# Patient Record
Sex: FEMALE | Race: WHITE | Employment: OTHER | URBAN - METROPOLITAN AREA
[De-identification: names, ages, dates, MRNs, and addresses within clinical notes are randomized per-mention and may not be internally consistent; named-entity substitution may affect disease eponyms.]

---

## 2017-02-14 ENCOUNTER — OFFICE VISIT (OUTPATIENT)
Dept: FAMILY MEDICINE CLINIC | Age: 72
End: 2017-02-14

## 2017-02-14 ENCOUNTER — HOSPITAL ENCOUNTER (OUTPATIENT)
Dept: LAB | Age: 72
Discharge: HOME | End: 2017-02-14
Payer: MEDICARE

## 2017-02-14 VITALS
HEIGHT: 68 IN | HEART RATE: 77 BPM | DIASTOLIC BLOOD PRESSURE: 72 MMHG | RESPIRATION RATE: 18 BRPM | TEMPERATURE: 97.5 F | BODY MASS INDEX: 21.61 KG/M2 | SYSTOLIC BLOOD PRESSURE: 155 MMHG | OXYGEN SATURATION: 100 % | WEIGHT: 142.6 LBS

## 2017-02-14 DIAGNOSIS — F41.9 ANXIETY: ICD-10-CM

## 2017-02-14 DIAGNOSIS — E78.5 HYPERLIPIDEMIA LDL GOAL <70: ICD-10-CM

## 2017-02-14 DIAGNOSIS — Z12.31 ENCOUNTER FOR SCREENING MAMMOGRAM FOR BREAST CANCER: ICD-10-CM

## 2017-02-14 DIAGNOSIS — B00.9 HERPES SIMPLEX TYPE 2 INFECTION: ICD-10-CM

## 2017-02-14 DIAGNOSIS — M81.0 OSTEOPOROSIS: ICD-10-CM

## 2017-02-14 DIAGNOSIS — I10 ESSENTIAL HYPERTENSION: ICD-10-CM

## 2017-02-14 DIAGNOSIS — I10 ESSENTIAL HYPERTENSION: Primary | ICD-10-CM

## 2017-02-14 LAB
ALBUMIN SERPL BCP-MCNC: 4.6 G/DL (ref 3.4–5)
ALBUMIN/GLOB SERPL: 1.6 {RATIO} (ref 0.8–1.7)
ALP SERPL-CCNC: 67 U/L (ref 45–117)
ALT SERPL-CCNC: 23 U/L (ref 13–56)
ANION GAP BLD CALC-SCNC: 10 MMOL/L (ref 3–18)
AST SERPL W P-5'-P-CCNC: 10 U/L (ref 15–37)
BILIRUB SERPL-MCNC: 1 MG/DL (ref 0.2–1)
BUN SERPL-MCNC: 17 MG/DL (ref 7–18)
BUN/CREAT SERPL: 21 (ref 12–20)
CALCIUM SERPL-MCNC: 10 MG/DL (ref 8.5–10.1)
CHLORIDE SERPL-SCNC: 95 MMOL/L (ref 100–108)
CO2 SERPL-SCNC: 25 MMOL/L (ref 21–32)
CREAT SERPL-MCNC: 0.82 MG/DL (ref 0.6–1.3)
GLOBULIN SER CALC-MCNC: 2.8 G/DL (ref 2–4)
GLUCOSE SERPL-MCNC: 88 MG/DL (ref 74–99)
POTASSIUM SERPL-SCNC: 4.5 MMOL/L (ref 3.5–5.5)
PROT SERPL-MCNC: 7.4 G/DL (ref 6.4–8.2)
SODIUM SERPL-SCNC: 130 MMOL/L (ref 136–145)

## 2017-02-14 PROCEDURE — 80053 COMPREHEN METABOLIC PANEL: CPT | Performed by: NURSE PRACTITIONER

## 2017-02-14 RX ORDER — LISINOPRIL 30 MG/1
30 TABLET ORAL DAILY
Qty: 30 TAB | Refills: 3 | Status: SHIPPED | OUTPATIENT
Start: 2017-02-14 | End: 2017-06-06 | Stop reason: SDUPTHER

## 2017-02-14 RX ORDER — BUSPIRONE HYDROCHLORIDE 15 MG/1
7.5 TABLET ORAL 2 TIMES DAILY
Qty: 30 TAB | Refills: 2 | Status: SHIPPED | OUTPATIENT
Start: 2017-02-14 | End: 2017-06-06 | Stop reason: SDUPTHER

## 2017-02-14 RX ORDER — VALACYCLOVIR HYDROCHLORIDE 1 G/1
2000 TABLET, FILM COATED ORAL 2 TIMES DAILY
Qty: 30 TAB | Refills: 1 | Status: SHIPPED | OUTPATIENT
Start: 2017-02-14 | End: 2017-06-06 | Stop reason: SDUPTHER

## 2017-02-14 RX ORDER — AMLODIPINE BESYLATE 10 MG/1
5 TABLET ORAL DAILY
Qty: 30 TAB | Refills: 2 | Status: SHIPPED | OUTPATIENT
Start: 2017-02-14 | End: 2017-06-06 | Stop reason: SDUPTHER

## 2017-02-14 NOTE — PROGRESS NOTES
Progress Note  Today's Date:  2017   Patient:  Kacy Salazar  Patient :  1945    Subjective:   Kacy Salazar is a 66 y.o. female who presents for follow up for Hypertension, Hyperlipidemia, and Anxiety. Is compliant with medication. Has Sinus congestion at this time but has not used her nasal spry because it burns her nostrils. Also want to have her preventive screenings mammogram and Bone Density done. She also wants to schedule her yearly Reclast for Osteoporosis; last infusion was in 2016. Current Outpatient Meds and Allergies     Current Outpatient Prescriptions on File Prior to Visit   Medication Sig Dispense Refill    pravastatin (PRAVACHOL) 20 mg tablet Take 1 Tab by mouth nightly. 30 Tab 3    tiZANidine (ZANAFLEX) 2 mg tablet Take  by mouth.  busPIRone (BUSPAR) 15 mg tablet Take 0.5 Tabs by mouth two (2) times a day. Indications: GENERALIZED ANXIETY DISORDER 30 Tab 2    lisinopril (PRINIVIL, ZESTRIL) 20 mg tablet Take 1 Tab by mouth daily. Indications: Hypertension 30 Tab 3    amLODIPine (NORVASC) 10 mg tablet Take 5 mg by mouth daily.  diclofenac (VOLTAREN) 1 % gel Apply  to affected area four (4) times daily.  DOCOSAHEXANOIC ACID/EPA (FISH OIL PO) Take 1,200 mg by mouth three (3) times daily.  glucosamine sulfate 1,000 mg cap Take  by mouth.  acetaminophen (TYLENOL) 650 mg CR tablet Take 650 mg by mouth every six (6) hours as needed for Pain.  VITAMIN B COMPLEX PO Take 1 mL by mouth daily.  CHOLECALCIFEROL, VITAMIN D3, (VITAMIN D3 PO) Take 400 Int'l Units by mouth daily.  CALCIUM-MAGNESIUM-ZINC PO Take  by mouth three (3) times daily.  famotidine (PEPCID) 20 mg tablet Take 20 mg by mouth two (2) times a day.  docusate sodium 100 mg tab Take  by mouth.  melatonin 1 mg tablet Take  by mouth.  ketoconazole (NIZORAL) 2 % topical cream Apply  to affected area daily.       valACYclovir (VALTREX) 1 gram tablet Take 2 Tabs by mouth two (2) times a day. Indications: SUPPRESSION OF RECURRENT HERPES SIMPLEX INFECTION 30 Tab 1     No current facility-administered medications on file prior to visit. These medications have been reviewed and reconciled with the patient during today's visit. Allergies   Allergen Reactions    Compazine [Prochlorperazine] Other (comments)     Childhood reaction    Erythromycin Nausea and Vomiting    Indocin [Indomethacin] Other (comments)     Made her feel weird to the point it scared her    Keflex [Cephalexin] Nausea and Vomiting       ROS:     CONST:   Denies fatigue, weight change, appetite change   NEURO:   Denies headaches, vision changes, dizziness, loss of consciousness  CV:      Denies chest pain, palpitations, orthopnea, PND  PULM:  Denies SOB, wheezing, cough, hemoptysis  GI:             Denies nausea, vomiting, abdominal pain, greasy stools, blood in stool,     diarrhea, constipation  :       Denies dysuria, hematuria, change in urine  MS:      Denies muscle/joint pain, joint swelling  SKIN:        Denies rashes, skin changes  ALLERGY: Denies seasonal allergies, itchy eyes      Objective:     VS:    Visit Vitals    /72 (BP 1 Location: Left arm, BP Patient Position: Sitting)    Pulse 77    Temp 97.5 °F (36.4 °C) (Oral)    Resp 18    Ht 5' 7.52\" (1.715 m)    Wt 142 lb 9.6 oz (64.7 kg)    SpO2 100%    BMI 21.99 kg/m2       General:   well-nourished, well-groomed, pleasant, alert, in no acute distress.      Head:  Normocephalic, atraumatic, MMM,   Ears:  External ears WNL, TMs WNL,   Eyes:  EOMI, PERRL, glasses  Nose:  External nares WNL, nasal congestion,    Neck:  Neck supple with normal ROM for age, no thyromegaly,  Cardiovasc:   Regular rate and rhythm, no murmurs, no rubs, no gallops,  Pulmonary:   Clear breath sounds bilaterally, good air movement, no wheezing, no rales, no rhonchi, normal respiratory effort  Abdomen:   Abdomen soft, nontender, nondistended, NABS,   Extremities:   No edema, no tenderness with palpation of calves, warm and well-perfused  Neuro:   Alert, conversant, appropriate, following commands, no focal deficits. Assessment:       1. Essential hypertension    2. Hyperlipidemia LDL goal <70    3. Anxiety    4. Herpes simplex type 2 infection    5. Osteoporosis    6. Encounter for screening mammogram for breast cancer        Plan:       Orders Placed This Encounter    DEXA BONE DENSITY STUDY AXIAL     Standing Status:   Future     Standing Expiration Date:   3/14/2018     Order Specific Question:   Reason for Exam     Answer:   Hx of Osteoporosis     Comments:   [address] [facility] MAMMO BI SCREENING INCL CAD     Standing Status:   Future     Standing Expiration Date:   3/17/2018     Order Specific Question:   Reason for Exam     Answer:   Routine     Comments:   [facility]    METABOLIC PANEL, COMPREHENSIVE     Standing Status:   Future     Standing Expiration Date:   8/16/2017    busPIRone (BUSPAR) 15 mg tablet     Sig: Take 0.5 Tabs by mouth two (2) times a day. Indications: GENERALIZED ANXIETY DISORDER     Dispense:  30 Tab     Refill:  2    amLODIPine (NORVASC) 10 mg tablet     Sig: Take 0.5 Tabs by mouth daily. Dispense:  30 Tab     Refill:  2    lisinopril (PRINIVIL, ZESTRIL) 30 mg tablet     Sig: Take 1 Tab by mouth daily. Dispense:  30 Tab     Refill:  3    valACYclovir (VALTREX) 1 gram tablet     Sig: Take 2 Tabs by mouth two (2) times a day. Indications: SUPPRESSION OF RECURRENT HERPES SIMPLEX INFECTION     Dispense:  30 Tab     Refill:  1       Advised to start taking Zyrtec daily. I have discussed the diagnosis with the patient and the intended plan as seen in the above orders. The patient has received an after-visit summary along with patient information handout. I have discussed medication side effects and warnings with the patient as well. Pt verbalized understanding.     Follow-up Disposition:  Return in about 6 months (around 8/14/2017), or if symptoms worsen or fail to improve.   AMBER Mccann  2/14/2017, 11:54 AM

## 2017-02-14 NOTE — PROGRESS NOTES
Tierra Samuels is a 66 y.o. female here for a follow up on her HTN. She would like mammo and dexa bone scan. Learning assessment previously completed. 1. Have you been to the ER, urgent care clinic or hospitalized since your last visit? no     2. Have you seen or consulted any other health care providers outside of the [address] [place] [address] since your last visit (Include any pap smears or colon screening)? Dr. Hermes Campbell, Dr. Levy Pay    Do you have an Advanced Directive? no    Would you like information on Advanced Directives?  Has paper work

## 2017-02-14 NOTE — MR AVS SNAPSHOT
Visit Information Date & Time Provider Department Dept. Phone Encounter #  
 2/14/2017 11:30 AM Mell Crouch, [facility] [phone] [id number] Upcoming Health Maintenance Date Due ZOSTER VACCINE AGE 60> 12/17/2004 GLAUCOMA SCREENING Q2Y 4/13/2017 Pneumococcal 65+ Low/Medium Risk (2 of 2 - PPSV23) 6/14/2017 MEDICARE YEARLY EXAM 11/16/2017 BREAST CANCER SCRN MAMMOGRAM 12/7/2017 COLONOSCOPY 11/4/2018 DTaP/Tdap/Td series (2 - Td) 9/11/2022 Allergies as of 2/14/2017  Review Complete On: 2/14/2017 By: AMBER Dupont Severity Noted Reaction Type Reactions Compazine [Prochlorperazine]  07/21/2016    Other (comments) Childhood reaction Erythromycin  07/21/2016   Intolerance Nausea and Vomiting Indocin [Indomethacin]  07/21/2016   Intolerance Other (comments) Made her feel weird to the point it scared her Keflex [Cephalexin]  07/21/2016   Intolerance Nausea and Vomiting Current Immunizations  Reviewed on 12/2/2016 Name Date Pneumococcal Conjugate (PCV-13) 6/14/2016 Not reviewed this visit You Were Diagnosed With   
  
 Codes Comments Essential hypertension    -  Primary ICD-10-CM: I10 
ICD-9-CM: 401.9 Hyperlipidemia LDL goal <70     ICD-10-CM: E78.5 ICD-9-CM: 272.4 Anxiety     ICD-10-CM: F41.9 ICD-9-CM: 300.00 Herpes simplex type 2 infection     ICD-10-CM: B00.9 ICD-9-CM: 054.9 Osteoporosis     ICD-10-CM: M81.0 ICD-9-CM: 733.00 Encounter for screening mammogram for breast cancer     ICD-10-CM: Z12.31 
ICD-9-CM: V76.12 Vitals BP Pulse Temp Resp Height(growth percentile) Weight(growth percentile) 155/72 (BP 1 Location: Left arm, BP Patient Position: Sitting) 77 97.5 °F (36.4 °C) (Oral) 18 5' 7.52\" (1.715 m) 142 lb 9.6 oz (64.7 kg) SpO2 BMI OB Status Smoking Status 100% 21.99 kg/m2 Postmenopausal Current Every Day Smoker Vitals History BMI and BSA Data Body Mass Index Body Surface Area  
 21.99 kg/m 2 1.76 m 2 Preferred Pharmacy Pharmacy Name Phone [facility]  [address] [phone] Your Updated Medication List  
  
   
This list is accurate as of: 2/14/17 12:45 PM.  Always use your most recent med list.  
  
  
  
  
 acetaminophen 650 mg CR tablet Commonly known as:  TYLENOL Take 650 mg by mouth every six (6) hours as needed for Pain. amLODIPine 10 mg tablet Commonly known as:  Rudene Post Take 0.5 Tabs by mouth daily. busPIRone 15 mg tablet Commonly known as:  BUSPAR Take 0.5 Tabs by mouth two (2) times a day. Indications: GENERALIZED ANXIETY DISORDER  
  
 CALCIUM-MAGNESIUM-ZINC PO Take  by mouth three (3) times daily. diclofenac 1 % Gel Commonly known as:  VOLTAREN Apply  to affected area four (4) times daily. docusate sodium 100 mg Tab Take  by mouth. famotidine 20 mg tablet Commonly known as:  PEPCID Take 20 mg by mouth two (2) times a day. FISH OIL PO Take 1,200 mg by mouth three (3) times daily. glucosamine sulfate 1,000 mg Cap Take  by mouth.  
  
 ketoconazole 2 % topical cream  
Commonly known as:  NIZORAL Apply  to affected area daily. lisinopril 30 mg tablet Commonly known as:  Delsie Commander Take 1 Tab by mouth daily. melatonin 1 mg tablet Take  by mouth.  
  
 pravastatin 20 mg tablet Commonly known as:  PRAVACHOL Take 1 Tab by mouth nightly. tiZANidine 2 mg tablet Commonly known as:  Jentila [place] Take  by mouth. valACYclovir 1 gram tablet Commonly known as:  VALTREX Take 2 Tabs by mouth two (2) times a day. Indications: SUPPRESSION OF RECURRENT HERPES SIMPLEX INFECTION  
  
 VITAMIN B COMPLEX PO Take 1 mL by mouth daily. VITAMIN D3 PO Take 400 Int'l Units by mouth daily. Prescriptions Sent to Pharmacy Refills  
 busPIRone (BUSPAR) 15 mg tablet 2 Sig: Take 0.5 Tabs by mouth two (2) times a day. Indications: GENERALIZED ANXIETY DISORDER Class: Normal  
 Pharmacy: [facility] [place] [address], [address] [place] [address]. [address] [place] [address] Ph #: [phone] Route: Oral  
 amLODIPine (NORVASC) 10 mg tablet 2 Sig: Take 0.5 Tabs by mouth daily. Class: Normal  
 Pharmacy: [facility] [place] [address], [address] [place] [address]. [address] [place] [address] Ph #: [phone] Route: Oral  
 lisinopril (PRINIVIL, ZESTRIL) 30 mg tablet 3 Sig: Take 1 Tab by mouth daily. Class: Normal  
 Pharmacy: [facility] [place] [address], [address] [place] [address] [address] [place] [address] Ph #: [phone] Route: Oral  
 valACYclovir (VALTREX) 1 gram tablet 1 Sig: Take 2 Tabs by mouth two (2) times a day. Indications: SUPPRESSION OF RECURRENT HERPES SIMPLEX INFECTION Class: Normal  
 Pharmacy: [facility] [place] [address], [address] [place] [address]. [address] [place] [address] Ph #: [phone] Route: Oral  
  
To-Do List   
 02/14/2017 Imaging:  DEXA BONE DENSITY STUDY AXIAL   
  
 02/14/2017 Imaging:  TONIE MAMMO BI SCREENING INCL CAD   
  
 05/15/2017 Lab:  METABOLIC PANEL, COMPREHENSIVE Patient Instructions High Cholesterol: Care Instructions Your Care Instructions Cholesterol is a type of fat in your blood. It is needed for many body functions, such as making new cells. Cholesterol is made by your body. It also comes from food you eat. High cholesterol means that you have too much of the fat in your blood. This raises your risk of a heart attack and stroke. LDL and HDL are part of your total cholesterol. LDL is the \"bad\" cholesterol.  High LDL can raise your risk for heart disease, heart attack, and stroke. HDL is the \"good\" cholesterol. It helps clear bad cholesterol from the body. High HDL is linked with a lower risk of heart disease, heart attack, and stroke. Your cholesterol levels help your doctor find out your risk for having a heart attack or stroke. You and your doctor can talk about whether you need to lower your risk and what treatment is best for you. A heart-healthy lifestyle along with medicines can help lower your cholesterol and your risk. The way you choose to lower your risk will depend on how high your risk is for heart attack and stroke. It will also depend on how you feel about taking medicines. Follow-up care is a key part of your treatment and safety. Be sure to make and go to all appointments, and call your doctor if you are having problems. It's also a good idea to know your test results and keep a list of the medicines you take. How can you care for yourself at home? · Eat a variety of foods every day. Good choices include fruits, vegetables, whole grains (like oatmeal), dried beans and peas, nuts and seeds, soy products (like tofu), and fat-free or low-fat dairy products. · Replace butter, margarine, and hydrogenated or partially hydrogenated oils with olive and canola oils. (Canola oil margarine without trans fat is fine.) · Replace red meat with fish, poultry, and soy protein (like tofu). · Limit processed and packaged foods like chips, crackers, and cookies. · Bake, broil, or steam foods. Don't ya them. · Be physically active. Get at least 30 minutes of exercise on most days of the week. Walking is a good choice. You also may want to do other activities, such as running, swimming, cycling, or playing tennis or team sports. · Stay at a healthy weight or lose weight by making the changes in eating and physical activity listed above. Losing just a small amount of weight, even 5 to 10 pounds, can reduce your risk for having a heart attack or stroke. · Do not smoke. When should you call for help? Watch closely for changes in your health, and be sure to contact your doctor if: 
· You need help making lifestyle changes. · You have questions about your medicine. Where can you learn more? Go to http://alfa-gino.info/. Enter R351 in the search box to learn more about \"High Cholesterol: Care Instructions. \" Current as of: January 27, 2016 Content Version: 11.1 © 2006-2016 [employer]. Care instructions adapted under license by [employer] (which disclaims liability or warranty for this information). If you have questions about a medical condition or this instruction, always ask your healthcare professional. [place] [address] any warranty or liability for your use of this information. DASH Diet: Care Instructions Your Care Instructions The DASH diet is an eating plan that can help lower your blood pressure. DASH stands for Dietary Approaches to Stop Hypertension. Hypertension is high blood pressure. The DASH diet focuses on eating foods that are high in calcium, potassium, and magnesium. These nutrients can lower blood pressure. The foods that are highest in these nutrients are fruits, vegetables, low-fat dairy products, nuts, seeds, and legumes. But taking calcium, potassium, and magnesium supplements instead of eating foods that are high in those nutrients does not have the same effect. The DASH diet also includes whole grains, fish, and poultry. The DASH diet is one of several lifestyle changes your doctor may recommend to lower your high blood pressure. Your doctor may also want you to decrease the amount of sodium in your diet. Lowering sodium while following the DASH diet can lower blood pressure even further than just the DASH diet alone. Follow-up care is a key part of your treatment and safety.  Be sure to make and go to all appointments, and call your doctor if you are having problems. It's also a good idea to know your test results and keep a list of the medicines you take. How can you care for yourself at home? Following the DASH diet · Eat 4 to 5 servings of fruit each day. A serving is 1 medium-sized piece of fruit, ½ cup chopped or canned fruit, 1/4 cup dried fruit, or 4 ounces (½ cup) of fruit juice. Choose fruit more often than fruit juice. · Eat 4 to 5 servings of vegetables each day. A serving is 1 cup of lettuce or raw leafy vegetables, ½ cup of chopped or cooked vegetables, or 4 ounces (½ cup) of vegetable juice. Choose vegetables more often than vegetable juice. · Get 2 to 3 servings of low-fat and fat-free dairy each day. A serving is 8 ounces of milk, 1 cup of yogurt, or 1 ½ ounces of cheese. · Eat 6 to 8 servings of grains each day. A serving is 1 slice of bread, 1 ounce of dry cereal, or ½ cup of cooked rice, pasta, or cooked cereal. Try to choose whole-grain products as much as possible. · Limit lean meat, poultry, and fish to 2 servings each day. A serving is 3 ounces, about the size of a deck of cards. · Eat 4 to 5 servings of nuts, seeds, and legumes (cooked dried beans, lentils, and split peas) each week. A serving is 1/3 cup of nuts, 2 tablespoons of seeds, or ½ cup of cooked beans or peas. · Limit fats and oils to 2 to 3 servings each day. A serving is 1 teaspoon of vegetable oil or 2 tablespoons of salad dressing. · Limit sweets and added sugars to 5 servings or less a week. A serving is 1 tablespoon jelly or jam, ½ cup sorbet, or 1 cup of lemonade. · Eat less than 2,300 milligrams (mg) of sodium a day. If you limit your sodium to 1,500 mg a day, you can lower your blood pressure even more. Tips for success · Start small. Do not try to make dramatic changes to your diet all at once. You might feel that you are missing out on your favorite foods and then be more likely to not follow the plan.  Make small changes, and stick with them. Once those changes become habit, add a few more changes. · Try some of the following: ¨ Make it a goal to eat a fruit or vegetable at every meal and at snacks. This will make it easy to get the recommended amount of fruits and vegetables each day. ¨ Try yogurt topped with fruit and nuts for a snack or healthy dessert. ¨ Add lettuce, tomato, cucumber, and onion to sandwiches. ¨ Combine a ready-made pizza crust with low-fat mozzarella cheese and lots of vegetable toppings. Try using tomatoes, squash, spinach, broccoli, carrots, cauliflower, and onions. ¨ Have a variety of cut-up vegetables with a low-fat dip as an appetizer instead of chips and dip. ¨ Sprinkle sunflower seeds or chopped almonds over salads. Or try adding chopped walnuts or almonds to cooked vegetables. ¨ Try some vegetarian meals using beans and peas. Add garbanzo or kidney beans to salads. Make burritos and tacos with mashed avila beans or black beans. Where can you learn more? Go to http://alfa-gino.info/. Enter M273 in the search box to learn more about \"DASH Diet: Care Instructions. \" Current as of: March 23, 2016 Content Version: 11.1 © 5699-8580 [employer]. Care instructions adapted under license by [employer] (which disclaims liability or warranty for this information). If you have questions about a medical condition or this instruction, always ask your healthcare professional. [place] [address] any warranty or liability for your use of this information. Low Sodium Diet (2,000 Milligram): Care Instructions Your Care Instructions Too much sodium causes your body to hold on to extra water. This can raise your blood pressure and force your heart and kidneys to work harder. In very serious cases, this could cause you to be put in the hospital. It might even be life-threatening.  By limiting sodium, you will feel better and lower your risk of serious problems. The most common source of sodium is salt. People get most of the salt in their diet from canned, prepared, and packaged foods. Fast food and restaurant meals also are very high in sodium. Your doctor will probably limit your sodium to less than 2,000 milligrams (mg) a day. This limit counts all the sodium in prepared and packaged foods and any salt you add to your food. Follow-up care is a key part of your treatment and safety. Be sure to make and go to all appointments, and call your doctor if you are having problems. It's also a good idea to know your test results and keep a list of the medicines you take. How can you care for yourself at home? Read food labels · Read labels on cans and food packages. The labels tell you how much sodium is in each serving. Make sure that you look at the serving size. If you eat more than the serving size, you have eaten more sodium. · Food labels also tell you the Percent Daily Value for sodium. Choose products with low Percent Daily Values for sodium. · Be aware that sodium can come in forms other than salt, including monosodium glutamate (MSG), sodium citrate, and sodium bicarbonate (baking soda). MSG is often added to Asian food. When you eat out, you can sometimes ask for food without MSG or added salt. Buy low-sodium foods · Buy foods that are labeled \"unsalted\" (no salt added), \"sodium-free\" (less than 5 mg of sodium per serving), or \"low-sodium\" (less than 140 mg of sodium per serving). Foods labeled \"reduced-sodium\" and \"light sodium\" may still have too much sodium. Be sure to read the label to see how much sodium you are getting. · Buy fresh vegetables, or frozen vegetables without added sauces. Buy low-sodium versions of canned vegetables, soups, and other canned goods. Prepare low-sodium meals · Cut back on the amount of salt you use in cooking.  This will help you adjust to the taste. Do not add salt after cooking. One teaspoon of salt has about 2,300 mg of sodium. · Take the salt shaker off the table. · Flavor your food with garlic, lemon juice, onion, vinegar, herbs, and spices. Do not use soy sauce, lite soy sauce, steak sauce, onion salt, garlic salt, celery salt, mustard, or ketchup on your food. · Use low-sodium salad dressings, sauces, and ketchup. Or make your own salad dressings and sauces without adding salt. · Use less salt (or none) when recipes call for it. You can often use half the salt a recipe calls for without losing flavor. Other foods such as rice, pasta, and grains do not need added salt. · Rinse canned vegetables, and cook them in fresh water. This removes somebut not allof the salt. · Avoid water that is naturally high in sodium or that has been treated with water softeners, which add sodium. Call your local water company to find out the sodium content of your water supply. If you buy bottled water, read the label and choose a sodium-free brand. Avoid high-sodium foods · Avoid eating: ¨ Smoked, cured, salted, and canned meat, fish, and poultry. ¨ Ham, gomez, hot dogs, and luncheon meats. ¨ Regular, hard, and processed cheese and regular peanut butter. ¨ Crackers with salted tops, and other salted snack foods such as pretzels, chips, and salted popcorn. ¨ Frozen prepared meals, unless labeled low-sodium. ¨ Canned and dried soups, broths, and bouillon, unless labeled sodium-free or low-sodium. ¨ Canned vegetables, unless labeled sodium-free or low-sodium. ¨ [place]ia fries, pizza, tacos, and other fast foods. ¨ Pickles, olives, ketchup, and other condiments, especially soy sauce, unless labeled sodium-free or low-sodium. Where can you learn more? Go to http://alfa-gino.info/. Enter G331 in the search box to learn more about \"Low Sodium Diet (2,000 Milligram): Care Instructions. \" Current as of: July 26, 2016 Content Version: 11.1 © 9095-8766 [employer]. Care instructions adapted under license by Robert Applebaum MD (which disclaims liability or warranty for this information). If you have questions about a medical condition or this instruction, always ask your healthcare professional. [address] any warranty or liability for your use of this information. Introducing [facility]! [facility] introduces Krugle patient portal. Now you can access parts of your medical record, email your doctor's office, and request medication refills online. 1. In your internet browser, go to [email or web address] 2. Click on the First Time User? Click Here link in the Sign In box. You will see the New Member Sign Up page. 3. Enter your Krugle Access Code exactly as it appears below. You will not need to use this code after youve completed the sign-up process. If you do not sign up before the expiration date, you must request a new code. · Krugle Access Code: [email or web address] Expires: 5/15/2017 12:44 PM 
 
4. Enter the last four digits of your Social Security Number (xxxx) and Date of Birth (mm/dd/yyyy) as indicated and click Submit. You will be taken to the next sign-up page. 5. Create a GlucoSentientt ID. This will be your Krugle login ID and cannot be changed, so think of one that is secure and easy to remember. 6. Create a Krugle password. You can change your password at any time. 7. Enter your Password Reset Question and Answer. This can be used at a later time if you forget your password. 8. Enter your e-mail address. You will receive e-mail notification when new information is available in [address] 9. Click Sign Up. You can now view and download portions of your medical record. 10. Click the Download Summary menu link to download a portable copy of your medical information. If you have questions, please visit the Frequently Asked Questions section of the [employer] website. Remember, RightSignature is NOT to be used for urgent needs. For medical emergencies, dial 911. Now available from your iPhone and Android! Please provide this summary of care documentation to your next provider. Your primary care clinician is listed as Ronen Tolbert. If you have any questions after today's visit, please call [phone].

## 2017-02-14 NOTE — PATIENT INSTRUCTIONS
High Cholesterol: Care Instructions  Your Care Instructions  Cholesterol is a type of fat in your blood. It is needed for many body functions, such as making new cells. Cholesterol is made by your body. It also comes from food you eat. High cholesterol means that you have too much of the fat in your blood. This raises your risk of a heart attack and stroke. LDL and HDL are part of your total cholesterol. LDL is the \"bad\" cholesterol. High LDL can raise your risk for heart disease, heart attack, and stroke. HDL is the \"good\" cholesterol. It helps clear bad cholesterol from the body. High HDL is linked with a lower risk of heart disease, heart attack, and stroke. Your cholesterol levels help your doctor find out your risk for having a heart attack or stroke. You and your doctor can talk about whether you need to lower your risk and what treatment is best for you. A heart-healthy lifestyle along with medicines can help lower your cholesterol and your risk. The way you choose to lower your risk will depend on how high your risk is for heart attack and stroke. It will also depend on how you feel about taking medicines. Follow-up care is a key part of your treatment and safety. Be sure to make and go to all appointments, and call your doctor if you are having problems. It's also a good idea to know your test results and keep a list of the medicines you take. How can you care for yourself at home? · Eat a variety of foods every day. Good choices include fruits, vegetables, whole grains (like oatmeal), dried beans and peas, nuts and seeds, soy products (like tofu), and fat-free or low-fat dairy products. · Replace butter, margarine, and hydrogenated or partially hydrogenated oils with olive and canola oils. (Canola oil margarine without trans fat is fine.)  · Replace red meat with fish, poultry, and soy protein (like tofu). · Limit processed and packaged foods like chips, crackers, and cookies.   · Bake, broil, or steam foods. Don't ya them. · Be physically active. Get at least 30 minutes of exercise on most days of the week. Walking is a good choice. You also may want to do other activities, such as running, swimming, cycling, or playing tennis or team sports. · Stay at a healthy weight or lose weight by making the changes in eating and physical activity listed above. Losing just a small amount of weight, even 5 to 10 pounds, can reduce your risk for having a heart attack or stroke. · Do not smoke. When should you call for help? Watch closely for changes in your health, and be sure to contact your doctor if:  · You need help making lifestyle changes. · You have questions about your medicine. Where can you learn more? Go to http://alfa-gino.info/. Enter N616 in the search box to learn more about \"High Cholesterol: Care Instructions. \"  Current as of: January 27, 2016  Content Version: 11.1  © 6091-6677 [employer]. Care instructions adapted under license by [employer] (which disclaims liability or warranty for this information). If you have questions about a medical condition or this instruction, always ask your healthcare professional. [address] any warranty or liability for your use of this information. DASH Diet: Care Instructions  Your Care Instructions  The DASH diet is an eating plan that can help lower your blood pressure. DASH stands for Dietary Approaches to Stop Hypertension. Hypertension is high blood pressure. The DASH diet focuses on eating foods that are high in calcium, potassium, and magnesium. These nutrients can lower blood pressure. The foods that are highest in these nutrients are fruits, vegetables, low-fat dairy products, nuts, seeds, and legumes. But taking calcium, potassium, and magnesium supplements instead of eating foods that are high in those nutrients does not have the same effect.  The DASH diet also includes whole grains, fish, and poultry. The DASH diet is one of several lifestyle changes your doctor may recommend to lower your high blood pressure. Your doctor may also want you to decrease the amount of sodium in your diet. Lowering sodium while following the DASH diet can lower blood pressure even further than just the DASH diet alone. Follow-up care is a key part of your treatment and safety. Be sure to make and go to all appointments, and call your doctor if you are having problems. It's also a good idea to know your test results and keep a list of the medicines you take. How can you care for yourself at home? Following the DASH diet  · Eat 4 to 5 servings of fruit each day. A serving is 1 medium-sized piece of fruit, ½ cup chopped or canned fruit, 1/4 cup dried fruit, or 4 ounces (½ cup) of fruit juice. Choose fruit more often than fruit juice. · Eat 4 to 5 servings of vegetables each day. A serving is 1 cup of lettuce or raw leafy vegetables, ½ cup of chopped or cooked vegetables, or 4 ounces (½ cup) of vegetable juice. Choose vegetables more often than vegetable juice. · Get 2 to 3 servings of low-fat and fat-free dairy each day. A serving is 8 ounces of milk, 1 cup of yogurt, or 1 ½ ounces of cheese. · Eat 6 to 8 servings of grains each day. A serving is 1 slice of bread, 1 ounce of dry cereal, or ½ cup of cooked rice, pasta, or cooked cereal. Try to choose whole-grain products as much as possible. · Limit lean meat, poultry, and fish to 2 servings each day. A serving is 3 ounces, about the size of a deck of cards. · Eat 4 to 5 servings of nuts, seeds, and legumes (cooked dried beans, lentils, and split peas) each week. A serving is 1/3 cup of nuts, 2 tablespoons of seeds, or ½ cup of cooked beans or peas. · Limit fats and oils to 2 to 3 servings each day. A serving is 1 teaspoon of vegetable oil or 2 tablespoons of salad dressing. · Limit sweets and added sugars to 5 servings or less a week. A serving is 1 tablespoon jelly or jam, ½ cup sorbet, or 1 cup of lemonade. · Eat less than 2,300 milligrams (mg) of sodium a day. If you limit your sodium to 1,500 mg a day, you can lower your blood pressure even more. Tips for success  · Start small. Do not try to make dramatic changes to your diet all at once. You might feel that you are missing out on your favorite foods and then be more likely to not follow the plan. Make small changes, and stick with them. Once those changes become habit, add a few more changes. · Try some of the following:  ¨ Make it a goal to eat a fruit or vegetable at every meal and at snacks. This will make it easy to get the recommended amount of fruits and vegetables each day. ¨ Try yogurt topped with fruit and nuts for a snack or healthy dessert. ¨ Add lettuce, tomato, cucumber, and onion to sandwiches. ¨ Combine a ready-made pizza crust with low-fat mozzarella cheese and lots of vegetable toppings. Try using tomatoes, squash, spinach, broccoli, carrots, cauliflower, and onions. ¨ Have a variety of cut-up vegetables with a low-fat dip as an appetizer instead of chips and dip. ¨ Sprinkle sunflower seeds or chopped almonds over salads. Or try adding chopped walnuts or almonds to cooked vegetables. ¨ Try some vegetarian meals using beans and peas. Add garbanzo or kidney beans to salads. Make burritos and tacos with mashed avila beans or black beans. Where can you learn more? Go to http://alfa-gino.info/. Enter Q628 in the search box to learn more about \"DASH Diet: Care Instructions. \"  Current as of: March 23, 2016  Content Version: 11.1  © 8378-1301 [employer]. Care instructions adapted under license by [employer] (which disclaims liability or warranty for this information).  If you have questions about a medical condition or this instruction, always ask your healthcare professional. Homer [employer] disclaims any warranty or liability for your use of this information. Low Sodium Diet (2,000 Milligram): Care Instructions  Your Care Instructions  Too much sodium causes your body to hold on to extra water. This can raise your blood pressure and force your heart and kidneys to work harder. In very serious cases, this could cause you to be put in the hospital. It might even be life-threatening. By limiting sodium, you will feel better and lower your risk of serious problems. The most common source of sodium is salt. People get most of the salt in their diet from canned, prepared, and packaged foods. Fast food and restaurant meals also are very high in sodium. Your doctor will probably limit your sodium to less than 2,000 milligrams (mg) a day. This limit counts all the sodium in prepared and packaged foods and any salt you add to your food. Follow-up care is a key part of your treatment and safety. Be sure to make and go to all appointments, and call your doctor if you are having problems. It's also a good idea to know your test results and keep a list of the medicines you take. How can you care for yourself at home? Read food labels  · Read labels on cans and food packages. The labels tell you how much sodium is in each serving. Make sure that you look at the serving size. If you eat more than the serving size, you have eaten more sodium. · Food labels also tell you the Percent Daily Value for sodium. Choose products with low Percent Daily Values for sodium. · Be aware that sodium can come in forms other than salt, including monosodium glutamate (MSG), sodium citrate, and sodium bicarbonate (baking soda). MSG is often added to Asian food. When you eat out, you can sometimes ask for food without MSG or added salt. Buy low-sodium foods  · Buy foods that are labeled \"unsalted\" (no salt added), \"sodium-free\" (less than 5 mg of sodium per serving), or \"low-sodium\" (less than 140 mg of sodium per serving).  Foods labeled \"reduced-sodium\" and \"light sodium\" may still have too much sodium. Be sure to read the label to see how much sodium you are getting. · Buy fresh vegetables, or frozen vegetables without added sauces. Buy low-sodium versions of canned vegetables, soups, and other canned goods. Prepare low-sodium meals  · Cut back on the amount of salt you use in cooking. This will help you adjust to the taste. Do not add salt after cooking. One teaspoon of salt has about 2,300 mg of sodium. · Take the salt shaker off the table. · Flavor your food with garlic, lemon juice, onion, vinegar, herbs, and spices. Do not use soy sauce, lite soy sauce, steak sauce, onion salt, garlic salt, celery salt, mustard, or ketchup on your food. · Use low-sodium salad dressings, sauces, and ketchup. Or make your own salad dressings and sauces without adding salt. · Use less salt (or none) when recipes call for it. You can often use half the salt a recipe calls for without losing flavor. Other foods such as rice, pasta, and grains do not need added salt. · Rinse canned vegetables, and cook them in fresh water. This removes some--but not all--of the salt. · Avoid water that is naturally high in sodium or that has been treated with water softeners, which add sodium. Call your local water company to find out the sodium content of your water supply. If you buy bottled water, read the label and choose a sodium-free brand. Avoid high-sodium foods  · Avoid eating:  ¨ Smoked, cured, salted, and canned meat, fish, and poultry. ¨ Ham, gomez, hot dogs, and luncheon meats. ¨ Regular, hard, and processed cheese and regular peanut butter. ¨ Crackers with salted tops, and other salted snack foods such as pretzels, chips, and salted popcorn. ¨ Frozen prepared meals, unless labeled low-sodium. ¨ Canned and dried soups, broths, and bouillon, unless labeled sodium-free or low-sodium. ¨ Canned vegetables, unless labeled sodium-free or low-sodium.   [facility] fries, pizza, tacos, and other fast foods. ¨ Pickles, olives, ketchup, and other condiments, especially soy sauce, unless labeled sodium-free or low-sodium. Where can you learn more? Go to http://alfa-gino.info/. Enter N433 in the search box to learn more about \"Low Sodium Diet (2,000 Milligram): Care Instructions. \"  Current as of: July 26, 2016  Content Version: 11.1  © 8726-0394 [employer], [employer]. Care instructions adapted under license by [employer] (which disclaims liability or warranty for this information). If you have questions about a medical condition or this instruction, always ask your healthcare professional. [address] any warranty or liability for your use of this information.

## 2017-02-18 NOTE — PROGRESS NOTES
Patient called regarding lab result. Advised to add salt in her diet. She stated that she had this problem in the past and was eating a hand full of sea salt nuts daily and it brought her level back up. Advised to to that and if she starts to feel sluggish to call 911. Level will be rechecked next week. She verbalized understanding.

## 2017-02-24 ENCOUNTER — CLINICAL SUPPORT (OUTPATIENT)
Dept: FAMILY MEDICINE CLINIC | Age: 72
End: 2017-02-24

## 2017-02-24 ENCOUNTER — HOSPITAL ENCOUNTER (OUTPATIENT)
Dept: LAB | Age: 72
Discharge: HOME | End: 2017-02-24
Payer: MEDICARE

## 2017-02-24 DIAGNOSIS — R89.9 ABNORMAL LABORATORY TEST: ICD-10-CM

## 2017-02-24 DIAGNOSIS — R89.9 ABNORMAL LABORATORY TEST: Primary | ICD-10-CM

## 2017-02-24 LAB
ANION GAP BLD CALC-SCNC: 7 MMOL/L (ref 3–18)
BUN SERPL-MCNC: 15 MG/DL (ref 7–18)
BUN/CREAT SERPL: 19 (ref 12–20)
CALCIUM SERPL-MCNC: 9 MG/DL (ref 8.5–10.1)
CHLORIDE SERPL-SCNC: 96 MMOL/L (ref 100–108)
CO2 SERPL-SCNC: 28 MMOL/L (ref 21–32)
CREAT SERPL-MCNC: 0.8 MG/DL (ref 0.6–1.3)
GLUCOSE SERPL-MCNC: 79 MG/DL (ref 74–99)
POTASSIUM SERPL-SCNC: 4.3 MMOL/L (ref 3.5–5.5)
SODIUM SERPL-SCNC: 131 MMOL/L (ref 136–145)

## 2017-02-24 PROCEDURE — 80048 BASIC METABOLIC PNL TOTAL CA: CPT | Performed by: NURSE PRACTITIONER

## 2017-02-28 ENCOUNTER — TELEPHONE (OUTPATIENT)
Dept: FAMILY MEDICINE CLINIC | Age: 72
End: 2017-02-28

## 2017-02-28 NOTE — PROGRESS NOTES
Also, her sodium level has slightly come up. She should increase salt intake. We will recheck lab in two weeks. Thank you.

## 2017-02-28 NOTE — TELEPHONE ENCOUNTER
Patient was returning a call from [address]. Please contact the patient at your convenience.

## 2017-03-14 ENCOUNTER — HOSPITAL ENCOUNTER (OUTPATIENT)
Dept: LAB | Age: 72
Discharge: HOME | End: 2017-03-14
Payer: MEDICARE

## 2017-03-14 ENCOUNTER — CLINICAL SUPPORT (OUTPATIENT)
Dept: FAMILY MEDICINE CLINIC | Age: 72
End: 2017-03-14

## 2017-03-14 DIAGNOSIS — R89.9 ABNORMAL LABORATORY TEST: ICD-10-CM

## 2017-03-14 DIAGNOSIS — I10 ESSENTIAL HYPERTENSION: Primary | ICD-10-CM

## 2017-03-14 LAB
ANION GAP BLD CALC-SCNC: 9 MMOL/L (ref 3–18)
BUN SERPL-MCNC: 16 MG/DL (ref 7–18)
BUN/CREAT SERPL: 16 (ref 12–20)
CALCIUM SERPL-MCNC: 9.2 MG/DL (ref 8.5–10.1)
CHLORIDE SERPL-SCNC: 98 MMOL/L (ref 100–108)
CO2 SERPL-SCNC: 26 MMOL/L (ref 21–32)
CREAT SERPL-MCNC: 1.01 MG/DL (ref 0.6–1.3)
GLUCOSE SERPL-MCNC: 76 MG/DL (ref 74–99)
POTASSIUM SERPL-SCNC: 4.7 MMOL/L (ref 3.5–5.5)
SODIUM SERPL-SCNC: 133 MMOL/L (ref 136–145)

## 2017-03-14 PROCEDURE — 80048 BASIC METABOLIC PNL TOTAL CA: CPT | Performed by: NURSE PRACTITIONER

## 2017-03-17 NOTE — PROGRESS NOTES
Please Fiona Joaquin let patient know that her Sodium is coming up. She should continue to increase salt in her diet and we will recheck in 2 weeks. If she has any symptoms, she should let the office know of go to the ED. Thank you.

## 2017-03-29 ENCOUNTER — HOSPITAL ENCOUNTER (OUTPATIENT)
Dept: LAB | Age: 72
Discharge: HOME | End: 2017-03-29
Payer: MEDICARE

## 2017-03-29 ENCOUNTER — CLINICAL SUPPORT (OUTPATIENT)
Dept: FAMILY MEDICINE CLINIC | Age: 72
End: 2017-03-29

## 2017-03-29 DIAGNOSIS — E87.1 HYPONATREMIA: Primary | ICD-10-CM

## 2017-03-29 DIAGNOSIS — E87.1 HYPONATREMIA: ICD-10-CM

## 2017-03-29 LAB
ANION GAP BLD CALC-SCNC: 8 MMOL/L (ref 3–18)
BUN SERPL-MCNC: 18 MG/DL (ref 7–18)
BUN/CREAT SERPL: 18 (ref 12–20)
CALCIUM SERPL-MCNC: 9.1 MG/DL (ref 8.5–10.1)
CHLORIDE SERPL-SCNC: 99 MMOL/L (ref 100–108)
CO2 SERPL-SCNC: 25 MMOL/L (ref 21–32)
CREAT SERPL-MCNC: 0.99 MG/DL (ref 0.6–1.3)
GLUCOSE SERPL-MCNC: 70 MG/DL (ref 74–99)
POTASSIUM SERPL-SCNC: 4.8 MMOL/L (ref 3.5–5.5)
SODIUM SERPL-SCNC: 132 MMOL/L (ref 136–145)

## 2017-03-29 PROCEDURE — 80048 BASIC METABOLIC PNL TOTAL CA: CPT | Performed by: FAMILY MEDICINE

## 2017-04-06 RX ORDER — PRAVASTATIN SODIUM 20 MG/1
20 TABLET ORAL
Qty: 30 TAB | Refills: 3 | Status: SHIPPED | OUTPATIENT
Start: 2017-04-06 | End: 2017-06-06 | Stop reason: SDUPTHER

## 2017-08-14 ENCOUNTER — OFFICE VISIT (OUTPATIENT)
Dept: FAMILY MEDICINE CLINIC | Age: 72
End: 2017-08-14

## 2017-08-14 VITALS
DIASTOLIC BLOOD PRESSURE: 74 MMHG | TEMPERATURE: 98.4 F | SYSTOLIC BLOOD PRESSURE: 167 MMHG | RESPIRATION RATE: 18 BRPM | HEART RATE: 66 BPM | HEIGHT: 68 IN | OXYGEN SATURATION: 98 % | WEIGHT: 145 LBS | BODY MASS INDEX: 21.98 KG/M2

## 2017-08-14 DIAGNOSIS — F41.9 ANXIETY: ICD-10-CM

## 2017-08-14 DIAGNOSIS — I10 ESSENTIAL HYPERTENSION: Primary | ICD-10-CM

## 2017-08-14 DIAGNOSIS — Z12.31 ENCOUNTER FOR SCREENING MAMMOGRAM FOR BREAST CANCER: ICD-10-CM

## 2017-08-14 DIAGNOSIS — B00.9 HERPES SIMPLEX TYPE 2 INFECTION: ICD-10-CM

## 2017-08-14 DIAGNOSIS — Z13.5 SCREENING FOR GLAUCOMA: ICD-10-CM

## 2017-08-14 DIAGNOSIS — E78.5 HYPERLIPIDEMIA LDL GOAL <70: ICD-10-CM

## 2017-08-14 DIAGNOSIS — F17.200 NEEDS SMOKING CESSATION EDUCATION: ICD-10-CM

## 2017-08-14 DIAGNOSIS — J30.9 ALLERGIC RHINITIS, UNSPECIFIED ALLERGIC RHINITIS TRIGGER, UNSPECIFIED RHINITIS SEASONALITY: ICD-10-CM

## 2017-08-14 DIAGNOSIS — E87.1 HYPONATREMIA: ICD-10-CM

## 2017-08-14 DIAGNOSIS — M47.22 OSTEOARTHRITIS OF SPINE WITH RADICULOPATHY, CERVICAL REGION: ICD-10-CM

## 2017-08-14 RX ORDER — LEVOCETIRIZINE DIHYDROCHLORIDE 5 MG/1
5 TABLET, FILM COATED ORAL DAILY
Qty: 30 TAB | Refills: 2 | Status: SHIPPED | OUTPATIENT
Start: 2017-08-14 | End: 2017-11-14 | Stop reason: SINTOL

## 2017-08-14 NOTE — PROGRESS NOTES
Payton Duncan is a 66 y.o. female here today for a follow up on HTN. Would like to discuss changing her allergy medication. Learning assessment previously completed. 1. Have you been to the ER, urgent care clinic or hospitalized since your last visit? no     2. Have you seen or consulted any other health care providers outside of the [facility] since your last visit (Include any pap smears or colon screening)? Dr. Igor Hung     Do you have an Advanced Directive? no    Would you like information on Advanced Directives?  Has paper work

## 2017-08-14 NOTE — PROGRESS NOTES
Today's Date:  2017   Patient:  Kathryn Manjarrez  Patient :  1945    Subjective:   Kathryn Manjarrez is a 66 y.o. female who presents for follow up for Chronic Diseases. Is compliant with medication; Also present with c/o sinus pressure and running nose. This is a chronic problem. Was taking Zyrtec daily which helped controlled symptoms but did not take for almost three weeks and for the past week, she has been suffering of exacerbation of symptoms. States that she has also started taking Zyrtec for the same time and it is not helping. States that she has a dog that has been sleeping on her bed for the past nine years and she can not have it sleep on the floor because it is her baby. She also uses Flonase Senimist daily. Denies fevers, chills, nausea, vomiting, diarrhea, Chest Pain, dizziness, SOB, headache, lightheadedness or vision change. Current Outpatient Meds and Allergies     Current Outpatient Prescriptions on File Prior to Visit   Medication Sig Dispense Refill    lisinopril (PRINIVIL, ZESTRIL) 30 mg tablet Take 1 Tab by mouth daily. 30 Tab 2    amLODIPine (NORVASC) 10 mg tablet Take 0.5 Tabs by mouth daily. 30 Tab 2    busPIRone (BUSPAR) 15 mg tablet Take 0.5 Tabs by mouth two (2) times a day. Indications: GENERALIZED ANXIETY DISORDER 30 Tab 2    pravastatin (PRAVACHOL) 20 mg tablet Take 1 Tab by mouth nightly. 30 Tab 2    valACYclovir (VALTREX) 1 gram tablet Take 2 Tabs by mouth two (2) times a day. Indications: SUPPRESSION OF RECURRENT HERPES SIMPLEX INFECTION 30 Tab 1    tiZANidine (ZANAFLEX) 2 mg tablet Take  by mouth.  diclofenac (VOLTAREN) 1 % gel Apply  to affected area four (4) times daily.  DOCOSAHEXANOIC ACID/EPA (FISH OIL PO) Take 1,200 mg by mouth three (3) times daily.  glucosamine sulfate 1,000 mg cap Take  by mouth.  acetaminophen (TYLENOL) 650 mg CR tablet Take 650 mg by mouth every six (6) hours as needed for Pain.       VITAMIN B COMPLEX PO Take 1 mL by mouth daily.  CHOLECALCIFEROL, VITAMIN D3, (VITAMIN D3 PO) Take 400 Int'l Units by mouth daily.  CALCIUM-MAGNESIUM-ZINC PO Take  by mouth three (3) times daily.  famotidine (PEPCID) 20 mg tablet Take 20 mg by mouth two (2) times a day.  docusate sodium 100 mg tab Take  by mouth.  melatonin 1 mg tablet Take 3 mg by mouth.  ketoconazole (NIZORAL) 2 % topical cream Apply  to affected area daily. No current facility-administered medications on file prior to visit. These medications have been reviewed and reconciled with the patient during today's visit. Allergies   Allergen Reactions    Compazine [Prochlorperazine] Other (comments)     Childhood reaction    Erythromycin Nausea and Vomiting    Indocin [Indomethacin] Other (comments)     Made her feel weird to the point it scared her    Keflex [Cephalexin] Nausea and Vomiting         ROS:     CONST:   Denies fatigue, weight change, appetite change   NEURO:   Denies headaches, vision changes, dizziness, loss of consciousness  ENT:  Admits to sinus pressure and allergies  CV:      Denies chest pain, palpitations, orthopnea, PND  PULM:  Admit to cough smoker; Denies SOB, wheezing, hemoptysis  GI:             Denies nausea, vomiting, abdominal pain, greasy stools, blood in stool,     diarrhea, constipation  :       Denies dysuria, hematuria, change in urine  MS:      Denies muscle/joint pain, joint swelling  SKIN:        Denies rashes, skin changes  HEME: Denies easy bleeding/bruising    Objective:     VS:    Visit Vitals    /74 (BP 1 Location: Left arm, BP Patient Position: Sitting)    Pulse 66    Temp 98.4 °F (36.9 °C) (Oral)    Resp 18    Ht 5' 7.52\" (1.715 m)    Wt 145 lb (65.8 kg)    SpO2 98%    BMI 22.36 kg/m2       General:   Well-nourished, well-groomed, pleasant, alert, in no acute distress.      Head:  Normocephalic, atraumatic, MMM,   Ears:  External ears WNL, TMs WNL,   Eyes:  EOMI, PERRL, Nose:  External nares WNL; pressure with palpation of maxillary sinuses without pain  Neck:  Neck supple with normal ROM for age, no thyromegaly,  Cardiovasc:   Regular rate and rhythm, no murmurs, no rubs, no gallops,   Pulmonary:   Clear breath sounds bilaterally, good air movement, no wheezing, no rales, no rhonchi, normal respiratory effort  Abdomen:   Abdomen soft, nontender, nondistended, NABS,  Extremities:   No edema, no tenderness with palpation of calves, warm and well-perfused  Neuro:   Alert, conversant, appropriate, following commands, no focal deficits. Assessment:       1. Essential hypertension    2. Anxiety    3. Osteoarthritis of spine with radiculopathy, cervical region    4. Hyperlipidemia LDL goal <70    5. Herpes simplex type 2 infection    6. Allergic rhinitis, unspecified allergic rhinitis trigger, unspecified rhinitis seasonality    7. Encounter for screening mammogram for breast cancer    8. Needs smoking cessation education    9. Screening for glaucoma    10.  Hyponatremia        Plan:       Orders Placed This Encounter    CBC WITH AUTOMATED DIFF     Standing Status:   Future     Standing Expiration Date:   2/14/2018    MICROALBUMIN, UR, RAND W/ MICROALBUMIN/CREA RATIO     Standing Status:   Future     Standing Expiration Date:   4/39/3531    METABOLIC PANEL, COMPREHENSIVE     Standing Status:   Future     Standing Expiration Date:   2/13/2018    LIPID PANEL     Standing Status:   Future     Standing Expiration Date:   2/13/2018    URINALYSIS W/ RFLX MICROSCOPIC     Standing Status:   Future     Standing Expiration Date:   2/13/2018    TSH 3RD GENERATION     Standing Status:   Future     Standing Expiration Date:   2/13/2018    REFERRAL TO OPHTHALMOLOGY     Referral Priority:   Routine     Referral Type:   Consultation     Referral Reason:   Specialty Services Required     Referred to Provider:   Tim Shahid OD    fluticasone (FLONASE SENSIMIST) 27.5 mcg/actuation nasal spray     Si Sprays by Nasal route daily.  levocetirizine (XYZAL) 5 mg tablet     Sig: Take 1 Tab by mouth daily. Dispense:  30 Tab     Refill:  2     Continue taking medication as prescribed. Return to ENT if symptoms persist.   I have discussed the diagnosis with the patient and the intended plan as seen in the above orders. The patient has received an after-visit summary along with patient information handout. I have discussed medication side effects and warnings with the patient as well. Pt verbalized understanding. Follow-up Disposition:  Return in about 3 months (around 2017).   AMBER Wayne  2017, 3:52 PM

## 2017-08-14 NOTE — PATIENT INSTRUCTIONS
Allergies: Care Instructions  Your Care Instructions  Allergies occur when your body's defense system (immune system) overreacts to certain substances. The immune system treats a harmless substance as if it were a harmful germ or virus. Many things can cause this overreaction, including pollens, medicine, food, dust, animal dander, and mold. Allergies can be mild or severe. Mild allergies can be managed with home treatment. But medicine may be needed to prevent problems. Managing your allergies is an important part of staying healthy. Your doctor may suggest that you have allergy testing to help find out what is causing your allergies. When you know what things trigger your symptoms, you can avoid them. This can prevent allergy symptoms and other health problems. For severe allergies that cause reactions that affect your whole body (anaphylactic reactions), your doctor may prescribe a shot of epinephrine to carry with you in case you have a severe reaction. Learn how to give yourself the shot and keep it with you at all times. Make sure it is not . Follow-up care is a key part of your treatment and safety. Be sure to make and go to all appointments, and call your doctor if you are having problems. It's also a good idea to know your test results and keep a list of the medicines you take. How can you care for yourself at home? · If you have been told by your doctor that dust or dust mites are causing your allergy, decrease the dust around your bed:  [facility] sheets, pillowcases, and other bedding in hot water every week. ¨ Use dust-proof covers for pillows, duvets, and mattresses. Avoid plastic covers because they tear easily and do not \"breathe. \" Wash as instructed on the label. ¨ Do not use any blankets and pillows that you do not need. ¨ Use blankets that you can wash in your washing machine. ¨ Consider removing drapes and carpets, which attract and hold dust, from your bedroom.   · If you are allergic to house dust and mites, do not use home humidifiers. Your doctor can suggest ways you can control dust and mites. · Look for signs of cockroaches. Cockroaches cause allergic reactions. Use cockroach baits to get rid of them. Then, clean your home well. Cockroaches like areas where grocery bags, newspapers, empty bottles, or cardboard boxes are stored. Do not keep these inside your home, and keep trash and food containers sealed. Seal off any spots where cockroaches might enter your home. · If you are allergic to mold, get rid of furniture, rugs, and drapes that smell musty. Check for mold in the bathroom. · If you are allergic to outdoor pollen or mold spores, use air-conditioning. Change or clean all filters every month. Keep windows closed. · If you are allergic to pollen, stay inside when pollen counts are high. Use a vacuum  with a HEPA filter or a double-thickness filter at least two times each week. · Stay inside when air pollution is bad. Avoid paint fumes, perfumes, and other strong odors. · Avoid conditions that make your allergies worse. Stay away from smoke. Do not smoke or let anyone else smoke in your house. Do not use fireplaces or wood-burning stoves. · If you are allergic to your pets, change the air filter in your furnace every month. Use high-efficiency filters. · If you are allergic to pet dander, keep pets outside or out of your bedroom. Old carpet and cloth furniture can hold a lot of animal dander. You may need to replace them. When should you call for help? Give an epinephrine shot if:  · You think you are having a severe allergic reaction. · You have symptoms in more than one body area, such as mild nausea and an itchy mouth. After giving an epinephrine shot call 911, even if you feel better. Call 911 if:  · You have symptoms of a severe allergic reaction. These may include:  ¨ Sudden raised, red areas (hives) all over your body.   ¨ Swelling of the throat, mouth, lips, or tongue. ¨ Trouble breathing. ¨ Passing out (losing consciousness). Or you may feel very lightheaded or suddenly feel weak, confused, or restless. · You have been given an epinephrine shot, even if you feel better. Call your doctor now or seek immediate medical care if:  · You have symptoms of an allergic reaction, such as:  ¨ A rash or hives (raised, red areas on the skin). ¨ Itching. ¨ Swelling. ¨ Belly pain, nausea, or vomiting. Watch closely for changes in your health, and be sure to contact your doctor if:  · You do not get better as expected. Where can you learn more? Go to http://alfa-gino.info/. Enter E550 in the search box to learn more about \"Allergies: Care Instructions. \"  Current as of: April 3, 2017  Content Version: 11.3  © 4683-5063 [employer]. Care instructions adapted under license by [employer] (which disclaims liability or warranty for this information). If you have questions about a medical condition or this instruction, always ask your healthcare professional. [place] [address] any warranty or liability for your use of this information. Rhinitis: Care Instructions  Your Care Instructions  Rhinitis is swelling and irritation in the nose. Allergies and infections are often the cause. Your nose may run or feel stuffy. Other symptoms are itchy and sore eyes, ears, throat, and mouth. If allergies are the cause, your doctor may do tests to find out what you are allergic to. You may be able to stop symptoms if you avoid the things that cause them. Your doctor may suggest or prescribe medicine to ease your symptoms. Follow-up care is a key part of your treatment and safety. Be sure to make and go to all appointments, and call your doctor if you are having problems. It's also a good idea to know your test results and keep a list of the medicines you take. How can you care for yourself at home?   · If your rhinitis is caused by allergies, try to find out what sets off (triggers) your symptoms. Take steps to avoid these triggers. ¨ Avoid yard work. It can stir up both pollen and mold. ¨ Do not smoke or allow others to smoke around you. If you need help quitting, talk to your doctor about stop-smoking programs and medicines. These can increase your chances of quitting for good. ¨ Do not use aerosol sprays, cleaning products, or perfumes. ¨ If pollen is one of your triggers, close your house and car windows during blooming season. ¨ Clean your house often to control dust.  ¨ Keep pets outside. · If your doctor recommends over-the-counter medicines to relieve symptoms, take your medicines exactly as prescribed. Call your doctor if you think you are having a problem with your medicine. · Use saline (saltwater) nasal washes to help keep your nasal passages open and wash out mucus and bacteria. You can buy saline nose drops at a grocery store or drugstore. Or you can make your own at home by adding 1 teaspoon of salt and 1 teaspoon of baking soda to 2 cups of distilled water. If you make your own, fill a bulb syringe with the solution, insert the tip into your nostril, and squeeze gently. Ashok Grammes your nose. When should you call for help? Call your doctor now or seek immediate medical care if:  · You are having trouble breathing. Watch closely for changes in your health, and be sure to contact your doctor if:  · Mucus from your nose gets thicker (like pus) or has new blood in it. · You have new or worse symptoms. · You do not get better as expected. Where can you learn more? Go to http://alfa-gino.info/. Enter M030 in the search box to learn more about \"Rhinitis: Care Instructions. \"  Current as of: July 29, 2016  Content Version: 11.3  © 1423-4316 [employer]. Care instructions adapted under license by [employer] (which disclaims liability or warranty for this information).  If you have questions about a medical condition or this instruction, always ask your healthcare professional. [address] any warranty or liability for your use of this information. High Cholesterol: Care Instructions  Your Care Instructions  Cholesterol is a type of fat in your blood. It is needed for many body functions, such as making new cells. Cholesterol is made by your body. It also comes from food you eat. High cholesterol means that you have too much of the fat in your blood. This raises your risk of a heart attack and stroke. LDL and HDL are part of your total cholesterol. LDL is the \"bad\" cholesterol. High LDL can raise your risk for heart disease, heart attack, and stroke. HDL is the \"good\" cholesterol. It helps clear bad cholesterol from the body. High HDL is linked with a lower risk of heart disease, heart attack, and stroke. Your cholesterol levels help your doctor find out your risk for having a heart attack or stroke. You and your doctor can talk about whether you need to lower your risk and what treatment is best for you. A heart-healthy lifestyle along with medicines can help lower your cholesterol and your risk. The way you choose to lower your risk will depend on how high your risk is for heart attack and stroke. It will also depend on how you feel about taking medicines. Follow-up care is a key part of your treatment and safety. Be sure to make and go to all appointments, and call your doctor if you are having problems. It's also a good idea to know your test results and keep a list of the medicines you take. How can you care for yourself at home? · Eat a variety of foods every day. Good choices include fruits, vegetables, whole grains (like oatmeal), dried beans and peas, nuts and seeds, soy products (like tofu), and fat-free or low-fat dairy products. · Replace butter, margarine, and hydrogenated or partially hydrogenated oils with olive and canola oils. (Canola oil margarine without trans fat is fine.)  · Replace red meat with fish, poultry, and soy protein (like tofu). · Limit processed and packaged foods like chips, crackers, and cookies. · Bake, broil, or steam foods. Don't ya them. · Be physically active. Get at least 30 minutes of exercise on most days of the week. Walking is a good choice. You also may want to do other activities, such as running, swimming, cycling, or playing tennis or team sports. · Stay at a healthy weight or lose weight by making the changes in eating and physical activity listed above. Losing just a small amount of weight, even 5 to 10 pounds, can reduce your risk for having a heart attack or stroke. · Do not smoke. When should you call for help? Watch closely for changes in your health, and be sure to contact your doctor if:  · You need help making lifestyle changes. · You have questions about your medicine. Where can you learn more? Go to http://alfa-gino.info/. Enter D736 in the search box to learn more about \"High Cholesterol: Care Instructions. \"  Current as of: April 3, 2017  Content Version: 11.3  © 9621-1664 [employer]. Care instructions adapted under license by [employer] (which disclaims liability or warranty for this information). If you have questions about a medical condition or this instruction, always ask your healthcare professional. [place] [address] any warranty or liability for your use of this information. Learning About Benefits From Quitting Smoking  How does quitting smoking make you healthier? If you're thinking about quitting smoking, you may have a few reasons to be smoke-free. Your health may be one of them. · When you quit smoking, you lower your risks for cancer, lung disease, heart attack, stroke, blood vessel disease, and blindness from macular degeneration.   · When you're smoke-free, you get sick less often, and you heal faster. You are less likely to get colds, flu, bronchitis, and pneumonia. · As a nonsmoker, you may find that your mood is better and you are less stressed. When and how will you feel healthier? Quitting has real health benefits that start from day 1 of being smoke-free. And the longer you stay smoke-free, the healthier you get and the better you feel. The first hours  · After just 20 minutes, your blood pressure and heart rate go down. That means there's less stress on your heart and blood vessels. · Within 12 hours, the level of carbon monoxide in your blood drops back to normal. That makes room for more oxygen. With more oxygen in your body, you may notice that you have more energy than when you smoked. After 2 weeks  · Your lungs start to work better. · Your risk of heart attack starts to drop. After 1 month  · When your lungs are clear, you cough less and breathe deeper, so it's easier to be active. · Your sense of taste and smell return. That means you can enjoy food more than you have since you started smoking. Over the years  · After 1 year, your risk of heart disease is half what it would be if you kept smoking. · After 5 years, your risk of stroke starts to shrink. Within a few years after that, it's about the same as if you'd never smoked. · After 10 years, your risk of dying from lung cancer is cut by about half. And your risk for many other types of cancer is lower too. How would quitting help others in your life? When you quit smoking, you improve the health of everyone who now breathes in your smoke. · Their heart, lung, and cancer risks drop, much like yours. · They are sick less. For babies and small children, living smoke-free means they're less likely to have ear infections, pneumonia, and bronchitis. · If you're a woman who is or will be pregnant someday, quitting smoking means a healthier . · Children who are close to you are less likely to become adult smokers.   Where can you learn more? Go to http://alfa-gino.info/. Enter [id number] [id number] [id number] in the search box to learn more about \"Learning About Benefits From Quitting Smoking. \"  Current as of: March 20, 2017  Content Version: 11.3  © 7099-1568 [employer]. Care instructions adapted under license by [employer] (which disclaims liability or warranty for this information). If you have questions about a medical condition or this instruction, always ask your healthcare professional. [place] [address] any warranty or liability for your use of this information.

## 2017-08-15 ENCOUNTER — LAB ONLY (OUTPATIENT)
Dept: FAMILY MEDICINE CLINIC | Age: 72
End: 2017-08-15

## 2017-08-15 ENCOUNTER — HOSPITAL ENCOUNTER (OUTPATIENT)
Dept: LAB | Age: 72
Discharge: HOME | End: 2017-08-15
Payer: MEDICARE

## 2017-08-15 DIAGNOSIS — E78.5 HYPERLIPIDEMIA LDL GOAL <70: ICD-10-CM

## 2017-08-15 DIAGNOSIS — I10 ESSENTIAL HYPERTENSION: ICD-10-CM

## 2017-08-15 DIAGNOSIS — I10 ESSENTIAL HYPERTENSION: Primary | ICD-10-CM

## 2017-08-15 LAB
ALBUMIN SERPL BCP-MCNC: 3.9 G/DL (ref 3.4–5)
ALBUMIN/GLOB SERPL: 1.4 {RATIO} (ref 0.8–1.7)
ALP SERPL-CCNC: 58 U/L (ref 45–117)
ALT SERPL-CCNC: 18 U/L (ref 13–56)
ANION GAP BLD CALC-SCNC: 8 MMOL/L (ref 3–18)
APPEARANCE UR: CLEAR
AST SERPL W P-5'-P-CCNC: 9 U/L (ref 15–37)
BACTERIA URNS QL MICRO: ABNORMAL /HPF
BASOPHILS # BLD: 0 K/UL (ref 0–0.06)
BASOPHILS NFR BLD: 0 % (ref 0–2)
BILIRUB SERPL-MCNC: 1 MG/DL (ref 0.2–1)
BILIRUB UR QL: NEGATIVE
BUN SERPL-MCNC: 22 MG/DL (ref 7–18)
BUN/CREAT SERPL: 21 (ref 12–20)
CALCIUM SERPL-MCNC: 8.6 MG/DL (ref 8.5–10.1)
CHLORIDE SERPL-SCNC: 104 MMOL/L (ref 100–108)
CHOLEST SERPL-MCNC: 146 MG/DL
CO2 SERPL-SCNC: 25 MMOL/L (ref 21–32)
COLOR UR: YELLOW
CREAT SERPL-MCNC: 1.07 MG/DL (ref 0.6–1.3)
DIFFERENTIAL METHOD BLD: ABNORMAL
EOSINOPHIL # BLD: 0.2 K/UL (ref 0–0.4)
EOSINOPHIL NFR BLD: 2 % (ref 0–5)
EPITH CASTS URNS QL MICRO: ABNORMAL /LPF (ref 0–5)
ERYTHROCYTE [DISTWIDTH] IN BLOOD BY AUTOMATED COUNT: 13.4 % (ref 11.6–14.5)
GLOBULIN SER CALC-MCNC: 2.8 G/DL (ref 2–4)
GLUCOSE SERPL-MCNC: 80 MG/DL (ref 74–99)
GLUCOSE UR STRIP.AUTO-MCNC: NEGATIVE MG/DL
HCT VFR BLD AUTO: 41.2 % (ref 35–45)
HDLC SERPL-MCNC: 72 MG/DL (ref 40–60)
HDLC SERPL: 2 {RATIO} (ref 0–5)
HGB BLD-MCNC: 13.4 G/DL (ref 12–16)
HGB UR QL STRIP: NEGATIVE
KETONES UR QL STRIP.AUTO: ABNORMAL MG/DL
LDLC SERPL CALC-MCNC: 51.6 MG/DL (ref 0–100)
LEUKOCYTE ESTERASE UR QL STRIP.AUTO: ABNORMAL
LIPID PROFILE,FLP: ABNORMAL
LYMPHOCYTES # BLD: 3.8 K/UL (ref 0.9–3.6)
LYMPHOCYTES NFR BLD: 37 % (ref 21–52)
MCH RBC QN AUTO: 31.5 PG (ref 24–34)
MCHC RBC AUTO-ENTMCNC: 32.5 G/DL (ref 31–37)
MCV RBC AUTO: 96.7 FL (ref 74–97)
MONOCYTES # BLD: 0.6 K/UL (ref 0.05–1.2)
MONOCYTES NFR BLD: 6 % (ref 3–10)
NEUTS SEG # BLD: 5.6 K/UL (ref 1.8–8)
NEUTS SEG NFR BLD: 55 % (ref 40–73)
NITRITE UR QL STRIP.AUTO: NEGATIVE
PH UR STRIP: 5.5 [PH] (ref 5–8)
PLATELET # BLD AUTO: 349 K/UL (ref 135–420)
PMV BLD AUTO: 9.7 FL (ref 9.2–11.8)
POTASSIUM SERPL-SCNC: 5.2 MMOL/L (ref 3.5–5.5)
PROT SERPL-MCNC: 6.7 G/DL (ref 6.4–8.2)
PROT UR STRIP-MCNC: ABNORMAL MG/DL
RBC # BLD AUTO: 4.26 M/UL (ref 4.2–5.3)
RBC #/AREA URNS HPF: ABNORMAL /HPF (ref 0–5)
SODIUM SERPL-SCNC: 137 MMOL/L (ref 136–145)
SP GR UR REFRACTOMETRY: 1.02 (ref 1–1.03)
TRIGL SERPL-MCNC: 112 MG/DL (ref ?–150)
TSH SERPL DL<=0.05 MIU/L-ACNC: 2.01 UIU/ML (ref 0.36–3.74)
UROBILINOGEN UR QL STRIP.AUTO: 0.2 EU/DL (ref 0.2–1)
VLDLC SERPL CALC-MCNC: 22.4 MG/DL
WBC # BLD AUTO: 10.2 K/UL (ref 4.6–13.2)
WBC URNS QL MICRO: ABNORMAL /HPF (ref 0–4)

## 2017-08-15 PROCEDURE — 85025 COMPLETE CBC W/AUTO DIFF WBC: CPT | Performed by: NURSE PRACTITIONER

## 2017-08-15 PROCEDURE — 80061 LIPID PANEL: CPT | Performed by: NURSE PRACTITIONER

## 2017-08-15 PROCEDURE — 84443 ASSAY THYROID STIM HORMONE: CPT | Performed by: NURSE PRACTITIONER

## 2017-08-15 PROCEDURE — 80053 COMPREHEN METABOLIC PANEL: CPT | Performed by: NURSE PRACTITIONER

## 2017-08-15 PROCEDURE — 82043 UR ALBUMIN QUANTITATIVE: CPT | Performed by: NURSE PRACTITIONER

## 2017-08-15 PROCEDURE — 81001 URINALYSIS AUTO W/SCOPE: CPT | Performed by: NURSE PRACTITIONER

## 2017-08-15 NOTE — PROGRESS NOTES
Yuri Mendoza is a 66 y.o. female here this morning for labs only. She tolerated well and left showing no s/s of distress.

## 2017-08-16 LAB
CREAT UR-MCNC: 278.01 MG/DL (ref 30–125)
MICROALBUMIN UR-MCNC: 8 MG/DL (ref 0–3)
MICROALBUMIN/CREAT UR-RTO: 29 MG/G (ref 0–30)

## 2017-08-20 NOTE — PROGRESS NOTES
Please let Mrs Will Asher know her blood count, liver and thyroid test were all normal. Her kidney function is stable. Her urine studies were normal. Her cholesterol values look good.

## 2017-08-22 ENCOUNTER — TELEPHONE (OUTPATIENT)
Dept: FAMILY MEDICINE CLINIC | Facility: CLINIC | Age: 72
End: 2017-08-22

## 2017-08-22 NOTE — TELEPHONE ENCOUNTER
Please let Mrs Raynard Jeans know her blood count, liver and thyroid test were all normal. Her kidney function is stable. Her urine studies were normal. Her cholesterol values look good. Called pt and left message. Call back number left and I myself or one of the other nurses will attempt to contact again.     The call was to inform pt results

## 2017-08-24 NOTE — TELEPHONE ENCOUNTER
Spoke with pt in regards to results. Pt acknowledges understanding and voices no concerns at this time.

## 2017-09-05 DIAGNOSIS — E78.5 HYPERLIPIDEMIA LDL GOAL <70: ICD-10-CM

## 2017-09-05 DIAGNOSIS — B00.9 HERPES SIMPLEX TYPE 2 INFECTION: ICD-10-CM

## 2017-09-05 DIAGNOSIS — I10 ESSENTIAL HYPERTENSION: ICD-10-CM

## 2017-09-05 DIAGNOSIS — Z12.31 ENCOUNTER FOR SCREENING MAMMOGRAM FOR BREAST CANCER: ICD-10-CM

## 2017-09-05 DIAGNOSIS — F41.9 ANXIETY: ICD-10-CM

## 2017-09-07 RX ORDER — BUSPIRONE HYDROCHLORIDE 15 MG/1
7.5 TABLET ORAL 2 TIMES DAILY
Qty: 30 TAB | Refills: 2 | Status: SHIPPED | OUTPATIENT
Start: 2017-09-07 | End: 2017-11-14 | Stop reason: SDUPTHER

## 2017-09-07 RX ORDER — PRAVASTATIN SODIUM 20 MG/1
20 TABLET ORAL
Qty: 30 TAB | Refills: 2 | Status: SHIPPED | OUTPATIENT
Start: 2017-09-07 | End: 2017-11-14 | Stop reason: SDUPTHER

## 2017-09-07 RX ORDER — LISINOPRIL 30 MG/1
30 TABLET ORAL DAILY
Qty: 30 TAB | Refills: 2 | Status: SHIPPED | OUTPATIENT
Start: 2017-09-07 | End: 2017-11-14 | Stop reason: SDUPTHER

## 2017-11-14 ENCOUNTER — HOSPITAL ENCOUNTER (OUTPATIENT)
Dept: LAB | Age: 72
Discharge: HOME | End: 2017-11-14
Payer: MEDICARE

## 2017-11-14 ENCOUNTER — OFFICE VISIT (OUTPATIENT)
Dept: FAMILY MEDICINE CLINIC | Age: 72
End: 2017-11-14

## 2017-11-14 VITALS
BODY MASS INDEX: 22.4 KG/M2 | TEMPERATURE: 98.2 F | WEIGHT: 147.8 LBS | OXYGEN SATURATION: 99 % | RESPIRATION RATE: 18 BRPM | DIASTOLIC BLOOD PRESSURE: 77 MMHG | SYSTOLIC BLOOD PRESSURE: 152 MMHG | HEIGHT: 68 IN | HEART RATE: 60 BPM

## 2017-11-14 DIAGNOSIS — I10 ESSENTIAL HYPERTENSION: Primary | ICD-10-CM

## 2017-11-14 DIAGNOSIS — M89.9 DISORDER OF BONE: ICD-10-CM

## 2017-11-14 DIAGNOSIS — E78.5 HYPERLIPIDEMIA LDL GOAL <70: ICD-10-CM

## 2017-11-14 DIAGNOSIS — F41.9 ANXIETY: ICD-10-CM

## 2017-11-14 DIAGNOSIS — R68.89 OTHER GENERAL SYMPTOMS AND SIGNS: ICD-10-CM

## 2017-11-14 DIAGNOSIS — M47.22 OSTEOARTHRITIS OF SPINE WITH RADICULOPATHY, CERVICAL REGION: ICD-10-CM

## 2017-11-14 DIAGNOSIS — Z12.2 ENCOUNTER FOR SCREENING FOR LUNG CANCER: ICD-10-CM

## 2017-11-14 DIAGNOSIS — F17.210 CIGARETTE NICOTINE DEPENDENCE, UNCOMPLICATED: ICD-10-CM

## 2017-11-14 DIAGNOSIS — R53.83 FATIGUE, UNSPECIFIED TYPE: ICD-10-CM

## 2017-11-14 DIAGNOSIS — R93.89 ABNORMAL CHEST X-RAY: ICD-10-CM

## 2017-11-14 DIAGNOSIS — G47.00 INSOMNIA, UNSPECIFIED TYPE: ICD-10-CM

## 2017-11-14 LAB
FOLATE SERPL-MCNC: 7.2 NG/ML (ref 3.1–17.5)
VIT B12 SERPL-MCNC: 1656 PG/ML (ref 211–911)

## 2017-11-14 PROCEDURE — 82306 VITAMIN D 25 HYDROXY: CPT | Performed by: NURSE PRACTITIONER

## 2017-11-14 PROCEDURE — 82607 VITAMIN B-12: CPT | Performed by: NURSE PRACTITIONER

## 2017-11-14 RX ORDER — BUSPIRONE HYDROCHLORIDE 15 MG/1
7.5 TABLET ORAL 2 TIMES DAILY
Qty: 30 TAB | Refills: 3 | Status: SHIPPED | OUTPATIENT
Start: 2017-11-14 | End: 2018-04-26 | Stop reason: SDUPTHER

## 2017-11-14 RX ORDER — TRAZODONE HYDROCHLORIDE 50 MG/1
50 TABLET ORAL
Qty: 30 TAB | Refills: 0 | Status: SHIPPED | OUTPATIENT
Start: 2017-11-14 | End: 2017-12-12 | Stop reason: SDUPTHER

## 2017-11-14 RX ORDER — PAPAYA
TABLET ORAL
COMMUNITY
End: 2022-02-14

## 2017-11-14 RX ORDER — CHLORPHENIRAMINE MALEATE 4 MG
2 TABLET ORAL DAILY
COMMUNITY

## 2017-11-14 RX ORDER — LISINOPRIL 30 MG/1
30 TABLET ORAL DAILY
Qty: 30 TAB | Refills: 3 | Status: SHIPPED | OUTPATIENT
Start: 2017-11-14 | End: 2018-03-15 | Stop reason: SDUPTHER

## 2017-11-14 RX ORDER — PRAVASTATIN SODIUM 20 MG/1
20 TABLET ORAL
Qty: 30 TAB | Refills: 3 | Status: SHIPPED | OUTPATIENT
Start: 2017-11-14 | End: 2018-04-26 | Stop reason: SDUPTHER

## 2017-11-14 RX ORDER — AMLODIPINE BESYLATE 10 MG/1
5 TABLET ORAL DAILY
Qty: 30 TAB | Refills: 3 | Status: SHIPPED | OUTPATIENT
Start: 2017-11-14 | End: 2018-07-26 | Stop reason: SDUPTHER

## 2017-11-14 NOTE — PROGRESS NOTES
Bethany Montero is a 66 y.o. female here this morning for a follow up on her HTN. She is also c/o extreme fatigue that she first noticed Saturday morning. She was seen at [facility] last month and was dx with pneumonia. They said she needs another x-ray to compare it to. Learning assessment previously completed. 1. Have you been to the ER, urgent care clinic or hospitalized since your last visit? [facility] October 11 and 13, 2017 and dx pneumonia    2. Have you seen or consulted any other health care providers outside of the [address] [place] [address] since your last visit (Include any pap smears or colon screening)? no    Do you have an Advanced Directive? no    Would you like information on Advanced Directives?  Has the paper work

## 2017-11-14 NOTE — PROGRESS NOTES
Today's Date:  2017   Patient:  Paula Clemente  Patient :  1945    Subjective:   Paula Clemente is a 66 y.o. female who presents for follow up for Chronic Diseases and also follow up for Pneumonia. Was seen at [facility]  for Congestion, Fevers and Diarrhea and chest Xray showed LLL Infiltrate and she was diagnosed with Pneumonia. The xray also revealed a 2 sub cm nodules over the right lower lobe. She was advised to have a follow up Cxray. States that she is not feeling well, she feels tired to the point that she cries even though she is not doing anything. Does not believe that she is depressed. Has no other complaints at this time. Current Outpatient Meds and Allergies     Current Outpatient Prescriptions on File Prior to Visit   Medication Sig Dispense Refill    lisinopril (PRINIVIL, ZESTRIL) 30 mg tablet Take 1 Tab by mouth daily. 30 Tab 2    busPIRone (BUSPAR) 15 mg tablet Take 0.5 Tabs by mouth two (2) times a day. Indications: GENERALIZED ANXIETY DISORDER 30 Tab 2    pravastatin (PRAVACHOL) 20 mg tablet Take 1 Tab by mouth nightly. 30 Tab 2    amLODIPine (NORVASC) 10 mg tablet Take 0.5 Tabs by mouth daily. 30 Tab 2    valACYclovir (VALTREX) 1 gram tablet Take 2 Tabs by mouth two (2) times a day. Indications: SUPPRESSION OF RECURRENT HERPES SIMPLEX INFECTION 30 Tab 1    tiZANidine (ZANAFLEX) 2 mg tablet Take  by mouth.  DOCOSAHEXANOIC ACID/EPA (FISH OIL PO) Take 1,200 mg by mouth three (3) times daily.  glucosamine sulfate 1,000 mg cap Take  by mouth.  acetaminophen (TYLENOL) 650 mg CR tablet Take 650 mg by mouth every six (6) hours as needed for Pain.  CHOLECALCIFEROL, VITAMIN D3, (VITAMIN D3 PO) Take 400 Int'l Units by mouth daily.  CALCIUM-MAGNESIUM-ZINC PO Take  by mouth three (3) times daily.  famotidine (PEPCID) 20 mg tablet Take 20 mg by mouth two (2) times a day.  melatonin 1 mg tablet Take 3 mg by mouth.       fluticasone (FLONASE SENSIMIST) 27.5 mcg/actuation nasal spray 2 Sprays by Nasal route daily.  levocetirizine (XYZAL) 5 mg tablet Take 1 Tab by mouth daily. 30 Tab 2    diclofenac (VOLTAREN) 1 % gel Apply  to affected area four (4) times daily.  VITAMIN B COMPLEX PO Take 1 mL by mouth daily.  docusate sodium 100 mg tab Take  by mouth.  ketoconazole (NIZORAL) 2 % topical cream Apply  to affected area daily. No current facility-administered medications on file prior to visit. These medications have been reviewed and reconciled with the patient during today's visit. Allergies   Allergen Reactions    Compazine [Prochlorperazine] Other (comments)     Childhood reaction    Erythromycin Nausea and Vomiting    Indocin [Indomethacin] Other (comments)     Made her feel weird to the point it scared her    Keflex [Cephalexin] Nausea and Vomiting         ROS:     CONST:   Denies fatigue, weight change, appetite change   NEURO:   Denies headaches, vision changes, dizziness, loss of consciousness  CV:      Denies chest pain, palpitations, orthopnea, PND  PULM:  Denies SOB, wheezing, cough, hemoptysis  GI:             Denies nausea, vomiting, abdominal pain, greasy stools, blood in stool,     diarrhea, constipation  :       Denies dysuria, hematuria, change in urine  MS:      Denies muscle/joint pain, joint swelling  SKIN:        Denies rashes, skin changes  ALLERGY: Chronic sinuses problem; seasonal allergies,    Objective:     VS:    Visit Vitals    /77 (BP 1 Location: Left arm, BP Patient Position: Sitting)    Pulse 60    Temp 98.2 °F (36.8 °C) (Oral)    Resp 18    Ht 5' 7.52\" (1.715 m)    Wt 147 lb 12.8 oz (67 kg)    SpO2 99%    BMI 22.79 kg/m2       General:   Well-nourished, well-groomed, pleasant, alert, in no acute distress.      Head:  Normocephalic, atraumatic, MMM,   Neck:  Neck supple with normal ROM for age, no thyromegaly,  Cardiovasc:   Regular rate and rhythm, no murmurs, no rubs, no gallops,   Pulmonary:   Clear breath sounds bilaterally, good air movement, no wheezing, no rales, no rhonchi, normal respiratory effort  Abdomen:   Abdomen soft, nontender, nondistended, NABS,  Extremities:   No edema, no tenderness with palpation of calves, warm and well-perfused  Neuro:   Alert, conversant, appropriate, following commands, no focal deficits. Assessment:       1. Essential hypertension    2. Hyperlipidemia LDL goal <70    3. Osteoarthritis of spine with radiculopathy, cervical region    4. Fatigue, unspecified type    5. Other general symptoms and signs     6. Disorder of bone     7. Anxiety    8. Abnormal chest x-ray    9. Insomnia, unspecified type    10. Encounter for screening for lung cancer    11. Cigarette nicotine dependence, uncomplicated         Plan:       Orders Placed This Encounter    XR CHEST PA LAT     Standing Status:   Future     Number of Occurrences:   1     Standing Expiration Date:   12/14/2018     Order Specific Question:   Reason for Exam     Answer: Follow up xray; diagnosed with pneumonia last month    CT CHEST WO CONT     Standing Status:   Future     Standing Expiration Date:   12/14/2018     Order Specific Question:   Is Patient Allergic to Contrast Dye? Answer:   No    VITAMIN B12 & FOLATE     Standing Status:   Future     Standing Expiration Date:   5/16/2018    VITAMIN D, 25 HYDROXY     Standing Status:   Future     Standing Expiration Date:   3/14/2018    omega-3 fatty acids-fish oil (FISH OIL) 360-1,200 mg cap     Sig: Take  by mouth.  carica papaya (PAPAYA ENZYME) tab     Sig: Take  by mouth.  lisinopril (PRINIVIL, ZESTRIL) 30 mg tablet     Sig: Take 1 Tab by mouth daily. Indications: hypertension     Dispense:  30 Tab     Refill:  3    busPIRone (BUSPAR) 15 mg tablet     Sig: Take 0.5 Tabs by mouth two (2) times a day.  Indications: Generalized Anxiety Disorder     Dispense:  30 Tab     Refill:  3    pravastatin (PRAVACHOL) 20 mg tablet     Sig: Take 1 Tab by mouth nightly. Dispense:  30 Tab     Refill:  3    amLODIPine (NORVASC) 10 mg tablet     Sig: Take 0.5 Tabs by mouth daily. Dispense:  30 Tab     Refill:  3    traZODone (DESYREL) 50 mg tablet     Sig: Take 1 Tab by mouth nightly. Dispense:  30 Tab     Refill:  0     CT of the chest changed from Low dose to without contrast as xray at [facility] showed the presence of nodules in the right lower lobe. Medication refilled. Started on Trazodone for Insomnia as Melatonin no longer works. Advised to start with half the dose (half pill) to see how the medication will work if symptom is not relieved  To add a melatonin if still not controlled to increase to take a whole pill. Advised to stop smoking; has cut down considerably but should stop smoking. I have discussed the diagnosis with the patient and the intended plan as seen in the above orders. The patient has received an after-visit summary along with patient information handout. I have discussed medication side effects and warnings with the patient as well. Pt verbalized understanding. Follow-up Disposition:  Return in about 4 weeks (around 12/12/2017), or if symptoms worsen or fail to improve.   AMBER Mack  11/14/2017, 10:40 AM

## 2017-11-14 NOTE — PATIENT INSTRUCTIONS
Fatigue: Care Instructions  Your Care Instructions    Fatigue is a feeling of tiredness, exhaustion, or lack of energy. You may feel fatigue because of too much or not enough activity. It can also come from stress, lack of sleep, boredom, and poor diet. Many medical problems, such as viral infections, can cause fatigue. Emotional problems, especially depression, are often the cause of fatigue. Fatigue is most often a symptom of another problem. Treatment for fatigue depends on the cause. For example, if you have fatigue because you have a certain health problem, treating this problem also treats your fatigue. If depression or anxiety is the cause, treatment may help. Follow-up care is a key part of your treatment and safety. Be sure to make and go to all appointments, and call your doctor if you are having problems. It's also a good idea to know your test results and keep a list of the medicines you take. How can you care for yourself at home? · Get regular exercise. But don't overdo it. Go back and forth between rest and exercise. · Get plenty of rest.  · Eat a healthy diet. Do not skip meals, especially breakfast.  · Reduce your use of caffeine, tobacco, and alcohol. Caffeine is most often found in coffee, tea, cola drinks, and chocolate. · Limit medicines that can cause fatigue. This includes tranquilizers and cold and allergy medicines. When should you call for help? Watch closely for changes in your health, and be sure to contact your doctor if:  ? · You have new symptoms such as fever or a rash. ? · Your fatigue gets worse. ? · You have been feeling down, depressed, or hopeless. Or you may have lost interest in things that you usually enjoy. ? · You are not getting better as expected. Where can you learn more? Go to http://alfa-gino.info/. Enter O460 in the search box to learn more about \"Fatigue: Care Instructions. \"  Current as of: March 20, 2017  Content Version: 11.4  © 0781-6060 Healthwise, [employer]. Care instructions adapted under license by [employer] (which disclaims liability or warranty for this information). If you have questions about a medical condition or this instruction, always ask your healthcare professional. [address] any warranty or liability for your use of this information. Learning About Sleeping Well  What does sleeping well mean? Sleeping well means getting enough sleep. How much sleep is enough varies among people. The number of hours you sleep is not as important as how you feel when you wake up. If you do not feel refreshed, you probably need more sleep. Another sign of not getting enough sleep is feeling tired during the day. The average total nightly sleep time is 7½ to 8 hours. Healthy adults may need a little more or a little less than this. Why is getting enough sleep important? Getting enough quality sleep is a basic part of good health. When your sleep suffers, your mood and your thoughts can suffer too. You may find yourself feeling more grumpy or stressed. Not getting enough sleep also can lead to serious problems, including injury, accidents, anxiety, and depression. What might cause poor sleeping? Many things can cause sleep problems, including:  · Stress. Stress can be caused by fear about a single event, such as giving a speech. Or you may have ongoing stress, such as worry about work or school. · Depression, anxiety, and other mental or emotional conditions. · Changes in your sleep habits or surroundings. This includes changes that happen where you sleep, such as noise, light, or sleeping in a different bed. It also includes changes in your sleep pattern, such as having jet lag or working a late shift. · Health problems, such as pain, breathing problems, and restless legs syndrome. · Lack of regular exercise. How can you help yourself?   Here are some tips that may help you sleep more soundly and wake up feeling more refreshed. Your sleeping area  · Use your bedroom only for sleeping and sex. A bit of light reading may help you fall asleep. But if it doesn't, do your reading elsewhere in the house. Don't watch TV in bed. · Be sure your bed is big enough to stretch out comfortably, especially if you have a sleep partner. · Keep your bedroom quiet, dark, and cool. Use curtains, blinds, or a sleep mask to block out light. To block out noise, use earplugs, soothing music, or a \"white noise\" machine. Your evening and bedtime routine  · Create a relaxing bedtime routine. You might want to take a warm shower or bath, listen to soothing music, or drink a cup of noncaffeinated tea. · Go to bed at the same time every night. And get up at the same time every morning, even if you feel tired. What to avoid  · Limit caffeine (coffee, tea, caffeinated sodas) during the day, and don't have any for at least 4 to 6 hours before bedtime. · Don't drink alcohol before bedtime. Alcohol can cause you to wake up more often during the night. · Don't smoke or use tobacco, especially in the evening. Nicotine can keep you awake. · Don't take naps during the day, especially close to bedtime. · Don't lie in bed awake for too long. If you can't fall asleep, or if you wake up in the middle of the night and can't get back to sleep within 15 minutes or so, get out of bed and go to another room until you feel sleepy. · Don't take medicine right before bed that may keep you awake or make you feel hyper or energized. Your doctor can tell you if your medicine may do this and if you can take it earlier in the day. If you can't sleep  · Imagine yourself in a peaceful, pleasant scene. Focus on the details and feelings of being in a place that is relaxing. · Get up and do a quiet or boring activity until you feel sleepy.   · Don't drink any liquids after 6 p.m. if you wake up often because you have to go to the bathroom. Where can you learn more? Go to http://alfa-gino.info/. Enter J374 in the search box to learn more about \"Learning About Sleeping Well. \"  Current as of: May 12, 2017  Content Version: 11.4  © 7355-6733 "[employer] Care instructions adapted under license by [employer] (which disclaims liability or warranty for this information). If you have questions about a medical condition or this instruction, always ask your healthcare professional. [address] any warranty or liability for your use of this information. Learning About Benefits From Quitting Smoking  How does quitting smoking make you healthier? If you're thinking about quitting smoking, you may have a few reasons to be smoke-free. Your health may be one of them. · When you quit smoking, you lower your risks for cancer, lung disease, heart attack, stroke, blood vessel disease, and blindness from macular degeneration. · When you're smoke-free, you get sick less often, and you heal faster. You are less likely to get colds, flu, bronchitis, and pneumonia. · As a nonsmoker, you may find that your mood is better and you are less stressed. When and how will you feel healthier? Quitting has real health benefits that start from day 1 of being smoke-free. And the longer you stay smoke-free, the healthier you get and the better you feel. The first hours  · After just 20 minutes, your blood pressure and heart rate go down. That means there's less stress on your heart and blood vessels. · Within 12 hours, the level of carbon monoxide in your blood drops back to normal. That makes room for more oxygen. With more oxygen in your body, you may notice that you have more energy than when you smoked. After 2 weeks  · Your lungs start to work better. · Your risk of heart attack starts to drop.   After 1 month  · When your lungs are clear, you cough less and breathe deeper, so it's easier to be active. · Your sense of taste and smell return. That means you can enjoy food more than you have since you started smoking. Over the years  · After 1 year, your risk of heart disease is half what it would be if you kept smoking. · After 5 years, your risk of stroke starts to shrink. Within a few years after that, it's about the same as if you'd never smoked. · After 10 years, your risk of dying from lung cancer is cut by about half. And your risk for many other types of cancer is lower too. How would quitting help others in your life? When you quit smoking, you improve the health of everyone who now breathes in your smoke. · Their heart, lung, and cancer risks drop, much like yours. · They are sick less. For babies and small children, living smoke-free means they're less likely to have ear infections, pneumonia, and bronchitis. · If you're a woman who is or will be pregnant someday, quitting smoking means a healthier . · Children who are close to you are less likely to become adult smokers. Where can you learn more? Go to http://alfa-gino.info/. Enter [id number] [id number] in the search box to learn more about \"Learning About Benefits From Quitting Smoking. \"  Current as of: 2017  Content Version: 11.4  © 0462-2121 Healthwise, [employer]. Care instructions adapted under license by [employer] (which disclaims liability or warranty for this information). If you have questions about a medical condition or this instruction, always ask your healthcare professional. [place] [address] any warranty or liability for your use of this information.

## 2017-11-15 LAB — 25(OH)D3 SERPL-MCNC: 32.2 NG/ML (ref 30–100)

## 2017-11-17 NOTE — PROGRESS NOTES
Please let patient know that her vit D is normal but her B12 is very high; she should stop taking B12 supplement for now. Level will be redrawn 3 to 6 months from now. Thank you.

## 2017-12-12 ENCOUNTER — OFFICE VISIT (OUTPATIENT)
Dept: FAMILY MEDICINE CLINIC | Age: 72
End: 2017-12-12

## 2017-12-12 VITALS
OXYGEN SATURATION: 100 % | HEART RATE: 81 BPM | RESPIRATION RATE: 18 BRPM | BODY MASS INDEX: 22.43 KG/M2 | HEIGHT: 68 IN | WEIGHT: 148 LBS | SYSTOLIC BLOOD PRESSURE: 157 MMHG | TEMPERATURE: 98 F | DIASTOLIC BLOOD PRESSURE: 73 MMHG

## 2017-12-12 VITALS
TEMPERATURE: 98 F | SYSTOLIC BLOOD PRESSURE: 157 MMHG | HEART RATE: 81 BPM | BODY MASS INDEX: 22.43 KG/M2 | RESPIRATION RATE: 18 BRPM | HEIGHT: 68 IN | WEIGHT: 148 LBS | DIASTOLIC BLOOD PRESSURE: 73 MMHG | OXYGEN SATURATION: 100 %

## 2017-12-12 DIAGNOSIS — B00.9 HERPES SIMPLEX TYPE 2 INFECTION: ICD-10-CM

## 2017-12-12 DIAGNOSIS — G47.00 INSOMNIA, UNSPECIFIED TYPE: Primary | ICD-10-CM

## 2017-12-12 RX ORDER — VALACYCLOVIR HYDROCHLORIDE 1 G/1
2000 TABLET, FILM COATED ORAL 2 TIMES DAILY
Qty: 30 TAB | Refills: 1 | Status: SHIPPED | OUTPATIENT
Start: 2017-12-12 | End: 2019-01-23 | Stop reason: SDUPTHER

## 2017-12-12 RX ORDER — TRAZODONE HYDROCHLORIDE 50 MG/1
50 TABLET ORAL
Qty: 30 TAB | Refills: 2 | Status: SHIPPED | OUTPATIENT
Start: 2017-12-12 | End: 2018-03-15 | Stop reason: SDUPTHER

## 2017-12-12 NOTE — PROGRESS NOTES
A user error has taken place: charting done on wrong patient and has been corrected Patient was scheduled with wrong provider.

## 2017-12-12 NOTE — MR AVS SNAPSHOT
Visit Information Date & Time Provider Department Dept. Phone Encounter #  
 12/12/2017 10:15 AM Mell Cuenca, [facility] [phone] [id number] Follow-up Instructions Return in about 3 months (around 3/12/2018). Upcoming Health Maintenance Date Due ZOSTER VACCINE AGE 60> 10/17/2004 Influenza Age 5 to Adult 8/1/2017 MEDICARE YEARLY EXAM 11/16/2017 COLONOSCOPY 11/4/2018 BREAST CANCER SCRN MAMMOGRAM 2/16/2019 GLAUCOMA SCREENING Q2Y 9/13/2019 DTaP/Tdap/Td series (2 - Td) 9/11/2022 Allergies as of 12/12/2017  Review Complete On: 12/12/2017 By: AMBER Mantilla Severity Noted Reaction Type Reactions Compazine [Prochlorperazine]  07/21/2016    Other (comments) Childhood reaction Erythromycin  07/21/2016   Intolerance Nausea and Vomiting Indocin [Indomethacin]  07/21/2016   Intolerance Other (comments) Made her feel weird to the point it scared her Keflex [Cephalexin]  07/21/2016   Intolerance Nausea and Vomiting Current Immunizations  Reviewed on 12/2/2016 Name Date Pneumococcal Conjugate (PCV-13) 6/14/2016 Pneumococcal Polysaccharide (PPSV-23) 6/15/2017 Not reviewed this visit You Were Diagnosed With   
  
 Codes Comments Insomnia, unspecified type    -  Primary ICD-10-CM: G47.00 ICD-9-CM: 780.52 Herpes simplex type 2 infection     ICD-10-CM: B00.9 ICD-9-CM: 054.9 Vitals BP Pulse Temp Resp Height(growth percentile) Weight(growth percentile) 157/73 (BP 1 Location: Left arm, BP Patient Position: Sitting) 81 98 °F (36.7 °C) (Oral) 18 5' 7.52\" (1.715 m) 148 lb (67.1 kg) SpO2 BMI OB Status Smoking Status 100% 22.82 kg/m2 Postmenopausal Current Some Day Smoker BMI and BSA Data Body Mass Index Body Surface Area  
 22.82 kg/m 2 1.79 m 2 Preferred Pharmacy Pharmacy Name Phone [address] [place] [address] [address] [phone] Your Updated Medication List  
  
   
This list is accurate as of: 12/12/17 10:57 AM.  Always use your most recent med list.  
  
  
  
  
 acetaminophen 650 mg Tber Commonly known as:  TYLENOL Take 650 mg by mouth every six (6) hours as needed for Pain. amLODIPine 10 mg tablet Commonly known as:  Gema Kevin Take 0.5 Tabs by mouth daily. busPIRone 15 mg tablet Commonly known as:  BUSPAR Take 0.5 Tabs by mouth two (2) times a day. Indications: Generalized Anxiety Disorder CALCIUM-MAGNESIUM-ZINC PO Take  by mouth three (3) times daily. diclofenac 1 % Gel Commonly known as:  VOLTAREN Apply  to affected area four (4) times daily. docusate sodium 100 mg Tab Take  by mouth. famotidine 20 mg tablet Commonly known as:  PEPCID Take 20 mg by mouth two (2) times a day. FISH -1,200 mg Cap Generic drug:  omega-3 fatty acids-fish oil Take  by mouth. FISH OIL PO Take 1,200 mg by mouth three (3) times daily. FLONASE SENSIMIST 27.5 mcg/actuation nasal spray Generic drug:  fluticasone 2 Sprays by Nasal route daily. glucosamine sulfate 1,000 mg Cap Take  by mouth.  
  
 ketoconazole 2 % topical cream  
Commonly known as:  NIZORAL Apply  to affected area daily. lisinopril 30 mg tablet Commonly known as:  Lisandra Ruffing Take 1 Tab by mouth daily. Indications: hypertension  
  
 melatonin 1 mg tablet Take 3 mg by mouth. PAPAYA ENZYME Tab Generic drug:  carica papaya Take  by mouth.  
  
 pravastatin 20 mg tablet Commonly known as:  PRAVACHOL Take 1 Tab by mouth nightly. tiZANidine 2 mg tablet Commonly known as:  Jacque Roger Take  by mouth. traZODone 50 mg tablet Commonly known as:  Carry Mullen Take 1 Tab by mouth nightly. valACYclovir 1 gram tablet Commonly known as:  VALTREX Take 2 Tabs by mouth two (2) times a day. Indications: SUPPRESSION OF RECURRENT HERPES SIMPLEX INFECTION  
  
 VITAMIN B COMPLEX PO Take 1 mL by mouth daily. VITAMIN D3 PO Take 400 Int'l Units by mouth daily. Prescriptions Sent to Pharmacy Refills  
 valACYclovir (VALTREX) 1 gram tablet 1 Sig: Take 2 Tabs by mouth two (2) times a day. Indications: SUPPRESSION OF RECURRENT HERPES SIMPLEX INFECTION Class: Normal  
 Pharmacy: [facility] [address] [place] [address], [address] [place] [address] #: [phone] Route: Oral  
 traZODone (DESYREL) 50 mg tablet 2 Sig: Take 1 Tab by mouth nightly. Class: Normal  
 Pharmacy: [facility] [address] [place] [address], [address] [place] [address] #: [phone] Route: Oral  
  
Follow-up Instructions Return in about 3 months (around 3/12/2018). Patient Instructions Insomnia: Care Instructions Your Care Instructions Insomnia is the inability to sleep well. It is a common problem for most people at some time. Insomnia may make it hard for you to get to sleep, stay asleep, or sleep as long as you need to. This can make you tired and grouchy during the day. It can also make you forgetful, less effective at work, and unhappy. Insomnia can be caused by conditions such as depression or anxiety. Pain can also affect your ability to sleep. When these problems are solved, the insomnia usually clears up. But sometimes bad sleep habits can cause insomnia. If insomnia is affecting your work or your enjoyment of life, you can take steps to improve your sleep. Follow-up care is a key part of your treatment and safety. Be sure to make and go to all appointments, and call your doctor if you are having problems. It's also a good idea to know your test results and keep a list of the medicines you take. How can you care for yourself at home? What to avoid · Do not have drinks with caffeine, such as coffee or black tea, for 8 hours before bed. · Do not smoke or use other types of tobacco near bedtime. Nicotine is a stimulant and can keep you awake. · Avoid drinking alcohol late in the evening, because it can cause you to wake in the middle of the night. · Do not eat a big meal close to bedtime. If you are hungry, eat a light snack. · Do not drink a lot of water close to bedtime, because the need to urinate may wake you up during the night. · Do not read or watch TV in bed. Use the bed only for sleeping and sexual activity. What to try · Go to bed at the same time every night, and wake up at the same time every morning. Do not take naps during the day. · Keep your bedroom quiet, dark, and cool. · Sleep on a comfortable pillow and mattress. · If watching the clock makes you anxious, turn it facing away from you so you cannot see the time. · If you worry when you lie down, start a worry book. Well before bedtime, write down your worries, and then set the book and your concerns aside. · Try meditation or other relaxation techniques before you go to bed. · If you cannot fall asleep, get up and go to another room until you feel sleepy. Do something relaxing. Repeat your bedtime routine before you go to bed again. · Make your house quiet and calm about an hour before bedtime. Turn down the lights, turn off the TV, log off the computer, and turn down the volume on music. This can help you relax after a busy day. When should you call for help? Watch closely for changes in your health, and be sure to contact your doctor if: 
? · Your efforts to improve your sleep do not work. ? · Your insomnia gets worse. ? · You have been feeling down, depressed, or hopeless or have lost interest in things that you usually enjoy. Where can you learn more? Go to http://francheska.info/. Enter P513 in the search box to learn more about \"Insomnia: Care Instructions. \" Current as of: July 26, 2016 Content Version: 11.4 © 4675-5198 One[employer] Virtual. Care instructions adapted under license by [employer] Cloud (which disclaims liability or warranty for this information). If you have questions about a medical condition or this instruction, always ask your healthcare professional. [address] any warranty or liability for your use of this information. Learning About Sleeping Well What does sleeping well mean? Sleeping well means getting enough sleep. How much sleep is enough varies among people. The number of hours you sleep is not as important as how you feel when you wake up. If you do not feel refreshed, you probably need more sleep. Another sign of not getting enough sleep is feeling tired during the day. The average total nightly sleep time is 7½ to 8 hours. Healthy adults may need a little more or a little less than this. Why is getting enough sleep important? Getting enough quality sleep is a basic part of good health. When your sleep suffers, your mood and your thoughts can suffer too. You may find yourself feeling more grumpy or stressed. Not getting enough sleep also can lead to serious problems, including injury, accidents, anxiety, and depression. What might cause poor sleeping? Many things can cause sleep problems, including: · Stress. Stress can be caused by fear about a single event, such as giving a speech. Or you may have ongoing stress, such as worry about work or school. · Depression, anxiety, and other mental or emotional conditions. · Changes in your sleep habits or surroundings. This includes changes that happen where you sleep, such as noise, light, or sleeping in a different bed. It also includes changes in your sleep pattern, such as having jet lag or working a late shift. · Health problems, such as pain, breathing problems, and restless legs syndrome. · Lack of regular exercise. How can you help yourself? Here are some tips that may help you sleep more soundly and wake up feeling more refreshed. Your sleeping area · Use your bedroom only for sleeping and sex. A bit of light reading may help you fall asleep. But if it doesn't, do your reading elsewhere in the house. Don't watch TV in bed. · Be sure your bed is big enough to stretch out comfortably, especially if you have a sleep partner. · Keep your bedroom quiet, dark, and cool. Use curtains, blinds, or a sleep mask to block out light. To block out noise, use earplugs, soothing music, or a \"white noise\" machine. Your evening and bedtime routine · Create a relaxing bedtime routine. You might want to take a warm shower or bath, listen to soothing music, or drink a cup of noncaffeinated tea. · Go to bed at the same time every night. And get up at the same time every morning, even if you feel tired. What to avoid · Limit caffeine (coffee, tea, caffeinated sodas) during the day, and don't have any for at least 4 to 6 hours before bedtime. · Don't drink alcohol before bedtime. Alcohol can cause you to wake up more often during the night. · Don't smoke or use tobacco, especially in the evening. Nicotine can keep you awake. · Don't take naps during the day, especially close to bedtime. · Don't lie in bed awake for too long. If you can't fall asleep, or if you wake up in the middle of the night and can't get back to sleep within 15 minutes or so, get out of bed and go to another room until you feel sleepy. · Don't take medicine right before bed that may keep you awake or make you feel hyper or energized. Your doctor can tell you if your medicine may do this and if you can take it earlier in the day. If you can't sleep · Imagine yourself in a peaceful, pleasant scene.  Focus on the details and feelings of being in a place that is relaxing. · Get up and do a quiet or boring activity until you feel sleepy. · Don't drink any liquids after 6 p.m. if you wake up often because you have to go to the bathroom. Where can you learn more? Go to http://alfa-gino.info/. Enter J302 in the search box to learn more about \"Learning About Sleeping Well. \" Current as of: May 12, 2017 Content Version: 11.4 © 9273-5581 Desk. Care instructions adapted under license by exoro system (which disclaims liability or warranty for this information). If you have questions about a medical condition or this instruction, always ask your healthcare professional. [address] any warranty or liability for your use of this information. Diabetic Renal Diet: Care Instructions Your Care Instructions You may already be spreading carbohydrate throughout your daily meals. When you also have kidney disease, you need to avoid foods that make your kidneys worse. Keep your blood sugar and blood pressure as near normal as you can to reduce your chance of kidney failure. Your doctor and dietitian will help you make an eating plan. It will be based on your body weight, size, and medical condition. You may need to limit salt, fluids, and protein. You also may need to limit minerals such as potassium and phosphorus. It takes planning, but there are plenty of tasty, healthy foods you can eat. Always talk with your doctor or dietitian before you make changes in your diet. Follow-up care is a key part of your treatment and safety. Be sure to make and go to all appointments, and call your doctor if you are having problems. It's also a good idea to know your test results and keep a list of the medicines you take. How can you care for yourself at home? · Work with your doctor or dietitian to create a food plan that guides your daily food choices. · Eat regular meals. Do not skip meals or go for many hours without eating. To help control your blood sugar, try to eat several small meals during the day, rather than three large ones. · You can use margarine, mayonnaise, and oil to add calories to your diet for energy. The healthiest oils are olive, canola, and safflower oils. · Talk to your dietitian about eating sweets, including honey and sugar. · Be safe with medicines. Take your medicines exactly as prescribed. Call your doctor if you think you are having a problem with your medicine. You will get more details on the specific medicines your doctor prescribes. · Do not take any other medicine without talking to your doctor first. This includes over-the-counter medicines, vitamins, and herbal products. · Limit alcohol to no more than 1 drink per day. Count it as part of your fluid allowance. To get the right amount of protein · Ask your doctor or dietitian how much protein you can have each day. You need some protein to stay healthy. · Include all sources of protein in your daily protein count. Besides meat, poultry, and fish, protein is found in milk and milk products, beans, peas, lentils, nuts, seeds, tofu, and eggs. Check for protein on the nutrition facts label found on packages of food such as bread and cereal. 
To limit salt · Do not add salt to your food. Avoid foods that list salt, sodium, or MSG as an ingredient. And look for \"reduced salt\" or \"low sodium\" on labels. · Do not use a salt substitute or lite salt unless your doctor says it is okay. (These products are high in potassium.) · Avoid or use very small amounts of condiments and marinades. These include soy sauce, fish sauce, and barbecue sauce. They are high in sodium. · Avoid salted pretzels, chips, and other salted snacks. · Check food labels to become more aware of the sodium content of foods.  Foods that are high in sodium include soups; many canned foods; cured, smoked, or dried meats; and many packaged foods. To control carbohydrate · Spread carbohydrate throughout the day. This helps to prevent high blood sugar after meals. Ask your dietitian how much carbohydrate you can have. Carbohydrate foods include: ¨ Whole-grain and refined breads and cereals, and some vegetables such as peas and beans. ¨ Fruits, milk, and milk products (except cheese). ¨ Candy, table sugar, and regular carbonated drinks. To limit fluids · Know what your fluid allowance is. Fill a pitcher with that amount of water every day. If you drink another fluid (such as coffee) that day, pour an equal amount out of the pitcher. · Foods that are liquid at room temperature count as fluids. These include ice cream and gelatin desserts such as Jell-O. To limit potassium · Fruits that are low in potassium include blueberries and raspberries. · Vegetables that are low in potassium include cucumber and radishes. To limit phosphorus · Follow your doctor's or dietitian's plan for your limit on milk and milk products in your diet. · Avoid nuts, peanut butter, seeds, lentils, beans, organ meats, and sardines. · Avoid cola drinks. · Avoid bran breads or bran cereals. They are high in phosphorus. Where can you learn more? Go to http://alfa-gino.info/. Enter U288 in the search box to learn more about \"Diabetic Renal Diet: Care Instructions. \" Current as of: March 13, 2017 Content Version: 11.4 © 8806-7945 Antidot. Care instructions adapted under license by Logic[employer]ker (which disclaims liability or warranty for this information). If you have questions about a medical condition or this instruction, always ask your healthcare professional. [facility] any warranty or liability for your use of this information. Introducing [facility]!    
 Denise García introduces MyCrowd patient portal. Now you can access parts of your medical record, email your doctor's office, and request medication refills online. 1. In your internet browser, go to [email or web address] 2. Click on the First Time User? Click Here link in the Sign In box. You will see the New Member Sign Up page. 3. Enter your Edhub Access Code exactly as it appears below. You will not need to use this code after youve completed the sign-up process. If you do not sign up before the expiration date, you must request a new code. · Edhub Access Code: [email or web address] Expires: 2/13/2018 11:25 AM 
 
4. Enter the last four digits of your Social Security Number (xxxx) and Date of Birth (mm/dd/yyyy) as indicated and click Submit. You will be taken to the next sign-up page. 5. Create a Edhub ID. This will be your Edhub login ID and cannot be changed, so think of one that is secure and easy to remember. 6. Create a Edhub password. You can change your password at any time. 7. Enter your Password Reset Question and Answer. This can be used at a later time if you forget your password. 8. Enter your e-mail address. You will receive e-mail notification when new information is available in [address] 9. Click Sign Up. You can now view and download portions of your medical record. 10. Click the Download Summary menu link to download a portable copy of your medical information. If you have questions, please visit the Frequently Asked Questions section of the Edhub website. Remember, Edhub is NOT to be used for urgent needs. For medical emergencies, dial 911. Now available from your iPhone and Android! Please provide this summary of care documentation to your next provider. Your primary care clinician is listed as Marcella Leigh. If you have any questions after today's visit, please call [phone].

## 2017-12-12 NOTE — PATIENT INSTRUCTIONS
Insomnia: Care Instructions  Your Care Instructions    Insomnia is the inability to sleep well. It is a common problem for most people at some time. Insomnia may make it hard for you to get to sleep, stay asleep, or sleep as long as you need to. This can make you tired and grouchy during the day. It can also make you forgetful, less effective at work, and unhappy. Insomnia can be caused by conditions such as depression or anxiety. Pain can also affect your ability to sleep. When these problems are solved, the insomnia usually clears up. But sometimes bad sleep habits can cause insomnia. If insomnia is affecting your work or your enjoyment of life, you can take steps to improve your sleep. Follow-up care is a key part of your treatment and safety. Be sure to make and go to all appointments, and call your doctor if you are having problems. It's also a good idea to know your test results and keep a list of the medicines you take. How can you care for yourself at home? What to avoid  · Do not have drinks with caffeine, such as coffee or black tea, for 8 hours before bed. · Do not smoke or use other types of tobacco near bedtime. Nicotine is a stimulant and can keep you awake. · Avoid drinking alcohol late in the evening, because it can cause you to wake in the middle of the night. · Do not eat a big meal close to bedtime. If you are hungry, eat a light snack. · Do not drink a lot of water close to bedtime, because the need to urinate may wake you up during the night. · Do not read or watch TV in bed. Use the bed only for sleeping and sexual activity. What to try  · Go to bed at the same time every night, and wake up at the same time every morning. Do not take naps during the day. · Keep your bedroom quiet, dark, and cool. · Sleep on a comfortable pillow and mattress. · If watching the clock makes you anxious, turn it facing away from you so you cannot see the time.   · If you worry when you lie down, start a worry book. Well before bedtime, write down your worries, and then set the book and your concerns aside. · Try meditation or other relaxation techniques before you go to bed. · If you cannot fall asleep, get up and go to another room until you feel sleepy. Do something relaxing. Repeat your bedtime routine before you go to bed again. · Make your house quiet and calm about an hour before bedtime. Turn down the lights, turn off the TV, log off the computer, and turn down the volume on music. This can help you relax after a busy day. When should you call for help? Watch closely for changes in your health, and be sure to contact your doctor if:  ? · Your efforts to improve your sleep do not work. ? · Your insomnia gets worse. ? · You have been feeling down, depressed, or hopeless or have lost interest in things that you usually enjoy. Where can you learn more? Go to http://alfaJetSuitegino.info/. Enter P513 in the search box to learn more about \"Insomnia: Care Instructions. \"  Current as of: July 26, 2016  Content Version: 11.4  © 9512-1604 [employer]. Care instructions adapted under license by [employer] (which disclaims liability or warranty for this information). If you have questions about a medical condition or this instruction, always ask your healthcare professional. [address] any warranty or liability for your use of this information. Learning About Sleeping Well  What does sleeping well mean? Sleeping well means getting enough sleep. How much sleep is enough varies among people. The number of hours you sleep is not as important as how you feel when you wake up. If you do not feel refreshed, you probably need more sleep. Another sign of not getting enough sleep is feeling tired during the day. The average total nightly sleep time is 7½ to 8 hours. Healthy adults may need a little more or a little less than this.   Why is getting enough sleep important? Getting enough quality sleep is a basic part of good health. When your sleep suffers, your mood and your thoughts can suffer too. You may find yourself feeling more grumpy or stressed. Not getting enough sleep also can lead to serious problems, including injury, accidents, anxiety, and depression. What might cause poor sleeping? Many things can cause sleep problems, including:  · Stress. Stress can be caused by fear about a single event, such as giving a speech. Or you may have ongoing stress, such as worry about work or school. · Depression, anxiety, and other mental or emotional conditions. · Changes in your sleep habits or surroundings. This includes changes that happen where you sleep, such as noise, light, or sleeping in a different bed. It also includes changes in your sleep pattern, such as having jet lag or working a late shift. · Health problems, such as pain, breathing problems, and restless legs syndrome. · Lack of regular exercise. How can you help yourself? Here are some tips that may help you sleep more soundly and wake up feeling more refreshed. Your sleeping area  · Use your bedroom only for sleeping and sex. A bit of light reading may help you fall asleep. But if it doesn't, do your reading elsewhere in the house. Don't watch TV in bed. · Be sure your bed is big enough to stretch out comfortably, especially if you have a sleep partner. · Keep your bedroom quiet, dark, and cool. Use curtains, blinds, or a sleep mask to block out light. To block out noise, use earplugs, soothing music, or a \"white noise\" machine. Your evening and bedtime routine  · Create a relaxing bedtime routine. You might want to take a warm shower or bath, listen to soothing music, or drink a cup of noncaffeinated tea. · Go to bed at the same time every night. And get up at the same time every morning, even if you feel tired.   What to avoid  · Limit caffeine (coffee, tea, caffeinated sodas) during the day, and don't have any for at least 4 to 6 hours before bedtime. · Don't drink alcohol before bedtime. Alcohol can cause you to wake up more often during the night. · Don't smoke or use tobacco, especially in the evening. Nicotine can keep you awake. · Don't take naps during the day, especially close to bedtime. · Don't lie in bed awake for too long. If you can't fall asleep, or if you wake up in the middle of the night and can't get back to sleep within 15 minutes or so, get out of bed and go to another room until you feel sleepy. · Don't take medicine right before bed that may keep you awake or make you feel hyper or energized. Your doctor can tell you if your medicine may do this and if you can take it earlier in the day. If you can't sleep  · Imagine yourself in a peaceful, pleasant scene. Focus on the details and feelings of being in a place that is relaxing. · Get up and do a quiet or boring activity until you feel sleepy. · Don't drink any liquids after 6 p.m. if you wake up often because you have to go to the bathroom. Where can you learn more? Go to http://alfa-gino.info/. Enter G760 in the search box to learn more about \"Learning About Sleeping Well. \"  Current as of: May 12, 2017  Content Version: 11.4  © 6002-2373 [employer]. Care instructions adapted under license by [employer] (which disclaims liability or warranty for this information). If you have questions about a medical condition or this instruction, always ask your healthcare professional. [place] [address] any warranty or liability for your use of this information. Diabetic Renal Diet: Care Instructions  Your Care Instructions    You may already be spreading carbohydrate throughout your daily meals. When you also have kidney disease, you need to avoid foods that make your kidneys worse.  Keep your blood sugar and blood pressure as near normal as you can to reduce your chance of kidney failure. Your doctor and dietitian will help you make an eating plan. It will be based on your body weight, size, and medical condition. You may need to limit salt, fluids, and protein. You also may need to limit minerals such as potassium and phosphorus. It takes planning, but there are plenty of tasty, healthy foods you can eat. Always talk with your doctor or dietitian before you make changes in your diet. Follow-up care is a key part of your treatment and safety. Be sure to make and go to all appointments, and call your doctor if you are having problems. It's also a good idea to know your test results and keep a list of the medicines you take. How can you care for yourself at home? · Work with your doctor or dietitian to create a food plan that guides your daily food choices. · Eat regular meals. Do not skip meals or go for many hours without eating. To help control your blood sugar, try to eat several small meals during the day, rather than three large ones. · You can use margarine, mayonnaise, and oil to add calories to your diet for energy. The healthiest oils are olive, canola, and safflower oils. · Talk to your dietitian about eating sweets, including honey and sugar. · Be safe with medicines. Take your medicines exactly as prescribed. Call your doctor if you think you are having a problem with your medicine. You will get more details on the specific medicines your doctor prescribes. · Do not take any other medicine without talking to your doctor first. This includes over-the-counter medicines, vitamins, and herbal products. · Limit alcohol to no more than 1 drink per day. Count it as part of your fluid allowance. To get the right amount of protein  · Ask your doctor or dietitian how much protein you can have each day. You need some protein to stay healthy. · Include all sources of protein in your daily protein count.  Besides meat, poultry, and fish, protein is found in milk and milk products, beans, peas, lentils, nuts, seeds, tofu, and eggs. Check for protein on the nutrition facts label found on packages of food such as bread and cereal.  To limit salt  · Do not add salt to your food. Avoid foods that list salt, sodium, or MSG as an ingredient. And look for \"reduced salt\" or \"low sodium\" on labels. · Do not use a salt substitute or lite salt unless your doctor says it is okay. (These products are high in potassium.)  · Avoid or use very small amounts of condiments and marinades. These include soy sauce, fish sauce, and barbecue sauce. They are high in sodium. · Avoid salted pretzels, chips, and other salted snacks. · Check food labels to become more aware of the sodium content of foods. Foods that are high in sodium include soups; many canned foods; cured, smoked, or dried meats; and many packaged foods. To control carbohydrate  · Spread carbohydrate throughout the day. This helps to prevent high blood sugar after meals. Ask your dietitian how much carbohydrate you can have. Carbohydrate foods include:  ¨ Whole-grain and refined breads and cereals, and some vegetables such as peas and beans. ¨ Fruits, milk, and milk products (except cheese). ¨ Candy, table sugar, and regular carbonated drinks. To limit fluids  · Know what your fluid allowance is. Fill a pitcher with that amount of water every day. If you drink another fluid (such as coffee) that day, pour an equal amount out of the pitcher. · Foods that are liquid at room temperature count as fluids. These include ice cream and gelatin desserts such as Jell-O. To limit potassium  · Fruits that are low in potassium include blueberries and raspberries. · Vegetables that are low in potassium include cucumber and radishes. To limit phosphorus  · Follow your doctor's or dietitian's plan for your limit on milk and milk products in your diet.   · Avoid nuts, peanut butter, seeds, lentils, beans, organ meats, and sardines. · Avoid cola drinks. · Avoid bran breads or bran cereals. They are high in phosphorus. Where can you learn more? Go to http://alfa-gino.info/. Enter I303 in the search box to learn more about \"Diabetic Renal Diet: Care Instructions. \"  Current as of: March 13, 2017  Content Version: 11.4  © 8052-7798 [employer]. Care instructions adapted under license by [employer] (which disclaims liability or warranty for this information). If you have questions about a medical condition or this instruction, always ask your healthcare professional. [address] any warranty or liability for your use of this information.

## 2017-12-12 NOTE — PROGRESS NOTES
Althea Islas is a 66 y.o. female here this morning for a follow up on her HTN and insomnia. She c/o dizziness and feeling anxious but denies any chest pains or SOB. Learning assessment previously completed. 1. Have you been to the ER, urgent care clinic or hospitalized since your last visit? no    2. Have you seen or consulted any other health care providers outside of the [address] [place] [address] since your last visit (Include any pap smears or colon screening)? no     Do you have an Advanced Directive? no    Would you like information on Advanced Directives?  Has paper work

## 2017-12-15 NOTE — PROGRESS NOTES
Today's Date:  12/15/2017   Patient:  Rodney Fine  Patient :  1945    Subjective:     Chief Complaint   Patient presents with    Insomnia      Arthlina Roper is a 66 y.o. female who presents for follow up for Hypertension and Insomnia; would also want medication to be refilled. Is compliant with medication. Is a little stressed about her living condition which is exacerbating her anxiety but states that she is dealing with it. States that she feel little dizzy at times but does not have symptoms now. Has no other complaints at this time. Current Outpatient Meds and Allergies     Current Outpatient Prescriptions on File Prior to Visit   Medication Sig Dispense Refill    omega-3 fatty acids-fish oil (FISH OIL) 360-1,200 mg cap Take  by mouth.  carica papaya (PAPAYA ENZYME) tab Take  by mouth.  lisinopril (PRINIVIL, ZESTRIL) 30 mg tablet Take 1 Tab by mouth daily. Indications: hypertension 30 Tab 3    busPIRone (BUSPAR) 15 mg tablet Take 0.5 Tabs by mouth two (2) times a day. Indications: Generalized Anxiety Disorder 30 Tab 3    pravastatin (PRAVACHOL) 20 mg tablet Take 1 Tab by mouth nightly. 30 Tab 3    amLODIPine (NORVASC) 10 mg tablet Take 0.5 Tabs by mouth daily. 30 Tab 3    fluticasone (FLONASE SENSIMIST) 27.5 mcg/actuation nasal spray 2 Sprays by Nasal route daily.  tiZANidine (ZANAFLEX) 2 mg tablet Take  by mouth.  diclofenac (VOLTAREN) 1 % gel Apply  to affected area four (4) times daily.  DOCOSAHEXANOIC ACID/EPA (FISH OIL PO) Take 1,200 mg by mouth three (3) times daily.  glucosamine sulfate 1,000 mg cap Take  by mouth.  acetaminophen (TYLENOL) 650 mg CR tablet Take 650 mg by mouth every six (6) hours as needed for Pain.  VITAMIN B COMPLEX PO Take 1 mL by mouth daily.  CHOLECALCIFEROL, VITAMIN D3, (VITAMIN D3 PO) Take 400 Int'l Units by mouth daily.  CALCIUM-MAGNESIUM-ZINC PO Take  by mouth three (3) times daily.  famotidine (PEPCID) 20 mg tablet Take 20 mg by mouth two (2) times a day.  docusate sodium 100 mg tab Take  by mouth.  melatonin 1 mg tablet Take 3 mg by mouth.  ketoconazole (NIZORAL) 2 % topical cream Apply  to affected area daily. No current facility-administered medications on file prior to visit. These medications have been reviewed and reconciled with the patient during today's visit. Allergies   Allergen Reactions    Compazine [Prochlorperazine] Other (comments)     Childhood reaction    Erythromycin Nausea and Vomiting    Indocin [Indomethacin] Other (comments)     Made her feel weird to the point it scared her    Keflex [Cephalexin] Nausea and Vomiting         ROS:     CONST:   Denies fatigue, weight change, appetite change   NEURO:   Denies headaches, vision changes, dizziness, loss of consciousness  CV:      Denies chest pain, palpitations, orthopnea, PND  PULM:  Denies SOB, wheezing, cough, hemoptysis  GI:             Denies nausea, vomiting, abdominal pain, greasy stools, blood in stool,     diarrhea, constipation  :       Denies dysuria, hematuria, change in urine  MS:      Denies muscle/joint pain, joint swelling  SKIN:        Denies rashes, skin changes    Objective:     VS:    Visit Vitals    /73 (BP 1 Location: Left arm, BP Patient Position: Sitting)    Pulse 81    Temp 98 °F (36.7 °C) (Oral)    Resp 18    Ht 5' 7.52\" (1.715 m)    Wt 148 lb (67.1 kg)    SpO2 100%    BMI 22.82 kg/m2       General:   Well-nourished, well-groomed, pleasant, alert, in no acute distress.      Cardiovasc:   Regular rate and rhythm, no murmurs, no rubs, no gallops,   Pulmonary:   Clear breath sounds bilaterally, good air movement, no wheezing, no rales, no rhonchi, normal respiratory effort  Abdomen:   Abdomen soft, nontender, nondistended, NABS,  Extremities:   No edema, no tenderness with palpation of calves, warm and well-perfused  Neuro:   Alert, conversant, appropriate, following commands, no focal deficits. Psych:   Oriented to time, place and person. Normal mood, no agitation or anxiety. Normal affect. Pleasant and cooperative. Pertinent diagnostic procedures include:  Hospital Outpatient Visit on 11/14/2017   Component Date Value Ref Range Status    Vitamin B12 11/14/2017 1656* 211 - 911 pg/mL Final    Folate 11/14/2017 7.2  3.10 - 17.50 ng/mL Final    Vitamin D 25-Hydroxy 11/14/2017 32.2  30 - 100 ng/mL Final    Comment: (NOTE)  Deficiency               <20 ng/mL  Insufficiency          20-30 ng/mL  Sufficient             ng/mL  Possible toxicity       >100 ng/mL    The Method used is Siemens Advia Centaur currently standardized to a   Center of Disease Control and Prevention (CDC) certified reference   [address]. Samples containing fluorescein dye can produce falsely   elevated values when tested with the ADVIA Centaur Vitamin D Assay. It is recommended that results in the toxic range, >100 ng/mL, be   retested 72 hours post fluorescein exposure. Assessment:       1. Insomnia, unspecified type    2. Herpes simplex type 2 infection        Plan:       Orders Placed This Encounter    valACYclovir (VALTREX) 1 gram tablet     Sig: Take 2 Tabs by mouth two (2) times a day. Indications: SUPPRESSION OF RECURRENT HERPES SIMPLEX INFECTION     Dispense:  30 Tab     Refill:  1    traZODone (DESYREL) 50 mg tablet     Sig: Take 1 Tab by mouth nightly. Dispense:  30 Tab     Refill:  2     She is doing well; BP slightly elevated. Medication refilled. Advised to let the office know if her anxiety get worse. She verbalized understanding. I have discussed the diagnosis with the patient and the intended plan as seen in the above orders. The patient has received an after-visit summary along with patient information handout. I have discussed medication side effects and warnings with the patient as well. Pt verbalized understanding.     Follow-up Disposition:  Return in about 3 months (around 3/12/2018).   AMBER Lee  12/15/2017, 1:25 PM

## 2018-03-15 DIAGNOSIS — I10 ESSENTIAL HYPERTENSION: ICD-10-CM

## 2018-03-15 DIAGNOSIS — G47.00 INSOMNIA, UNSPECIFIED TYPE: ICD-10-CM

## 2018-03-15 NOTE — TELEPHONE ENCOUNTER
Pt has moved to [address]. She has had trouble finding a doctor there that will take her insurance (medicare and medicaid combo). She finally got an appt scheduled for April 26th but will run out of meds before then. She is requesting one refill with each because she will run out before the 26th of April.

## 2018-03-20 ENCOUNTER — TELEPHONE (OUTPATIENT)
Dept: FAMILY MEDICINE CLINIC | Age: 73
End: 2018-03-20

## 2018-03-20 RX ORDER — TRAZODONE HYDROCHLORIDE 50 MG/1
50 TABLET ORAL
Qty: 30 TAB | Refills: 2 | Status: SHIPPED | OUTPATIENT
Start: 2018-03-20 | End: 2018-04-26 | Stop reason: SDUPTHER

## 2018-03-20 RX ORDER — LISINOPRIL 30 MG/1
30 TABLET ORAL DAILY
Qty: 30 TAB | Refills: 3 | Status: SHIPPED | OUTPATIENT
Start: 2018-03-20 | End: 2018-07-26 | Stop reason: SDUPTHER

## 2018-03-20 NOTE — TELEPHONE ENCOUNTER
Returned call to pt, made her aware that meds have been refilled.  She says she appreciates our help

## 2018-04-26 ENCOUNTER — OFFICE VISIT (OUTPATIENT)
Dept: INTERNAL MEDICINE CLINIC | Age: 73
End: 2018-04-26

## 2018-04-26 VITALS
TEMPERATURE: 98.3 F | SYSTOLIC BLOOD PRESSURE: 146 MMHG | OXYGEN SATURATION: 99 % | RESPIRATION RATE: 16 BRPM | HEIGHT: 68 IN | WEIGHT: 145 LBS | DIASTOLIC BLOOD PRESSURE: 72 MMHG | HEART RATE: 72 BPM | BODY MASS INDEX: 21.98 KG/M2

## 2018-04-26 DIAGNOSIS — M47.22 OSTEOARTHRITIS OF SPINE WITH RADICULOPATHY, CERVICAL REGION: ICD-10-CM

## 2018-04-26 DIAGNOSIS — G47.00 INSOMNIA, UNSPECIFIED TYPE: ICD-10-CM

## 2018-04-26 DIAGNOSIS — I10 ESSENTIAL HYPERTENSION: Primary | ICD-10-CM

## 2018-04-26 DIAGNOSIS — E78.5 HYPERLIPIDEMIA LDL GOAL <70: ICD-10-CM

## 2018-04-26 DIAGNOSIS — F41.9 ANXIETY: ICD-10-CM

## 2018-04-26 RX ORDER — BUSPIRONE HYDROCHLORIDE 15 MG/1
7.5 TABLET ORAL 2 TIMES DAILY
Qty: 90 TAB | Refills: 3 | Status: SHIPPED | OUTPATIENT
Start: 2018-04-26 | End: 2019-01-23 | Stop reason: SDUPTHER

## 2018-04-26 RX ORDER — ASPIRIN 81 MG/1
81 TABLET ORAL DAILY
Qty: 90 TAB | Refills: 3 | Status: SHIPPED | OUTPATIENT
Start: 2018-04-26 | End: 2019-01-23 | Stop reason: SDUPTHER

## 2018-04-26 RX ORDER — PRAVASTATIN SODIUM 20 MG/1
20 TABLET ORAL
Qty: 90 TAB | Refills: 3 | Status: SHIPPED | OUTPATIENT
Start: 2018-04-26 | End: 2019-01-23 | Stop reason: SDUPTHER

## 2018-04-26 RX ORDER — TRAZODONE HYDROCHLORIDE 50 MG/1
50 TABLET ORAL
Qty: 90 TAB | Refills: 3 | Status: SHIPPED | OUTPATIENT
Start: 2018-04-26 | End: 2018-06-11 | Stop reason: SDUPTHER

## 2018-04-26 NOTE — PROGRESS NOTES
Reviewed record in preparation for visit and have obtained necessary documentation. Identified pt with two pt identifiers(name and ). Chief Complaint   Patient presents with   [address] Maintenance Due   Topic Date Due    ZOSTER VACCINE AGE 60>  10/17/2004    Influenza Age 5 to Adult  2017    MEDICARE YEARLY EXAM  03/15/2018       Ms. Fabby Kwok has a reminder for a \"due or due soon\" health maintenance. I have asked that she discuss health maintenance topic(s) due with Her  primary care provider. Coordination of Care Questionnaire:  :     1) Have you been to an emergency room, urgent care clinic since your last visit? no   Hospitalized since your last visit? no             2) Have you seen or consulted any other health care providers outside of [address] [place] [address] since your last visit? no  (Include any pap smears or colon screenings in this section.)    3) Do you have an Advance Directive on file? no    4) Are you interested in receiving information on Advance Directives? NO    Patient is accompanied by self I have received verbal consent from [facility] to discuss any/all medical information while they are present in the room.

## 2018-04-26 NOTE — PROGRESS NOTES
Sudhir Winter is a 67 y.o. female who presents for evaluation of new pt visit, transfer of care. Used to see NP teri in [place]. Moved to [facility] in January. Overall doing ok. Has some chronic neck pain, follows with pain specialist in [place] [address] for injections, would like somebody here. Sleeping much better with addition of trazodone, also takes 1 mg melatonin before.       ROS:  Constitutional: negative for fevers, chills, anorexia and weight loss  Eyes:   negative for visual disturbance and irritation  ENT:   negative for tinnitus,sore throat,nasal congestion,ear pain,hoarseness  Respiratory:  negative for cough, hemoptysis, dyspnea,wheezing  CV:   negative for chest pain, palpitations, lower extremity edema  GI:   negative for nausea, vomiting, diarrhea, abdominal pain,melena  Genitourinary: negative for frequency, dysuria and hematuria  Musculoskel: negative for myalgias, arthralgias, back pain, muscle weakness, joint pain  Neurological:  negative for headaches, dizziness, focal weakness, numbness  Psychiatric:     Negative for depression or anxiety      Past Medical History:   Diagnosis Date    Arthritis     Chronic pain     neck pain    Degenerative disc disease, cervical     Floating kidney     Right    Gallstone 1974    Herpes simplex type II infection     Hypercholesterolemia     Hypertension     Kidney disease        Past Surgical History:   Procedure Laterality Date    HX CHOLECYSTECTOMY  1974    HX HYSTERECTOMY  1974    HX ORTHOPAEDIC  1972    R arm broken, pins put in, taken out 1 year later   [place]    from bone on L leg        Family History   Problem Relation Age of Onset    Cancer Mother      Cervical    Stroke Mother     Psychiatric Disorder Mother     Heart Disease Father     Cancer Sister      Breast       Social History     Social History    Marital status: SINGLE     Spouse name: N/A    Number of children: N/A    Years of education: N/A Occupational History    Not on file. Social History Main Topics    Smoking status: Current Some Day Smoker     Packs/day: 0.25     Years: 41.00    Smokeless tobacco: Never Used    Alcohol use Yes      Comment: occasional    Drug use: No    Sexual activity: Not on file     Other Topics Concern    Not on file     Social History Narrative            Visit Vitals    /72 (BP 1 Location: Left arm, BP Patient Position: Sitting)    Pulse 72    Temp 98.3 °F (36.8 °C) (Oral)    Resp 16    Ht 5' 7.52\" (1.715 m)    Wt 145 lb (65.8 kg)    SpO2 99%    BMI 22.36 kg/m2       Physical Examination:   General - Well appearing female  HEENT - PERRL, TM no erythema/opacification, normal nasal turbinates, no oropharyngeal erythema or exudate, MMM  Neck - supple, no bruits, no thyroidomegaly, no lymphadenopathy  Pulm - clear to auscultation bilaterally  Cardio - RRR, normal S1 S2, no murmur  Abd - soft, nontender, no masses, no HSM  Extrem - no edema, +2 distal pulses  Neuro-  No focal deficits, CN intact     Assessment/Plan:    1.  hyperlipids--continue pravachol  2.  htn--continue norvasc, lisinopril  3. Anxiety and depression--improved with buspar  4. Insomnia--on trazodone, melatonin. Info given on sleep hygiene  5. Chronic c spine oa--referral to dr Arabella Arias  6. Routine adult health maintenance--rx given for shingrix    rtc 3 months, check labs then.         Rayma December III, DO

## 2018-04-26 NOTE — PATIENT INSTRUCTIONS
Insomnia: Care Instructions  Your Care Instructions    Insomnia is the inability to sleep well. It is a common problem for most people at some time. Insomnia may make it hard for you to get to sleep, stay asleep, or sleep as long as you need to. This can make you tired and grouchy during the day. It can also make you forgetful, less effective at work, and unhappy. Insomnia can be caused by conditions such as depression or anxiety. Pain can also affect your ability to sleep. When these problems are solved, the insomnia usually clears up. But sometimes bad sleep habits can cause insomnia. If insomnia is affecting your work or your enjoyment of life, you can take steps to improve your sleep. Follow-up care is a key part of your treatment and safety. Be sure to make and go to all appointments, and call your doctor if you are having problems. It's also a good idea to know your test results and keep a list of the medicines you take. How can you care for yourself at home? What to avoid  · Do not have drinks with caffeine, such as coffee or black tea, for 8 hours before bed. · Do not smoke or use other types of tobacco near bedtime. Nicotine is a stimulant and can keep you awake. · Avoid drinking alcohol late in the evening, because it can cause you to wake in the middle of the night. · Do not eat a big meal close to bedtime. If you are hungry, eat a light snack. · Do not drink a lot of water close to bedtime, because the need to urinate may wake you up during the night. · Do not read or watch TV in bed. Use the bed only for sleeping and sexual activity. What to try  · Go to bed at the same time every night, and wake up at the same time every morning. Do not take naps during the day. · Keep your bedroom quiet, dark, and cool. · Sleep on a comfortable pillow and mattress. · If watching the clock makes you anxious, turn it facing away from you so you cannot see the time.   · If you worry when you lie down, start a worry book. Well before bedtime, write down your worries, and then set the book and your concerns aside. · Try meditation or other relaxation techniques before you go to bed. · If you cannot fall asleep, get up and go to another room until you feel sleepy. Do something relaxing. Repeat your bedtime routine before you go to bed again. · Make your house quiet and calm about an hour before bedtime. Turn down the lights, turn off the TV, log off the computer, and turn down the volume on music. This can help you relax after a busy day. When should you call for help? Watch closely for changes in your health, and be sure to contact your doctor if:  ? · Your efforts to improve your sleep do not work. ? · Your insomnia gets worse. ? · You have been feeling down, depressed, or hopeless or have lost interest in things that you usually enjoy. Where can you learn more? Go to http://alfaTransparency Softwaregino.info/. Enter P513 in the search box to learn more about \"Insomnia: Care Instructions. \"  Current as of: July 26, 2016  Content Version: 11.4  © 6481-7758 [employer]. Care instructions adapted under license by [employer] (which disclaims liability or warranty for this information). If you have questions about a medical condition or this instruction, always ask your healthcare professional. [address] any warranty or liability for your use of this information. Learning About Sleeping Well  What does sleeping well mean? Sleeping well means getting enough sleep. How much sleep is enough varies among people. The number of hours you sleep is not as important as how you feel when you wake up. If you do not feel refreshed, you probably need more sleep. Another sign of not getting enough sleep is feeling tired during the day. The average total nightly sleep time is 7½ to 8 hours. Healthy adults may need a little more or a little less than this.   Why is getting enough sleep important? Getting enough quality sleep is a basic part of good health. When your sleep suffers, your mood and your thoughts can suffer too. You may find yourself feeling more grumpy or stressed. Not getting enough sleep also can lead to serious problems, including injury, accidents, anxiety, and depression. What might cause poor sleeping? Many things can cause sleep problems, including:  · Stress. Stress can be caused by fear about a single event, such as giving a speech. Or you may have ongoing stress, such as worry about work or school. · Depression, anxiety, and other mental or emotional conditions. · Changes in your sleep habits or surroundings. This includes changes that happen where you sleep, such as noise, light, or sleeping in a different bed. It also includes changes in your sleep pattern, such as having jet lag or working a late shift. · Health problems, such as pain, breathing problems, and restless legs syndrome. · Lack of regular exercise. How can you help yourself? Here are some tips that may help you sleep more soundly and wake up feeling more refreshed. Your sleeping area  · Use your bedroom only for sleeping and sex. A bit of light reading may help you fall asleep. But if it doesn't, do your reading elsewhere in the house. Don't watch TV in bed. · Be sure your bed is big enough to stretch out comfortably, especially if you have a sleep partner. · Keep your bedroom quiet, dark, and cool. Use curtains, blinds, or a sleep mask to block out light. To block out noise, use earplugs, soothing music, or a \"white noise\" machine. Your evening and bedtime routine  · Create a relaxing bedtime routine. You might want to take a warm shower or bath, listen to soothing music, or drink a cup of noncaffeinated tea. · Go to bed at the same time every night. And get up at the same time every morning, even if you feel tired.   What to avoid  · Limit caffeine (coffee, tea, caffeinated sodas) during the day, and don't have any for at least 4 to 6 hours before bedtime. · Don't drink alcohol before bedtime. Alcohol can cause you to wake up more often during the night. · Don't smoke or use tobacco, especially in the evening. Nicotine can keep you awake. · Don't take naps during the day, especially close to bedtime. · Don't lie in bed awake for too long. If you can't fall asleep, or if you wake up in the middle of the night and can't get back to sleep within 15 minutes or so, get out of bed and go to another room until you feel sleepy. · Don't take medicine right before bed that may keep you awake or make you feel hyper or energized. Your doctor can tell you if your medicine may do this and if you can take it earlier in the day. If you can't sleep  · Imagine yourself in a peaceful, pleasant scene. Focus on the details and feelings of being in a place that is relaxing. · Get up and do a quiet or boring activity until you feel sleepy. · Don't drink any liquids after 6 p.m. if you wake up often because you have to go to the bathroom. Where can you learn more? Go to http://alfa-gino.info/. Enter K641 in the search box to learn more about \"Learning About Sleeping Well. \"  Current as of: May 12, 2017  Content Version: 11.4  © 1399-1680 [employer], [employer]. Care instructions adapted under license by [employer] (which disclaims liability or warranty for this information). If you have questions about a medical condition or this instruction, always ask your healthcare professional. [place] [address] any warranty or liability for your use of this information.

## 2018-06-11 DIAGNOSIS — G47.00 INSOMNIA, UNSPECIFIED TYPE: ICD-10-CM

## 2018-06-11 RX ORDER — TRAZODONE HYDROCHLORIDE 50 MG/1
50 TABLET ORAL
Qty: 90 TAB | Refills: 3 | Status: SHIPPED | OUTPATIENT
Start: 2018-06-11 | End: 2018-12-03 | Stop reason: SDUPTHER

## 2018-06-11 NOTE — TELEPHONE ENCOUNTER
[phone]    Pt is requesting a refill \"Trazodone 50mg\" (new Rx listed on file) to be called in to [facility] [place] [phone].              Message copied/pasted from [facility]

## 2018-06-28 ENCOUNTER — TELEPHONE (OUTPATIENT)
Dept: INTERNAL MEDICINE CLINIC | Age: 73
End: 2018-06-28

## 2018-06-28 NOTE — TELEPHONE ENCOUNTER
----- Message from Christel Schilling sent at 6/27/2018  4:39 PM EDT -----  Regarding: Dr. Ran Aldridge  Pt will be dropping off pre-op papers for the doctor to fill out tomorrow and the form needs to be faxed back over to Dr. Clemente Noble (fax #[phone]. Patient's best contact number is [phone].         Message copied/pasted from [facility]

## 2018-06-29 ENCOUNTER — DOCUMENTATION ONLY (OUTPATIENT)
Dept: INTERNAL MEDICINE CLINIC | Age: 73
End: 2018-06-29

## 2018-06-29 NOTE — PROGRESS NOTES
Ms. Mitch Sara dropped off from from [facility] for Dr. Mark Anthony Rhodes to complete. Turnaround time and potential fee was discussed. Form was placed in Dr. Deborah Johnston box.

## 2018-07-03 ENCOUNTER — TELEPHONE (OUTPATIENT)
Dept: INTERNAL MEDICINE CLINIC | Age: 73
End: 2018-07-03

## 2018-07-03 NOTE — TELEPHONE ENCOUNTER
Pt missed a call from [facility] number is [phone].        Message received & copied from [facility] Del Rosario Quiroz

## 2018-07-03 NOTE — TELEPHONE ENCOUNTER
Patient is have cataract surgery on Monday, July 9, 2018 and needs to come in as soon as possible for a pre op. Her number is [phone].        Message received & copied from [facility]

## 2018-07-05 NOTE — TELEPHONE ENCOUNTER
Spoke with patient after 2 patient identifiers being note and advised that she could be seen today at 330pm, patient declined she is in [place], I advised that is someone cancelled on tomorrow I would call her back. Patient expressed understanding and has no further questions at this time.

## 2018-07-05 NOTE — TELEPHONE ENCOUNTER
----- Message from Ector Ramirez sent at 7/3/2018   -----  Regarding: Dr. Derrick Leblanc / Telephone  Pt is returning a phone call to priscilla regarding eye surgery on Monday July 9, 2018 at 1p. Pt has already sent over the pre op forms.   Best contact: [phone]        Message copied/pasted from [facility], received after closing

## 2018-07-06 ENCOUNTER — OFFICE VISIT (OUTPATIENT)
Dept: INTERNAL MEDICINE CLINIC | Age: 73
End: 2018-07-06

## 2018-07-06 ENCOUNTER — TELEPHONE (OUTPATIENT)
Dept: INTERNAL MEDICINE CLINIC | Age: 73
End: 2018-07-06

## 2018-07-06 VITALS
BODY MASS INDEX: 22.51 KG/M2 | TEMPERATURE: 98.1 F | SYSTOLIC BLOOD PRESSURE: 135 MMHG | WEIGHT: 143.4 LBS | RESPIRATION RATE: 20 BRPM | HEART RATE: 67 BPM | HEIGHT: 67 IN | OXYGEN SATURATION: 99 % | DIASTOLIC BLOOD PRESSURE: 77 MMHG

## 2018-07-06 DIAGNOSIS — H91.90 HEARING LOSS, UNSPECIFIED HEARING LOSS TYPE, UNSPECIFIED LATERALITY: ICD-10-CM

## 2018-07-06 DIAGNOSIS — Z01.818 PREOP EXAMINATION: Primary | ICD-10-CM

## 2018-07-06 DIAGNOSIS — F41.9 ANXIETY: ICD-10-CM

## 2018-07-06 DIAGNOSIS — Z00.00 MEDICARE ANNUAL WELLNESS VISIT, SUBSEQUENT: ICD-10-CM

## 2018-07-06 DIAGNOSIS — E78.5 HYPERLIPIDEMIA LDL GOAL <70: ICD-10-CM

## 2018-07-06 DIAGNOSIS — I10 ESSENTIAL HYPERTENSION: ICD-10-CM

## 2018-07-06 NOTE — MR AVS SNAPSHOT
[address] [facility] 
[phone] Patient: Fern Morales MRN: [id number] :[id number] Visit Information Date & Time Provider Department Dept. Phone Encounter #  
 2018 11:45 AM Tim Gallo, [address] [facility] [id number] Follow-up Instructions Return for regular visit. Your Appointments 2018 11:30 AM  
ROUTINE CARE with Chyu Gustafson III, DO [facility] [address] [facility]) Appt Note: 3 mos f/u [address] [phone]  
[address] [facility] Upcoming Health Maintenance Date Due ZOSTER VACCINE AGE 60> 10/17/2004 COLONOSCOPY 2018 Influenza Age 5 to Adult 2018 BREAST CANCER SCRN MAMMOGRAM 2019 MEDICARE YEARLY EXAM 2019 GLAUCOMA SCREENING Q2Y 2019 DTaP/Tdap/Td series (2 - Td) 2022 Allergies as of 2018  Review Complete On: 2018 By: Chuy Gustafson III, DO Severity Noted Reaction Type Reactions Compazine [Prochlorperazine]  2016    Other (comments) Childhood reaction Erythromycin  2016   Intolerance Nausea and Vomiting Indocin [Indomethacin]  2016   Intolerance Other (comments) Made her feel weird to the point it scared her Keflex [Cephalexin]  2016   Intolerance Nausea and Vomiting Current Immunizations  Reviewed on 2018 Name Date Pneumococcal Conjugate (PCV-13) 2016 Pneumococcal Polysaccharide (PPSV-23) 6/15/2017 Zoster Recombinant 2018, 2018 Reviewed by Tim Gallo DO on 2018 at 12:23 PM  
You Were Diagnosed With   
  
 Codes Comments Preop examination    -  Primary ICD-10-CM: T73.448 ICD-9-CM: V72.84   
 Medicare annual wellness visit, subsequent     ICD-10-CM: Z00.00 ICD-9-CM: V70.0 Essential hypertension     ICD-10-CM: I10 
ICD-9-CM: 401.9 Anxiety     ICD-10-CM: F41.9 ICD-9-CM: 300.00 Hyperlipidemia LDL goal <70     ICD-10-CM: E78.5 ICD-9-CM: 272.4 Hearing loss, unspecified hearing loss type, unspecified laterality     ICD-10-CM: H91.90 ICD-9-CM: 092. 9 Vitals BP Pulse Temp Resp Height(growth percentile) Weight(growth percentile) 135/77 (BP 1 Location: Left arm, BP Patient Position: Sitting) 67 98.1 °F (36.7 °C) (Oral) 20 5' 7\" (1.702 m) 143 lb 6.4 oz (65 kg) SpO2 BMI OB Status Smoking Status 99% 22.46 kg/m2 Postmenopausal Current Some Day Smoker Vitals History BMI and BSA Data Body Mass Index Body Surface Area  
 22.46 kg/m 2 1.75 m 2 Preferred Pharmacy Pharmacy Name Phone [phone] [facility] [phone] Your Updated Medication List  
  
   
This list is accurate as of 7/6/18 12:30 PM.  Always use your most recent med list.  
  
  
  
  
 acetaminophen 650 mg Tber Commonly known as:  TYLENOL Take 650 mg by mouth every six (6) hours as needed for Pain. amLODIPine 10 mg tablet Commonly known as:  Izetta Harder Take 0.5 Tabs by mouth daily. aspirin delayed-release 81 mg tablet Take 1 Tab by mouth daily. busPIRone 15 mg tablet Commonly known as:  BUSPAR Take 0.5 Tabs by mouth two (2) times a day. Indications: Generalized Anxiety Disorder CALCIUM-MAGNESIUM-ZINC PO Take  by mouth three (3) times daily. diclofenac 1 % Gel Commonly known as:  VOLTAREN Apply  to affected area four (4) times daily. docusate sodium 100 mg Tab Take  by mouth. famotidine 20 mg tablet Commonly known as:  PEPCID Take 20 mg by mouth two (2) times a day. FISH -1,200 mg Cap Generic drug:  omega-3 fatty acids-fish oil Take  by mouth. FISH OIL PO Take 1,200 mg by mouth three (3) times daily. FLONASE SENSIMIST 27.5 mcg/actuation nasal spray Generic drug:  fluticasone 2 Sprays by Nasal route daily. glucosamine sulfate 1,000 mg Cap Take  by mouth.  
  
 ketoconazole 2 % topical cream  
Commonly known as:  NIZORAL Apply  to affected area daily. lisinopril 30 mg tablet Commonly known as:  Deborah [place] Take 1 Tab by mouth daily. Indications: hypertension  
  
 melatonin 1 mg tablet Take 3 mg by mouth. PAPAYA ENZYME Tab Generic drug:  carica papaya Take  by mouth daily as needed. pravastatin 20 mg tablet Commonly known as:  PRAVACHOL Take 1 Tab by mouth nightly. tiZANidine 2 mg tablet Commonly known as:  Cyn Banter Take  by mouth. traZODone 50 mg tablet Commonly known as:  Berniece Staggers Take 1 Tab by mouth nightly. valACYclovir 1 gram tablet Commonly known as:  VALTREX Take 2 Tabs by mouth two (2) times a day. Indications: SUPPRESSION OF RECURRENT HERPES SIMPLEX INFECTION  
  
 VITAMIN B COMPLEX PO Take 1 mL by mouth daily. VITAMIN D3 PO Take 400 Int'l Units by mouth daily. We Performed the Following REFERRAL TO AUDIOLOGY [REF7 Custom] Follow-up Instructions Return for regular visit. Referral Information Referral ID Referred By Referred To  
  
 [address] [place] [address] Ear, [address] [place] [address] [facility] [address] [facility], [address] [place] [address] Fax: [phone] Visits Status Start Date End Date 1 New Request 7/6/18 7/6/19 If your referral has a status of pending review or denied, additional information will be sent to support the outcome of this decision. Patient Instructions Medicare Wellness Visit, Female The best way to live healthy is to have a lifestyle where you eat a well-balanced diet, exercise regularly, limit alcohol use, and quit all forms of tobacco/nicotine, if applicable. Regular preventive services are another way to keep healthy. Preventive services (vaccines, screening tests, monitoring & exams) can help personalize your care plan, which helps you manage your own care. Screening tests can find health problems at the earliest stages, when they are easiest to treat. ClotildeMaida Cristine Castillo follows the current, evidence-based guidelines published by the [facility] Lucio Cortez ([facility]) when recommending preventive services for our patients. Because we follow these guidelines, sometimes recommendations change over time as research supports it. (For example, mammograms used to be recommended annually. Even though Medicare will still pay for an annual mammogram, the newer guidelines recommend a mammogram every two years for women of average risk.) Of course, you and your provider may decide to screen more often for some diseases, based on your risk and co-morbidities (chronic disease you are already diagnosed with). Preventive services for you include: - Medicare offers their members a free annual wellness visit, which is time for you and your primary care provider to discuss and plan for your preventive service needs. Take advantage of this benefit every year! 
 
-All people over age 72 should receive the recommended pneumonia vaccines. Current USPSTF guidelines recommend a series of two vaccines for the best pneumonia protection.  
 
-All adults should have a yearly flu vaccine and a tetanus vaccine every 10 years. All adults age 61 years should receive a shingles vaccine once in their lifetime.   
 
-A bone mass density test is recommended when a woman turns 65 to screen for osteoporosis. This test is only recommended once as a screening.  Some providers will use this same test as a disease monitoring tool if you already have osteoporosis. -All adults age 38-68 years who are overweight should have a diabetes screening test once every three years.  
 
-Other screening tests & preventive services for persons with diabetes include: an eye exam to screen for diabetic retinopathy, a kidney function test, a foot exam, and stricter control over your cholesterol.  
 
-Cardiovascular screening for adults with routine risk involves an electrocardiogram (ECG) at intervals determined by the provider.  
 
-Colorectal cancer screenings should be done for adults age 54-65 years with normal risk. There are a number of acceptable methods of screening for this type of cancer. Each test has its own benefits and drawbacks. Discuss with your provider what is most appropriate for you during your annual wellness visit. The different tests include: colonoscopy (considered the best screening method), a fecal occult blood test, a fecal DNA test, and sigmoidoscopy. -Breast cancer screenings are recommended every other year for women of normal risk age 54-69 years.  
 
-Cervical cancer screenings for women over age 72 are only recommended with certain risk factors.  
 
-All adults born between [facility] should be screened once for Hepatitis C. Here is a list of your current Health Maintenance items (your personalized list of preventive services) with a due date: 
Health Maintenance Due Topic Date Due  Shingles Vaccine  10/17/2004 Durga Cande Annual Well Visit  03/15/2018  Colonoscopy  11/04/2018 Introducing [facility]! [employer] introduces Neofect patient portal. Now you can access parts of your medical record, email your doctor's office, and request medication refills online. 1. In your internet browser, go to [email or web address] 2. Click on the First Time User? Click Here link in the Sign In box. You will see the New Member Sign Up page. 3. Enter your Yodlee Access Code exactly as it appears below. You will not need to use this code after youve completed the sign-up process. If you do not sign up before the expiration date, you must request a new code. · Yodlee Access Code: [email or web address] Expires: 7/25/2018  3:50 PM 
 
4. Enter the last four digits of your Social Security Number (xxxx) and Date of Birth (mm/dd/yyyy) as indicated and click Submit. You will be taken to the next sign-up page. 5. Create a Yodlee ID. This will be your Yodlee login ID and cannot be changed, so think of one that is secure and easy to remember. 6. Create a Yodlee password. You can change your password at any time. 7. Enter your Password Reset Question and Answer. This can be used at a later time if you forget your password. 8. Enter your e-mail address. You will receive e-mail notification when new information is available in [address] 9. Click Sign Up. You can now view and download portions of your medical record. 10. Click the Download Summary menu link to download a portable copy of your medical information. If you have questions, please visit the Frequently Asked Questions section of the Yodlee website. Remember, Yodlee is NOT to be used for urgent needs. For medical emergencies, dial 911. Now available from your iPhone and Android! Please provide this summary of care documentation to your next provider. Your primary care clinician is listed as Immanuel Jmiénez If you have any questions after today's visit, please call [phone].

## 2018-07-06 NOTE — PROGRESS NOTES
Reviewed record in preparation for visit and have obtained necessary documentation. Identified pt with two pt identifiers(name and ). Chief Complaint   Patient presents with    Pre-op Exam     Cataract       Health Maintenance Due   Topic Date Due    Shingles Vaccine  10/17/2004    Annual Well Visit  03/15/2018    Colonoscopy  2018       Coordination of Care Questionnaire:  :     1) Have you been to an emergency room, urgent care clinic since your last visit? no   Hospitalized since your last visit? no             2) Have you seen or consulted any other health care providers outside of [address] [place] [address] since your last visit?  yes Dr. Marietta Rothman MD  (Include any pap smears or colon screenings in this section.)

## 2018-07-06 NOTE — PROGRESS NOTES
Fern Morales is a 67 y.o. female who presents for evaluation of preop exam for upcoming bilateral catarcts. Last seen by me April 26, 2018. Doing well, no concerns. Will have one eye done on Monday July 9, and then other on next Monday July 16. ROS:  Constitutional: negative for fevers, chills, anorexia and weight loss  Eyes:   negative for visual disturbance and irritation  ENT:   negative for tinnitus,sore throat,nasal congestion,ear pain,hoarseness  Respiratory:  negative for cough, hemoptysis, dyspnea,wheezing  CV:   negative for chest pain, palpitations, lower extremity edema  GI:   negative for nausea, vomiting, diarrhea, abdominal pain,melena  Genitourinary: negative for frequency, dysuria and hematuria  Musculoskel: negative for myalgias, arthralgias, back pain, muscle weakness, joint pain  Neurological:  negative for headaches, dizziness, focal weakness, numbness  Psychiatric:     Negative for depression or anxiety      Past Medical History:   Diagnosis Date    Arthritis     Chronic pain     neck pain    Degenerative disc disease, cervical     Floating kidney     Right    Gallstone 1974    Herpes simplex type II infection     Hypercholesterolemia     Hypertension     Kidney disease        Past Surgical History:   Procedure Laterality Date    HX CHOLECYSTECTOMY  1974    HX HYSTERECTOMY  1974    HX ORTHOPAEDIC  1972    R arm broken, pins put in, taken out 1 year later   [place]    from bone on L leg        Family History   Problem Relation Age of Onset    Cancer Mother      Cervical    Stroke Mother     Psychiatric Disorder Mother     Heart Disease Father     Cancer Sister      Breast       Social History     Social History    Marital status: SINGLE     Spouse name: N/A    Number of children: N/A    Years of education: N/A     Occupational History    Not on file.      Social History Main Topics    Smoking status: Current Some Day Smoker     Packs/day: 0.25 Years: 41.00    Smokeless tobacco: Never Used    Alcohol use Yes      Comment: occasional    Drug use: No    Sexual activity: Not on file     Other Topics Concern    Not on file     Social History Narrative            Visit Vitals    /77 (BP 1 Location: Left arm, BP Patient Position: Sitting)    Pulse 67    Temp 98.1 °F (36.7 °C) (Oral)    Resp 20    Ht 5' 7\" (1.702 m)    Wt 143 lb 6.4 oz (65 kg)    SpO2 99%    BMI 22.46 kg/m2       Physical Examination:   General - Well appearing female  HEENT - PERRL, TM no erythema/opacification, normal nasal turbinates, no oropharyngeal erythema or exudate, MMM  Neck - supple, no bruits, no thyroidomegaly, no lymphadenopathy  Pulm - clear to auscultation bilaterally  Cardio - RRR, normal S1 S2, no murmur  Abd - soft, nontender, no masses, no HSM  Extrem - no edema, +2 distal pulses  Neuro-  No focal deficits, CN intact     Assessment/Plan:    1.  preop examination for upcoming cataract sx--no further workup needed, may proceed with sx as indicated  2. htn--controlled with norvasc, lisinopril. Also on asa  3.  hyperlipids--on pravachol  4. Anxiety--doing well with buspar  5. Insomnia--continue trazodone  6. Hearing loss--referral to ent, dr Wade Parada  7. Tobacco abuse--still smokes some    rtc prn for regular visit. Montserrat Coy III, DO              This is the Subsequent Medicare Annual Wellness Exam, performed 12 months or more after the Initial AWV or the last Subsequent AWV    I have reviewed the patient's medical history in detail and updated the computerized patient record.      History     Past Medical History:   Diagnosis Date    Arthritis     Chronic pain     neck pain    Degenerative disc disease, cervical     Floating kidney     Right    Gallstone 1974    Herpes simplex type II infection     Hypercholesterolemia     Hypertension     Kidney disease       Past Surgical History:   Procedure Laterality Date    HX CHOLECYSTECTOMY  1974    HX HYSTERECTOMY  1974    HX ORTHOPAEDIC  1972    R arm broken, pins put in, taken out 1 year later   [facility]    from bone on L leg      Current Outpatient Prescriptions   Medication Sig Dispense Refill    traZODone (DESYREL) 50 mg tablet Take 1 Tab by mouth nightly. 90 Tab 3    pravastatin (PRAVACHOL) 20 mg tablet Take 1 Tab by mouth nightly. 90 Tab 3    busPIRone (BUSPAR) 15 mg tablet Take 0.5 Tabs by mouth two (2) times a day. Indications: Generalized Anxiety Disorder 90 Tab 3    aspirin delayed-release 81 mg tablet Take 1 Tab by mouth daily. 90 Tab 3    lisinopril (PRINIVIL, ZESTRIL) 30 mg tablet Take 1 Tab by mouth daily. Indications: hypertension 30 Tab 3    valACYclovir (VALTREX) 1 gram tablet Take 2 Tabs by mouth two (2) times a day. Indications: SUPPRESSION OF RECURRENT HERPES SIMPLEX INFECTION 30 Tab 1    omega-3 fatty acids-fish oil (FISH OIL) 360-1,200 mg cap Take  by mouth.  carica papaya (PAPAYA ENZYME) tab Take  by mouth daily as needed.  amLODIPine (NORVASC) 10 mg tablet Take 0.5 Tabs by mouth daily. 30 Tab 3    fluticasone (FLONASE SENSIMIST) 27.5 mcg/actuation nasal spray 2 Sprays by Nasal route daily.  tiZANidine (ZANAFLEX) 2 mg tablet Take  by mouth.  diclofenac (VOLTAREN) 1 % gel Apply  to affected area four (4) times daily.  DOCOSAHEXANOIC ACID/EPA (FISH OIL PO) Take 1,200 mg by mouth three (3) times daily.  glucosamine sulfate 1,000 mg cap Take  by mouth.  acetaminophen (TYLENOL) 650 mg CR tablet Take 650 mg by mouth every six (6) hours as needed for Pain.  VITAMIN B COMPLEX PO Take 1 mL by mouth daily.  CHOLECALCIFEROL, VITAMIN D3, (VITAMIN D3 PO) Take 400 Int'l Units by mouth daily.  CALCIUM-MAGNESIUM-ZINC PO Take  by mouth three (3) times daily.  famotidine (PEPCID) 20 mg tablet Take 20 mg by mouth two (2) times a day.  docusate sodium 100 mg tab Take  by mouth.       melatonin 1 mg tablet Take 3 mg by mouth.  ketoconazole (NIZORAL) 2 % topical cream Apply  to affected area daily. Allergies   Allergen Reactions    Compazine [Prochlorperazine] Other (comments)     Childhood reaction    Erythromycin Nausea and Vomiting    Indocin [Indomethacin] Other (comments)     Made her feel weird to the point it scared her    Keflex [Cephalexin] Nausea and Vomiting     Family History   Problem Relation Age of Onset    Cancer Mother      Cervical    Stroke Mother     Psychiatric Disorder Mother     Heart Disease Father     Cancer Sister      Breast     Social History   Substance Use Topics    Smoking status: Current Some Day Smoker     Packs/day: 0.25     Years: 41.00    Smokeless tobacco: Never Used    Alcohol use Yes      Comment: occasional     Patient Active Problem List   Diagnosis Code    Anxiety F41.9    Essential hypertension I10    Osteoarthritis of cervical spine M47.812    Hyperlipidemia LDL goal <70 E78.5    Chronic kidney disease N18.9    Herpes simplex type 2 infection B00.9    Sinus disorder J34.9       Depression Risk Factor Screening:     PHQ over the last two weeks 7/6/2018   Little interest or pleasure in doing things Not at all   Feeling down, depressed or hopeless Not at all   Total Score PHQ 2 0     Alcohol Risk Factor Screening: You do not drink alcohol or very rarely. Maybe every few months. Functional Ability and Level of Safety:   Hearing Loss  The patient needs further evaluation. Activities of Daily Living  The home contains: no safety equipment. Patient does total self care    Fall Risk  Fall Risk Assessment, last 12 mths 7/6/2018   Able to walk? Yes   Fall in past 12 months? No       Abuse Screen  Patient is not abused. Lives with a grandson and his girlfriend.     Cognitive Screening   Evaluation of Cognitive Function:  Has your family/caregiver stated any concerns about your memory: no  Normal    Patient Care Team   Patient Care Team:  Minnie Crowe DO as PCP - General (Internal Medicine)    Assessment/Plan   Education and counseling provided:  Are appropriate based on today's review and evaluation  End-of-Life planning (with patient's consent)  Pneumococcal Vaccine    Diagnoses and all orders for this visit:    1.  Medicare annual wellness visit, subsequent      Health Maintenance Due   Topic Date Due    ZOSTER VACCINE AGE 60>  10/17/2004    MEDICARE YEARLY EXAM  03/15/2018    COLONOSCOPY  11/04/2018

## 2018-07-06 NOTE — TELEPHONE ENCOUNTER
#[phone]  Pt is requesting a call as pre op has not been faxed to the surgery center as of yet they tell her? Surgery is Monday and this has to be done as soon as possible today. Thanks.

## 2018-07-06 NOTE — TELEPHONE ENCOUNTER
Spoke with patient after 2 patient identifiers being note and advised that pro op had been faxed. Patient expressed understanding and has no further questions at this time.

## 2018-07-06 NOTE — PATIENT INSTRUCTIONS
Medicare Wellness Visit, Female    The best way to live healthy is to have a lifestyle where you eat a well-balanced diet, exercise regularly, limit alcohol use, and quit all forms of tobacco/nicotine, if applicable. Regular preventive services are another way to keep healthy. Preventive services (vaccines, screening tests, monitoring & exams) can help personalize your care plan, which helps you manage your own care. Screening tests can find health problems at the earliest stages, when they are easiest to treat. [facility] follows the current, evidence-based guidelines published by the [facility] ([facility]) when recommending preventive services for our patients. Because we follow these guidelines, sometimes recommendations change over time as research supports it. (For example, mammograms used to be recommended annually. Even though Medicare will still pay for an annual mammogram, the newer guidelines recommend a mammogram every two years for women of average risk.)    Of course, you and your provider may decide to screen more often for some diseases, based on your risk and co-morbidities (chronic disease you are already diagnosed with). Preventive services for you include:    - Medicare offers their members a free annual wellness visit, which is time for you and your primary care provider to discuss and plan for your preventive service needs. Take advantage of this benefit every year!    -All people over age 72 should receive the recommended pneumonia vaccines. Current USPSTF guidelines recommend a series of two vaccines for the best pneumonia protection.     -All adults should have a yearly flu vaccine and a tetanus vaccine every 10 years. All adults age 61 years should receive a shingles vaccine once in their lifetime.      -A bone mass density test is recommended when a woman turns 65 to screen for osteoporosis.  This test is only recommended once as a screening. Some providers will use this same test as a disease monitoring tool if you already have osteoporosis. -All adults age 38-68 years who are overweight should have a diabetes screening test once every three years.     -Other screening tests & preventive services for persons with diabetes include: an eye exam to screen for diabetic retinopathy, a kidney function test, a foot exam, and stricter control over your cholesterol.     -Cardiovascular screening for adults with routine risk involves an electrocardiogram (ECG) at intervals determined by the provider.     -Colorectal cancer screenings should be done for adults age 54-65 years with normal risk. There are a number of acceptable methods of screening for this type of cancer. Each test has its own benefits and drawbacks. Discuss with your provider what is most appropriate for you during your annual wellness visit. The different tests include: colonoscopy (considered the best screening method), a fecal occult blood test, a fecal DNA test, and sigmoidoscopy. -Breast cancer screenings are recommended every other year for women of normal risk age 54-69 years.     -Cervical cancer screenings for women over age 72 are only recommended with certain risk factors.     -All adults born between [facility] should be screened once for Hepatitis C.      Here is a list of your current Health Maintenance items (your personalized list of preventive services) with a due date:  Health Maintenance Due   Topic Date Due    Shingles Vaccine  10/17/2004    Annual Well Visit  03/15/2018    Colonoscopy  11/04/2018

## 2018-07-26 ENCOUNTER — OFFICE VISIT (OUTPATIENT)
Dept: INTERNAL MEDICINE CLINIC | Age: 73
End: 2018-07-26

## 2018-07-26 ENCOUNTER — HOSPITAL ENCOUNTER (OUTPATIENT)
Dept: LAB | Age: 73
Discharge: HOME | End: 2018-07-26
Payer: MEDICARE

## 2018-07-26 VITALS
WEIGHT: 142.4 LBS | SYSTOLIC BLOOD PRESSURE: 149 MMHG | HEIGHT: 67 IN | RESPIRATION RATE: 14 BRPM | DIASTOLIC BLOOD PRESSURE: 76 MMHG | HEART RATE: 65 BPM | TEMPERATURE: 98.2 F | OXYGEN SATURATION: 96 % | BODY MASS INDEX: 22.35 KG/M2

## 2018-07-26 DIAGNOSIS — N18.30 CKD (CHRONIC KIDNEY DISEASE) STAGE 3, GFR 30-59 ML/MIN (HCC): Chronic | ICD-10-CM

## 2018-07-26 DIAGNOSIS — E78.5 HYPERLIPIDEMIA LDL GOAL <70: ICD-10-CM

## 2018-07-26 DIAGNOSIS — I10 ESSENTIAL HYPERTENSION: Primary | ICD-10-CM

## 2018-07-26 DIAGNOSIS — F41.9 ANXIETY: ICD-10-CM

## 2018-07-26 PROCEDURE — 80053 COMPREHEN METABOLIC PANEL: CPT

## 2018-07-26 PROCEDURE — 36415 COLL VENOUS BLD VENIPUNCTURE: CPT

## 2018-07-26 RX ORDER — TIMOLOL MALEATE 5 MG/ML
SOLUTION/ DROPS OPHTHALMIC
Refills: 1 | COMMUNITY
Start: 2018-07-10 | End: 2018-08-23

## 2018-07-26 RX ORDER — LISINOPRIL 30 MG/1
30 TABLET ORAL DAILY
Qty: 90 TAB | Refills: 3 | Status: SHIPPED | OUTPATIENT
Start: 2018-07-26 | End: 2019-01-23 | Stop reason: SDUPTHER

## 2018-07-26 RX ORDER — OFLOXACIN 3 MG/ML
SOLUTION/ DROPS OPHTHALMIC
Refills: 1 | COMMUNITY
Start: 2018-06-22 | End: 2018-08-23 | Stop reason: ALTCHOICE

## 2018-07-26 RX ORDER — AMLODIPINE BESYLATE 5 MG/1
5 TABLET ORAL DAILY
Qty: 90 TAB | Refills: 3 | Status: SHIPPED | OUTPATIENT
Start: 2018-07-26 | End: 2019-08-07 | Stop reason: SDUPTHER

## 2018-07-26 RX ORDER — PREDNISOLONE ACETATE 10 MG/ML
SUSPENSION/ DROPS OPHTHALMIC
Refills: 1 | COMMUNITY
Start: 2018-06-22 | End: 2018-11-20

## 2018-07-26 RX ORDER — KETOROLAC TROMETHAMINE 5 MG/ML
SOLUTION OPHTHALMIC
Refills: 1 | COMMUNITY
Start: 2018-06-22 | End: 2018-08-23 | Stop reason: ALTCHOICE

## 2018-07-26 NOTE — PROGRESS NOTES
Damir Morillo is a 67 y.o. female who presents for evaluation of routine follow up. Last seen by me July 6, 2018 in preop for cataract sx. Had left eye done 2 weeks ago, and set to have right eye done on Monday. Doing ok, though has lots of bruising on left side of face from sx.   Concerned about her kidneys and na levels, as they have been abn in the past.      ROS:  Constitutional: negative for fevers, chills, anorexia and weight loss  Eyes:   negative for visual disturbance and irritation  ENT:   negative for tinnitus,sore throat,nasal congestion,ear pain,hoarseness  Respiratory:  negative for cough, hemoptysis, dyspnea,wheezing  CV:   negative for chest pain, palpitations, lower extremity edema  GI:   negative for nausea, vomiting, diarrhea, abdominal pain,melena  Genitourinary: negative for frequency, dysuria and hematuria  Musculoskel: negative for myalgias, arthralgias, back pain, muscle weakness, joint pain  Neurological:  negative for headaches, dizziness, focal weakness, numbness  Psychiatric:     Negative for depression or anxiety      Past Medical History:   Diagnosis Date    Arthritis     Chronic pain     neck pain    Degenerative disc disease, cervical     Floating kidney     Right    Gallstone 1974    Herpes simplex type II infection     Hypercholesterolemia     Hypertension     Kidney disease        Past Surgical History:   Procedure Laterality Date    HX CATARACT REMOVAL Left 07/09/2018    HX CHOLECYSTECTOMY  1974    HX HYSTERECTOMY  1974    HX ORTHOPAEDIC  1972    R arm broken, pins put in, taken out 1 year later   [facility]    from bone on L leg        Family History   Problem Relation Age of Onset    Cancer Mother      Cervical    Stroke Mother     Psychiatric Disorder Mother     Heart Disease Father     Cancer Sister      Breast       Social History     Social History    Marital status: SINGLE     Spouse name: N/A    Number of children: N/A    Years of education: N/A     Occupational History    Not on file. Social History Main Topics    Smoking status: Current Some Day Smoker     Packs/day: 0.25     Years: 41.00    Smokeless tobacco: Never Used    Alcohol use Yes      Comment: occasional    Drug use: No    Sexual activity: Not on file     Other Topics Concern    Not on file     Social History Narrative            Visit Vitals    /76 (BP 1 Location: Left arm, BP Patient Position: Sitting)    Pulse 65    Temp 98.2 °F (36.8 °C) (Oral)    Resp 14    Ht 5' 7\" (1.702 m)    Wt 142 lb 6.4 oz (64.6 kg)    SpO2 96%    BMI 22.3 kg/m2       Physical Examination:   General - Well appearing female  HEENT - PERRL, TM no erythema/opacification, normal nasal turbinates, no oropharyngeal erythema or exudate, MMM  Neck - supple, no bruits, no thyroidomegaly, no lymphadenopathy  Pulm - clear to auscultation bilaterally  Cardio - RRR, normal S1 S2, no murmur  Abd - soft, nontender, no masses, no HSM  Extrem - no edema, +2 distal pulses  Neuro-  No focal deficits, CN intact     Assessment/Plan:    1.  htn--continue norvasc, lisinopril. 2.  ckd 3--check cmp  3. Hx hyponatremia--check cmp  4. Anxiety--doing well with buspar and trazodone  5.   Cataracts--asa remains on hold, has plans for 2nd eye sx on Monday  6.  hyperlipids--on pravachol    rtc 3 months        Alyson Mccloud III, DO

## 2018-07-26 NOTE — PATIENT INSTRUCTIONS
Medicines to Avoid With Kidney Disease: Care Instructions  Your Care Instructions    Kidney disease means that your kidneys are not able to get rid of waste from the blood. So they can't keep your body's fluids and chemicals in balance. Usually, the kidneys get rid of waste from the blood through the urine. And they balance the fluids in the body. When your kidneys don't work as they should, you have to be careful about some medicines. They may harm your kidneys. Your doctor may tell you not to take them. Or he or she may change the dose. Medicines for pain and swelling, such as ibuprofen (Advil or Motrin) or naproxen (Aleve), can cause harm. So can some antibiotics and antacids. And you need to be careful about some drugs that treat cancer, lower blood pressure, or get rid of water from the body. Some herbal products could cause harm too. Follow-up care is a key part of your treatment and safety. Be sure to make and go to all appointments, and call your doctor if you are having problems. It's also a good idea to know your test results and keep a list of the medicines you take. How can you care for yourself at home? · Tell your doctor all the prescription, herbal, or over-the-counter medicines you take. Do not take any new ones unless you talk to your doctor first.  · Do not take anti-inflammatory medicines. These include ibuprofen (Advil, Motrin) and naproxen (Aleve). You can use acetaminophen (Tylenol) for pain. · Do not take two or more pain medicines at the same time unless the doctor told you to. Many pain medicines have acetaminophen, which is Tylenol. Too much acetaminophen (Tylenol) can be harmful. · Tell all doctors and others who work with your health care that you have kidney disease. · Wear medical alert jewelry that lists your health problem. You can buy this at most drugstores. Where can you learn more? Go to http://alfa-gino.info/.   Enter C222 in the search box to learn more about \"Medicines to Avoid With Kidney Disease: Care Instructions. \"  Current as of: May 12, 2017  Content Version: 11.7  © 1734-2825 [employer]. Care instructions adapted under license by [employer] (which disclaims liability or warranty for this information). If you have questions about a medical condition or this instruction, always ask your healthcare professional. [address] any warranty or liability for your use of this information.

## 2018-07-26 NOTE — MR AVS SNAPSHOT
[address] [facility] 
[phone] Patient: Eleanor Christine MRN: [id number] :1945 Visit Information Date & Time Provider Department Dept. Phone Encounter #  
 2018 11:30 AM Hamilton Babinski, [address] [place] [address],[address] [phone] [id number] Follow-up Instructions Return in about 3 months (around 10/26/2018). Upcoming Health Maintenance Date Due ZOSTER VACCINE AGE 60> 10/17/2004 COLONOSCOPY 2018 Influenza Age 5 to Adult 2018 BREAST CANCER SCRN MAMMOGRAM 2019 MEDICARE YEARLY EXAM 2019 GLAUCOMA SCREENING Q2Y 2020 DTaP/Tdap/Td series (2 - Td) 2022 Allergies as of 2018  Review Complete On: 2018 By: Adrian Nazario III, DO Severity Noted Reaction Type Reactions Compazine [Prochlorperazine]  2016    Other (comments) Childhood reaction Erythromycin  2016   Intolerance Nausea and Vomiting Indocin [Indomethacin]  2016   Intolerance Other (comments) Made her feel weird to the point it scared her Keflex [Cephalexin]  2016   Intolerance Nausea and Vomiting Current Immunizations  Reviewed on 2018 Name Date Pneumococcal Conjugate (PCV-13) 2016 Pneumococcal Polysaccharide (PPSV-23) 6/15/2017 Zoster Recombinant 2018, 2018 Reviewed by Hamilton Babinski, DO on 2018 at 12:23 PM  
You Were Diagnosed With   
  
 Codes Comments Essential hypertension    -  Primary ICD-10-CM: I10 
ICD-9-CM: 401.9 CKD (chronic kidney disease) stage 3, GFR 30-59 ml/min     ICD-10-CM: N18.3 ICD-9-CM: 414. 3 Anxiety     ICD-10-CM: F41.9 ICD-9-CM: 300.00 Hyperlipidemia LDL goal <70     ICD-10-CM: E78.5 ICD-9-CM: 272.4 Vitals BP Pulse Temp Resp Height(growth percentile) Weight(growth percentile) 149/76 (BP 1 Location: Left arm, BP Patient Position: Sitting) 65 98.2 °F (36.8 °C) (Oral) 14 5' 7\" (1.702 m) 142 lb 6.4 oz (64.6 kg) SpO2 BMI OB Status Smoking Status 96% 22.3 kg/m2 Postmenopausal Current Some Day Smoker Vitals History BMI and BSA Data Body Mass Index Body Surface Area  
 22.3 kg/m 2 1.75 m 2 Preferred Pharmacy Pharmacy Name Phone [id number] [facility] [phone] Your Updated Medication List  
  
   
This list is accurate as of 7/26/18 12:28 PM.  Always use your most recent med list.  
  
  
  
  
 acetaminophen 650 mg Tber Commonly known as:  TYLENOL Take 650 mg by mouth every six (6) hours as needed for Pain. amLODIPine 10 mg tablet Commonly known as:  Elyn Coffer Take 0.5 Tabs by mouth daily. aspirin delayed-release 81 mg tablet Take 1 Tab by mouth daily. busPIRone 15 mg tablet Commonly known as:  BUSPAR Take 0.5 Tabs by mouth two (2) times a day. Indications: Generalized Anxiety Disorder CALCIUM-MAGNESIUM-ZINC PO Take  by mouth three (3) times daily. diclofenac 1 % Gel Commonly known as:  VOLTAREN Apply  to affected area four (4) times daily. docusate sodium 100 mg Tab Take  by mouth. famotidine 20 mg tablet Commonly known as:  PEPCID Take 20 mg by mouth two (2) times a day. FISH -1,200 mg Cap Generic drug:  omega-3 fatty acids-fish oil Take  by mouth. FLONASE SENSIMIST 27.5 mcg/actuation nasal spray Generic drug:  fluticasone 2 Sprays by Nasal route daily. glucosamine sulfate 1,000 mg Cap Take  by mouth.  
  
 ketoconazole 2 % topical cream  
Commonly known as:  NIZORAL Apply  to affected area daily. ketorolac 0.5 % ophthalmic solution Commonly known as:  Delores Brian INSTILL 1 DROP IN AFFECTED EYE 4 TIMES A DAY INTO EYE HAVING SURGERY. START 2 DAYS PRIOR. lisinopril 30 mg tablet Commonly known as:  Thersia Kubas Take 1 Tab by mouth daily. Indications: hypertension  
  
 melatonin 1 mg tablet Take 3 mg by mouth. ofloxacin 0.3 % ophthalmic solution Commonly known as:  FLOXIN  
INSTILL 1 DROP 4 TIMES EVERY DAY INTO SURGERY EYE, START 2 DAYS PRIOR TO SURGERY. PAPAYA ENZYME Tab Generic drug:  carica papaya Take  by mouth daily as needed. pravastatin 20 mg tablet Commonly known as:  PRAVACHOL Take 1 Tab by mouth nightly. prednisoLONE acetate 1 % ophthalmic suspension Commonly known as:  PRED FORTE INSTILL 1 DROP 4 TIMES EVERY DAY INTO EYE AFTER SURGERY FOR 1 WEEK THEN TAPER AS DIRECTED SHINGRIX (PF) 50 mcg/0.5 mL Susr injection Generic drug:  varicella-zoster recombinant (PF)  
TO BE ADMINISTERED BY PHARMACIST FOR IMMUNIZATION  
  
 timolol 0.5 % ophthalmic solution Commonly known as:  TIMOPTIC INSTILL 1 DROP EVERY MORNING INTO THE LEFT EYE  
  
 tiZANidine 2 mg tablet Commonly known as:  Monroe Mins Take  by mouth. traZODone 50 mg tablet Commonly known as:  Madolyn Saas Take 1 Tab by mouth nightly. valACYclovir 1 gram tablet Commonly known as:  VALTREX Take 2 Tabs by mouth two (2) times a day. Indications: SUPPRESSION OF RECURRENT HERPES SIMPLEX INFECTION  
  
 VITAMIN B COMPLEX PO Take 1 mL by mouth daily. VITAMIN D3 PO Take 400 Int'l Units by mouth daily. We Performed the Following METABOLIC PANEL, COMPREHENSIVE [44930 CPT(R)] Follow-up Instructions Return in about 3 months (around 10/26/2018). Patient Instructions Medicines to Avoid With Kidney Disease: Care Instructions Your Care Instructions Kidney disease means that your kidneys are not able to get rid of waste from the blood. So they can't keep your body's fluids and chemicals in balance.  Usually, the kidneys get rid of waste from the blood through the urine. And they balance the fluids in the body. When your kidneys don't work as they should, you have to be careful about some medicines. They may harm your kidneys. Your doctor may tell you not to take them. Or he or she may change the dose. Medicines for pain and swelling, such as ibuprofen (Advil or Motrin) or naproxen (Aleve), can cause harm. So can some antibiotics and antacids. And you need to be careful about some drugs that treat cancer, lower blood pressure, or get rid of water from the body. Some herbal products could cause harm too. Follow-up care is a key part of your treatment and safety. Be sure to make and go to all appointments, and call your doctor if you are having problems. It's also a good idea to know your test results and keep a list of the medicines you take. How can you care for yourself at home? · Tell your doctor all the prescription, herbal, or over-the-counter medicines you take. Do not take any new ones unless you talk to your doctor first. 
· Do not take anti-inflammatory medicines. These include ibuprofen (Advil, Motrin) and naproxen (Aleve). You can use acetaminophen (Tylenol) for pain. · Do not take two or more pain medicines at the same time unless the doctor told you to. Many pain medicines have acetaminophen, which is Tylenol. Too much acetaminophen (Tylenol) can be harmful. · Tell all doctors and others who work with your health care that you have kidney disease. · Wear medical alert jewelry that lists your health problem. You can buy this at most drugstores. Where can you learn more? Go to http://alfa-gino.info/. Enter D798 in the search box to learn more about \"Medicines to Avoid With Kidney Disease: Care Instructions. \" Current as of: May 12, 2017 Content Version: 11.7 © 0033-3804 [employer].  Care instructions adapted under license by [employer] (which disclaims liability or warranty for this information). If you have questions about a medical condition or this instruction, always ask your healthcare professional. [address] any warranty or liability for your use of this information. Introducing [facility] SERVICES! Radha Liu introduces [employer] patient portal. Now you can access parts of your medical record, email your doctor's office, and request medication refills online. 1. In your internet browser, go to [email or web address] 2. Click on the First Time User? Click Here link in the Sign In box. You will see the New Member Sign Up page. 3. Enter your [employer] Access Code exactly as it appears below. You will not need to use this code after youve completed the sign-up process. If you do not sign up before the expiration date, you must request a new code. · [employer] Access Code: [email or web address] Expires: 10/24/2018 12:28 PM 
 
4. Enter the last four digits of your Social Security Number (xxxx) and Date of Birth (mm/dd/yyyy) as indicated and click Submit. You will be taken to the next sign-up page. 5. Create a [employer] ID. This will be your [employer] login ID and cannot be changed, so think of one that is secure and easy to remember. 6. Create a [employer] password. You can change your password at any time. 7. Enter your Password Reset Question and Answer. This can be used at a later time if you forget your password. 8. Enter your e-mail address. You will receive e-mail notification when new information is available in [address]. 9. Click Sign Up. You can now view and download portions of your medical record. 10. Click the Download Summary menu link to download a portable copy of your medical information. If you have questions, please visit the Frequently Asked Questions section of the [employer] website. Remember, [employer] is NOT to be used for urgent needs. For medical emergencies, dial 911. Now available from your iPhone and Android! Please provide this summary of care documentation to your next provider. Your primary care clinician is listed as Je Bad If you have any questions after today's visit, please call [phone].

## 2018-07-27 LAB
ALBUMIN SERPL-MCNC: 4.4 G/DL (ref 3.5–4.8)
ALBUMIN/GLOB SERPL: 2 {RATIO} (ref 1.2–2.2)
ALP SERPL-CCNC: 53 IU/L (ref 39–117)
ALT SERPL-CCNC: 11 IU/L (ref 0–32)
AST SERPL-CCNC: 14 IU/L (ref 0–40)
BILIRUB SERPL-MCNC: 0.8 MG/DL (ref 0–1.2)
BUN SERPL-MCNC: 19 MG/DL (ref 8–27)
BUN/CREAT SERPL: 17 (ref 12–28)
CALCIUM SERPL-MCNC: 10 MG/DL (ref 8.7–10.3)
CHLORIDE SERPL-SCNC: 101 MMOL/L (ref 96–106)
CO2 SERPL-SCNC: 23 MMOL/L (ref 20–29)
CREAT SERPL-MCNC: 1.13 MG/DL (ref 0.57–1)
GLOBULIN SER CALC-MCNC: 2.2 G/DL (ref 1.5–4.5)
GLUCOSE SERPL-MCNC: 83 MG/DL (ref 65–99)
POTASSIUM SERPL-SCNC: 5.2 MMOL/L (ref 3.5–5.2)
PROT SERPL-MCNC: 6.6 G/DL (ref 6–8.5)
SODIUM SERPL-SCNC: 139 MMOL/L (ref 134–144)

## 2018-07-27 NOTE — PROGRESS NOTES
Sodium (salt levels) and kidney function both look good. No changes needed. Good luck with 2nd eye sx.

## 2018-07-27 NOTE — PROGRESS NOTES
Spoke with patient after 2 patient identifiers being note and advised per Dr. Elton Gonsalez Sodium (salt levels) and kidney function both look good. No changes needed. Good luck with 2nd eye sx. . Patient expressed understanding and has no further questions at this time.

## 2018-08-13 ENCOUNTER — TELEPHONE (OUTPATIENT)
Dept: INTERNAL MEDICINE CLINIC | Age: 73
End: 2018-08-13

## 2018-08-13 NOTE — TELEPHONE ENCOUNTER
#[phone] pt states she is hot & cold, runny nose then goes bone dry she states. Pt is so tired she can barely walk. Pt is requesting to be seen today or tomorrow by Dr. Priyanka Serraot. Please call.

## 2018-08-14 NOTE — TELEPHONE ENCOUNTER
Unable to reach patient LVM to return call.  Want to offer her appt at 11am tomorrow with Dr. Amy Rodriguez

## 2018-08-14 NOTE — TELEPHONE ENCOUNTER
Unable to reach patient LVM to return call.  Advised that I have an appt available tomorrow at 11am. Call us back and let us know, or she should go to urgent care if she needs to be seen today

## 2018-08-14 NOTE — TELEPHONE ENCOUNTER
The patient is requesting a call back with an appointment for today for weakness, chills, a fever of a unknown temperature and a cough since Saturday.  (S)[phone]       Message received & copied from [facility]

## 2018-08-14 NOTE — TELEPHONE ENCOUNTER
Pt called informing see went to [place] and was told she has a viral infection,. Pt will not need the appt for tomorrow. Pt is requesting to schedule a follow up appt for Monday. Pt best contact number is [phone].        Message received & copied from [facility]

## 2018-08-17 ENCOUNTER — TELEPHONE (OUTPATIENT)
Dept: INTERNAL MEDICINE CLINIC | Age: 73
End: 2018-08-17

## 2018-08-17 NOTE — TELEPHONE ENCOUNTER
Spoke with patient after 2 patient identifiers being note and advised of appt on Thursday, August 23, 2018 01:45 PM, patient declined the Monday morning 8am appt. She accepted the 145pm appt on Thursday Patient expressed understanding and has no further questions at this time.

## 2018-08-17 NOTE — TELEPHONE ENCOUNTER
----- Message from Alexandro Smith sent at 8/17/2018  9:47 AM EDT -----  Regarding: Dr. Edmar El is returning a call back from [facility] in reference to f/up er appt from [facility] 8/14/18. Pt best contact [phone].             Message copied/pasted from [facility]

## 2018-08-23 ENCOUNTER — OFFICE VISIT (OUTPATIENT)
Dept: INTERNAL MEDICINE CLINIC | Age: 73
End: 2018-08-23

## 2018-08-23 VITALS
WEIGHT: 142 LBS | SYSTOLIC BLOOD PRESSURE: 123 MMHG | TEMPERATURE: 98.1 F | DIASTOLIC BLOOD PRESSURE: 61 MMHG | HEART RATE: 71 BPM | HEIGHT: 67 IN | RESPIRATION RATE: 16 BRPM | OXYGEN SATURATION: 98 % | BODY MASS INDEX: 22.29 KG/M2

## 2018-08-23 DIAGNOSIS — I10 ESSENTIAL HYPERTENSION: ICD-10-CM

## 2018-08-23 DIAGNOSIS — R05.9 COUGH: ICD-10-CM

## 2018-08-23 DIAGNOSIS — J34.9 SINUS DISORDER: ICD-10-CM

## 2018-08-23 DIAGNOSIS — J20.9 ACUTE BRONCHITIS, UNSPECIFIED ORGANISM: Primary | ICD-10-CM

## 2018-08-23 DIAGNOSIS — F41.9 ANXIETY: ICD-10-CM

## 2018-08-23 RX ORDER — BENZONATATE 100 MG/1
100 CAPSULE ORAL
Qty: 30 CAP | Refills: 0 | Status: SHIPPED | OUTPATIENT
Start: 2018-08-23 | End: 2018-08-30

## 2018-08-23 RX ORDER — AZITHROMYCIN 250 MG/1
250 TABLET, FILM COATED ORAL SEE ADMIN INSTRUCTIONS
Qty: 6 TAB | Refills: 0 | Status: SHIPPED | OUTPATIENT
Start: 2018-08-23 | End: 2018-08-28

## 2018-08-23 NOTE — PROGRESS NOTES
Reviewed record in preparation for visit and have obtained necessary documentation. Identified pt with two pt identifiers(name and ). Chief Complaint   Patient presents with   [facility] Follow Up    Fatigue    Nasal Congestion       Health Maintenance Due   Topic Date Due    ZOSTER VACCINE AGE 60>  10/17/2004    Influenza Age 5 to Adult  2018    COLONOSCOPY  2018       Ms. Gina Berkowitz has a reminder for a \"due or due soon\" health maintenance. I have asked that she discuss health maintenance topic(s) due with Her  primary care provider. Coordination of Care Questionnaire:  :     1) Have you been to an emergency room, urgent care clinic since your last visit? yes   Hospitalized since your last visit? no             2) Have you seen or consulted any other health care providers outside of [facility] since your last visit? yes  (Include any pap smears or colon screenings in this section.)    3) Do you have an Advance Directive on file? no    4) Are you interested in receiving information on Advance Directives? NO    Patient is accompanied by self I have received verbal consent from Damir Morillo to discuss any/all medical information while they are present in the room.

## 2018-08-23 NOTE — MR AVS SNAPSHOT
[address] [facility] 
[phone] Patient: Rocky Franco MRN: [id number] :1945 Visit Information Date & Time Provider Department Dept. Phone Encounter #  
 2018  1:45 PM Sowmya Graham, [address] [id number] Follow-up Instructions Return for regular visit. Your Appointments 10/23/2018 11:45 AM  
ROUTINE CARE with Aaliyah Khan III, DO [facility] [address] [facility]) Appt Note: 3 mo f/u  
 [address] [id number]  
[address] [facility] [address] [facility] Upcoming Health Maintenance Date Due ZOSTER VACCINE AGE 60> 10/17/2004 Influenza Age 5 to Adult 2018 COLONOSCOPY 2018 BREAST CANCER SCRN MAMMOGRAM 2019 MEDICARE YEARLY EXAM 2019 GLAUCOMA SCREENING Q2Y 2020 DTaP/Tdap/Td series (2 - Td) 2022 Allergies as of 2018  Review Complete On: 2018 By: Aaliyah Khan III, DO Severity Noted Reaction Type Reactions Compazine [Prochlorperazine]  2016    Other (comments) Childhood reaction Erythromycin  2016   Intolerance Nausea and Vomiting Indocin [Indomethacin]  2016   Intolerance Other (comments) Made her feel weird to the point it scared her Keflex [Cephalexin]  2016   Intolerance Nausea and Vomiting Current Immunizations  Reviewed on 2018 Name Date Pneumococcal Conjugate (PCV-13) 2016 Pneumococcal Polysaccharide (PPSV-23) 6/15/2017 Zoster Recombinant 2018, 2018, 2018, 2018 Not reviewed this visit You Were Diagnosed With   
  
 Codes Comments Acute bronchitis, unspecified organism    -  Primary ICD-10-CM: J20.9 ICD-9-CM: 466.0  Essential hypertension     ICD-10-CM: I10 
 ICD-9-CM: 401.9 Anxiety     ICD-10-CM: F41.9 ICD-9-CM: 300.00 Sinus disorder     ICD-10-CM: J34.9 ICD-9-CM: 473.9 Cough     ICD-10-CM: R05 ICD-9-CM: 732. 2 Vitals BP Pulse Temp Resp Height(growth percentile) Weight(growth percentile) 123/61 (BP 1 Location: Left arm, BP Patient Position: Sitting) 71 98.1 °F (36.7 °C) (Oral) 16 5' 7\" (1.702 m) 142 lb (64.4 kg) SpO2 BMI OB Status Smoking Status 98% 22.24 kg/m2 Postmenopausal Current Some Day Smoker Vitals History BMI and BSA Data Body Mass Index Body Surface Area  
 22.24 kg/m 2 1.74 m 2 Preferred Pharmacy Pharmacy Name Phone [phone] [facility] [phone] Your Updated Medication List  
  
   
This list is accurate as of 8/23/18  2:32 PM.  Always use your most recent med list.  
  
  
  
  
 acetaminophen 650 mg Tber Commonly known as:  TYLENOL Take 650 mg by mouth every six (6) hours as needed for Pain. amLODIPine 5 mg tablet Commonly known as:  Mat Mend Take 1 Tab by mouth daily. aspirin delayed-release 81 mg tablet Take 1 Tab by mouth daily. azithromycin 250 mg tablet Commonly known as:  Hedwig Pine Take 1 Tab by mouth See Admin Instructions for 5 days. benzonatate 100 mg capsule Commonly known as:  TESSALON Take 1 Cap by mouth three (3) times daily as needed for Cough for up to 7 days. busPIRone 15 mg tablet Commonly known as:  BUSPAR Take 0.5 Tabs by mouth two (2) times a day. Indications: Generalized Anxiety Disorder CALCIUM-MAGNESIUM-ZINC PO Take  by mouth three (3) times daily. diclofenac 1 % Gel Commonly known as:  VOLTAREN Apply  to affected area four (4) times daily. docusate sodium 100 mg Tab Take  by mouth. famotidine 20 mg tablet Commonly known as:  PEPCID Take 20 mg by mouth two (2) times a day. FISH -1,200 mg Cap Generic drug:  omega-3 fatty acids-fish oil Take  by mouth. FLONASE SENSIMIST 27.5 mcg/actuation nasal spray Generic drug:  fluticasone 2 Sprays by Nasal route daily. glucosamine sulfate 1,000 mg Cap Take  by mouth.  
  
 ketoconazole 2 % topical cream  
Commonly known as:  NIZORAL Apply  to affected area daily. lisinopril 30 mg tablet Commonly known as:  Nunez Paradise Take 1 Tab by mouth daily. Indications: hypertension  
  
 melatonin 1 mg tablet Take 3 mg by mouth. PAPAYA ENZYME Tab Generic drug:  carica papaya Take  by mouth daily as needed. pravastatin 20 mg tablet Commonly known as:  PRAVACHOL Take 1 Tab by mouth nightly. prednisoLONE acetate 1 % ophthalmic suspension Commonly known as:  PRED FORTE INSTILL 1 DROP 4 TIMES EVERY DAY INTO EYE AFTER SURGERY FOR 1 WEEK THEN TAPER AS DIRECTED SHINGRIX (PF) 50 mcg/0.5 mL Susr injection Generic drug:  varicella-zoster recombinant (PF)  
TO BE ADMINISTERED BY PHARMACIST FOR IMMUNIZATION  
  
 tiZANidine 2 mg tablet Commonly known as:  Dorenda Sham Take  by mouth. traZODone 50 mg tablet Commonly known as:  Michael [place] Take 1 Tab by mouth nightly. valACYclovir 1 gram tablet Commonly known as:  VALTREX Take 2 Tabs by mouth two (2) times a day. Indications: SUPPRESSION OF RECURRENT HERPES SIMPLEX INFECTION  
  
 VITAMIN B COMPLEX PO Take 1 mL by mouth daily. VITAMIN D3 PO Take 400 Int'l Units by mouth daily. Prescriptions Sent to Pharmacy Refills  
 azithromycin (ZITHROMAX) 250 mg tablet 0 Sig: Take 1 Tab by mouth See Admin Instructions for 5 days. Class: Normal  
 [facility] [place], [address] Ph #: [phone] Route: Oral  
 benzonatate (TESSALON) 100 mg capsule 0  Sig: Take 1 Cap by mouth three (3) times daily as needed for Cough for up to 7 days. Class: Normal  
 Pharmacy: [place] [id number], [address] [place] [address] #: [phone] Route: Oral  
  
Follow-up Instructions Return for regular visit. Patient Instructions Cough: Care Instructions Your Care Instructions A cough is your body's response to something that bothers your throat or airways. Many things can cause a cough. You might cough because of a cold or the flu, bronchitis, or asthma. Smoking, postnasal drip, allergies, and stomach acid that backs up into your throat also can cause coughs. A cough is a symptom, not a disease. Most coughs stop when the cause, such as a cold, goes away. You can take a few steps at home to cough less and feel better. Follow-up care is a key part of your treatment and safety. Be sure to make and go to all appointments, and call your doctor if you are having problems. It's also a good idea to know your test results and keep a list of the medicines you take. How can you care for yourself at home? · Drink lots of water and other fluids. This helps thin the mucus and soothes a dry or sore throat. Honey or lemon juice in hot water or tea may ease a dry cough. · Take cough medicine as directed by your doctor. · Prop up your head on pillows to help you breathe and ease a dry cough. · Try cough drops to soothe a dry or sore throat. Cough drops don't stop a cough. Medicine-flavored cough drops are no better than candy-flavored drops or hard candy. · Do not smoke. Avoid secondhand smoke. If you need help quitting, talk to your doctor about stop-smoking programs and medicines. These can increase your chances of quitting for good. When should you call for help? Call 911 anytime you think you may need emergency care. For example, call if: 
  · You have severe trouble breathing.  
 Call your doctor now or seek immediate medical care if: 
  · You cough up blood.   · You have new or worse trouble breathing.  
  · You have a new or higher fever.  
  · You have a new rash.  
 Watch closely for changes in your health, and be sure to contact your doctor if: 
  · You cough more deeply or more often, especially if you notice more mucus or a change in the color of your mucus.  
  · You have new symptoms, such as a sore throat, an earache, or sinus pain.  
  · You do not get better as expected. Where can you learn more? Go to http://alfa-gino.info/. Enter D279 in the search box to learn more about \"Cough: Care Instructions. \" Current as of: December 6, 2017 Content Version: 11.7 © 0409-6544 [employer]. Care instructions adapted under license by [employer] (which disclaims liability or warranty for this information). If you have questions about a medical condition or this instruction, always ask your healthcare professional. [place] [address] any warranty or liability for your use of this information. Sinusitis: Care Instructions Your Care Instructions Sinusitis is an infection of the lining of the sinus cavities in your head. Sinusitis often follows a cold. It causes pain and pressure in your head and face. In most cases, sinusitis gets better on its own in 1 to 2 weeks. But some mild symptoms may last for several weeks. Sometimes antibiotics are needed. Follow-up care is a key part of your treatment and safety. Be sure to make and go to all appointments, and call your doctor if you are having problems. It's also a good idea to know your test results and keep a list of the medicines you take. How can you care for yourself at home? · Take an over-the-counter pain medicine, such as acetaminophen (Tylenol), ibuprofen (Advil, Motrin), or naproxen (Aleve). Read and follow all instructions on the label. · If the doctor prescribed antibiotics, take them as directed.  Do not stop taking them just because you feel better. You need to take the full course of antibiotics. · Be careful when taking over-the-counter cold or flu medicines and Tylenol at the same time. Many of these medicines have acetaminophen, which is Tylenol. Read the labels to make sure that you are not taking more than the recommended dose. Too much acetaminophen (Tylenol) can be harmful. · Breathe warm, moist air from a steamy shower, a hot bath, or a sink filled with hot water. Avoid cold, dry air. Using a humidifier in your home may help. Follow the directions for cleaning the machine. · Use saline (saltwater) nasal washes to help keep your nasal passages open and wash out mucus and bacteria. You can buy saline nose drops at a grocery store or drugstore. Or you can make your own at home by adding 1 teaspoon of salt and 1 teaspoon of baking soda to 2 cups of distilled water. If you make your own, fill a bulb syringe with the solution, insert the tip into your nostril, and squeeze gently. Maged Kunzick your nose. · Put a hot, wet towel or a warm gel pack on your face 3 or 4 times a day for 5 to 10 minutes each time. · Try a decongestant nasal spray like oxymetazoline (Afrin). Do not use it for more than 3 days in a row. Using it for more than 3 days can make your congestion worse. When should you call for help? Call your doctor now or seek immediate medical care if: 
  · You have new or worse swelling or redness in your face or around your eyes.  
  · You have a new or higher fever.  
 Watch closely for changes in your health, and be sure to contact your doctor if: 
  · You have new or worse facial pain.  
  · The mucus from your nose becomes thicker (like pus) or has new blood in it.  
  · You are not getting better as expected. Where can you learn more? Go to http://alfa-gino.info/. Enter J518 in the search box to learn more about \"Sinusitis: Care Instructions. \" Current as of: May 12, 2017 Content Version: 11.7 © 2729-9058 [employer]. Care instructions adapted under license by [employer] (which disclaims liability or warranty for this information). If you have questions about a medical condition or this instruction, always ask your healthcare professional. [address] any warranty or liability for your use of this information. Saline Nasal Washes: Care Instructions Your Care Instructions Saline nasal washes help keep the nasal passages open by washing out thick or dried mucus. This simple remedy can help relieve symptoms of allergies, sinusitis, and colds. It also can make the nose feel more comfortable by keeping the mucous membranes moist. You may notice a little burning sensation in your nose the first few times you use the solution, but this usually gets better in a few days. Follow-up care is a key part of your treatment and safety. Be sure to make and go to all appointments, and call your doctor if you are having problems. It's also a good idea to know your test results and keep a list of the medicines you take. How can you care for yourself at home? · You can buy premixed saline solution in a squeeze bottle or other sinus rinse products at a drugstore. Read and follow the instructions on the label. · You also can make your own saline solution by adding 1 teaspoon of salt and 1 teaspoon of baking soda to 2 cups of distilled water. · If you use a homemade solution, pour a small amount into a clean bowl. Using a rubber bulb syringe, squeeze the syringe and place the tip in the salt water. Pull a small amount of the salt water into the syringe by relaxing your hand. · Sit down with your head tilted slightly back. Do not lie down. Put the tip of the bulb syringe or the squeeze bottle a little way into one of your nostrils. Gently drip or squirt a few drops into the nostril.  Repeat with the other nostril. Some sneezing and gagging are normal at first. 
· Gently blow your nose. · Wipe the syringe or bottle tip clean after each use. · Repeat this 2 or 3 times a day. · Use nasal washes gently if you have nosebleeds often. When should you call for help? Watch closely for changes in your health, and be sure to contact your doctor if: 
  · You often get nosebleeds.  
  · You have problems doing the nasal washes. Where can you learn more? Go to http://alfa-gino.info/. Enter [id number] [id number] [id number] in the search box to learn more about \"Saline Nasal Washes: Care Instructions. \" Current as of: May 12, 2017 Content Version: 11.7 © 5893-3235 [employer]. Care instructions adapted under license by [employer] (which disclaims liability or warranty for this information). If you have questions about a medical condition or this instruction, always ask your healthcare professional. [place] [address] any warranty or liability for your use of this information. Add flonase or nasocort for a few days to help with sinus congestion. Introducing \[facility]\"" & HEALTH SERVICES! Loraine Walsh introduces Local.com patient portal. Now you can access parts of your medical record, email your doctor's office, and request medication refills online. 1. In your internet browser, go to [email or web address] 2. Click on the First Time User? Click Here link in the Sign In box. You will see the New Member Sign Up page. 3. Enter your Local.com Access Code exactly as it appears below. You will not need to use this code after youve completed the sign-up process. If you do not sign up before the expiration date, you must request a new code. · Local.com Access Code: [email or web address] Expires: 10/24/2018 12:28 PM 
 
4. Enter the last four digits of your Social Security Number (xxxx) and Date of Birth (mm/dd/yyyy) as indicated and click Submit.  You will be taken to the next sign-up page. 5. Create a tagWALLET ID. This will be your tagWALLET login ID and cannot be changed, so think of one that is secure and easy to remember. 6. Create a tagWALLET password. You can change your password at any time. 7. Enter your Password Reset Question and Answer. This can be used at a later time if you forget your password. 8. Enter your e-mail address. You will receive e-mail notification when new information is available in [address]. 9. Click Sign Up. You can now view and download portions of your medical record. 10. Click the Download Summary menu link to download a portable copy of your medical information. If you have questions, please visit the Frequently Asked Questions section of the tagWALLET website. Remember, tagWALLET is NOT to be used for urgent needs. For medical emergencies, dial 911. Now available from your iPhone and Android! Please provide this summary of care documentation to your next provider. Your primary care clinician is listed as Professor Jansen 108 If you have any questions after today's visit, please call [phone].

## 2018-08-23 NOTE — PROGRESS NOTES
Park Bunch is a 67 y.o. female who presents for evaluation of cough, sinus congestion. Onset about 2 weeks ago. Went to [facility] on Tuesday aug 14, tests and imaging all normal.  No rx given. Has not improved at all. ROS:  Constitutional: negative for fevers, chills, anorexia and weight loss  Eyes:   negative for visual disturbance and irritation  ENT:   negative for tinnitus,sore throat,ear pain,hoarseness. ++nasal congestion  Respiratory:  negative for hemoptysis, dyspnea,wheezing.  ++cough  CV:   negative for chest pain, palpitations, lower extremity edema  GI:   negative for nausea, vomiting, diarrhea, abdominal pain,melena  Genitourinary: negative for frequency, dysuria and hematuria  Musculoskel: negative for myalgias, arthralgias, back pain, muscle weakness, joint pain  Neurological:  negative for headaches, dizziness, focal weakness, numbness  Psychiatric:     Negative for depression or anxiety      Past Medical History:   Diagnosis Date    Arthritis     Chronic pain     neck pain    Degenerative disc disease, cervical     Floating kidney     Right    Gallstone 1974    Herpes simplex type II infection     Hypercholesterolemia     Hypertension     Kidney disease        Past Surgical History:   Procedure Laterality Date    HX CATARACT REMOVAL Left 07/09/2018    HX CHOLECYSTECTOMY  1974    HX HYSTERECTOMY  1974    HX ORTHOPAEDIC  1972    R arm broken, pins put in, taken out 1 year later    [address] [facility]    from bone on L leg        Family History   Problem Relation Age of Onset    Cancer Mother      Cervical    Stroke Mother     Psychiatric Disorder Mother     Heart Disease Father     Cancer Sister      Breast       Social History     Social History    Marital status: SINGLE     Spouse name: N/A    Number of children: N/A    Years of education: N/A     Occupational History    Not on file.      Social History Main Topics    Smoking status: Current Some Day Smoker     Packs/day: 0.25     Years: 41.00    Smokeless tobacco: Never Used    Alcohol use Yes      Comment: occasional    Drug use: No    Sexual activity: No     Other Topics Concern    Not on file     Social History Narrative            Visit Vitals    /61 (BP 1 Location: Left arm, BP Patient Position: Sitting)    Pulse 71    Temp 98.1 °F (36.7 °C) (Oral)    Resp 16    Ht 5' 7\" (1.702 m)    Wt 142 lb (64.4 kg)    SpO2 98%    BMI 22.24 kg/m2       Physical Examination:   General - Well appearing female  HEENT - PERRL, TM no erythema/opacification, normal nasal turbinates, no oropharyngeal erythema or exudate, MMM  Neck - supple, no bruits, no thyroidomegaly, no lymphadenopathy  Pulm - clear to auscultation bilaterally--good air flow, no wheezes, rales, or rhonchi  Cardio - RRR, normal S1 S2, no murmur  Abd - soft, nontender, no masses, no HSM  Extrem - no edema, +2 distal pulses  Neuro-  No focal deficits, CN intact     Assessment/Plan:    1. Acute sinusitis with cough--start flonase/nasocort. rx for zpak as well. Stay hydrated  2. htn--controlled with norvasc, lisinopril  3.  ckd 3--stable  4. Anxiety--continue buspar, trazodone  5.   Hx hyponatremia--corrected    rtc prn for regular visit        Alyson Mccloud III, DO

## 2018-08-23 NOTE — PATIENT INSTRUCTIONS
Cough: Care Instructions  Your Care Instructions    A cough is your body's response to something that bothers your throat or airways. Many things can cause a cough. You might cough because of a cold or the flu, bronchitis, or asthma. Smoking, postnasal drip, allergies, and stomach acid that backs up into your throat also can cause coughs. A cough is a symptom, not a disease. Most coughs stop when the cause, such as a cold, goes away. You can take a few steps at home to cough less and feel better. Follow-up care is a key part of your treatment and safety. Be sure to make and go to all appointments, and call your doctor if you are having problems. It's also a good idea to know your test results and keep a list of the medicines you take. How can you care for yourself at home? · Drink lots of water and other fluids. This helps thin the mucus and soothes a dry or sore throat. Honey or lemon juice in hot water or tea may ease a dry cough. · Take cough medicine as directed by your doctor. · Prop up your head on pillows to help you breathe and ease a dry cough. · Try cough drops to soothe a dry or sore throat. Cough drops don't stop a cough. Medicine-flavored cough drops are no better than candy-flavored drops or hard candy. · Do not smoke. Avoid secondhand smoke. If you need help quitting, talk to your doctor about stop-smoking programs and medicines. These can increase your chances of quitting for good. When should you call for help? Call 911 anytime you think you may need emergency care.  For example, call if:    · You have severe trouble breathing.    Call your doctor now or seek immediate medical care if:    · You cough up blood.     · You have new or worse trouble breathing.     · You have a new or higher fever.     · You have a new rash.    Watch closely for changes in your health, and be sure to contact your doctor if:    · You cough more deeply or more often, especially if you notice more mucus or a change in the color of your mucus.     · You have new symptoms, such as a sore throat, an earache, or sinus pain.     · You do not get better as expected. Where can you learn more? Go to http://alfa-gino.info/. Enter D279 in the search box to learn more about \"Cough: Care Instructions. \"  Current as of: December 6, 2017  Content Version: 11.7  © 7999-0183 [employer]. Care instructions adapted under license by [employer] (which disclaims liability or warranty for this information). If you have questions about a medical condition or this instruction, always ask your healthcare professional. [address] any warranty or liability for your use of this information. Sinusitis: Care Instructions  Your Care Instructions    Sinusitis is an infection of the lining of the sinus cavities in your head. Sinusitis often follows a cold. It causes pain and pressure in your head and face. In most cases, sinusitis gets better on its own in 1 to 2 weeks. But some mild symptoms may last for several weeks. Sometimes antibiotics are needed. Follow-up care is a key part of your treatment and safety. Be sure to make and go to all appointments, and call your doctor if you are having problems. It's also a good idea to know your test results and keep a list of the medicines you take. How can you care for yourself at home? · Take an over-the-counter pain medicine, such as acetaminophen (Tylenol), ibuprofen (Advil, Motrin), or naproxen (Aleve). Read and follow all instructions on the label. · If the doctor prescribed antibiotics, take them as directed. Do not stop taking them just because you feel better. You need to take the full course of antibiotics. · Be careful when taking over-the-counter cold or flu medicines and Tylenol at the same time. Many of these medicines have acetaminophen, which is Tylenol.  Read the labels to make sure that you are not taking more than the recommended dose. Too much acetaminophen (Tylenol) can be harmful. · Breathe warm, moist air from a steamy shower, a hot bath, or a sink filled with hot water. Avoid cold, dry air. Using a humidifier in your home may help. Follow the directions for cleaning the machine. · Use saline (saltwater) nasal washes to help keep your nasal passages open and wash out mucus and bacteria. You can buy saline nose drops at a grocery store or drugstore. Or you can make your own at home by adding 1 teaspoon of salt and 1 teaspoon of baking soda to 2 cups of distilled water. If you make your own, fill a bulb syringe with the solution, insert the tip into your nostril, and squeeze gently. Sharilyn Ply your nose. · Put a hot, wet towel or a warm gel pack on your face 3 or 4 times a day for 5 to 10 minutes each time. · Try a decongestant nasal spray like oxymetazoline (Afrin). Do not use it for more than 3 days in a row. Using it for more than 3 days can make your congestion worse. When should you call for help? Call your doctor now or seek immediate medical care if:    · You have new or worse swelling or redness in your face or around your eyes.     · You have a new or higher fever.    Watch closely for changes in your health, and be sure to contact your doctor if:    · You have new or worse facial pain.     · The mucus from your nose becomes thicker (like pus) or has new blood in it.     · You are not getting better as expected. Where can you learn more? Go to http://alfa-gino.info/. Enter V065 in the search box to learn more about \"Sinusitis: Care Instructions. \"  Current as of: May 12, 2017  Content Version: 11.7  © 9377-1774 [employer], [employer]. Care instructions adapted under license by [employer] (which disclaims liability or warranty for this information).  If you have questions about a medical condition or this instruction, always ask your healthcare professional. Healthwise, [facility] disclaims any warranty or liability for your use of this information. Saline Nasal Washes: Care Instructions  Your Care Instructions  Saline nasal washes help keep the nasal passages open by washing out thick or dried mucus. This simple remedy can help relieve symptoms of allergies, sinusitis, and colds. It also can make the nose feel more comfortable by keeping the mucous membranes moist. You may notice a little burning sensation in your nose the first few times you use the solution, but this usually gets better in a few days. Follow-up care is a key part of your treatment and safety. Be sure to make and go to all appointments, and call your doctor if you are having problems. It's also a good idea to know your test results and keep a list of the medicines you take. How can you care for yourself at home? · You can buy premixed saline solution in a squeeze bottle or other sinus rinse products at a drugstore. Read and follow the instructions on the label. · You also can make your own saline solution by adding 1 teaspoon of salt and 1 teaspoon of baking soda to 2 cups of distilled water. · If you use a homemade solution, pour a small amount into a clean bowl. Using a rubber bulb syringe, squeeze the syringe and place the tip in the salt water. Pull a small amount of the salt water into the syringe by relaxing your hand. · Sit down with your head tilted slightly back. Do not lie down. Put the tip of the bulb syringe or the squeeze bottle a little way into one of your nostrils. Gently drip or squirt a few drops into the nostril. Repeat with the other nostril. Some sneezing and gagging are normal at first.  · Gently blow your nose. · Wipe the syringe or bottle tip clean after each use. · Repeat this 2 or 3 times a day. · Use nasal washes gently if you have nosebleeds often. When should you call for help?   Watch closely for changes in your health, and be sure to contact your doctor if:    · You often get nosebleeds.     · You have problems doing the nasal washes. Where can you learn more? Go to http://alfa-gino.info/. Enter [id number] in the search box to learn more about \"Saline Nasal Washes: Care Instructions. \"  Current as of: May 12, 2017  Content Version: 11.7  © 6858-0174 [employer]. Care instructions adapted under license by [employer] (which disclaims liability or warranty for this information). If you have questions about a medical condition or this instruction, always ask your healthcare professional. [address] any warranty or liability for your use of this information. Add flonase or nasocort for a few days to help with sinus congestion.

## 2018-10-23 ENCOUNTER — OFFICE VISIT (OUTPATIENT)
Dept: INTERNAL MEDICINE CLINIC | Age: 73
End: 2018-10-23

## 2018-10-23 VITALS
SYSTOLIC BLOOD PRESSURE: 194 MMHG | WEIGHT: 146.4 LBS | RESPIRATION RATE: 14 BRPM | HEIGHT: 67 IN | HEART RATE: 68 BPM | DIASTOLIC BLOOD PRESSURE: 74 MMHG | OXYGEN SATURATION: 99 % | TEMPERATURE: 98.6 F | BODY MASS INDEX: 22.98 KG/M2

## 2018-10-23 DIAGNOSIS — H91.90 HEARING LOSS, UNSPECIFIED HEARING LOSS TYPE, UNSPECIFIED LATERALITY: ICD-10-CM

## 2018-10-23 DIAGNOSIS — R09.89 ABDOMINAL BRUIT: Primary | ICD-10-CM

## 2018-10-23 DIAGNOSIS — Z72.0 TOBACCO ABUSE: ICD-10-CM

## 2018-10-23 DIAGNOSIS — N18.30 CKD (CHRONIC KIDNEY DISEASE) STAGE 3, GFR 30-59 ML/MIN (HCC): ICD-10-CM

## 2018-10-23 DIAGNOSIS — F41.9 ANXIETY: ICD-10-CM

## 2018-10-23 DIAGNOSIS — I10 ESSENTIAL HYPERTENSION: ICD-10-CM

## 2018-10-23 RX ORDER — PREDNISONE 20 MG/1
TABLET ORAL
COMMUNITY
End: 2018-11-20

## 2018-10-23 NOTE — PROGRESS NOTES
Chief Complaint   Patient presents with    Hypertension     3 month follow up    Chronic Kidney Disease     3 month follow up     1. Have you been to the ER, urgent care clinic since your last visit? Hospitalized since your last visit? No    2. Have you seen or consulted any other health care providers outside of the [address] [place] [address] since your last visit? Include any pap smears or colon screening.  No

## 2018-10-23 NOTE — PATIENT INSTRUCTIONS

## 2018-10-23 NOTE — PROGRESS NOTES
Kaycee Morales is a 67 y.o. female who presents for evaluation of routine follow up. Last seen by me aug 23, 2018 for sinus issues. Since then also saw dr Rima Luke from ent for left sided hearing loss. Was started on prednisone 80 mg daily x 4 days, then a taper every 4 days. Hearing is back to normal, but she is having lots of side effects from the prednisone. bp elevated, insomnia, agitation, irritation, jittery. Still smokes some, asks about AAA screen.       ROS:  Constitutional: negative for fevers, chills, anorexia and weight loss  Eyes:   negative for visual disturbance and irritation  ENT:   negative for tinnitus,sore throat,nasal congestion,ear pain,hoarseness  Respiratory:  negative for cough, hemoptysis, dyspnea,wheezing  CV:   negative for chest pain, palpitations, lower extremity edema  GI:   negative for nausea, vomiting, diarrhea, abdominal pain,melena  Genitourinary: negative for frequency, dysuria and hematuria  Musculoskel: negative for myalgias, arthralgias, back pain, muscle weakness, joint pain  Neurological:  negative for headaches, dizziness, focal weakness, numbness  Psychiatric:    ++ for depression or anxiety      Past Medical History:   Diagnosis Date    Arthritis     Chronic pain     neck pain    Degenerative disc disease, cervical     Floating kidney     Right    Gallstone 1974    Herpes simplex type II infection     Hypercholesterolemia     Hypertension     Kidney disease        Past Surgical History:   Procedure Laterality Date    HX CATARACT REMOVAL Left 07/09/2018    HX CHOLECYSTECTOMY  1974    HX HYSTERECTOMY  1974    HX ORTHOPAEDIC  1972    R arm broken, pins put in, taken out 1 year later   [address]    from bone on L leg        Family History   Problem Relation Age of Onset    Cancer Mother         Cervical    Stroke Mother     Psychiatric Disorder Mother     Heart Disease Father     Cancer Sister         Breast       Social History     Socioeconomic History    Marital status: SINGLE     Spouse name: Not on file    Number of children: Not on file    Years of education: Not on file    Highest education level: Not on file   Social Needs    Financial resource strain: Not on file    Food insecurity - worry: Not on file    Food insecurity - inability: Not on file    Transportation needs - medical: Not on file   [employer] needs - non-medical: Not on file   Occupational History    Not on file   Tobacco Use    Smoking status: Current Some Day Smoker     Packs/day: 0.25     Years: 41.00     Pack years: 10.25    Smokeless tobacco: Never Used   Substance and Sexual Activity    Alcohol use: Yes     Comment: occasional    Drug use: No    Sexual activity: No   Other Topics Concern    Not on file   Social History Narrative    Not on file            Visit Vitals  /74 (BP 1 Location: Right arm, BP Patient Position: Sitting)   Pulse 68   Temp 98.6 °F (37 °C) (Oral)   Resp 14   Ht 5' 7\" (1.702 m)   Wt 146 lb 6.4 oz (66.4 kg)   SpO2 99%   BMI 22.93 kg/m²       Physical Examination:   General - Well appearing female  HEENT - PERRL, TM no erythema/opacification, normal nasal turbinates, no oropharyngeal erythema or exudate, MMM  Neck - supple, no bruits, no thyroidomegaly, no lymphadenopathy  Pulm - clear to auscultation bilaterally  Cardio - RRR, normal S1 S2, no murmur  Abd - soft, nontender, no masses, no HSM  Extrem - no edema, +2 distal pulses  Neuro-  No focal deficits, CN intact     Assessment/Plan:    1.  htn--worsened by steroids (she is now on day 9 of 16, so over half way done). Continue with norvasc, lisinopril. Asked her to try to monitor at home  2. Anxiety--continue buspar, trazodone  3.  abd bruit--check abd ultrasound, r/o AAA  4. Hx hyponatremia--resolved  5.  ckd 3--stable    Declines flu shot.         David Conner III, DO

## 2018-11-02 ENCOUNTER — HOSPITAL ENCOUNTER (OUTPATIENT)
Dept: ULTRASOUND IMAGING | Age: 73
Discharge: HOME | End: 2018-11-02
Attending: INTERNAL MEDICINE
Payer: MEDICARE

## 2018-11-02 DIAGNOSIS — R09.89 ABDOMINAL BRUIT: ICD-10-CM

## 2018-11-02 DIAGNOSIS — Z72.0 TOBACCO ABUSE: ICD-10-CM

## 2018-11-02 DIAGNOSIS — I10 ESSENTIAL HYPERTENSION: ICD-10-CM

## 2018-11-02 PROCEDURE — 76706 US ABDL AORTA SCREEN AAA: CPT

## 2018-11-05 NOTE — PROGRESS NOTES
Called, spoke to pt. Two identifiers confirmed. Pt notified of results per Dr. Sil Monterroso  Pt verbalized understanding of information discussed w/ no further questions at this time.

## 2018-11-20 ENCOUNTER — OFFICE VISIT (OUTPATIENT)
Dept: URGENT CARE | Age: 73
End: 2018-11-20

## 2018-11-20 VITALS
TEMPERATURE: 98.5 F | HEART RATE: 78 BPM | WEIGHT: 146 LBS | BODY MASS INDEX: 22.91 KG/M2 | RESPIRATION RATE: 16 BRPM | DIASTOLIC BLOOD PRESSURE: 73 MMHG | HEIGHT: 67 IN | SYSTOLIC BLOOD PRESSURE: 164 MMHG | OXYGEN SATURATION: 97 %

## 2018-11-20 DIAGNOSIS — N30.90 CYSTITIS: Primary | ICD-10-CM

## 2018-11-20 DIAGNOSIS — R30.0 DYSURIA: ICD-10-CM

## 2018-11-20 LAB
BILIRUB UR QL STRIP: NEGATIVE
GLUCOSE UR-MCNC: NEGATIVE MG/DL
KETONES P FAST UR STRIP-MCNC: NEGATIVE MG/DL
PH UR STRIP: 7 [PH] (ref 4.6–8)
PROT UR QL STRIP: NEGATIVE
SP GR UR STRIP: 1.01 (ref 1–1.03)
UA UROBILINOGEN AMB POC: NORMAL (ref 0.2–1)
URINALYSIS CLARITY POC: NORMAL
URINALYSIS COLOR POC: NORMAL
URINE BLOOD POC: NORMAL
URINE LEUKOCYTES POC: NORMAL
URINE NITRITES POC: NEGATIVE

## 2018-11-20 RX ORDER — DOXYCYCLINE 100 MG/1
100 CAPSULE ORAL 2 TIMES DAILY
Qty: 20 CAP | Refills: 0 | Status: SHIPPED | OUTPATIENT
Start: 2018-11-20 | End: 2018-11-30

## 2018-11-20 NOTE — PATIENT INSTRUCTIONS
Urinary Tract Infection in Women: Care Instructions  Your Care Instructions    A urinary tract infection, or UTI, is a general term for an infection anywhere between the kidneys and the urethra (where urine comes out). Most UTIs are bladder infections. They often cause pain or burning when you urinate. UTIs are caused by bacteria and can be cured with antibiotics. Be sure to complete your treatment so that the infection goes away. Follow-up care is a key part of your treatment and safety. Be sure to make and go to all appointments, and call your doctor if you are having problems. It's also a good idea to know your test results and keep a list of the medicines you take. How can you care for yourself at home? · Take your antibiotics as directed. Do not stop taking them just because you feel better. You need to take the full course of antibiotics. · Drink extra water and other fluids for the next day or two. This may help wash out the bacteria that are causing the infection. (If you have kidney, heart, or liver disease and have to limit fluids, talk with your doctor before you increase your fluid intake.)  · Avoid drinks that are carbonated or have caffeine. They can irritate the bladder. · Urinate often. Try to empty your bladder each time. · To relieve pain, take a hot bath or lay a heating pad set on low over your lower belly or genital area. Never go to sleep with a heating pad in place. To prevent UTIs  · Drink plenty of water each day. This helps you urinate often, which clears bacteria from your system. (If you have kidney, heart, or liver disease and have to limit fluids, talk with your doctor before you increase your fluid intake.)  · Urinate when you need to. · Urinate right after you have sex. · Change sanitary pads often. · Avoid douches, bubble baths, feminine hygiene sprays, and other feminine hygiene products that have deodorants.   · After going to the bathroom, wipe from front to back.  When should you call for help? Call your doctor now or seek immediate medical care if:    · Symptoms such as fever, chills, nausea, or vomiting get worse or appear for the first time.     · You have new pain in your back just below your rib cage. This is called flank pain.     · There is new blood or pus in your urine.     · You have any problems with your antibiotic medicine.    Watch closely for changes in your health, and be sure to contact your doctor if:    · You are not getting better after taking an antibiotic for 2 days.     · Your symptoms go away but then come back. Where can you learn more? Go to http://alfa-gino.info/. Enter S987 in the search box to learn more about \"Urinary Tract Infection in Women: Care Instructions. \"  Current as of: March 21, 2018  Content Version: 11.8  © 3930-0041 Healthwise, [employer]. Care instructions adapted under license by [employer] (which disclaims liability or warranty for this information). If you have questions about a medical condition or this instruction, always ask your healthcare professional. [address] any warranty or liability for your use of this information.

## 2018-11-20 NOTE — PROGRESS NOTES
Emilee Prasad is a 67 y.o. female who complains of urinary frequency, urgency and dysuria since yesterday. Denies flank pain, fever, chills, or abdominal pain, N/V. Hx of CKD3, HLD, HTN,. The history is provided by the patient. Past Medical History:   Diagnosis Date    Arthritis     Chronic pain     neck pain    Degenerative disc disease, cervical     Floating kidney     Right    Gallstone 1974    Herpes simplex type II infection     Hypercholesterolemia     Hypertension     Kidney disease         Past Surgical History:   Procedure Laterality Date    HX CATARACT REMOVAL Left 07/09/2018    HX CHOLECYSTECTOMY  1974    HX HYSTERECTOMY  1974    HX ORTHOPAEDIC  1972    R arm broken, pins put in, taken out 1 year later    [facility]    from bone on L leg          Family History   Problem Relation Age of Onset    Cancer Mother         Cervical    Stroke Mother     Psychiatric Disorder Mother     Heart Disease Father     Cancer Sister         Breast        Social History     Socioeconomic History    Marital status: SINGLE     Spouse name: Not on file    Number of children: Not on file    Years of education: Not on file    Highest education level: Not on file   Social Needs    Financial resource strain: Not on file    Food insecurity - worry: Not on file    Food insecurity - inability: Not on file   [employer] needs - medical: Not on file   [employer] needs - non-medical: Not on file   Occupational History    Not on file   Tobacco Use    Smoking status: Current Some Day Smoker     Packs/day: 0.25     Years: 41.00     Pack years: 10.25    Smokeless tobacco: Never Used   Substance and Sexual Activity    Alcohol use: Yes     Comment: occasional    Drug use: No    Sexual activity: No   Other Topics Concern    Not on file   Social History Narrative    Not on file                ALLERGIES: Compazine [prochlorperazine]; Erythromycin;  Indocin [indomethacin]; and Keflex [cephalexin]    Review of Systems   Constitutional: Negative for chills and fever. Respiratory: Negative for shortness of breath and wheezing. Cardiovascular: Negative for chest pain and palpitations. Gastrointestinal: Negative for abdominal pain, nausea and vomiting. Genitourinary: Positive for dysuria, frequency and urgency. Negative for hematuria. Musculoskeletal: Negative for myalgias. Skin: Negative for rash. Hematological: Negative for adenopathy. Vitals:    11/20/18 1353   BP: 164/73   Pulse: 78   Resp: 16   Temp: 98.5 °F (36.9 °C)   SpO2: 97%   Weight: 146 lb (66.2 kg)   Height: 5' 7\" (1.702 m)       Physical Exam   Constitutional: She appears well-developed and well-nourished. No distress. Cardiovascular: Normal rate, regular rhythm and normal heart sounds. Pulmonary/Chest: Effort normal and breath sounds normal. No respiratory distress. She has no wheezes. She has no rales. Abdominal: Soft. Bowel sounds are normal. She exhibits no distension. There is tenderness in the suprapubic area. There is no rigidity, no rebound, no guarding and no CVA tenderness. Neurological: She is alert. Skin: She is not diaphoretic. Psychiatric: She has a normal mood and affect. Her behavior is normal. Judgment and thought content normal.   Nursing note and vitals reviewed. MDM    ICD-10-CM ICD-9-CM    1. Cystitis N30.90 595.9    2. Dysuria R30.0 788.1 AMB POC URINALYSIS DIP STICK AUTO W/O MICRO      CULTURE, URINE     Medications Ordered Today   Medications    doxycycline (VIBRAMYCIN) 100 mg capsule     Sig: Take 1 Cap by mouth two (2) times a day for 10 days. Dispense:  20 Cap     Refill:  0     The patients condition was discussed with the patient and they understand. The patient is to follow up with PCP [facility]. If signs and symptoms become worse the pt is to go to the ER. The patient is to take medications as prescribed.      Results for orders placed or performed in visit on 11/20/18   AMB POC URINALYSIS DIP STICK AUTO W/O MICRO   Result Value Ref Range    Color (UA POC)      Clarity (UA POC)      Glucose (UA POC) Negative Negative    Bilirubin (UA POC) Negative Negative    Ketones (UA POC) Negative Negative    Specific gravity (UA POC) 1.015 1.001 - 1.035    Blood (UA POC) Trace Negative    pH (UA POC) 7 4.6 - 8.0    Protein (UA POC) Negative Negative    Urobilinogen (UA POC) 0.2 mg/dL 0.2 - 1    Nitrites (UA POC) Negative Negative    Leukocyte esterase (UA POC) 1+ Negative           Procedures

## 2018-11-22 LAB — BACTERIA UR CULT: ABNORMAL

## 2018-12-03 DIAGNOSIS — G47.00 INSOMNIA, UNSPECIFIED TYPE: ICD-10-CM

## 2018-12-03 RX ORDER — TRAZODONE HYDROCHLORIDE 50 MG/1
50 TABLET ORAL
Qty: 90 TAB | Refills: 3 | Status: SHIPPED | OUTPATIENT
Start: 2018-12-03 | End: 2020-01-29 | Stop reason: SDUPTHER

## 2018-12-03 NOTE — TELEPHONE ENCOUNTER
----- Message from Denisha Schofield sent at 12/3/2018 11:09 AM EST -----  Regarding: dr. Love Smith  Pt requesting a refill for \"trazadone 50 mg\". [facility] pharmacy. [address] [facility]. Pharmacy phone [phone]. Pt's contact [phone].        Message copied/pasted from [facility]

## 2018-12-14 ENCOUNTER — OFFICE VISIT (OUTPATIENT)
Dept: URGENT CARE | Age: 73
End: 2018-12-14

## 2018-12-14 VITALS
DIASTOLIC BLOOD PRESSURE: 67 MMHG | HEART RATE: 84 BPM | BODY MASS INDEX: 23.99 KG/M2 | WEIGHT: 144 LBS | HEIGHT: 65 IN | RESPIRATION RATE: 18 BRPM | SYSTOLIC BLOOD PRESSURE: 151 MMHG | OXYGEN SATURATION: 95 % | TEMPERATURE: 98 F

## 2018-12-14 DIAGNOSIS — R05.8 COUGH PRODUCTIVE OF PURULENT SPUTUM: Primary | ICD-10-CM

## 2018-12-14 DIAGNOSIS — F17.210 CIGARETTE SMOKER: ICD-10-CM

## 2018-12-14 RX ORDER — PREDNISONE 20 MG/1
TABLET ORAL
Qty: 10 TAB | Refills: 0 | Status: SHIPPED | OUTPATIENT
Start: 2018-12-14 | End: 2019-02-05

## 2018-12-14 RX ORDER — DOXYCYCLINE 100 MG/1
100 CAPSULE ORAL 2 TIMES DAILY
Qty: 14 CAP | Refills: 0 | Status: SHIPPED | OUTPATIENT
Start: 2018-12-14 | End: 2018-12-21

## 2018-12-14 NOTE — PROGRESS NOTES
Here for 5 days of progressively worsening cough  She is a long time smoker but denies diagnosis of COPD  Cough is present all day. Associated with yellow sputum and mild SOB. Denies fever, wheeze, dizziness, headache, weakness, lethargy, chest pain, nausea or vomiting  Has not tried meds  Has cut back smoking some during this time. Overall not improved.                Past Medical History:   Diagnosis Date    Arthritis     Chronic pain     neck pain    Degenerative disc disease, cervical     Floating kidney     Right    Gallstone 1974    Herpes simplex type II infection     Hypercholesterolemia     Hypertension     Kidney disease         Past Surgical History:   Procedure Laterality Date    HX CATARACT REMOVAL Left 07/09/2018    HX CHOLECYSTECTOMY  1974    HX HYSTERECTOMY  1974    HX ORTHOPAEDIC  1972    R arm broken, pins put in, taken out 1 year later    [facility]    from bone on L leg          Family History   Problem Relation Age of Onset    Cancer Mother         Cervical    Stroke Mother     Psychiatric Disorder Mother     Heart Disease Father     Cancer Sister         Breast        Social History     Socioeconomic History    Marital status: SINGLE     Spouse name: Not on file    Number of children: Not on file    Years of education: Not on file    Highest education level: Not on file   Social Needs    Financial resource strain: Not on file    Food insecurity - worry: Not on file    Food insecurity - inability: Not on file   [employer] needs - medical: Not on file   [employer] needs - non-medical: Not on file   Occupational History    Not on file   Tobacco Use    Smoking status: Current Some Day Smoker     Packs/day: 0.25     Years: 41.00     Pack years: 10.25    Smokeless tobacco: Never Used   Substance and Sexual Activity    Alcohol use: Yes     Comment: occasional    Drug use: No    Sexual activity: No   Other Topics Concern    Not on file Social History Narrative    Not on file                ALLERGIES: Compazine [prochlorperazine]; Indocin [indomethacin]; Cephalexin; and Erythromycin    Review of Systems   All other systems reviewed and are negative. Vitals:    12/14/18 1245 12/14/18 1307 12/14/18 1358   BP: 151/67     Pulse: 80  84   Resp: 13 18    Temp: 98 °F (36.7 °C)     SpO2: 93% 94% 95%   Weight: 144 lb (65.3 kg)     Height: 5' 5\" (1.651 m)         Physical Exam   Constitutional: She is oriented to person, place, and time. No distress. Non-toxic appearing, well hydrated   HENT:   Head: Normocephalic and atraumatic. Nose: Nose normal.   Mouth/Throat: Oropharynx is clear and moist. No oropharyngeal exudate. Eyes: Conjunctivae and EOM are normal. Pupils are equal, round, and reactive to light. No scleral icterus. Neck: Normal range of motion. Neck supple. Cardiovascular: Regular rhythm and normal heart sounds. Exam reveals no gallop and no friction rub. No murmur heard. Pulmonary/Chest: Effort normal. No respiratory distress. She has no rales. Soft scattered wheeze. No rales. Lymphadenopathy:     She has no cervical adenopathy. Neurological: She is alert and oriented to person, place, and time. She exhibits normal muscle tone. Coordination normal.   Skin: Skin is warm and dry. No rash noted. She is not diaphoretic. No erythema. No pallor. Psychiatric: She has a normal mood and affect. Her behavior is normal. Thought content normal.   Nursing note and vitals reviewed. MDM     Differential Diagnosis; Clinical Impression; Plan:       CLINICAL IMPRESSION:  (R05) Cough productive of purulent sputum  (primary encounter diagnosis)  (F17.210) Cigarette smoker    Orders Placed This Encounter      XR CHEST PA LAT    RX      predniSONE (DELTASONE) 20 mg tablet      doxycycline (VIBRAMYCIN) 100 mg capsule      Plan:  1. CXR WNL. Will cover with antibiotics and prednisone. 2. Maintain adequate fluid intake  3.  Follow up with PCP within 1 week      We have reviewed concerning signs/symptoms, normal vs abnormal progression of medical condition and when to seek immediate medical attention. Schedule with PCP or Urgent Care immediately for worsening or new symptoms. See your PCP if there is not at least some improvement in symptoms within the next 1 week. You should see your PCP for updates on your routine health maintenance. XR Results (most recent):  Results from Appointment encounter on 12/14/18   XR CHEST PA LAT    Narrative EXAM: XR CHEST PA LAT    INDICATION: smoker, productive cough, SOB    COMPARISON: None. FINDINGS: PA and lateral radiographs of the chest demonstrate clear lungs. The  cardiac and mediastinal contours and pulmonary vascularity are normal. The bones  and soft tissues are within normal limits.        Impression IMPRESSION: No acute finding         Procedures

## 2018-12-14 NOTE — PATIENT INSTRUCTIONS
Follow up with PCP without improvement over next 5-7 days sooner/immediately for new or worsening       Cough: Care Instructions  Your Care Instructions    A cough is your body's response to something that bothers your throat or airways. Many things can cause a cough. You might cough because of a cold or the flu, bronchitis, or asthma. Smoking, postnasal drip, allergies, and stomach acid that backs up into your throat also can cause coughs. A cough is a symptom, not a disease. Most coughs stop when the cause, such as a cold, goes away. You can take a few steps at home to cough less and feel better. Follow-up care is a key part of your treatment and safety. Be sure to make and go to all appointments, and call your doctor if you are having problems. It's also a good idea to know your test results and keep a list of the medicines you take. How can you care for yourself at home? · Drink lots of water and other fluids. This helps thin the mucus and soothes a dry or sore throat. Honey or lemon juice in hot water or tea may ease a dry cough. · Take cough medicine as directed by your doctor. · Prop up your head on pillows to help you breathe and ease a dry cough. · Try cough drops to soothe a dry or sore throat. Cough drops don't stop a cough. Medicine-flavored cough drops are no better than candy-flavored drops or hard candy. · Do not smoke. Avoid secondhand smoke. If you need help quitting, talk to your doctor about stop-smoking programs and medicines. These can increase your chances of quitting for good. When should you call for help? Call 911 anytime you think you may need emergency care.  For example, call if:    · You have severe trouble breathing.    Call your doctor now or seek immediate medical care if:    · You cough up blood.     · You have new or worse trouble breathing.     · You have a new or higher fever.     · You have a new rash.    Watch closely for changes in your health, and be sure to contact your doctor if:    · You cough more deeply or more often, especially if you notice more mucus or a change in the color of your mucus.     · You have new symptoms, such as a sore throat, an earache, or sinus pain.     · You do not get better as expected. Where can you learn more? Go to http://alfa-gino.info/. Enter D279 in the search box to learn more about \"Cough: Care Instructions. \"  Current as of: December 6, 2017  Content Version: 11.8  © 2882-9479 [employer]. Care instructions adapted under license by [employer] (which disclaims liability or warranty for this information). If you have questions about a medical condition or this instruction, always ask your healthcare professional. [address] any warranty or liability for your use of this information.

## 2018-12-24 ENCOUNTER — TELEPHONE (OUTPATIENT)
Dept: INTERNAL MEDICINE CLINIC | Age: 73
End: 2018-12-24

## 2018-12-24 NOTE — TELEPHONE ENCOUNTER
Patient is switching her PCP over to [facility] and would like her records faxed over to [phone], and office number [phone]. Appointment is on 01/09/19.  Thanks

## 2018-12-26 NOTE — TELEPHONE ENCOUNTER
Called pt and left vm advising that a medical records release form needs to be completed and signed by her.

## 2019-01-23 ENCOUNTER — OFFICE VISIT (OUTPATIENT)
Dept: FAMILY MEDICINE CLINIC | Age: 74
End: 2019-01-23

## 2019-01-23 VITALS
OXYGEN SATURATION: 97 % | TEMPERATURE: 97.7 F | WEIGHT: 146.7 LBS | HEIGHT: 65 IN | HEART RATE: 63 BPM | BODY MASS INDEX: 24.44 KG/M2 | RESPIRATION RATE: 18 BRPM | SYSTOLIC BLOOD PRESSURE: 150 MMHG | DIASTOLIC BLOOD PRESSURE: 66 MMHG

## 2019-01-23 DIAGNOSIS — B00.9 HERPES SIMPLEX TYPE 2 INFECTION: ICD-10-CM

## 2019-01-23 DIAGNOSIS — Z12.31 BREAST CANCER SCREENING BY MAMMOGRAM: ICD-10-CM

## 2019-01-23 DIAGNOSIS — I10 ESSENTIAL HYPERTENSION: Primary | ICD-10-CM

## 2019-01-23 DIAGNOSIS — G47.00 INSOMNIA, UNSPECIFIED TYPE: ICD-10-CM

## 2019-01-23 DIAGNOSIS — M81.0 OSTEOPOROSIS WITHOUT CURRENT PATHOLOGICAL FRACTURE, UNSPECIFIED OSTEOPOROSIS TYPE: ICD-10-CM

## 2019-01-23 DIAGNOSIS — R23.3 EASY BRUISING: ICD-10-CM

## 2019-01-23 DIAGNOSIS — M89.9 DISORDER OF BONE: ICD-10-CM

## 2019-01-23 DIAGNOSIS — N18.30 CKD (CHRONIC KIDNEY DISEASE) STAGE 3, GFR 30-59 ML/MIN (HCC): Chronic | ICD-10-CM

## 2019-01-23 DIAGNOSIS — E78.5 HYPERLIPIDEMIA LDL GOAL <70: ICD-10-CM

## 2019-01-23 DIAGNOSIS — F41.9 ANXIETY: ICD-10-CM

## 2019-01-23 RX ORDER — CALCIUM CARB/VITAMIN D3/VIT K1 500-500-40
1000 TABLET,CHEWABLE ORAL DAILY
COMMUNITY
End: 2019-08-30

## 2019-01-23 RX ORDER — ASPIRIN 81 MG
100 TABLET, DELAYED RELEASE (ENTERIC COATED) ORAL
COMMUNITY
End: 2019-01-23 | Stop reason: SDUPTHER

## 2019-01-23 RX ORDER — AMLODIPINE BESYLATE 5 MG/1
5 TABLET ORAL
COMMUNITY
Start: 2018-07-26 | End: 2019-01-23 | Stop reason: SDUPTHER

## 2019-01-23 RX ORDER — KETOCONAZOLE 20 MG/G
CREAM TOPICAL
COMMUNITY
End: 2019-01-23 | Stop reason: SDUPTHER

## 2019-01-23 RX ORDER — BACLOFEN 20 MG
1200 TABLET ORAL
COMMUNITY
End: 2019-02-05

## 2019-01-23 RX ORDER — MOMETASONE FUROATE 1 MG/ML
SOLUTION TOPICAL
Refills: 3 | COMMUNITY
Start: 2018-11-05 | End: 2020-08-04 | Stop reason: SDUPTHER

## 2019-01-23 RX ORDER — PREDNISOLONE ACETATE 10 MG/ML
SUSPENSION/ DROPS OPHTHALMIC
COMMUNITY
Start: 2018-06-22 | End: 2019-02-05

## 2019-01-23 RX ORDER — UREA 10 %
1 LOTION (ML) TOPICAL
COMMUNITY
End: 2019-01-23 | Stop reason: SDUPTHER

## 2019-01-23 RX ORDER — VALACYCLOVIR HYDROCHLORIDE 1 G/1
2000 TABLET, FILM COATED ORAL 2 TIMES DAILY
Qty: 30 TAB | Refills: 1 | Status: SHIPPED | OUTPATIENT
Start: 2019-01-23 | End: 2019-09-30 | Stop reason: SDUPTHER

## 2019-01-23 RX ORDER — DEXTROMETHORPHAN HYDROBROMIDE, GUAIFENESIN 5; 100 MG/5ML; MG/5ML
650 LIQUID ORAL
COMMUNITY
End: 2019-01-23 | Stop reason: SDUPTHER

## 2019-01-23 RX ORDER — LISINOPRIL 30 MG/1
30 TABLET ORAL
COMMUNITY
Start: 2018-07-26 | End: 2019-01-23 | Stop reason: SDUPTHER

## 2019-01-23 RX ORDER — GLUCOSAMINE/CHONDR SU A SOD 750-600 MG
1000 TABLET ORAL
COMMUNITY
End: 2019-01-23 | Stop reason: SDUPTHER

## 2019-01-23 RX ORDER — PRAVASTATIN SODIUM 20 MG/1
20 TABLET ORAL
COMMUNITY
Start: 2018-04-26 | End: 2019-01-23 | Stop reason: SDUPTHER

## 2019-01-23 RX ORDER — GUAIFENESIN 100 MG/5ML
81 LIQUID (ML) ORAL
COMMUNITY
Start: 2018-04-26 | End: 2019-01-23 | Stop reason: SDUPTHER

## 2019-01-23 RX ORDER — DICLOFENAC SODIUM 10 MG/G
GEL TOPICAL
COMMUNITY
Start: 2019-01-04 | End: 2019-01-23 | Stop reason: SDUPTHER

## 2019-01-23 RX ORDER — FAMOTIDINE 20 MG/1
20 TABLET, FILM COATED ORAL
COMMUNITY
End: 2019-01-23 | Stop reason: SDUPTHER

## 2019-01-23 RX ORDER — LORAZEPAM 1 MG/1
TABLET ORAL
COMMUNITY
Start: 2019-01-22 | End: 2019-06-24

## 2019-01-23 RX ORDER — TRAZODONE HYDROCHLORIDE 50 MG/1
50 TABLET ORAL
COMMUNITY
Start: 2018-06-11 | End: 2019-01-23 | Stop reason: SDUPTHER

## 2019-01-23 RX ORDER — BUSPIRONE HYDROCHLORIDE 15 MG/1
7.5 TABLET ORAL 2 TIMES DAILY
Qty: 90 TAB | Refills: 3 | Status: SHIPPED | OUTPATIENT
Start: 2019-01-23 | End: 2020-01-29 | Stop reason: SDUPTHER

## 2019-01-23 RX ORDER — VALACYCLOVIR HYDROCHLORIDE 1 G/1
2000 TABLET, FILM COATED ORAL
COMMUNITY
Start: 2017-12-12 | End: 2019-01-23 | Stop reason: SDUPTHER

## 2019-01-23 RX ORDER — PRAVASTATIN SODIUM 20 MG/1
20 TABLET ORAL
Qty: 90 TAB | Refills: 3 | Status: SHIPPED | OUTPATIENT
Start: 2019-01-23 | End: 2020-01-29 | Stop reason: SDUPTHER

## 2019-01-23 RX ORDER — LISINOPRIL 30 MG/1
30 TABLET ORAL DAILY
Qty: 90 TAB | Refills: 3 | Status: SHIPPED | OUTPATIENT
Start: 2019-01-23 | End: 2019-08-30

## 2019-01-23 RX ORDER — BUSPIRONE HYDROCHLORIDE 15 MG/1
7.5 TABLET ORAL
COMMUNITY
Start: 2018-04-26 | End: 2019-01-23 | Stop reason: SDUPTHER

## 2019-01-23 RX ORDER — TIZANIDINE 2 MG/1
2 TABLET ORAL
COMMUNITY
Start: 2018-06-10 | End: 2019-01-23 | Stop reason: SDUPTHER

## 2019-01-23 NOTE — PROGRESS NOTES
Jose D Villalpando  Identified pt with two pt identifiers(name and ). Chief Complaint   Patient presents with   [address]     rm 11/non fasting/referrals for mammo gram, dexa       1. Have you been to the ER, urgent care clinic since your last visit? no  Hospitalized since your last visit? no    2. Have you seen or consulted any other health care providers outside of the [address] [place] [address] since your last visit? Include any pap smears or colon screening. no      Advance Care Planning    In the event something were to happen to you and you were unable to speak on your behalf, do you have an Advance Directive/ Living Will in place stating your wishes? NO    If yes, do we have a copy on file NO    If no, would you like information NO    Medication reconciliation up to date and corrected with patient at this time. Today's provider has been notified of reason for visit, vitals and flowsheets obtained on patients. Reviewed record in preparation for visit, huddled with provider and have obtained necessary documentation. Health Maintenance Due   Topic    COLONOSCOPY     BREAST CANCER SCRN MAMMOGRAM        Wt Readings from Last 3 Encounters:   18 144 lb (65.3 kg)   18 146 lb (66.2 kg)   10/23/18 146 lb 6.4 oz (66.4 kg)     Temp Readings from Last 3 Encounters:   18 98 °F (36.7 °C)   18 98.5 °F (36.9 °C)   10/23/18 98.6 °F (37 °C) (Oral)     BP Readings from Last 3 Encounters:   18 151/67   18 164/73   10/23/18 194/74     Pulse Readings from Last 3 Encounters:   18 84   18 78   10/23/18 68     There were no vitals filed for this visit.       Learning Assessment:  :     Learning Assessment 2018   PRIMARY LEARNER Patient Patient   HIGHEST LEVEL OF EDUCATION - PRIMARY LEARNER  DID NOT GRADUATE HIGH SCHOOL DID NOT GRADUATE HIGH SCHOOL   BARRIERS PRIMARY LEARNER NONE NONE   CO-LEARNER CAREGIVER No No   PRIMARY LANGUAGE ENGLISH ENGLISH LEARNER PREFERENCE PRIMARY DEMONSTRATION OTHER (COMMENT)   ANSWERED BY patient patient   RELATIONSHIP SELF SELF       Depression Screening:  :     PHQ over the last two weeks 8/23/2018   Little interest or pleasure in doing things Not at all   Feeling down, depressed, irritable, or hopeless Not at all   Total Score PHQ 2 0       No flowsheet data found. Fall Risk Assessment:  :     Fall Risk Assessment, last 12 mths 8/23/2018   Able to walk? Yes   Fall in past 12 months? No       Abuse Screening:  :     Abuse Screening Questionnaire 4/26/2018 11/15/2016   Do you ever feel afraid of your partner? N N   Are you in a relationship with someone who physically or mentally threatens you? N N   Is it safe for you to go home? Y Y       ADL Screening:  :     ADL Assessment 7/26/2018   Feeding yourself No Help Needed   Getting from bed to chair No Help Needed   Getting dressed No Help Needed   Bathing or showering No Help Needed   Walk across the room (includes cane/walker) No Help Needed   Using the telphone No Help Needed   Taking your medications No Help Needed   Preparing meals No Help Needed   Managing money (expenses/bills) No Help Needed   Moderately strenuous housework (laundry) No Help Needed   Shopping for personal items (toiletries/medicines) No Help Needed   Shopping for groceries No Help Needed   Driving No Help Needed   Climbing a flight of stairs No Help Needed   Getting to places beyond walking distances No Help Needed           BMI:  Weight Metrics 12/14/2018 11/20/2018 10/23/2018 8/23/2018 7/26/2018 7/6/2018 4/26/2018   Weight 144 lb 146 lb 146 lb 6.4 oz 142 lb 142 lb 6.4 oz 143 lb 6.4 oz 145 lb   BMI 23.96 kg/m2 22.87 kg/m2 22.93 kg/m2 22.24 kg/m2 22.3 kg/m2 22.46 kg/m2 22.36 kg/m2           Medication reconciliation up to date and corrected with patient at this time.

## 2019-01-23 NOTE — PATIENT INSTRUCTIONS
Osteoporosis/Osteopenia Management    I recommend you take at least 1200 mg of elemental calcium daily (one 600 mg tablet in the morning and one in the afternoon/evening) and/or consume dietary calcium in addition to oral to at least 1200 mg.    I recommend at least 1000 IU of vitamin D daily. I will check your vitamin D level today, and if it is low, I will prescribe you a weekly supplement called ergocalciferol 50,000. I recommend that you perform physical exercise daily for at least 30 minutes with low intensity resistance training with lightweights or rubber tension ropes (Thera-Band). Lower from taking 3 fish oil tablets daily down to just 2 tablets daily to see if this helps the easy bleeding. Try taking just 1/2 tablet Trazodone at night for sleep over the next several weeks, then lower to every other night for 2 weeks, then just as needed. You may continue taking the melatonin. Learning About the [address] [facility] Diet  What is the Mediterranean diet? The Mediterranean diet is a style of eating rather than a diet plan. It features foods eaten in [place], [place], [place], and other countries along the [facility]. It emphasizes eating foods like fish, fruits, vegetables, beans, high-fiber breads and whole grains, nuts, and olive oil. This style of eating includes limited red meat, cheese, and sweets. Why choose the Mediterranean diet? A Mediterranean-style diet may improve heart health. It contains more fat than other heart-healthy diets. But the fats are mainly from nuts, unsaturated oils (such as fish oils and olive oil), and certain nut or seed oils (such as canola, soybean, or flaxseed oil). These fats may help protect the heart and blood vessels. How can you get started on the Mediterranean diet? Here are some things you can do to switch to a more Mediterranean way of eating.   What to eat  · Eat a variety of fruits and vegetables each day, such as grapes, blueberries, tomatoes, broccoli, peppers, figs, olives, spinach, eggplant, beans, lentils, and chickpeas. · Eat a variety of whole-grain foods each day, such as oats, brown rice, and whole wheat bread, pasta, and couscous. · Eat fish at least 2 times a week. Try tuna, salmon, mackerel, lake trout, herring, or sardines. · Eat moderate amounts of low-fat dairy products, such as milk, cheese, or yogurt. · Eat moderate amounts of poultry and eggs. · Choose healthy (unsaturated) fats, such as nuts, olive oil, and certain nut or seed oils like canola, soybean, and flaxseed. · Limit unhealthy (saturated) fats, such as butter, palm oil, and coconut oil. And limit fats found in animal products, such as meat and dairy products made with whole milk. Try to eat red meat only a few times a month in very small amounts. · Limit sweets and desserts to only a few times a week. This includes sugar-sweetened drinks like soda. The Mediterranean diet may also include red wine with your meal--1 glass each day for women and up to 2 glasses a day for men. Tips for eating at home  · Use herbs, spices, garlic, lemon zest, and citrus juice instead of salt to add flavor to foods. · Add avocado slices to your sandwich instead of gomez. · Have fish for lunch or dinner instead of red meat. Brush the fish with olive oil, and broil or grill it. · Sprinkle your salad with seeds or nuts instead of cheese. · Cook with olive or canola oil instead of butter or oils that are high in saturated fat. · Switch from 2% milk or whole milk to 1% or fat-free milk. · Dip raw vegetables in a vinaigrette dressing or hummus instead of dips made from mayonnaise or sour cream.  · Have a piece of fruit for dessert instead of a piece of cake. Try baked apples, or have some dried fruit. Tips for eating out  · Try broiled, grilled, baked, or poached fish instead of having it fried or breaded.   · Ask your  to have your meals prepared with olive oil instead of butter. · Order dishes made with marinara sauce or sauces made from olive oil. Avoid sauces made from cream or mayonnaise. · Choose whole-grain breads, whole wheat pasta and pizza crust, brown rice, beans, and lentils. · Cut back on butter or margarine on bread. Instead, you can dip your bread in a small amount of olive oil. · Ask for a side salad or grilled vegetables instead of french fries or chips. Where can you learn more? Go to http://alfa-gino.info/. Enter 241-520-8403 in the search box to learn more about \"Learning About the Mediterranean Diet. \"  Current as of: March 28, 2018  Content Version: 11.9  © 6392-9682 [employer]. Care instructions adapted under license by [employer] (which disclaims liability or warranty for this information). If you have questions about a medical condition or this instruction, always ask your healthcare professional. [address] any warranty or liability for your use of this information.

## 2019-01-23 NOTE — PROGRESS NOTES
HPI:  75 y.o. Presents as new patient to establish care. She was previously seen by Dr. Chucho Dominguez, records are in [address] [facility]. She has been in [address] [place] [address] for one year from [place], and was in [place] before that. She has lived in [address] [place] [address] previously. She is  twice. She has 5 children, 3 girls and 2 boys; she has 6 grandchildren, and 4 great grandkids. 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 She smokes 1/2 ppd for over 41 years. She has occasional beer. She does not walk as much as she used to, since she would be walking herself. She has a dachshund. She lives with a grandson and his girlfriend. Patient Active Problem List    Diagnosis    CKD (chronic kidney disease) stage 3, GFR 30-59 ml/min ([facility]) - she has never seen nephrology    Sinus disorder    Anxiety - buspar 7.5 mg bid with relief    Essential hypertension - amlodipine 5 mg, lisinopril 30 mg    Osteoarthritis of cervical spinef    Hyperlipidemia LDL goal <70 - pravastatin 20 mg , fish oil 1200 mg tid    Herpes simplex type 2 infection outbreak - on buttock, may have 2 outbreaks several weeks apart and then go over 6 months without having one, takes Valtrex 2g bid x one day for outbreaks     Osteoporosis - she had 4 rounds of Reclast, last in 2016. Last DXA in [place] thru [facility], shows L1-L4 T score  -2.2 (osteopenia, which is s/p Reclast, baseline DXA not available). No h/o fragility fracture. She notices easy bruising and bleeding with thin skin.       Past Medical History:   Diagnosis Date    Arthritis     Chronic obstructive pulmonary disease ([facility] [address] 2010    early disease on PFTs, briefly on a daily inhaler    Chronic pain     neck pain, Dr. Kelsey Sierra    Degenerative disc disease, cervical 1998    cervical spine injections, RFA Dr. aMry Dominguez kidney     Right    Gallstone 1974    GERD (gastroesophageal reflux disease) pepcid 20 mg prn (about 2x/month)    Herpes simplex type II infection     Hypercholesterolemia     Hypertension     Kidney disease     Osteoporosis 2012    Reclast 9/24/2012, 10/09/2013, 10/31/2014, 1/29/2016    PUD (peptic ulcer disease) 2010    endoscopy confirmed, was taking aleve    Trauma 1972    right arm fracture surgically repaired, twisted in washing machine       Social History     Tobacco Use    Smoking status: Current Some Day Smoker     Packs/day: 0.25     Years: 41.00     Pack years: 10.25    Smokeless tobacco: Never Used   Substance Use Topics    Alcohol use: Yes     Comment: occasional    Drug use: No     Family History   Problem Relation Age of Onset    Cancer Mother         Cervical    Stroke Mother     Psychiatric Disorder Mother     Heart Disease Father     Cancer Sister         Breast         Outpatient Medications Marked as Taking for the 1/23/19 encounter (Office Visit) with Valeri Rodriguez PA-C   Medication Sig Dispense Refill    CALCIUM-MAGNESIUM-ZINC PO Take  by mouth.  docosahexanoic acid 100 mg cap Take 1,200 mg by mouth.  LORazepam (ATIVAN) 1 mg tablet Take one pill 1 hour prior to injection      mometasone (ELOCON) 0.1 % lotion INSTILL 4 METRIC DROP TWO TIMES DAILY IN EARS, AS NEEDED  3    cholecalciferol, vitamin d3, 400 unit cap Take 400 Int'l Units by mouth.  traZODone (DESYREL) 50 mg tablet Take 1 Tab by mouth nightly. 90 Tab 3    varicella-zoster recombinant, PF, (SHINGRIX, PF,) 50 mcg/0.5 mL susr injection TO BE ADMINISTERED BY PHARMACIST FOR IMMUNIZATION      lisinopril (PRINIVIL, ZESTRIL) 30 mg tablet Take 1 Tab by mouth daily. Indications: hypertension 90 Tab 3    amLODIPine (NORVASC) 5 mg tablet Take 1 Tab by mouth daily. 90 Tab 3    pravastatin (PRAVACHOL) 20 mg tablet Take 1 Tab by mouth nightly. 90 Tab 3    busPIRone (BUSPAR) 15 mg tablet Take 0.5 Tabs by mouth two (2) times a day.  Indications: Generalized Anxiety Disorder 90 Tab 3    valACYclovir (VALTREX) 1 gram tablet Take 2 Tabs by mouth two (2) times a day. Indications: SUPPRESSION OF RECURRENT HERPES SIMPLEX INFECTION 30 Tab 1    omega-3 fatty acids-fish oil (FISH OIL) 360-1,200 mg cap Take  by mouth.  carica papaya (PAPAYA ENZYME) tab Take  by mouth daily as needed.  fluticasone (FLONASE SENSIMIST) 27.5 mcg/actuation nasal spray 2 Sprays by Nasal route daily.  diclofenac (VOLTAREN) 1 % gel Apply  to affected area four (4) times daily.  glucosamine sulfate 1,000 mg cap Take  by mouth.  acetaminophen (TYLENOL) 650 mg CR tablet Take 650 mg by mouth every six (6) hours as needed for Pain.  VITAMIN B COMPLEX PO Take 1 mL by mouth daily.  famotidine (PEPCID) 20 mg tablet Take 20 mg by mouth two (2) times a day.  docusate sodium 100 mg tab Take  by mouth.  melatonin 1 mg tablet Take 3 mg by mouth. Allergies   Allergen Reactions    Compazine [Prochlorperazine] Other (comments)     Childhood reaction    Indocin [Indomethacin] Other (comments)     Made her feel weird to the point it scared her    Cephalexin Nausea and Vomiting    Erythromycin Nausea and Vomiting       ROS:  ROS negative except as per HPI.       PE:  Visit Vitals  /66 (BP 1 Location: Left arm, BP Patient Position: Sitting)   Pulse 63   Temp 97.7 °F (36.5 °C) (Oral)   Resp 18   Ht 5' 5\" (1.651 m)   Wt 146 lb 11.2 oz (66.5 kg)   SpO2 97%   BMI 24.41 kg/m²     Gen: alert, oriented, no acute distress, daughter at bedside  Head: normocephalic, atraumatic  Ears: external auditory canals clear, TMs without erythema or effusion  Eyes: pupils equal round reactive to light, sclera clear, conjunctiva clear  Oral: moist mucus membranes, no oral lesions, no pharyngeal inflammation or exudate  Neck: symmetric normal sized thyroid, no lymphadenopathy  Resp: no increase work of breathing, lungs clear to ausculation bilaterally, no wheezing, rales or rhonchi  CV: S1, S2 normal.  No murmurs, rubs, or gallops. Abd: soft, not tender, not distended. No hepatosplenomegaly. Normal bowel sounds. Neuro: cranial nerves intact, normal strength and movement in all extremities, reflexes and sensation intact and symmetric. Skin: multiple small ecchymoses bilateral forearms, well healed surgical scar right forearm, right upper buttock \"thumbprint\" sized erythematous papulo-vesicle  Extremities: no cyanosis or edema    No results found for this visit on 01/23/19. Lab Results   Component Value Date/Time    WBC 10.2 08/15/2017 08:00 AM    HGB 13.4 08/15/2017 08:00 AM    HCT 41.2 08/15/2017 08:00 AM    PLATELET 064 72/26/3607 08:00 AM    MCV 96.7 08/15/2017 08:00 AM     Lab Results   Component Value Date/Time    Sodium 139 07/26/2018 12:37 PM    Potassium 5.2 07/26/2018 12:37 PM    Chloride 101 07/26/2018 12:37 PM    CO2 23 07/26/2018 12:37 PM    Anion gap 8 08/15/2017 08:00 AM    Glucose 83 07/26/2018 12:37 PM    BUN 19 07/26/2018 12:37 PM    Creatinine 1.13 (H) 07/26/2018 12:37 PM    BUN/Creatinine ratio 17 07/26/2018 12:37 PM    GFR est AA 56 (L) 07/26/2018 12:37 PM    GFR est non-AA 48 (L) 07/26/2018 12:37 PM    Calcium 10.0 07/26/2018 12:37 PM    Bilirubin, total 0.8 07/26/2018 12:37 PM    AST (SGOT) 14 07/26/2018 12:37 PM    Alk.  phosphatase 53 07/26/2018 12:37 PM    Protein, total 6.6 07/26/2018 12:37 PM    Albumin 4.4 07/26/2018 12:37 PM    Globulin 2.8 08/15/2017 08:00 AM    A-G Ratio 2.0 07/26/2018 12:37 PM    ALT (SGPT) 11 07/26/2018 12:37 PM     Lab Results   Component Value Date/Time    Cholesterol, total 146 08/15/2017 08:00 AM    HDL Cholesterol 72 (H) 08/15/2017 08:00 AM    LDL, calculated 51.6 08/15/2017 08:00 AM    VLDL, calculated 22.4 08/15/2017 08:00 AM    Triglyceride 112 08/15/2017 08:00 AM    CHOL/HDL Ratio 2.0 08/15/2017 08:00 AM     Lab Results   Component Value Date/Time    Hemoglobin A1c 5.6 07/26/2016 08:15 AM     Lab Results   Component Value Date/Time    Vitamin D 25-Hydroxy 32.2 11/14/2017 11:15 AM           Assessment/Plan:      ICD-10-CM ICD-9-CM    1. Essential hypertension - amlodipine 5 mg, lisinopril 30 mg, /66, chart review shows a slight rising trend recently, consider lowering lisinopril to 20 mg and increasing amlodipine to 10 mg, since I am referring her to nephrology for CKD,  I will see their opinion on her medications first and have encouraged her to invest in home monitor I10 401.9 lisinopril (PRINIVIL, ZESTRIL) 30 mg tablet      CBC WITH AUTOMATED DIFF      METABOLIC PANEL, COMPREHENSIVE      LIPID PANEL   2. Osteoporosis without current pathological fracture, unspecified osteoporosis type - she had 4 rounds of Reclast, last in 2016. Last DXA 2/15/2017 in [place] thru [facility], shows L1-L4 T score  -2.2 (osteopenia, which is s/p Reclast, baseline DXA not available). No h/o fragility fracture. With 4 doses of Reclast, she has reached the limit of lifetime bisphosphonates, Prolia is safe with her CKD stage 3, schedule DXA after 2/15/2019, daughter will try to obtain prior/baseline DXA reports, after all results in will refer for Prolia injections q6mos   M81.0 733.00 DEXA BONE DENSITY STUDY AXIAL   3. Anxiety - buspar 7.5 mg bid with relief F41.9 300.00 busPIRone (BUSPAR) 15 mg tablet      TSH 3RD GENERATION   4. Herpes simplex type 2 infection - on buttock, may have 2 outbreaks several weeks apart and then go over 6 months without having one, takes Valtrex 2g bid x one day for outbreaks B00.9 054.9 valACYclovir (VALTREX) 1 gram tablet   5. Hyperlipidemia LDL goal <70 - cholesterol at goal on pravastatin 20 mg daily, she also takes fish oil TID, she notes easy bruising so I have asked her to decrease fish oil to BID E78.5 272.4 pravastatin (PRAVACHOL) 20 mg tablet      LIPID PANEL   6. Easy bruising - see #5   R23.8 782.9 CBC   7. Breast cancer screening by mammogram Z12.31 V76.12 TONIE MAMMO BI SCREENING INCL CAD   8.  Disorder of bone  M89.9 733.90 DEXA BONE DENSITY STUDY AXIAL      VITAMIN D, 25 HYDROXY      PTH INTACT   9. CKD (chronic kidney disease) stage 3, GFR 30-59 ml/min ([facility]) - eGFR 48, on amlodipine and lisinopril, she has been trending down since 3/14/2017, previously took NSAIDs for her neck pain but off now and seeing Dr Darling Has for [address] in Feb 2019, refer to nephrology, avoid NSAIDs N18.3 585.3 REFERRAL TO NEPHROLOGY      VITAMIN D, 25 HYDROXY      PTH INTACT     10. Insomnia, unspecified type - on trazodone 50 mg qhd for about one year, she would like to take prn    Try taking just 1/2 tablet Trazodone at night for sleep over the next several weeks, then lower to every other night for 2 weeks, then just as needed. You may continue taking the melatonin. G47.00 780.52        Health Maintenance reviewed - updated.     Medications Discontinued During This Encounter   Medication Reason    aspirin 81 mg chewable tablet Duplicate Order    aspirin delayed-release 81 mg tablet Duplicate Order    diclofenac (VOLTAREN) 1 % gel Duplicate Order    lisinopril (PRINIVIL, ZESTRIL) 30 mg tablet Duplicate Order    busPIRone (BUSPAR) 15 mg tablet Duplicate Order    traZODone (DESYREL) 50 mg tablet Duplicate Order    valACYclovir (VALTREX) 1 gram tablet Duplicate Order    CALCIUM-MAGNESIUM-ZINC PO Duplicate Order    amLODIPine (NORVASC) 5 mg tablet Duplicate Order    AMLODIPINE BESYLATE, BULK, Duplicate Order    tiZANidine (ZANAFLEX) 2 mg tablet Duplicate Order    tiZANidine (ZANAFLEX) 2 mg tablet Duplicate Order    glucosamine sulfate dipot chlr (GLUCOSAMINE SULFATE 2KCL) 5,709 mg cap Duplicate Order    famotidine (PEPCID) 20 mg tablet Duplicate Order    CHOLECALCIFEROL, VITAMIN D3, (VITAMIN D3 PO) Duplicate Order    pravastatin (PRAVACHOL) 20 mg tablet Duplicate Order    ketoconazole (NIZORAL) 2 % topical cream Duplicate Order    melatonin 1 mg tablet Duplicate Order    fluticasone (FLONASE SENSIMIST) 27.5 mcg/actuation nasal spray Duplicate Order    acetaminophen (TYLENOL) 210 mg TbER Duplicate Order    docusate sodium 462 mg tab Duplicate Order    varicella-zoster recombinant, PF, (SHINGRIX, PF,) 50 mcg/0.5 mL susr injection Duplicate Order    OMEGA-3 FATTY ACIDS-FISH OIL PO Duplicate Order       Current Outpatient Medications   Medication Sig Dispense Refill    CALCIUM-MAGNESIUM-ZINC PO Take  by mouth.  docosahexanoic acid 100 mg cap Take 1,200 mg by mouth.  LORazepam (ATIVAN) 1 mg tablet Take one pill 1 hour prior to injection      mometasone (ELOCON) 0.1 % lotion INSTILL 4 METRIC DROP TWO TIMES DAILY IN EARS, AS NEEDED  3    cholecalciferol, vitamin d3, 400 unit cap Take 400 Int'l Units by mouth.  traZODone (DESYREL) 50 mg tablet Take 1 Tab by mouth nightly. 90 Tab 3    varicella-zoster recombinant, PF, (SHINGRIX, PF,) 50 mcg/0.5 mL susr injection TO BE ADMINISTERED BY PHARMACIST FOR IMMUNIZATION      lisinopril (PRINIVIL, ZESTRIL) 30 mg tablet Take 1 Tab by mouth daily. Indications: hypertension 90 Tab 3    amLODIPine (NORVASC) 5 mg tablet Take 1 Tab by mouth daily. 90 Tab 3    pravastatin (PRAVACHOL) 20 mg tablet Take 1 Tab by mouth nightly. 90 Tab 3    busPIRone (BUSPAR) 15 mg tablet Take 0.5 Tabs by mouth two (2) times a day. Indications: Generalized Anxiety Disorder 90 Tab 3    valACYclovir (VALTREX) 1 gram tablet Take 2 Tabs by mouth two (2) times a day. Indications: SUPPRESSION OF RECURRENT HERPES SIMPLEX INFECTION 30 Tab 1    omega-3 fatty acids-fish oil (FISH OIL) 360-1,200 mg cap Take  by mouth.  carica papaya (PAPAYA ENZYME) tab Take  by mouth daily as needed.  fluticasone (FLONASE SENSIMIST) 27.5 mcg/actuation nasal spray 2 Sprays by Nasal route daily.  diclofenac (VOLTAREN) 1 % gel Apply  to affected area four (4) times daily.  glucosamine sulfate 1,000 mg cap Take  by mouth.  acetaminophen (TYLENOL) 650 mg CR tablet Take 650 mg by mouth every six (6) hours as needed for Pain.       VITAMIN B COMPLEX PO Take 1 mL by mouth daily.  famotidine (PEPCID) 20 mg tablet Take 20 mg by mouth two (2) times a day.  docusate sodium 100 mg tab Take  by mouth.  melatonin 1 mg tablet Take 3 mg by mouth.  CARICA PAPAYA PO Take  by mouth.  prednisoLONE acetate (PRED FORTE) 1 % ophthalmic suspension INSTILL 1 DROP 4 TIMES EVERY DAY INTO EYE AFTER SURGERY FOR 1 WEEK THEN TAPER AS DIRECTED      predniSONE (DELTASONE) 20 mg tablet Take 2 tabs by mouth one time daily with food for 5 days. 10 Tab 0    ketoconazole (NIZORAL) 2 % topical cream Apply  to affected area daily. Recommended healthy diet low in carbohydrates, fats, sodium and cholesterol. Recommended regular cardiovascular exercise 3-6 times per week for 30-60 minutes daily. Verbal and written instructions (see AVS) provided. Patient expresses understanding of diagnosis and treatment plan. Follow-up Disposition:  Return in about 3 months (around 4/23/2019).

## 2019-01-24 DIAGNOSIS — E78.5 HYPERLIPIDEMIA LDL GOAL <70: ICD-10-CM

## 2019-01-24 DIAGNOSIS — M89.9 DISORDER OF BONE: ICD-10-CM

## 2019-01-24 DIAGNOSIS — N18.30 CKD (CHRONIC KIDNEY DISEASE) STAGE 3, GFR 30-59 ML/MIN (HCC): Chronic | ICD-10-CM

## 2019-01-24 DIAGNOSIS — I10 ESSENTIAL HYPERTENSION: ICD-10-CM

## 2019-01-24 DIAGNOSIS — F41.9 ANXIETY: ICD-10-CM

## 2019-01-29 LAB
25(OH)D3+25(OH)D2 SERPL-MCNC: 30.2 NG/ML (ref 30–100)
ALBUMIN SERPL-MCNC: 4.4 G/DL (ref 3.5–4.8)
ALBUMIN/GLOB SERPL: 2.1 {RATIO} (ref 1.2–2.2)
ALP SERPL-CCNC: 45 IU/L (ref 39–117)
ALT SERPL-CCNC: 10 IU/L (ref 0–32)
AST SERPL-CCNC: 12 IU/L (ref 0–40)
BASOPHILS # BLD AUTO: 0 X10E3/UL (ref 0–0.2)
BASOPHILS NFR BLD AUTO: 0 %
BILIRUB SERPL-MCNC: 0.9 MG/DL (ref 0–1.2)
BUN SERPL-MCNC: 13 MG/DL (ref 8–27)
BUN/CREAT SERPL: 12 (ref 12–28)
CALCIUM SERPL-MCNC: 9.3 MG/DL (ref 8.7–10.3)
CHLORIDE SERPL-SCNC: 101 MMOL/L (ref 96–106)
CHOLEST SERPL-MCNC: 137 MG/DL (ref 100–199)
CO2 SERPL-SCNC: 22 MMOL/L (ref 20–29)
CREAT SERPL-MCNC: 1.08 MG/DL (ref 0.57–1)
EOSINOPHIL # BLD AUTO: 0.2 X10E3/UL (ref 0–0.4)
EOSINOPHIL NFR BLD AUTO: 3 %
ERYTHROCYTE [DISTWIDTH] IN BLOOD BY AUTOMATED COUNT: 13.6 % (ref 12.3–15.4)
GLOBULIN SER CALC-MCNC: 2.1 G/DL (ref 1.5–4.5)
GLUCOSE SERPL-MCNC: 83 MG/DL (ref 65–99)
HCT VFR BLD AUTO: 40.6 % (ref 34–46.6)
HDLC SERPL-MCNC: 54 MG/DL
HGB BLD-MCNC: 13.4 G/DL (ref 11.1–15.9)
IMM GRANULOCYTES # BLD AUTO: 0 X10E3/UL (ref 0–0.1)
IMM GRANULOCYTES NFR BLD AUTO: 0 %
INTERPRETATION, 910389: NORMAL
INTERPRETATION: NORMAL
LDLC SERPL CALC-MCNC: 57 MG/DL (ref 0–99)
LYMPHOCYTES # BLD AUTO: 2.1 X10E3/UL (ref 0.7–3.1)
LYMPHOCYTES NFR BLD AUTO: 34 %
MCH RBC QN AUTO: 30.9 PG (ref 26.6–33)
MCHC RBC AUTO-ENTMCNC: 33 G/DL (ref 31.5–35.7)
MCV RBC AUTO: 94 FL (ref 79–97)
MONOCYTES # BLD AUTO: 0.6 X10E3/UL (ref 0.1–0.9)
MONOCYTES NFR BLD AUTO: 10 %
NEUTROPHILS # BLD AUTO: 3.3 X10E3/UL (ref 1.4–7)
NEUTROPHILS NFR BLD AUTO: 53 %
PDF IMAGE, 910387: NORMAL
PLATELET # BLD AUTO: 340 X10E3/UL (ref 150–379)
POTASSIUM SERPL-SCNC: 4.8 MMOL/L (ref 3.5–5.2)
PROT SERPL-MCNC: 6.5 G/DL (ref 6–8.5)
PTH-INTACT SERPL-MCNC: 18 PG/ML (ref 15–65)
RBC # BLD AUTO: 4.33 X10E6/UL (ref 3.77–5.28)
SODIUM SERPL-SCNC: 139 MMOL/L (ref 134–144)
TRIGL SERPL-MCNC: 132 MG/DL (ref 0–149)
TSH SERPL DL<=0.005 MIU/L-ACNC: 2.91 UIU/ML (ref 0.45–4.5)
VLDLC SERPL CALC-MCNC: 26 MG/DL (ref 5–40)
WBC # BLD AUTO: 6.2 X10E3/UL (ref 3.4–10.8)

## 2019-02-05 NOTE — PERIOP NOTES
[facility] Ambulatory Surgery Unit Pre-operative Instructions Procedure Date  2/12/19            Tentative Arrival Time [id number] 1. On the day of your procedure, please report to the Ambulatory Surgery Unit Registration Desk and sign in at your designated time. The Ambulatory Surgery Unit is located in [facility] on the [facility] side of the [facility] across from the [address] [place] [address]. Please have all of your health insurance cards and a photo ID. 2. You must have someone with you to drive you home as directed by your surgeon. 3. You may have a light breakfast and take normal morning medications. 4. We recommend you do not drink any alcoholic beverages for 24 hours before and after your procedure. 5. Contact your surgeons office for instructions on the following medications: non-steroidal anti-inflammatory drugs (i.e. Advil, Aleve), vitamins, and supplements. (Some surgeons will want you to stop these medications prior to surgery and others may allow you to take them) **If you are currently taking Plavix, Coumadin, Aspirin and/or other blood-thinning agents, contact your surgeon for instructions. ** Your surgeon will partner with the physician prescribing these medications to determine if it is safe to stop or if you need to continue taking. Please do not stop taking these medications without instructions from your surgeon. 6. In an effort to help prevent surgical site infection, we ask that you shower with an anti-bacterial soap (i.e. Dial or Safeguard) on the morning of your procedure. Do not apply any lotions, powders, or deodorants after showering. 7. Wear comfortable clothes. Wear glasses instead of contacts. Do not bring any jewelry or money (other than copays or fees as instructed). Do not wear make-up, particularly mascara, the morning of your procedure. Wear your hair loose or down, no ponytails, buns, yoana pins or clips.  All body piercings must be removed. 8. You should understand that if you do not follow these instructions your procedure may be cancelled. If your physical condition changes (i.e. fever, cold or flu) please contact your surgeon as soon as possible. 9. It is important that you be on time. If a situation occurs where you may be late, or if you have any questions or problems, please call [phone]. 
 
10. Your procedure time may be subject to change. You will receive a phone call the day prior to confirm your arrival time. I understand a pre-operative phone call will be made to verify my procedure time. In the event that I am not available, I give permission for a message to be left on my answering service and/or with another person? Yes Bri-op instructions given over the phone and the pt verbalized an understanding 
 
 ___________________      ___________________      ___________________ 
(Signature of Patient)          (Witness)                   (Date and Time)

## 2019-02-12 ENCOUNTER — HOSPITAL ENCOUNTER (OUTPATIENT)
Age: 74
Setting detail: OUTPATIENT SURGERY
Discharge: HOME | End: 2019-02-12
Attending: PHYSICAL MEDICINE & REHABILITATION | Admitting: PHYSICAL MEDICINE & REHABILITATION
Payer: MEDICARE

## 2019-02-12 ENCOUNTER — APPOINTMENT (OUTPATIENT)
Dept: GENERAL RADIOLOGY | Age: 74
End: 2019-02-12
Attending: PHYSICAL MEDICINE & REHABILITATION
Payer: MEDICARE

## 2019-02-12 VITALS
RESPIRATION RATE: 16 BRPM | BODY MASS INDEX: 24.16 KG/M2 | OXYGEN SATURATION: 98 % | TEMPERATURE: 98 F | HEIGHT: 65 IN | HEART RATE: 63 BPM | WEIGHT: 145 LBS | SYSTOLIC BLOOD PRESSURE: 138 MMHG | DIASTOLIC BLOOD PRESSURE: 84 MMHG

## 2019-02-12 PROCEDURE — 77030020268 HC MISC GENERAL SUPPLY: Performed by: PHYSICAL MEDICINE & REHABILITATION

## 2019-02-12 PROCEDURE — 74011000250 HC RX REV CODE- 250: Performed by: PHYSICAL MEDICINE & REHABILITATION

## 2019-02-12 PROCEDURE — 76000 FLUOROSCOPY <1 HR PHYS/QHP: CPT

## 2019-02-12 PROCEDURE — 76210000046 HC AMBSU PH II REC FIRST 0.5 HR: Performed by: PHYSICAL MEDICINE & REHABILITATION

## 2019-02-12 PROCEDURE — 77030006997: Performed by: PHYSICAL MEDICINE & REHABILITATION

## 2019-02-12 PROCEDURE — 74011250636 HC RX REV CODE- 250/636: Performed by: PHYSICAL MEDICINE & REHABILITATION

## 2019-02-12 PROCEDURE — 76030000002 HC AMB SURG OR TIME FIRST 0.: Performed by: PHYSICAL MEDICINE & REHABILITATION

## 2019-02-12 PROCEDURE — 72020 X-RAY EXAM OF SPINE 1 VIEW: CPT

## 2019-02-12 PROCEDURE — 76030000017 HC AMB SURG FIRST 0.5 HR INTENSV-TIER 1: Performed by: PHYSICAL MEDICINE & REHABILITATION

## 2019-02-12 RX ORDER — LIDOCAINE HYDROCHLORIDE 20 MG/ML
7 INJECTION, SOLUTION INFILTRATION; PERINEURAL ONCE
Status: COMPLETED | OUTPATIENT
Start: 2019-02-12 | End: 2019-02-12

## 2019-02-12 RX ORDER — BUPIVACAINE HYDROCHLORIDE 5 MG/ML
5 INJECTION, SOLUTION EPIDURAL; INTRACAUDAL ONCE
Status: COMPLETED | OUTPATIENT
Start: 2019-02-12 | End: 2019-02-12

## 2019-02-12 RX ORDER — DEXAMETHASONE SODIUM PHOSPHATE 10 MG/ML
6 INJECTION INTRAMUSCULAR; INTRAVENOUS ONCE
Status: COMPLETED | OUTPATIENT
Start: 2019-02-12 | End: 2019-02-12

## 2019-02-12 NOTE — H&P
Procedural Case Note 2/12/2019    (2:29 PM) Lacey Torres 1945   (70 y.o.) [id number] CC:  pain ROS:   Complete ROS obtained, no CP, no SOB, no N or V 
 
PMH:    
Past Medical History:  
Diagnosis Date  Anxiety  Arthritis  Chronic obstructive pulmonary disease ([facility] [address] 2010  
 early disease on PFTs, briefly on a daily inhaler  Chronic pain   
 neck pain, Dr. Cayden Lares  Degenerative disc disease, cervical 1998  
 cervical spine injections, RFA Dr. Cayden Lares  Floating kidney Right [address]  GERD (gastroesophageal reflux disease) pepcid 20 mg prn (about 2x/month)  Herpes simplex type II infection  Hypercholesterolemia  Hypertension  Nausea & vomiting During surgery when ether was used  Osteoporosis 2012 Reclast 9/24/2012, 10/09/2013, 10/31/2014, 1/29/2016  PUD (peptic ulcer disease) 2010  
 endoscopy confirmed, was taking aleve  Stage 3 chronic kidney disease ([facility] [address] [id number]  Trauma 1972  
 right arm fracture surgically repaired, twisted in washing machine ALLERGIES:    
Allergies Allergen Reactions  Compazine [Prochlorperazine] Other (comments) Childhood reaction-Neck spasms  Indocin [Indomethacin] Other (comments) Made her feel weird to the point it scared her  Other Medication Nausea and Vomiting Flu vaccine in 9399-4000  Cephalexin Nausea and Vomiting  Erythromycin Nausea and Vomiting MEDS:    
No current facility-administered medications for this encounter. Visit Vitals /67 (BP 1 Location: Right arm, BP Patient Position: At rest) Pulse 68 Temp 98.1 °F (36.7 °C) Resp 16 Ht 5' 5\" (1.651 m) Wt 65.8 kg (145 lb) SpO2 99% BMI 24.13 kg/m² PE: 
Gen: NAD Head: normocephalic Heart: RRR Lungs: CTA linda Abd: NT, ND, soft Neuro: awake and alert Skin: intact IMPRESSION:   Cervical DJD 
 
 Note:  The clinical status was discussed in detail with the patient. The procedure was again discussed and described in detail. All understand and accept the planned procedure and risks; reject other forms of treatment. All questions are answered.  
 
Quan Lazcano MD

## 2019-02-12 NOTE — OP NOTES
PROCEDURES PERFORMED:   1. Radiofrequency neuroablation of the medial branches Left C5,6,7     PREPROCEDURE DIAGNOSIS: cervical spondylosis     POST PROCEDURE DIAGNOSIS: cervical spondylosis    SURGEON: Kayla Butler M.D. HISTORY OF PRESENT ILLNESS AND INDICATIONS FOR THE PROCEDURE: This patient was previously given diagnostic successful blocks of the facet joints innervated by the aforementioned medial branches and is here today for ablation and neurolysis of those branches. She has previously failed conservative management which has proceeded to injection therapy. FINDINGS AND PROCEDURES:  Adequate informed consent was obtained and the patient was taken to the procedure room and placed in the prone position on the procedure table. A time out was held for patient verification. The patient had monitoring throughout the procedure at cycled one minute blood pressure, pulse, and pulse oximetry. The patient will be further monitored for 30 minutes postprocedure in the recovery area. The skin was prepped and draped in the normal sterile fashion. Using fluoroscopic guidance in anteroposterior, oblique, and lateral views, the appropriate cervical levels were identified. After identification 1 percent lidocaine skin anesthesia was administered at each site. Using a radiofrequency ablation needle, a 22 gauge 10 millimeter active tip was placed at the medial branch nerves, left C5, C6 and C7. The initial impedance in Ohms was within acceptable limits. Each level was subsequently tested on sensory at 50 megahertz with no referred pattern of pain noted at any level. Each level was also tested at 2 Hertz with no distal motor movement or referred pain pattern. The lesion site was injected with 0.5 milliliters of 0.5 percent bupivacaine anesthesia of the nerve branch. Next a lesion was applied for 90 seconds at 80 degrees. The patient tolerated the procedure well with no apparent complications.  The patient was taken to the recovery area for further monitoring. Post procedure neurologic examination was consistent with preprocedure examination. The patient was discharged home ambulatory with family and was given post procedure instructions. FOLLOW-UP: The patient will have a follow-up appointment scheduled by the clinic in the coming weeks.

## 2019-02-12 NOTE — PERIOP NOTES
Neuro:  Push/Pull assessment:  LUE Response: equal 
 LLE Response: equal 
 RUE Response: equal 
 RLE Response: equal

## 2019-02-15 NOTE — PERIOP NOTES
[facility] Ambulatory Surgery Unit Pre-operative Instructions Procedure Date  2/19            Tentative Arrival Time 1300 1. On the day of your procedure, please report to the Ambulatory Surgery Unit Registration Desk and sign in at your designated time. The Ambulatory Surgery Unit is located in [facility] on the [facility] side of the Hospitals in [place] across from the [address] [place] [address]. Please have all of your health insurance cards and a photo ID. 2. You must have someone with you to drive you home as directed by your surgeon. 3. You may have a light breakfast and take normal morning medications. 4. We recommend you do not drink any alcoholic beverages for 24 hours before and after your procedure. 5. Contact your surgeons office for instructions on the following medications: non-steroidal anti-inflammatory drugs (i.e. Advil, Aleve), vitamins, and supplements. (Some surgeons will want you to stop these medications prior to surgery and others may allow you to take them) **If you are currently taking Plavix, Coumadin, Aspirin and/or other blood-thinning agents, contact your surgeon for instructions. ** Your surgeon will partner with the physician prescribing these medications to determine if it is safe to stop or if you need to continue taking. Please do not stop taking these medications without instructions from your surgeon. 6. In an effort to help prevent surgical site infection, we ask that you shower with an anti-bacterial soap (i.e. Dial or Safeguard) on the morning of your procedure. Do not apply any lotions, powders, or deodorants after showering. 7. Wear comfortable clothes. Wear glasses instead of contacts. Do not bring any jewelry or money (other than copays or fees as instructed). Do not wear make-up, particularly mascara, the morning of your procedure. Wear your hair loose or down, no ponytails, buns, yoana pins or clips.  All body piercings must be removed. 8. You should understand that if you do not follow these instructions your procedure may be cancelled. If your physical condition changes (i.e. fever, cold or flu) please contact your surgeon as soon as possible. 9. It is important that you be on time. If a situation occurs where you may be late, or if you have any questions or problems, please call [phone]. 
 
10. Your procedure time may be subject to change. You will receive a phone call the day prior to confirm your arrival time. I understand a pre-operative phone call will be made to verify my procedure time. In the event that I am not available, I give permission for a message to be left on my answering service and/or with another person? yes Reviewed by phone with pt, verbalized understanding 
 ___________________      ___________________      ___________________ 
(Signature of Patient)          (Witness)                   (Date and Time)

## 2019-02-19 ENCOUNTER — APPOINTMENT (OUTPATIENT)
Dept: GENERAL RADIOLOGY | Age: 74
End: 2019-02-19
Attending: PHYSICAL MEDICINE & REHABILITATION
Payer: MEDICARE

## 2019-02-19 ENCOUNTER — HOSPITAL ENCOUNTER (OUTPATIENT)
Age: 74
Setting detail: OUTPATIENT SURGERY
Discharge: HOME | End: 2019-02-19
Attending: PHYSICAL MEDICINE & REHABILITATION | Admitting: PHYSICAL MEDICINE & REHABILITATION
Payer: MEDICARE

## 2019-02-19 VITALS
WEIGHT: 145 LBS | DIASTOLIC BLOOD PRESSURE: 67 MMHG | HEIGHT: 65 IN | OXYGEN SATURATION: 97 % | TEMPERATURE: 97.8 F | RESPIRATION RATE: 17 BRPM | BODY MASS INDEX: 24.16 KG/M2 | SYSTOLIC BLOOD PRESSURE: 144 MMHG | HEART RATE: 63 BPM

## 2019-02-19 PROCEDURE — 74011000250 HC RX REV CODE- 250: Performed by: PHYSICAL MEDICINE & REHABILITATION

## 2019-02-19 PROCEDURE — 76000 FLUOROSCOPY <1 HR PHYS/QHP: CPT

## 2019-02-19 PROCEDURE — 74011250636 HC RX REV CODE- 250/636: Performed by: PHYSICAL MEDICINE & REHABILITATION

## 2019-02-19 PROCEDURE — 77030013367: Performed by: PHYSICAL MEDICINE & REHABILITATION

## 2019-02-19 PROCEDURE — 76030000000 HC AMB SURG OR TIME 0.5 TO 1: Performed by: PHYSICAL MEDICINE & REHABILITATION

## 2019-02-19 PROCEDURE — 72020 X-RAY EXAM OF SPINE 1 VIEW: CPT

## 2019-02-19 PROCEDURE — 76210000046 HC AMBSU PH II REC FIRST 0.5 HR: Performed by: PHYSICAL MEDICINE & REHABILITATION

## 2019-02-19 PROCEDURE — 77030003665 HC NDL SPN BBMI -A: Performed by: PHYSICAL MEDICINE & REHABILITATION

## 2019-02-19 RX ORDER — BUPIVACAINE HYDROCHLORIDE 5 MG/ML
10 INJECTION, SOLUTION EPIDURAL; INTRACAUDAL ONCE
Status: COMPLETED | OUTPATIENT
Start: 2019-02-19 | End: 2019-02-19

## 2019-02-19 RX ORDER — LIDOCAINE HYDROCHLORIDE 20 MG/ML
5 INJECTION, SOLUTION EPIDURAL; INFILTRATION; INTRACAUDAL; PERINEURAL ONCE
Status: DISCONTINUED | OUTPATIENT
Start: 2019-02-19 | End: 2019-02-19 | Stop reason: HOSPADM

## 2019-02-19 RX ORDER — METHYLPREDNISOLONE ACETATE 40 MG/ML
80 INJECTION, SUSPENSION INTRA-ARTICULAR; INTRALESIONAL; INTRAMUSCULAR; SOFT TISSUE ONCE
Status: DISCONTINUED | OUTPATIENT
Start: 2019-02-19 | End: 2019-02-19 | Stop reason: HOSPADM

## 2019-02-19 RX ORDER — LIDOCAINE HYDROCHLORIDE 20 MG/ML
INJECTION, SOLUTION EPIDURAL; INFILTRATION; INTRACAUDAL; PERINEURAL AS NEEDED
Status: DISCONTINUED | OUTPATIENT
Start: 2019-02-19 | End: 2019-02-19 | Stop reason: HOSPADM

## 2019-02-19 RX ORDER — DEXAMETHASONE SODIUM PHOSPHATE 4 MG/ML
8 INJECTION, SOLUTION INTRA-ARTICULAR; INTRALESIONAL; INTRAMUSCULAR; INTRAVENOUS; SOFT TISSUE ONCE
Status: COMPLETED | OUTPATIENT
Start: 2019-02-19 | End: 2019-02-19

## 2019-02-19 NOTE — PERIOP NOTES
56 Pt arrived to PACU. Pt denies pain or numbness. Moving all extremeties. Strong and equal grasps. 1500 Discharge reviewed with pt. Pt meets discharge criteria.

## 2019-02-19 NOTE — OP NOTES
Cervical Facet Radiofrequency Ablation Operative Report    Indications: This is a 75 y.o. female who presents with cervicalgia. She was positive for cervical DJD. The patient was admitted for surgery as conservative measures have failed. Date of Surgery: 2/19/2019    Preoperative Diagnosis: cervical DJD    Postoperative Diagnosis: cervical DJD    Surgeon(s) and Role:     * Mayco Lord MD - Primary     Procedure:  Procedure(s):  RIGHT C5-6 C6-7 RADIO FREQUENCY ABLATION    Procedure in Detail:  PROCEDURES PERFORMED:   1. Radiofrequency neuroablation of the medial branches R C5, C6, C7    HISTORY OF PRESENT ILLNESS AND INDICATIONS FOR THE PROCEDURE: This patient was previously given diagnostic successful blocks of the facet joints innervated by the aforementioned medial branches and is here today for ablation and neurolysis of those branches. She has previously failed conservative management which has proceeded to injection therapy. FINDINGS AND PROCEDURES:  Adequate informed consent was obtained and the patient was taken to the procedure room and placed in the prone position on the procedure table. A time out was held for patient verification. The patient had monitoring throughout the procedure at cycled one minute blood pressure, pulse, and pulse oximetry. The patient will be further monitored for 30 minutes postprocedure in the recovery area. The skin was prepped and draped in the normal sterile fashion. Using fluoroscopic guidance in anteroposterior, oblique, and lateral views, the appropriate lateral margin of the cervical pillars were identified. After identification 1 percent lidocaine skin anesthesia was administered at each site. Using a radiofrequency ablation needle, a 22 gauge 5 millimeter active tip was placed at the medial branch nerves, right C5, 6,7. The initial impedance in Ohms was within acceptable limits.  Each level was subsequently tested on sensory at 50 megahertz with no referred pattern of pain noted at any level. Each level was also tested at 2 Hertz with no distal motor movement or referred pain pattern. The lesion site was injected with 0.5 milliliters of 0.5 percent bupivacaine anesthesia of the nerve branch. Next a lesion was applied for 90 seconds at 80 degrees. The patient tolerated the procedure well with no apparent complications. The patient was taken to the recovery area for further monitoring. Post procedure neurologic examination was consistent with preprocedure examination. The patient was discharged home ambulatory with family and was given post procedure instructions. Estimated Blood Loss:  none     Specimens: None       Drains: None          Complications:  None    Signed By: Laura Figueroa MD                        February 19, 2019   FOLLOW-UP: The patient will have a follow-up appointment scheduled by the clinic in the coming weeks.

## 2019-02-19 NOTE — PERIOP NOTES
Sudhir Maggy 1945 
[id number] Situation: 
Verbal report given from: Vinayak Thompson Procedure: Procedure(s): RIGHT C5-6 C6-7 RADIO FREQUENCY ABLATION Background: 
 
Preoperative diagnosis: Degeneration of cervical intervertebral disc [M50.30] Postoperative diagnosis: Degeneration of cervical intervertebral disc [M50.30] :  Dr. Ruslan Alejo Assistant(s): Circ-1: Kyree Malhotra RN Local Nurse Monitor: Rinda Koyanagi, RN Scrub Tech-1: Rafaela Jimenez Specimens: * No specimens in log * Assessment: 
Intra-procedure medications Anesthesia gave intra-procedure sedation and medications, see anesthesia flow sheet Intravenous fluids: Juanice Boaz Vital signs stable Recommendation:

## 2019-02-19 NOTE — H&P
Procedural Case Note 2/19/2019    (1:47 PM) Arelis German 1945   (70 y.o.) [id number] CC:  pain ROS:   Complete ROS obtained, no CP, no SOB, no N or V 
 
PMH:    
Past Medical History:  
Diagnosis Date  Anxiety  Arthritis  Chronic obstructive pulmonary disease ([facility] [address] 2010  
 early disease on PFTs, briefly on a daily inhaler  Chronic pain   
 neck pain, Dr. Blade Nolasco  Degenerative disc disease, cervical 1998  
 cervical spine injections, [facility] Dr. Blade Nolasco  Floating kidney Right [address]  GERD (gastroesophageal reflux disease) pepcid 20 mg prn (about 2x/month)  Herpes simplex type II infection  Hypercholesterolemia  Hypertension  Nausea & vomiting During surgery when ether was used  Osteoporosis 2012 Reclast 9/24/2012, 10/09/2013, 10/31/2014, 1/29/2016  PUD (peptic ulcer disease) 2010  
 endoscopy confirmed, was taking aleve  Stage 3 chronic kidney disease ([facility] [address] [id number]  Trauma 1972  
 right arm fracture surgically repaired, twisted in washing machine ALLERGIES:    
Allergies Allergen Reactions  Compazine [Prochlorperazine] Other (comments) Childhood reaction-Neck spasms  Indocin [Indomethacin] Other (comments) Made her feel weird to the point it scared her  Other Medication Nausea and Vomiting Flu vaccine in [id number]  Cephalexin Nausea and Vomiting  Erythromycin Nausea and Vomiting MEDS:    
No current facility-administered medications for this encounter. Visit Vitals /74 (BP 1 Location: Right arm, BP Patient Position: At rest) Pulse 65 Temp 98.1 °F (36.7 °C) Resp 16 Ht 5' 5\" (1.651 m) Wt 65.8 kg (145 lb) SpO2 98% BMI 24.13 kg/m² PE: 
Gen: NAD Head: normocephalic Heart: RRR Lungs: CTA linda Abd: NT, ND, soft Neuro: awake and alert Skin: intact IMPRESSION:   Cervical DJD 
 
 Note:  The clinical status was discussed in detail with the patient. The procedure was again discussed and described in detail. All understand and accept the planned procedure and risks; reject other forms of treatment. All questions are answered.  
 
Justice Plaza MD

## 2019-02-19 NOTE — DISCHARGE INSTRUCTIONS
Radiofrequency Ablation  Discharge Instructions    You had a radiofrequency ablation today. You will probably have some numbness in your lower back area for the next 6-8 hours. You should begin feeling better after a few days, but it may take up to 2 weeks to notice the difference. The benefit you get from your injection will last a variable amount of time, depending on the severity of your spine problem. · Pain:  Most people do not have any increase in pain after this injection. However, you might experience some soreness a few days at the site of the injection. If this happens, putting an ice pack over the sore area will help. · Bandage: You have a small bandage covering the site of the injection. You may remove it when you get home. · Restrictions:  Some one should drive you home after the injection. After that, you have no restrictions. You may resume your normal level of activity. You may take a shower or bath, and you may eat normally. You should continue your current exercise and/or therapy routine. Medications:  Continue your current medications as prescribed. If you pain decreases, you may reduce the amount of your pain medications. If you stopped taking anticoagulants or blood-thinners before the injection, start them tomorrow. Call Dr. Joseline Love at [phone] if you experience:    · Fever (101 degrees Fahrenheit or greater)  · Nausea or vomiting  · Headache unrelieved by your normal pain medication  · Redness or swelling at the injection site that lasts more than 1 day  · New numbness, tingling, weakness, or pain that you didn't have before the injection      If still having pain in 1-2 weeks, call office at [phone] for a follow up appointment.         DISCHARGE SUMMARY from Nurse    The following personal items collected during your admission are returned to you:   Dental Appliance: Dental Appliances: None  Vision: Visual Aid: None  Hearing Aid:    Jewelry: Jewelry: None  Clothing: Clothing: With patient  Other Valuables: Other Valuables: None  Valuables sent to safe: If you were given prescriptions, please review the written information on prescribed medications. · You will receive a Post Operative Call from one of the Recovery Room Nurses on the day after your surgery to check on you. It is very important for us to know how you are recovering after your surgery. · You may receive an e-mail or letter in the mail from [place] regarding your experience with us in the Ambulatory Surgery Unit. Your feedback is valuable to us and we appreciate your participation in the survey. If you have not had your influenza or pneumococcal vaccines, please follow up with your primary care physician. The discharge information has been reviewed with the patient. The patient verbalized understanding.

## 2019-02-27 ENCOUNTER — HOSPITAL ENCOUNTER (OUTPATIENT)
Dept: MAMMOGRAPHY | Age: 74
Discharge: HOME | End: 2019-02-27
Payer: MEDICARE

## 2019-02-27 ENCOUNTER — HOSPITAL ENCOUNTER (OUTPATIENT)
Dept: BONE DENSITY | Age: 74
Discharge: HOME | End: 2019-02-27
Payer: MEDICARE

## 2019-02-27 DIAGNOSIS — Z12.31 BREAST CANCER SCREENING BY MAMMOGRAM: ICD-10-CM

## 2019-02-27 DIAGNOSIS — M81.0 OSTEOPOROSIS WITHOUT CURRENT PATHOLOGICAL FRACTURE, UNSPECIFIED OSTEOPOROSIS TYPE: ICD-10-CM

## 2019-02-27 DIAGNOSIS — M89.9 DISORDER OF BONE: ICD-10-CM

## 2019-02-27 PROCEDURE — 77080 DXA BONE DENSITY AXIAL: CPT

## 2019-02-27 PROCEDURE — 77067 SCR MAMMO BI INCL CAD: CPT

## 2019-04-17 ENCOUNTER — OFFICE VISIT (OUTPATIENT)
Dept: FAMILY MEDICINE CLINIC | Age: 74
End: 2019-04-17

## 2019-04-17 VITALS
TEMPERATURE: 95.8 F | WEIGHT: 146.6 LBS | HEIGHT: 65 IN | DIASTOLIC BLOOD PRESSURE: 64 MMHG | SYSTOLIC BLOOD PRESSURE: 144 MMHG | OXYGEN SATURATION: 99 % | HEART RATE: 67 BPM | RESPIRATION RATE: 18 BRPM | BODY MASS INDEX: 24.43 KG/M2

## 2019-04-17 DIAGNOSIS — I10 ESSENTIAL HYPERTENSION: Primary | ICD-10-CM

## 2019-04-17 DIAGNOSIS — F41.9 ANXIETY: ICD-10-CM

## 2019-04-17 DIAGNOSIS — M81.0 OSTEOPOROSIS WITHOUT CURRENT PATHOLOGICAL FRACTURE, UNSPECIFIED OSTEOPOROSIS TYPE: ICD-10-CM

## 2019-04-17 DIAGNOSIS — N18.30 CKD (CHRONIC KIDNEY DISEASE) STAGE 3, GFR 30-59 ML/MIN (HCC): ICD-10-CM

## 2019-04-17 DIAGNOSIS — L82.1 SEBORRHEIC KERATOSES: ICD-10-CM

## 2019-04-17 RX ORDER — HYDROCHLOROTHIAZIDE 25 MG/1
12.5 TABLET ORAL DAILY
Qty: 15 TAB | Refills: 2 | Status: SHIPPED | OUTPATIENT
Start: 2019-04-17 | End: 2019-05-29 | Stop reason: SDUPTHER

## 2019-04-17 RX ORDER — PRAVASTATIN SODIUM 20 MG/1
20 TABLET ORAL
COMMUNITY
Start: 2018-04-26 | End: 2019-04-17 | Stop reason: SDUPTHER

## 2019-04-17 RX ORDER — AMLODIPINE BESYLATE 5 MG/1
5 TABLET ORAL
COMMUNITY
Start: 2018-07-26 | End: 2019-04-17 | Stop reason: SDUPTHER

## 2019-04-17 RX ORDER — TRAZODONE HYDROCHLORIDE 50 MG/1
50 TABLET ORAL
COMMUNITY
Start: 2018-06-11 | End: 2019-04-17 | Stop reason: SDUPTHER

## 2019-04-17 RX ORDER — VALACYCLOVIR HYDROCHLORIDE 1 G/1
2000 TABLET, FILM COATED ORAL
COMMUNITY
Start: 2017-12-12 | End: 2019-04-17 | Stop reason: SDUPTHER

## 2019-04-17 RX ORDER — LISINOPRIL 30 MG/1
30 TABLET ORAL
COMMUNITY
Start: 2018-07-26 | End: 2019-04-17 | Stop reason: SDUPTHER

## 2019-04-17 NOTE — PROGRESS NOTES
Vale Stanton  Identified pt with two pt identifiers(name and ). Chief Complaint   Patient presents with    Hypertension     rm 11/non fasting/       . Have you been to the ER, urgent care clinic since your last visit? No Hospitalized since your last visit? no    2. Have you seen or consulted any other health care providers outside of the [address] [place] [address] since your last visit? Include any pap smears or colon screening. no      Advance Care Planning    In the event something were to happen to you and you were unable to speak on your behalf, do you have an Advance Directive/ Living Will in place stating your wishes? NO    If yes, do we have a copy on file NO    If no, would you like information NO    Medication reconciliation up to date and corrected with patient at this time. Today's provider has been notified of reason for visit, vitals and flowsheets obtained on patients. Reviewed record in preparation for visit, huddled with provider and have obtained necessary documentation. Health Maintenance Due   Topic    COLONOSCOPY        Wt Readings from Last 3 Encounters:   19 145 lb (65.8 kg)   19 145 lb (65.8 kg)   19 146 lb 11.2 oz (66.5 kg)     Temp Readings from Last 3 Encounters:   19 97.8 °F (36.6 °C)   19 98 °F (36.7 °C)   19 97.7 °F (36.5 °C) (Oral)     BP Readings from Last 3 Encounters:   19 144/67   19 138/84   19 150/66     Pulse Readings from Last 3 Encounters:   19 63   19 63   19 63     There were no vitals filed for this visit.       Learning Assessment:  :     Learning Assessment 2018   PRIMARY LEARNER Patient Patient   HIGHEST LEVEL OF EDUCATION - PRIMARY LEARNER  DID NOT GRADUATE HIGH SCHOOL DID NOT GRADUATE HIGH SCHOOL   BARRIERS PRIMARY LEARNER NONE NONE   CO-LEARNER CAREGIVER No No   PRIMARY LANGUAGE ENGLISH ENGLISH   LEARNER PREFERENCE PRIMARY DEMONSTRATION OTHER (COMMENT) ANSWERED BY patient patient   RELATIONSHIP SELF SELF       Depression Screening:  :     3 most recent PHQ Screens 1/23/2019   Little interest or pleasure in doing things Not at all   Feeling down, depressed, irritable, or hopeless Not at all   Total Score PHQ 2 0       No flowsheet data found. Fall Risk Assessment:  :     Fall Risk Assessment, last 12 mths 1/23/2019   Able to walk? Yes   Fall in past 12 months? No       Abuse Screening:  :     Abuse Screening Questionnaire 1/23/2019 4/26/2018 11/15/2016   Do you ever feel afraid of your partner? N N N   Are you in a relationship with someone who physically or mentally threatens you? N N N   Is it safe for you to go home? Y Y Y       ADL Screening:  :     ADL Assessment 1/23/2019   Feeding yourself No Help Needed   Getting from bed to chair No Help Needed   Getting dressed No Help Needed   Bathing or showering No Help Needed   Walk across the room (includes cane/walker) No Help Needed   Using the telphone No Help Needed   Taking your medications No Help Needed   Preparing meals No Help Needed   Managing money (expenses/bills) No Help Needed   Moderately strenuous housework (laundry) No Help Needed   Shopping for personal items (toiletries/medicines) No Help Needed   Shopping for groceries No Help Needed   Driving No Help Needed   Climbing a flight of stairs No Help Needed   Getting to places beyond walking distances No Help Needed           BMI:  Weight Metrics 2/19/2019 2/12/2019 1/23/2019 12/14/2018 11/20/2018 10/23/2018 8/23/2018   Weight 145 lb 145 lb 146 lb 11.2 oz 144 lb 146 lb 146 lb 6.4 oz 142 lb   BMI 24.13 kg/m2 24.13 kg/m2 24.41 kg/m2 23.96 kg/m2 22.87 kg/m2 22.93 kg/m2 22.24 kg/m2           Medication reconciliation up to date and corrected with patient at this time.

## 2019-04-17 NOTE — PATIENT INSTRUCTIONS
Learning About Diuretics for High Blood Pressure  Introduction  Diuretics help to lower blood pressure. This reduces your risk of a heart attack and stroke. It also reduces your risk of kidney disease. Diuretics cause your kidneys to remove sodium and water. They also relax the blood vessel walls. These help lower your blood pressure. Examples  · Chlorthalidone  · Hydrochlorothiazide  Possible side effects  There are some common side effects. They are:  · Too little potassium. · Feeling dizzy. · Rash. · Urinating a lot. · High blood sugar. (But this is not common.)  You may have other side effects. Check the information that comes with your medicine. What to know about taking this medicine  · You may take other medicines for blood pressure. Diuretics can help those work better. They can also prevent extra fluid in your body. · You may need to take potassium pills. Or you may have to watch how much potassium is in your food. Ask your doctor about this. · You may need blood tests to check your kidneys and your potassium level. · Take your medicines exactly as prescribed. Call your doctor if you think you are having a problem with your medicine. · Check with your doctor or pharmacist before you use any other medicines. This includes over-the-counter medicines. Make sure your doctor knows all of the medicines, vitamins, herbal products, and supplements you take. Taking some medicines together can cause problems. Where can you learn more? Go to http://alfa-gino.info/. Enter H256 in the search box to learn more about \"Learning About Diuretics for High Blood Pressure. \"  Current as of: July 22, 2018  Content Version: 11.9  © 0712-4860 [employer]. Care instructions adapted under license by [employer] (which disclaims liability or warranty for this information).  If you have questions about a medical condition or this instruction, always ask your healthcare professional. [address] any warranty or liability for your use of this information.

## 2019-04-17 NOTE — PROGRESS NOTES
HPI:  75 y.o.  presents for follow up appointment. No hospital, ER or specialist visits since last primary care visit except as noted below. Hypertension - compliant with medication, on amlodipine 5 mg daily and lisinopril 30 mg daily (states she has left ankle swelling on amlodipine 10 mg daily),  She does not recall being on any other antihypertensives. no home monitoring. No history of heart disease or stroke. No chest pain, no shortness of breath, no headaches, no lower extremity swelling. Anxiety - she is taking busprione 7.5 mg BID with relief    Osteoporosis - she had 4 rounds of Reclast, last in 2016. No h/o fragility fracture. She had DXA 2/27/2019 which still shows osteopenia. DXA 2/27/2019: (excluded None) lumbar spine L1-L4 T score -2.1 (BMD 0.814 g/cm2), right femoral neck T score: -2.3 (0.596 g/cm2), right total hip T score: -1.8 (0.728 g/cm2), and distal one third left radius T score -2.3 (BMD 0.550 g/cm2). FRAX score 15 % probability in 10 years for major osteoporotic fracture and 6.2 % 10 year probability of hip fracture.     CKD stage 3 - I had referred her to nephrology, but she states the office never returned her calls for an appt, so she would like new referral    Skin - she has itchy bumps on her back, her daughter notes one is darkening, she would like to see derm      Patient Active Problem List    Diagnosis    GERD (gastroesophageal reflux disease)     pepcid 20 mg prn (about 2x/month)      Chronic pain     neck pain, Dr. Irma Lobo CKD (chronic kidney disease) stage 3, GFR 30-59 ml/min ([address]    Sinus disorder    Anxiety    Essential hypertension    Osteoarthritis of cervical spine    Hyperlipidemia LDL goal <70    Herpes simplex type 2 infection    Osteoporosis     Reclast 9/24/2012, 10/09/2013, 10/31/2014, 1/29/2016           Past Medical History:   Diagnosis Date    Anxiety     Arthritis     Chronic obstructive pulmonary disease ([address] 2010    early disease on PFTs, briefly on a daily inhaler    Chronic pain     neck pain, Dr. Nataliia Manley Degenerative disc disease, cervical 1998    cervical spine injections, RFA Dr. Saige Bush kidney     Right    Gallstone 1974    GERD (gastroesophageal reflux disease)     pepcid 20 mg prn (about 2x/month)    Herpes simplex type II infection     Hypercholesterolemia     Hypertension     Nausea & vomiting     During surgery when ether was used    Osteoporosis 2012    Reclast 9/24/2012, 10/09/2013, 10/31/2014, 1/29/2016    PUD (peptic ulcer disease) 2010    endoscopy confirmed, was taking aleve    Stage 3 chronic kidney disease ([facility] [address] [id number]    Trauma 1972    right arm fracture surgically repaired, twisted in washing machine       Social History     Tobacco Use    Smoking status: Current Some Day Smoker     Packs/day: 0.25     Years: 41.00     Pack years: 10.25    Smokeless tobacco: Never Used   Substance Use Topics    Alcohol use: Yes     Comment: occasional    Drug use: No       Outpatient Medications Marked as Taking for the 4/17/19 encounter (Office Visit) with Valeri Rodriguez PA-C   Medication Sig Dispense Refill    carboxymethylcellulose sodium (REFRESH OP) Apply  to eye.  CALCIUM-MAGNESIUM-ZINC PO Take 1 Tab by mouth daily.  mometasone (ELOCON) 0.1 % lotion INSTILL 4 METRIC DROP TWO TIMES DAILY IN EARS, AS NEEDED  3    cholecalciferol, vitamin d3, 400 unit cap Take 1,000 Units by mouth daily.  busPIRone (BUSPAR) 15 mg tablet Take 0.5 Tabs by mouth two (2) times a day. 90 Tab 3    valACYclovir (VALTREX) 1 gram tablet Take 2 Tabs by mouth two (2) times a day. 30 Tab 1    pravastatin (PRAVACHOL) 20 mg tablet Take 1 Tab by mouth nightly. 90 Tab 3    lisinopril (PRINIVIL, ZESTRIL) 30 mg tablet Take 1 Tab by mouth daily. 90 Tab 3    traZODone (DESYREL) 50 mg tablet Take 1 Tab by mouth nightly. 90 Tab 3    amLODIPine (NORVASC) 5 mg tablet Take 1 Tab by mouth daily.  90 Tab 3    omega-3 fatty acids-fish oil (FISH OIL) 360-1,200 mg cap Take 1 Tab by mouth daily.  carica papaya (PAPAYA ENZYME) tab Take  by mouth daily as needed (4-5 a day as needed).  fluticasone (FLONASE SENSIMIST) 27.5 mcg/actuation nasal spray 2 Sprays by Nasal route daily.  diclofenac (VOLTAREN) 1 % gel Apply  to affected area four (4) times daily.  glucosamine sulfate 1,000 mg cap Take 1 Tab by mouth daily.  acetaminophen (TYLENOL) 650 mg CR tablet Take 650 mg by mouth every six (6) hours as needed for Pain.  famotidine (PEPCID) 20 mg tablet Take 20 mg by mouth as needed.  melatonin 1 mg tablet Take 3 mg by mouth nightly. Allergies   Allergen Reactions    Compazine [Prochlorperazine] Other (comments)     Childhood reaction-Neck spasms    Indocin [Indomethacin] Other (comments)     Made her feel weird to the point it scared her    Other Medication Nausea and Vomiting     Flu vaccine in [id number]    Cephalexin Nausea and Vomiting    Erythromycin Nausea and Vomiting       ROS:  ROS negative except as per HPI. PE:  Visit Vitals  /64 (BP 1 Location: Left arm, BP Patient Position: Sitting)   Pulse 67   Temp 95.8 °F (35.4 °C) (Oral)   Resp 18   Ht 5' 5\" (1.651 m)   Wt 146 lb 9.6 oz (66.5 kg)   SpO2 99%   BMI 24.40 kg/m²     Gen: alert, oriented, no acute distress  Head: normocephalic, atraumatic  Ears: external auditory canals clear, TMs without erythema or effusion  Eyes: pupils equal round reactive to light, sclera clear, conjunctiva clear  Oral: moist mucus membranes, no oral lesions, no pharyngeal inflammation or exudate  Neck: symmetric normal sized thyroid, no carotid bruits, no jugular vein distention  Resp: no increase work of breathing, lungs clear to ausculation bilaterally, no wheezing, rales or rhonchi  CV: S1, S2 normal.  No murmurs, rubs, or gallops. Abd: soft, not tender, not distended. Normal bowel sounds.     Neuro: nonfocal  Skin: multiple seborrheic keratoses on back, one light brown lesion has a very dark brown pigmented area that daughter states has increased in pigment  Extremities: no cyanosis or edema    No results found for this visit on 04/17/19. Assessment/Plan:      ICD-10-CM ICD-9-CM    1. Essential hypertension - not controlled, on amlodipine 5 mg daily and lisinopril 30 mg daily (states she has left ankle swelling on amlodipine 10 mg daily). Pulse is 67 so will avoid beta-blocker. Will add HCTZ 12.5 mg daily. F/U 1 month. Stay well hydrated. I10 401.9 hydroCHLOROthiazide (HYDRODIURIL) 25 mg tablet   2. CKD (chronic kidney disease) stage 3, GFR 30-59 ml/min (HCC) - eGFR 51, stay well hydrated, no NSAIDs   N18.3 585.3 REFERRAL TO NEPHROLOGY   3. Osteoporosis without current pathological fracture, unspecified osteoporosis type - she had 4 rounds of Reclast, last in 2016. No h/o fragility fracture. DXA on 2/27/2019 shows T score right femoral neck -2.3. FRAX  6.2 % 10 year probability of hip fracture, since this is greater than 3 %, continued therapy is recommended. I have referred her to endocrine for further eval and probable Prolia. M81.0 733.00 REFERRAL TO ENDOCRINOLOGY   4. Anxiety - stable on buspar 7.5 mg BID F41.9 300.00    5. Seborrheic keratoses L82.1 702.19 REFERRAL TO DERMATOLOGY           Health Maintenance reviewed - updated. Medications Discontinued During This Encounter   Medication Reason    lisinopril (PRINIVIL, ZESTRIL) 30 mg tablet Duplicate Order    traZODone (DESYREL) 50 mg tablet Duplicate Order    valACYclovir (VALTREX) 1 gram tablet Duplicate Order    amLODIPine (NORVASC) 5 mg tablet Duplicate Order    pravastatin (PRAVACHOL) 20 mg tablet Duplicate Order       Current Outpatient Medications   Medication Sig Dispense Refill    carboxymethylcellulose sodium (REFRESH OP) Apply  to eye.  hydroCHLOROthiazide (HYDRODIURIL) 25 mg tablet Take 0.5 Tabs by mouth daily.  15 Tab 2    CALCIUM-MAGNESIUM-ZINC PO Take 1 Tab by mouth daily.  mometasone (ELOCON) 0.1 % lotion INSTILL 4 METRIC DROP TWO TIMES DAILY IN EARS, AS NEEDED  3    cholecalciferol, vitamin d3, 400 unit cap Take 1,000 Units by mouth daily.  busPIRone (BUSPAR) 15 mg tablet Take 0.5 Tabs by mouth two (2) times a day. 90 Tab 3    valACYclovir (VALTREX) 1 gram tablet Take 2 Tabs by mouth two (2) times a day. 30 Tab 1    pravastatin (PRAVACHOL) 20 mg tablet Take 1 Tab by mouth nightly. 90 Tab 3    lisinopril (PRINIVIL, ZESTRIL) 30 mg tablet Take 1 Tab by mouth daily. 90 Tab 3    traZODone (DESYREL) 50 mg tablet Take 1 Tab by mouth nightly. 90 Tab 3    amLODIPine (NORVASC) 5 mg tablet Take 1 Tab by mouth daily. 90 Tab 3    omega-3 fatty acids-fish oil (FISH OIL) 360-1,200 mg cap Take 1 Tab by mouth daily.  carica papaya (PAPAYA ENZYME) tab Take  by mouth daily as needed (4-5 a day as needed).  fluticasone (FLONASE SENSIMIST) 27.5 mcg/actuation nasal spray 2 Sprays by Nasal route daily.  diclofenac (VOLTAREN) 1 % gel Apply  to affected area four (4) times daily.  glucosamine sulfate 1,000 mg cap Take 1 Tab by mouth daily.  acetaminophen (TYLENOL) 650 mg CR tablet Take 650 mg by mouth every six (6) hours as needed for Pain.  famotidine (PEPCID) 20 mg tablet Take 20 mg by mouth as needed.  melatonin 1 mg tablet Take 3 mg by mouth nightly.  dextran 70/hypromellose (TEARS NATURALE OP) Apply 1 Drop to eye as needed.  LORazepam (ATIVAN) 1 mg tablet Take one pill 1 hour prior to injection      ketoconazole (NIZORAL) 2 % topical cream Apply  to affected area daily. Recommended healthy diet low in carbohydrates, fats, sodium and cholesterol. Recommended regular cardiovascular exercise 3-6 times per week for 30-60 minutes daily. Verbal and written instructions (see AVS) provided. Patient expresses understanding of diagnosis and treatment plan.     Follow-up and Dispositions    · Return in about 1 month (around 5/17/2019).

## 2019-05-07 ENCOUNTER — HOSPITAL ENCOUNTER (OUTPATIENT)
Dept: ULTRASOUND IMAGING | Age: 74
Discharge: HOME | End: 2019-05-07
Attending: INTERNAL MEDICINE
Payer: MEDICARE

## 2019-05-07 DIAGNOSIS — I10 ESSENTIAL HYPERTENSION, MALIGNANT: ICD-10-CM

## 2019-05-07 DIAGNOSIS — N18.30 CHRONIC KIDNEY DISEASE, STAGE III (MODERATE) (HCC): ICD-10-CM

## 2019-05-07 PROCEDURE — 76770 US EXAM ABDO BACK WALL COMP: CPT

## 2019-05-17 NOTE — PROGRESS NOTES
FRAX: 6.2% 10-year probability hip fracture. Reviewed during office visit 4/17/2019. Completed Reclast x 4. Referred to endocrine for Prolia evaluation.

## 2019-05-29 ENCOUNTER — OFFICE VISIT (OUTPATIENT)
Dept: FAMILY MEDICINE CLINIC | Age: 74
End: 2019-05-29

## 2019-05-29 VITALS
TEMPERATURE: 97.5 F | HEART RATE: 78 BPM | RESPIRATION RATE: 18 BRPM | SYSTOLIC BLOOD PRESSURE: 122 MMHG | BODY MASS INDEX: 24.28 KG/M2 | HEIGHT: 65 IN | OXYGEN SATURATION: 98 % | WEIGHT: 145.7 LBS | DIASTOLIC BLOOD PRESSURE: 66 MMHG

## 2019-05-29 DIAGNOSIS — I10 ESSENTIAL HYPERTENSION: Primary | ICD-10-CM

## 2019-05-29 DIAGNOSIS — N18.30 CKD (CHRONIC KIDNEY DISEASE) STAGE 3, GFR 30-59 ML/MIN (HCC): ICD-10-CM

## 2019-05-29 DIAGNOSIS — C44.310 BASAL CELL CARCINOMA (BCC) OF FACE: ICD-10-CM

## 2019-05-29 RX ORDER — HYDROCHLOROTHIAZIDE 25 MG/1
12.5 TABLET ORAL
COMMUNITY
Start: 2019-04-17 | End: 2019-05-29 | Stop reason: SDUPTHER

## 2019-05-29 RX ORDER — BETAMETHASONE DIPROPIONATE 0.5 MG/G
CREAM TOPICAL
Refills: 0 | COMMUNITY
Start: 2019-05-13 | End: 2020-08-04 | Stop reason: ALTCHOICE

## 2019-05-29 RX ORDER — LISINOPRIL 40 MG/1
TABLET ORAL
Refills: 0 | COMMUNITY
Start: 2019-05-17 | End: 2019-05-29 | Stop reason: SDUPTHER

## 2019-05-29 RX ORDER — TORSEMIDE 10 MG/1
5 TABLET ORAL DAILY
Refills: 2 | COMMUNITY
Start: 2019-05-03

## 2019-05-29 RX ORDER — TORSEMIDE 10 MG/1
TABLET ORAL
COMMUNITY
Start: 2019-05-03 | End: 2019-05-29 | Stop reason: SDUPTHER

## 2019-05-29 NOTE — PROGRESS NOTES
Subjective:     Rex Sinha is a 75 y.o. female who presents for follow up of hypertension and CKD stage 3. She saw nephrologist Dr. Eric Humphrey. She stopped the HCTZ that I just started and switched her to torsemide 10 mg daily, and increased the lisinopril to 40 mg daily, and kept the amlodipine 5 mg. She is taking medications as directed. She generally follows a low sodium diet  Home BP Monitoring upon first checks after changing her medicines was well controlled, but she has not checked in last week since she has been overall feeling so much better. She sees Dr. Suni Winchester again in early August.  She denies chest pain, headaches, shortness of breath, vision change and lower extremity edema. She saw dermatologist and had some lesions frozen off. She was given betamethasone cream for rash on her left hip. She had a shave biopsy and has basal cell on left cheek, and will have that totally removed by Dr. Ajit Andrade in mid June.     Past Medical History:   Diagnosis Date    Anxiety     Arthritis     Chronic obstructive pulmonary disease ([facility] [address] 2010    early disease on PFTs, briefly on a daily inhaler    Chronic pain     neck pain, Dr. Dustin Martinez Degenerative disc disease, cervical 1998    cervical spine injections, RFA Dr. Lois Matamoros kidney     Right    Gallstone 1974    GERD (gastroesophageal reflux disease)     pepcid 20 mg prn (about 2x/month)    Herpes simplex type II infection     Hypercholesterolemia     Hypertension     Nausea & vomiting     During surgery when ether was used    Osteoporosis 2012    Reclast 9/24/2012, 10/09/2013, 10/31/2014, 1/29/2016    PUD (peptic ulcer disease) 2010    endoscopy confirmed, was taking aleve    Stage 3 chronic kidney disease ([facility] [address] [id number]    Trauma 1972    right arm fracture surgically repaired, twisted in washing machine     Social History     Tobacco Use    Smoking status: Current Some Day Smoker     Packs/day: 0.25     Years: 41.00     Pack years: 10.25    Smokeless tobacco: Never Used   Substance Use Topics    Alcohol use: Yes     Comment: occasional     family history includes Breast Cancer in her sister; Cancer in her mother and sister; Heart Disease in her father; Psychiatric Disorder in her mother; Stroke in her mother. Outpatient Medications Marked as Taking for the 5/29/19 encounter (Office Visit) with Valeri Deluna PA-C   Medication Sig Dispense Refill    augmented betamethasone dipropionate (DIPROLENE-AF) 0.05 % topical cream THERESE EXT AA BID PRN  0    torsemide (DEMADEX) 10 mg tablet   2    carboxymethylcellulose sodium (REFRESH OP) Apply  to eye.  dextran 70/hypromellose (TEARS NATURALE OP) Apply 1 Drop to eye as needed.  CALCIUM-MAGNESIUM-ZINC PO Take 1 Tab by mouth daily.  mometasone (ELOCON) 0.1 % lotion INSTILL 4 METRIC DROP TWO TIMES DAILY IN EARS, AS NEEDED  3    cholecalciferol, vitamin d3, 400 unit cap Take 1,000 Units by mouth daily.  busPIRone (BUSPAR) 15 mg tablet Take 0.5 Tabs by mouth two (2) times a day. 90 Tab 3    valACYclovir (VALTREX) 1 gram tablet Take 2 Tabs by mouth two (2) times a day. 30 Tab 1    pravastatin (PRAVACHOL) 20 mg tablet Take 1 Tab by mouth nightly. 90 Tab 3    traZODone (DESYREL) 50 mg tablet Take 1 Tab by mouth nightly. (Patient taking differently: Take 50 mg by mouth nightly. Indications: taking 25mg) 90 Tab 3    amLODIPine (NORVASC) 5 mg tablet Take 1 Tab by mouth daily. 90 Tab 3    omega-3 fatty acids-fish oil (FISH OIL) 360-1,200 mg cap Take 1 Tab by mouth daily.  carica papaya (PAPAYA ENZYME) tab Take  by mouth daily as needed (4-5 a day as needed).  fluticasone (FLONASE SENSIMIST) 27.5 mcg/actuation nasal spray 2 Sprays by Nasal route daily.  diclofenac (VOLTAREN) 1 % gel Apply  to affected area four (4) times daily.  glucosamine sulfate 1,000 mg cap Take 1 Tab by mouth daily.       acetaminophen (TYLENOL) 650 mg CR tablet Take 650 mg by mouth every six (6) hours as needed for Pain.  famotidine (PEPCID) 20 mg tablet Take 20 mg by mouth as needed.  melatonin 1 mg tablet Take 1 mg by mouth nightly.  ketoconazole (NIZORAL) 2 % topical cream Apply  to affected area daily. Allergies   Allergen Reactions    Compazine [Prochlorperazine] Other (comments)     Childhood reaction-Neck spasms    Indocin [Indomethacin] Other (comments)     Made her feel weird to the point it scared her    Other Medication Nausea and Vomiting     Flu vaccine in 9635-8495    Cephalexin Nausea and Vomiting    Erythromycin Nausea and Vomiting       Review of Systems:  GEN: no weight loss, weight gain, fatigue or night sweats  CV: no PND, orthopnea, or palpitations  Resp: no wheeze, no cough  Abd: no nausea, vomiting or diarrhea  Endocrine: no hair loss, excessive thirst or polyuria    Objective:     Visit Vitals  /66 (BP 1 Location: Left arm, BP Patient Position: Sitting)   Pulse 78   Temp 97.5 °F (36.4 °C) (Oral)   Resp 18   Ht 5' 5\" (1.651 m)   Wt 145 lb 11.2 oz (66.1 kg)   SpO2 98%   BMI 24.25 kg/m²     General:   alert, cooperative and no distress   Eyes: conjunctivae/sclerae clear. PERRL, EOM's intact   Ears: External auditory canals clear, tympanic membranes clear   Sinuses/Nose: No maxillary or frontal tenderness. No rhinorrhea present. Mouth:  No oral lesions, no pharyngeal erythema, no exudates   Neck: Trachea midline, no thyromegaly, no bruits   Heart: S1 and S2 normal,no murmurs noted    Lungs: Clear to auscultation bilaterally, no increased work of breathing   Abdomen: Soft, nontender. Normal bowel sounds   Extremities: No edema or cyanosis           Assessment/Plan:       ICD-10-CM ICD-9-CM    1. Essential hypertension - 122/66- well controlled. Continue current medication,  torsemide 10 mg daily,  lisinopril to 40 mg daily, and  amlodipine 5 mg. Exercise, and low salt/ low caffeine diet were discussed. I10 401.9    2.  CKD (chronic kidney disease) stage 3, GFR 30-59 ml/min (HCC) - she is now seeing Dr Idella Closs N18.3 585.3    3. Basal cell carcinoma (BCC) of face - noted on shave biopsy, due for full removal next month with Dr. Gregoria Fernandez. C44.310 173.31            Follow-up and Dispositions    · Return in about 4 months (around 9/29/2019). Recommended healthy diet low in carbohydrates, fats, sodium and cholesterol. Recommended regular cardiovascular exercise 3-6 times per week for 30-60 minutes daily. Verbal and written instructions (see AVS) provided. Patient expresses understanding of diagnosis and treatment plan.

## 2019-05-29 NOTE — PATIENT INSTRUCTIONS
DASH Diet: Care Instructions  Your Care Instructions    The DASH diet is an eating plan that can help lower your blood pressure. DASH stands for Dietary Approaches to Stop Hypertension. Hypertension is high blood pressure. The DASH diet focuses on eating foods that are high in calcium, potassium, and magnesium. These nutrients can lower blood pressure. The foods that are highest in these nutrients are fruits, vegetables, low-fat dairy products, nuts, seeds, and legumes. But taking calcium, potassium, and magnesium supplements instead of eating foods that are high in those nutrients does not have the same effect. The DASH diet also includes whole grains, fish, and poultry. The DASH diet is one of several lifestyle changes your doctor may recommend to lower your high blood pressure. Your doctor may also want you to decrease the amount of sodium in your diet. Lowering sodium while following the DASH diet can lower blood pressure even further than just the DASH diet alone. Follow-up care is a key part of your treatment and safety. Be sure to make and go to all appointments, and call your doctor if you are having problems. It's also a good idea to know your test results and keep a list of the medicines you take. How can you care for yourself at home? Following the DASH diet  · Eat 4 to 5 servings of fruit each day. A serving is 1 medium-sized piece of fruit, ½ cup chopped or canned fruit, 1/4 cup dried fruit, or 4 ounces (½ cup) of fruit juice. Choose fruit more often than fruit juice. · Eat 4 to 5 servings of vegetables each day. A serving is 1 cup of lettuce or raw leafy vegetables, ½ cup of chopped or cooked vegetables, or 4 ounces (½ cup) of vegetable juice. Choose vegetables more often than vegetable juice. · Get 2 to 3 servings of low-fat and fat-free dairy each day. A serving is 8 ounces of milk, 1 cup of yogurt, or 1 ½ ounces of cheese. · Eat 6 to 8 servings of grains each day.  A serving is 1 slice of bread, 1 ounce of dry cereal, or ½ cup of cooked rice, pasta, or cooked cereal. Try to choose whole-grain products as much as possible. · Limit lean meat, poultry, and fish to 2 servings each day. A serving is 3 ounces, about the size of a deck of cards. · Eat 4 to 5 servings of nuts, seeds, and legumes (cooked dried beans, lentils, and split peas) each week. A serving is 1/3 cup of nuts, 2 tablespoons of seeds, or ½ cup of cooked beans or peas. · Limit fats and oils to 2 to 3 servings each day. A serving is 1 teaspoon of vegetable oil or 2 tablespoons of salad dressing. · Limit sweets and added sugars to 5 servings or less a week. A serving is 1 tablespoon jelly or jam, ½ cup sorbet, or 1 cup of lemonade. · Eat less than 2,300 milligrams (mg) of sodium a day. If you limit your sodium to 1,500 mg a day, you can lower your blood pressure even more. Tips for success  · Start small. Do not try to make dramatic changes to your diet all at once. You might feel that you are missing out on your favorite foods and then be more likely to not follow the plan. Make small changes, and stick with them. Once those changes become habit, add a few more changes. · Try some of the following:  ? Make it a goal to eat a fruit or vegetable at every meal and at snacks. This will make it easy to get the recommended amount of fruits and vegetables each day. ? Try yogurt topped with fruit and nuts for a snack or healthy dessert. ? Add lettuce, tomato, cucumber, and onion to sandwiches. ? Combine a ready-made pizza crust with low-fat mozzarella cheese and lots of vegetable toppings. Try using tomatoes, squash, spinach, broccoli, carrots, cauliflower, and onions. ? Have a variety of cut-up vegetables with a low-fat dip as an appetizer instead of chips and dip. ? Sprinkle sunflower seeds or chopped almonds over salads. Or try adding chopped walnuts or almonds to cooked vegetables.   ? Try some vegetarian meals using beans and peas. Add garbanzo or kidney beans to salads. Make burritos and tacos with mashed avila beans or black beans. Where can you learn more? Go to http://alfa-gino.info/. Enter M239 in the search box to learn more about \"DASH Diet: Care Instructions. \"  Current as of: July 22, 2018  Content Version: 11.9  © 0783-0741 [employer]. Care instructions adapted under license by [employer] (which disclaims liability or warranty for this information). If you have questions about a medical condition or this instruction, always ask your healthcare professional. [address] any warranty or liability for your use of this information.

## 2019-05-29 NOTE — PROGRESS NOTES
Rex Sinha  Identified pt with two pt identifiers(name and ). Chief Complaint   Patient presents with    Follow-up     rm 9/non fasting/nephrologist follow up       1. Have you been to the ER, urgent care clinic since your last visit?  no  Hospitalized since your last visit? no    2. Have you seen or consulted any other health care providers outside of the [address] [place] [address] since your last visit? Include any pap smears or colon screening. no      Advance Care Planning    In the event something were to happen to you and you were unable to speak on your behalf, do you have an Advance Directive/ Living Will in place stating your wishes? NO    If yes, do we have a copy on file NO    If no, would you like information NO    Medication reconciliation up to date and corrected with patient at this time. Today's provider has been notified of reason for visit, vitals and flowsheets obtained on patients. Reviewed record in preparation for visit, huddled with provider and have obtained necessary documentation. Health Maintenance Due   Topic    COLONOSCOPY        Wt Readings from Last 3 Encounters:   19 146 lb 9.6 oz (66.5 kg)   19 145 lb (65.8 kg)   19 145 lb (65.8 kg)     Temp Readings from Last 3 Encounters:   19 95.8 °F (35.4 °C) (Oral)   19 97.8 °F (36.6 °C)   19 98 °F (36.7 °C)     BP Readings from Last 3 Encounters:   19 144/64   19 144/67   19 138/84     Pulse Readings from Last 3 Encounters:   19 67   19 63   19 63     There were no vitals filed for this visit.       Learning Assessment:  :     Learning Assessment 2018   PRIMARY LEARNER Patient Patient   HIGHEST LEVEL OF EDUCATION - PRIMARY LEARNER  DID NOT GRADUATE HIGH SCHOOL DID NOT GRADUATE HIGH SCHOOL   BARRIERS PRIMARY LEARNER NONE NONE   CO-LEARNER CAREGIVER No No   PRIMARY LANGUAGE ENGLISH ENGLISH   LEARNER PREFERENCE PRIMARY DEMONSTRATION OTHER (COMMENT)   ANSWERED BY patient patient   RELATIONSHIP SELF SELF       Depression Screening:  :     3 most recent PHQ Screens 4/17/2019   Little interest or pleasure in doing things Not at all   Feeling down, depressed, irritable, or hopeless Not at all   Total Score PHQ 2 0       No flowsheet data found. Fall Risk Assessment:  :     Fall Risk Assessment, last 12 mths 4/17/2019   Able to walk? Yes   Fall in past 12 months? No       Abuse Screening:  :     Abuse Screening Questionnaire 4/17/2019 1/23/2019 4/26/2018 11/15/2016   Do you ever feel afraid of your partner? N N N N   Are you in a relationship with someone who physically or mentally threatens you? N N N N   Is it safe for you to go home? Y Y Y Y       ADL Screening:  :     ADL Assessment 4/17/2019   Feeding yourself No Help Needed   Getting from bed to chair No Help Needed   Getting dressed No Help Needed   Bathing or showering No Help Needed   Walk across the room (includes cane/walker) No Help Needed   Using the telphone No Help Needed   Taking your medications No Help Needed   Preparing meals No Help Needed   Managing money (expenses/bills) No Help Needed   Moderately strenuous housework (laundry) No Help Needed   Shopping for personal items (toiletries/medicines) No Help Needed   Shopping for groceries No Help Needed   Driving No Help Needed   Climbing a flight of stairs No Help Needed   Getting to places beyond walking distances No Help Needed           BMI:  Weight Metrics 4/17/2019 2/19/2019 2/12/2019 1/23/2019 12/14/2018 11/20/2018 10/23/2018   Weight 146 lb 9.6 oz 145 lb 145 lb 146 lb 11.2 oz 144 lb 146 lb 146 lb 6.4 oz   BMI 24.4 kg/m2 24.13 kg/m2 24.13 kg/m2 24.41 kg/m2 23.96 kg/m2 22.87 kg/m2 22.93 kg/m2           Medication reconciliation up to date and corrected with patient at this time.

## 2019-06-24 ENCOUNTER — OFFICE VISIT (OUTPATIENT)
Dept: FAMILY MEDICINE CLINIC | Age: 74
End: 2019-06-24

## 2019-06-24 VITALS
HEART RATE: 84 BPM | OXYGEN SATURATION: 98 % | HEIGHT: 65 IN | RESPIRATION RATE: 22 BRPM | DIASTOLIC BLOOD PRESSURE: 60 MMHG | SYSTOLIC BLOOD PRESSURE: 110 MMHG | WEIGHT: 143.3 LBS | TEMPERATURE: 97.9 F | BODY MASS INDEX: 23.88 KG/M2

## 2019-06-24 DIAGNOSIS — L03.312 CELLULITIS OF BACK EXCEPT BUTTOCK: Primary | ICD-10-CM

## 2019-06-24 RX ORDER — TIZANIDINE 2 MG/1
TABLET ORAL
COMMUNITY
Start: 2019-06-18

## 2019-06-24 RX ORDER — CEPHALEXIN 500 MG/1
500 CAPSULE ORAL 3 TIMES DAILY
Qty: 20 CAP | Refills: 0 | Status: SHIPPED | OUTPATIENT
Start: 2019-06-24 | End: 2019-07-01

## 2019-06-24 NOTE — PROGRESS NOTES
Damir Valienteja  Identified pt with two pt identifiers(name and ). Chief Complaint   Patient presents with    Rib Pain    Back Pain    Skin Problem     mid back, reddened area, size of a fist; c/o pain and burning, hot to the touch; showed up a couple days after moving her mattress       1. Have you been to the ER, urgent care clinic since your last visit? Hospitalized since your last visit? No    2. Have you seen or consulted any other health care providers outside of the [address] [place] [address] since your last visit? Include any pap smears or colon screening. No      Would you like to sign up for MyChart today, if you have not already done so? Patient has a mychart  If not, would you like information on MyChart, and how to sign up at a later time? No      Medication reconciliation up to date and corrected with patient at this time. Today's provider has been notified of reason for visit, vitals and flowsheets obtained on patients. Reviewed record in preparation for visit, huddled with provider and have obtained necessary documentation.       Health Maintenance Due   Topic    COLONOSCOPY     MEDICARE YEARLY EXAM        Wt Readings from Last 3 Encounters:   19 143 lb 4.8 oz (65 kg)   19 145 lb 11.2 oz (66.1 kg)   19 146 lb 9.6 oz (66.5 kg)     Temp Readings from Last 3 Encounters:   19 97.9 °F (36.6 °C) (Oral)   19 97.5 °F (36.4 °C) (Oral)   19 95.8 °F (35.4 °C) (Oral)     BP Readings from Last 3 Encounters:   19 143/63   19 122/66   19 144/64     Pulse Readings from Last 3 Encounters:   19 84   19 78   19 67     Vitals:    19 1437   BP: 143/63   Pulse: 84   Resp: 22   Temp: 97.9 °F (36.6 °C)   TempSrc: Oral   SpO2: 98%   Weight: 143 lb 4.8 oz (65 kg)   Height: 5' 5\" (1.651 m)   PainSc:   3   PainLoc: Generalized         Learning Assessment:  :     Learning Assessment 2018   PRIMARY LEARNER Patient Patient HIGHEST LEVEL OF EDUCATION - PRIMARY LEARNER  DID NOT GRADUATE HIGH SCHOOL DID NOT GRADUATE HIGH SCHOOL   BARRIERS PRIMARY LEARNER NONE NONE   CO-LEARNER CAREGIVER No No   PRIMARY LANGUAGE ENGLISH ENGLISH   LEARNER PREFERENCE PRIMARY DEMONSTRATION OTHER (COMMENT)   ANSWERED BY patient patient   RELATIONSHIP SELF SELF       Depression Screening:  :     3 most recent PHQ Screens 5/29/2019   Little interest or pleasure in doing things Not at all   Feeling down, depressed, irritable, or hopeless Not at all   Total Score PHQ 2 0       Fall Risk Assessment:  :     Fall Risk Assessment, last 12 mths 5/29/2019   Able to walk? Yes   Fall in past 12 months? No       Abuse Screening:  :     Abuse Screening Questionnaire 5/29/2019 4/17/2019 1/23/2019 4/26/2018 11/15/2016   Do you ever feel afraid of your partner? N N N N N   Are you in a relationship with someone who physically or mentally threatens you? N N N N N   Is it safe for you to go home?  Y Y Y Y Y       ADL Screening:  :     ADL Assessment 5/29/2019   Feeding yourself No Help Needed   Getting from bed to chair No Help Needed   Getting dressed No Help Needed   Bathing or showering No Help Needed   Walk across the room (includes cane/walker) No Help Needed   Using the telphone No Help Needed   Taking your medications No Help Needed   Preparing meals No Help Needed   Managing money (expenses/bills) No Help Needed   Moderately strenuous housework (laundry) No Help Needed   Shopping for personal items (toiletries/medicines) No Help Needed   Shopping for groceries No Help Needed   Driving No Help Needed   Climbing a flight of stairs No Help Needed   Getting to places beyond walking distances No Help Needed

## 2019-06-24 NOTE — PROGRESS NOTES
No recalled injury. Red spot yesterday AM.  2 weeks ago turned mattress around on bed. Noted rib soreness 6-7 days ago. Taking tylenol and Zanaflex. Red spot is warm and has burning sensation. Visit Vitals  /60 (BP 1 Location: Left arm, BP Patient Position: Sitting)   Pulse 84   Temp 97.9 °F (36.6 °C) (Oral)   Resp 22   Ht 5' 5\" (1.651 m)   Wt 143 lb 4.8 oz (65 kg)   LMP  (LMP Unknown)   SpO2 98%   BMI 23.85 kg/m²     Patient alert and cooperative. Lower mid lumbar with about a softball sized, sharply demarcated, erythematous, slightly indurated, flat area. No vesicles appreciated on today's exam.    Assessment:  1. This area is tender to palpation. Plan:  1. Need to cover for cellulitis, though question why this has occurred. Possibly secondary to vector bite with secondary infection. 2. Keflex 500 t.i.d. for seven days. 3. Return in 48-72 hours for recheck. 4. Picture taken today. 5. Follow otherwise here prn.

## 2019-06-26 ENCOUNTER — OFFICE VISIT (OUTPATIENT)
Dept: FAMILY MEDICINE CLINIC | Age: 74
End: 2019-06-26

## 2019-06-26 VITALS
TEMPERATURE: 97.7 F | OXYGEN SATURATION: 98 % | WEIGHT: 143.1 LBS | RESPIRATION RATE: 20 BRPM | DIASTOLIC BLOOD PRESSURE: 56 MMHG | BODY MASS INDEX: 23.84 KG/M2 | HEIGHT: 65 IN | SYSTOLIC BLOOD PRESSURE: 130 MMHG | HEART RATE: 70 BPM

## 2019-06-26 DIAGNOSIS — L03.312 CELLULITIS OF SKIN OF BACK: Primary | ICD-10-CM

## 2019-06-26 NOTE — PROGRESS NOTES
Deondre Casarez  Identified pt with two pt identifiers(name and ). Chief Complaint   Patient presents with    Skin Infection     2 day follow up       1. Have you been to the ER, urgent care clinic since your last visit? Hospitalized since your last visit? No    2. Have you seen or consulted any other health care providers outside of the [address] [place] [address] since your last visit? Include any pap smears or colon screening. No      Would you like to sign up for MyChart today, if you have not already done so? Patient has a mychart  If not, would you like information on MyChart, and how to sign up at a later time? No      Medication reconciliation up to date and corrected with patient at this time. Today's provider has been notified of reason for visit, vitals and flowsheets obtained on patients. Reviewed record in preparation for visit, huddled with provider and have obtained necessary documentation.       Health Maintenance Due   Topic    COLONOSCOPY     MEDICARE YEARLY EXAM        Wt Readings from Last 3 Encounters:   19 143 lb 1.6 oz (64.9 kg)   19 143 lb 4.8 oz (65 kg)   19 145 lb 11.2 oz (66.1 kg)     Temp Readings from Last 3 Encounters:   19 97.7 °F (36.5 °C) (Oral)   19 97.9 °F (36.6 °C) (Oral)   19 97.5 °F (36.4 °C) (Oral)     BP Readings from Last 3 Encounters:   19 130/56   19 110/60   19 122/66     Pulse Readings from Last 3 Encounters:   19 70   19 84   19 78     Vitals:    19 1544   BP: 130/56   Pulse: 70   Resp: 20   Temp: 97.7 °F (36.5 °C)   TempSrc: Oral   SpO2: 98%   Weight: 143 lb 1.6 oz (64.9 kg)   Height: 5' 5\" (1.651 m)   PainSc:   0 - No pain         Learning Assessment:  :     Learning Assessment 2018   PRIMARY LEARNER Patient Patient   HIGHEST LEVEL OF EDUCATION - PRIMARY LEARNER  DID NOT GRADUATE HIGH SCHOOL DID NOT GRADUATE HIGH SCHOOL   BARRIERS PRIMARY LEARNER NONE NONE CO-LEARNER CAREGIVER No No   PRIMARY LANGUAGE ENGLISH ENGLISH   LEARNER PREFERENCE PRIMARY DEMONSTRATION OTHER (COMMENT)   ANSWERED BY patient patient   RELATIONSHIP SELF SELF       Depression Screening:  :     3 most recent PHQ Screens 5/29/2019   Little interest or pleasure in doing things Not at all   Feeling down, depressed, irritable, or hopeless Not at all   Total Score PHQ 2 0       Fall Risk Assessment:  :     Fall Risk Assessment, last 12 mths 5/29/2019   Able to walk? Yes   Fall in past 12 months? No       Abuse Screening:  :     Abuse Screening Questionnaire 5/29/2019 4/17/2019 1/23/2019 4/26/2018 11/15/2016   Do you ever feel afraid of your partner? N N N N N   Are you in a relationship with someone who physically or mentally threatens you? N N N N N   Is it safe for you to go home?  Y Y Y Y Y       ADL Screening:  :     ADL Assessment 5/29/2019   Feeding yourself No Help Needed   Getting from bed to chair No Help Needed   Getting dressed No Help Needed   Bathing or showering No Help Needed   Walk across the room (includes cane/walker) No Help Needed   Using the telphone No Help Needed   Taking your medications No Help Needed   Preparing meals No Help Needed   Managing money (expenses/bills) No Help Needed   Moderately strenuous housework (laundry) No Help Needed   Shopping for personal items (toiletries/medicines) No Help Needed   Shopping for groceries No Help Needed   Driving No Help Needed   Climbing a flight of stairs No Help Needed   Getting to places beyond walking distances No Help Needed

## 2019-06-26 NOTE — PROGRESS NOTES
Here for recheck of presumed cellulitis of lower left back. Visit Vitals  /56 (BP 1 Location: Right arm, BP Patient Position: Sitting)   Pulse 70   Temp 97.7 °F (36.5 °C) (Oral)   Resp 20   Ht 5' 5\" (1.651 m)   Wt 143 lb 1.6 oz (64.9 kg)   LMP  (LMP Unknown)   SpO2 98%   BMI 23.81 kg/m²     Patient alert and cooperative. Left lower back - marked decreased size, decreased erythema of the area. Still mildly tender to palpation. Assessment:  1. Resolving skin cellulitis. Plan:  1. Follow up with primary care provider next week. 2. Continue and finish current antibiotic script. 3. Recheck here otherwise prn.

## 2019-06-28 ENCOUNTER — HOSPITAL ENCOUNTER (OUTPATIENT)
Dept: MRI IMAGING | Age: 74
Discharge: HOME | End: 2019-06-28
Attending: PHYSICAL MEDICINE & REHABILITATION
Payer: MEDICARE

## 2019-06-28 DIAGNOSIS — M50.30 DDD (DEGENERATIVE DISC DISEASE), CERVICAL: ICD-10-CM

## 2019-06-28 DIAGNOSIS — M54.2 NECK PAIN: ICD-10-CM

## 2019-06-28 PROCEDURE — 72141 MRI NECK SPINE W/O DYE: CPT

## 2019-08-30 ENCOUNTER — OFFICE VISIT (OUTPATIENT)
Dept: ENDOCRINOLOGY | Age: 74
End: 2019-08-30

## 2019-08-30 VITALS
SYSTOLIC BLOOD PRESSURE: 133 MMHG | HEART RATE: 61 BPM | WEIGHT: 140.6 LBS | BODY MASS INDEX: 23.43 KG/M2 | HEIGHT: 65 IN | DIASTOLIC BLOOD PRESSURE: 60 MMHG

## 2019-08-30 DIAGNOSIS — M81.0 AGE-RELATED OSTEOPOROSIS WITHOUT CURRENT PATHOLOGICAL FRACTURE: Primary | ICD-10-CM

## 2019-08-30 RX ORDER — LISINOPRIL 40 MG/1
40 TABLET ORAL DAILY
Refills: 2 | COMMUNITY
Start: 2019-08-08 | End: 2021-08-02 | Stop reason: SDUPTHER

## 2019-08-30 RX ORDER — GLUCOSAMINE SULFATE 1500 MG
1000 POWDER IN PACKET (EA) ORAL DAILY
COMMUNITY
End: 2020-08-04 | Stop reason: ALTCHOICE

## 2019-08-30 NOTE — PATIENT INSTRUCTIONS
1) Please call [phone] to reset your [employer] password if you can't remember it. 2) Your bone density scan in 2019 is relatively stable compared to 2017 but they were done on different machines so a direct comparison cannot be made. However, your spine and hip are both in the osteopenic range (mild bone loss) and even though your fracture risk for hip fracture is 6% which is over the 3% treatment threshold, I think your overall risk of fracture at this time is not too high that we need to do any other medical treatment. 3) I recommend continuing the calcium and vitamin D and trying to cut back on smoking as best as possible. 4) Try to get back into walking 10-15 minutes a day 2-3 times a week and work up to 30 minutes 5 times a week. This does not have to be done altogether but can be split up throughout the day. 5) Our office will contact you in Jan 2021 to repeat a bone density scan in Feb 2021 and then arrange for a follow up with me after the scan is done. 6) If you were to suffer a fracture, please let me know and I can see you back sooner.

## 2019-08-30 NOTE — PROGRESS NOTES
Chief Complaint   Patient presents with    Osteoporosis     pcp and pharmacy cofirmed     History of Present Illness: Verena Parekh is a 75 y.o. female who is a new patient for osteoporosis. Recalls a doctor starting her on weekly fosamax sometime in the [address] and took this for about 5 years and was found to have ulcers in her stomach so she was taken off this med. Was in [place] from 2007 to 2016 and was seeing her PCP and was recommended to start Reclast and had 4 doses in 9/12, 10/13, 10/14 and 1/16 and hasn't had any treatment since then. No history of any fracture. No FH of osteoporosis or hip fracture for sure but her mother may have had this condition as she was stooped over. Went into menopause in her early 45s and was never put on HRT due a doctor being concerned of her breast health. Did have a period of time she drank 4-7 drinks per day for about 5 years in her 45s but not on a regular basis since then. Did smoke up to 1 ppd at the most but currently is about 1/4-1/2 ppd for the past 40 years. Did take PPI for a year after her ulcer diagnosis but since then just takes H2 blocker as needed. No chronic steroid exposure. Drinks a glass of milk every morning and has cheese at lunch and occ yogurt and eats some green veggies. Takes 1000 units of D3 daily and a calcium-mg-zn tab 2 tabs per day. No fall risk. Not currently doing any walk or weight bearing activity. Did have a DXA in Feb 2017 in [place] that comments on a T-score of -2.2 at the L-spine and -2.0 at the femurs. In 2/19 her DXA showed a T-score of -2.1 at the L-spine, and -2.3 at the right fem neck and -2.0 a the right total hip.       Past Medical History:   Diagnosis Date    Anxiety     Arthritis     Chronic obstructive pulmonary disease ([facility] [address] 2010    early disease on PFTs, briefly on a daily inhaler    Chronic pain     neck pain, Dr. Shaq Guillen    Degenerative disc disease, cervical 1998    cervical spine injections, [facility] Dr. Abena Cortez kidney     Right    Gallstone 1974    GERD (gastroesophageal reflux disease)     pepcid 20 mg prn (about 2x/month)    Herpes simplex type II infection     Hypercholesterolemia     Hypertension     Nausea & vomiting     During surgery when ether was used    Osteoporosis 2012    Reclast 9/24/2012, 10/09/2013, 10/31/2014, 1/29/2016    PUD (peptic ulcer disease) 2010    endoscopy confirmed, was taking aleve    Stage 3 chronic kidney disease ([facility] [address] [id number]    Trauma 1972    right arm fracture surgically repaired, twisted in washing machine     Past Surgical History:   Procedure Laterality Date    HX APPENDECTOMY      HX BREAST BIOPSY Right     negative 1987    HX CATARACT REMOVAL Bilateral 07/09/2018    HX CHOLECYSTECTOMY  1974    HX HYSTERECTOMY  1974    HX ORTHOPAEDIC  1972    R arm broken, pins put in, taken out 1 year later   [place]    from bone on L leg      Current Outpatient Medications   Medication Sig    lisinopril (PRINIVIL, ZESTRIL) 40 mg tablet TK 1 T PO  D    cholecalciferol (VITAMIN D3) 1,000 unit cap Take  by mouth daily.  amLODIPine (NORVASC) 5 mg tablet TAKE 1 TABLET BY MOUTH EVERY DAY    tiZANidine (ZANAFLEX) 2 mg tablet TAKE 1 TABLET BY MOUTH EVERY 8 HOURS AS NEEDED FOR SPASMS    augmented betamethasone dipropionate (DIPROLENE-AF) 0.05 % topical cream THERESE EXT AA BID PRN    torsemide (DEMADEX) 10 mg tablet Take 10 mg by mouth daily.  carboxymethylcellulose sodium (REFRESH OP) Apply  to eye.  dextran 70/hypromellose (TEARS NATURALE OP) Apply 1 Drop to eye as needed.  CALCIUM-MAGNESIUM-ZINC PO Take 2 Tabs by mouth daily.  mometasone (ELOCON) 0.1 % lotion INSTILL 4 METRIC DROP TWO TIMES DAILY IN EARS, AS NEEDED    busPIRone (BUSPAR) 15 mg tablet Take 0.5 Tabs by mouth two (2) times a day.  valACYclovir (VALTREX) 1 gram tablet Take 2 Tabs by mouth two (2) times a day.     pravastatin (PRAVACHOL) 20 mg tablet Take 1 Tab by mouth nightly.  traZODone (DESYREL) 50 mg tablet Take 1 Tab by mouth nightly. (Patient taking differently: Take 50 mg by mouth nightly. Indications: taking 25mg)    omega-3 fatty acids-fish oil (FISH OIL) 360-1,200 mg cap Take 1 Tab by mouth daily.  carica papaya (PAPAYA ENZYME) tab Take  by mouth daily as needed (4-5 a day as needed).  fluticasone (FLONASE SENSIMIST) 27.5 mcg/actuation nasal spray 2 Sprays by Nasal route daily.  diclofenac (VOLTAREN) 1 % gel Apply  to affected area as needed.  glucosamine sulfate 1,000 mg cap Take 2 Tabs by mouth daily.  acetaminophen (TYLENOL) 650 mg CR tablet Take 650 mg by mouth every six (6) hours as needed for Pain.  famotidine (PEPCID) 20 mg tablet Take 20 mg by mouth as needed.  melatonin 1 mg tablet Take 1 mg by mouth nightly.  ketoconazole (NIZORAL) 2 % topical cream Apply  to affected area daily. No current facility-administered medications for this visit. Allergies   Allergen Reactions    Indomethacin Other (comments)     Made her feel weird to the point it scared her  Other reaction(s): Other (see comments)  Made her feel weird to the point it scared her  Made her feel weird to the point it scared her    Other reaction(s): Other (comments)  Made her feel weird to the point it scared her    Fosamax [Alendronate] Other (comments)     Had a peptic ulcer    Other Medication Nausea and Vomiting     Flu vaccine in [id number]    Cephalexin Nausea and Vomiting    Erythromycin Nausea and Vomiting    Prochlorperazine Other (comments)     Childhood reaction-Neck spasms  Other reaction(s): Other (see comments)  Childhood reaction  Childhood reaction    Other reaction(s):  Other (comments)  Childhood reaction-Neck spasms     Family History   Problem Relation Age of Onset    Cancer Mother         Cervical    Stroke Mother     Psychiatric Disorder Mother     Heart Disease Father     Breast Cancer Sister         in her 52's     Social History     Socioeconomic History    Marital status: SINGLE     Spouse name: Not on file    Number of children: Not on file    Years of education: Not on file    Highest education level: Not on file   Occupational History    Not on file   Social Needs    Financial resource strain: Not on file    Food insecurity:     Worry: Not on file     Inability: Not on file    Transportation needs:     Medical: Not on file     Non-medical: Not on file   Tobacco Use    Smoking status: Current Some Day Smoker     Packs/day: 0.25     Years: 41.00     Pack years: 10.25    Smokeless tobacco: Never Used   Substance and Sexual Activity    Alcohol use: Yes     Comment: occasional    Drug use: No    Sexual activity: Never   Lifestyle    Physical activity:     Days per week: Not on file     Minutes per session: Not on file    Stress: Not on file   Relationships    Social connections:     Talks on phone: Not on file     Gets together: Not on file     Attends Church service: Not on file     Active member of club or organization: Not on file     Attends meetings of clubs or organizations: Not on file     Relationship status: Not on file    Intimate partner violence:     Fear of current or ex partner: Not on file     Emotionally abused: Not on file     Physically abused: Not on file     Forced sexual activity: Not on file   Other Topics Concern    Not on file   Social History Narrative    Lives in [place] with her grandson and his girlfriend.  x 2. Has 3 girls and 2 boys. Retired. Worked as a  and drove trucks and school buses. Likes to read.      Review of Systems:  - Constitutional Symptoms: no fevers, chills, (+) 6 lb weight loss since 4/19 with losing fluid  - Eyes: no blurry vision or double vision, (+) dry eyes  - Cardiovascular: no chest pain or palpitations  - Respiratory: some cough, occ shortness of breath  - Gastrointestinal: no dysphagia or abdominal pain  - Musculoskeletal: (+) hip pain right >left  - Integumentary: no rashes  - Neurological: (+) numbness, tingling in left arm, occ headaches  - Psychiatric: no depression, some anxiety  - Endocrine: no heat or cold intolerance, no polyuria or polydipsia    Physical Examination:  Blood pressure 133/60, pulse 61, height 5' 5\" (1.651 m), weight 140 lb 9.6 oz (63.8 kg). - General: pleasant, no distress, good eye contact  - HEENT: no exopthalmos, no periorbital edema, no scleral/conjunctival injection, EOMI, no lid lag or stare  - Neck: supple, no thyromegaly, masses, lymph nodes, (+) right carotid bruit, no supraclavicular or dorsocervical fat pads  - Cardiovascular: regular, normal rate, normal S1 and S2, no murmurs/rubs/gallops   - Respiratory: clear to auscultation bilaterally  - Gastrointestinal: soft, nontender, nondistended, no masses, no hepatosplenomegaly  - Musculoskeletal: no proximal muscle weakness in upper or lower extremities, no vertebral tenderness  - Integumentary: no acanthosis nigricans, no abdominal striae, no rashes, no edema  - Neurological: reflexes 2+ at biceps, no tremor  - Psychiatric: normal mood and affect    Data Reviewed:     Component      Latest Ref Rng & Units 1/28/2019 1/28/2019 1/28/2019 1/28/2019           8:28 AM  8:28 AM  8:28 AM  8:28 AM   Glucose      65 - 99 mg/dL    83   BUN      8 - 27 mg/dL    13   Creatinine      0.57 - 1.00 mg/dL    1.08 (H)   GFR est non-AA      >59 mL/min/1.73    51 (L)   GFR est AA      >59 mL/min/1.73    58 (L)   BUN/Creatinine ratio      12 - 28    12   Sodium      134 - 144 mmol/L    139   Potassium      3.5 - 5.2 mmol/L    4.8   Chloride      96 - 106 mmol/L    101   CO2      20 - 29 mmol/L    22   Calcium      8.7 - 10.3 mg/dL    9.3   Protein, total      6.0 - 8.5 g/dL    6.5   Albumin      3.5 - 4.8 g/dL    4.4   GLOBULIN, TOTAL      1.5 - 4.5 g/dL    2.1   A-G Ratio      1.2 - 2.2    2.1   Bilirubin, total      0.0 - 1.2 mg/dL    0.9   Alk.  phosphatase      39 - 117 IU/L 45   AST      0 - 40 IU/L    12   ALT (SGPT)      0 - 32 IU/L    10   VITAMIN D, 25-HYDROXY      30.0 - 100.0 ng/mL   30.2    TSH      0.450 - 4.500 uIU/mL  2.910     PTH, Intact      15 - 65 pg/mL 18        Bone Mineral Density     Indication: Osteoporosis follow-up  Age: 75  Sex: Female.     Menopause status: postmenopausal.  Hormone replacement therapy: No      Number of falls in the past year: None. Risk factors for osteoporosis: Family history of osteoporosis, tobacco use       Current medication for osteoporosis: Calcium and vitamin D. Comparison: 2017     Technique: Imaging was performed on the [employer] QDR 4500 C. World Health  Organization meta-analysis fracture risk calculator (FRAX) analysis was  performed for 10 year fracture risk probability assessment     Excluded sites: None      Findings:        Femoral Neck Right:  Bone mineral density (gm/cm2): 0.596  % of peak bone mass: 70  % for age matched controls:  96  T-score: -2.3  Z-score: -0.2     Total Hip Right:  Bone mineral density (gm/cm2): 0.728  % of peak bone mass: 77  % for age matched controls:  100  T-score: -1.8  Z-score: 0     Lumbar Spine: L1-L4  Bone mineral density (gm/cm2): 0.814  % of peak bone mass: 78  % for age matched controls: 103  T-score: -2.1  Z-score: 0.2     33% Radius Left:  Bone mineral density (gm/cm2): 0.550  % of peak bone mass: 80  % for age matched controls:  103  T-score: -2.3  Z-score: 0.2     IMPRESSION  Impression: This patient is osteopenic using the World Health Organization criteria  A direct comparison to last exam cannot be made due to differences in machinery.     10 year probability of major osteoporotic fracture: 15%  10 year probability of hip fracture: 6.2%     Recommendations:  Therapy recommendations need to be tailored to each individual patient.  Using  the [facility]) FRAX absolute fracture algorithm, the  National Osteoporosis Foundation recommends beginning pharmacological therapy in  postmenopausal women and men over the age of 48 with a 8 year probability of a  hip fracture of >3% OR with the 10 year probability of a major osteoporotic  fracture of >20%. Please reconsider testing based on risk factors. Currently, Medicare will only  reimburse for a central DXA examination every two years, unless the patient is  on chronic glucocorticoid therapy.     Note: Please note that reliable, valid comparisons cannot be made between  studies which have been performed on machines from different manufacturers. If  clinically warranted, a follow up study performed at this site, on the same  unit, would allow the most sensitive assessment of change in bone mineral  density. Assessment/Plan:   1. Age-related osteoporosis without current pathological fracture: Recalls a doctor starting her on weekly fosamax sometime in the [address] and took this for about 5 years and was found to have ulcers in her stomach so she was taken off this med. Was in [place] from 2007 to 2016 and was seeing her PCP and was recommended to start Reclast and had 4 doses in 9/12, 10/13, 10/14 and 1/16 and hasn't had any treatment since then. No history of any fracture. No FH of osteoporosis or hip fracture for sure but her mother may have had this condition as she was stooped over. Went into menopause in her early 45s and was never put on HRT due a doctor being concerned of her breast health. Did have a period of time she drank 4-7 drinks per day for about 5 years in her 45s but not on a regular basis since then. Did smoke up to 1 ppd at the most but currently is about 1/4-1/2 ppd for the past 40 years. Did take PPI for a year after her ulcer diagnosis but since then just takes H2 blocker as needed. No chronic steroid exposure. Drinks a glass of milk every morning and has cheese at lunch and occ yogurt and eats some green veggies. Takes 1000 units of D3 daily and a calcium-mg-zn tab 2 tabs per day. No fall risk. Not currently doing any walk or weight bearing activity. Did have a DXA in Feb 2017 in [place] that comments on a T-score of -2.2 at the L-spine and -2.0 at the femurs. In 2/19 her DXA showed a T-score of -2.1 at the L-spine, and -2.3 at the right fem neck and -2.0 a the right total hip. Her FRAX score is 6.2% for hip fracture and 15% for major osteoporotic fracture. We discussed that currently her bone density is in the osteopenic range at all sites and I'm assuming must have improved from the osteoporotic range in the past.  Although the bone density scans from 2017 and 2019 cannot be compared directly as they were done on different machines, the T-scores are similar enough to assume that she is relatively stable over the past 2 years. She is not a high fall risk and can improve her weight bearing activities so we agreed to hold off on any treatment for the next 2 years and work on cutting back on her smoking and continue her calcium and vitamin D as her most recent D level was normal at 30 in 1/19. We spent 45 minutes of face to face time together and > 50% of the time was spent in counseling regarding management of her osteoporosis. - cont calcium-mg-zn 2 tabs daily  - cont vitamin D 1000 units daily  - repeat DXA in 2/21        Patient Instructions   1) Please call [phone] to reset your [employer] password if you can't remember it. 2) Your bone density scan in 2019 is relatively stable compared to 2017 but they were done on different machines so a direct comparison cannot be made. However, your spine and hip are both in the osteopenic range (mild bone loss) and even though your fracture risk for hip fracture is 6% which is over the 3% treatment threshold, I think your overall risk of fracture at this time is not too high that we need to do any other medical treatment. 3) I recommend continuing the calcium and vitamin D and trying to cut back on smoking as best as possible.     4) Try to get back into walking 10-15 minutes a day 2-3 times a week and work up to 30 minutes 5 times a week. This does not have to be done altogether but can be split up throughout the day. 5) Our office will contact you in Jan 2021 to repeat a bone density scan in Feb 2021 and then arrange for a follow up with me after the scan is done. 6) If you were to suffer a fracture, please let me know and I can see you back sooner. Follow-up and Dispositions    · Return for in Feb 2021.            Copy sent to:  Patient Care Team:  Dalila Pena PA-C as PCP - General (Physician Assistant)  Srini Sheets MD (Nephrology)

## 2019-09-30 ENCOUNTER — OFFICE VISIT (OUTPATIENT)
Dept: FAMILY MEDICINE CLINIC | Age: 74
End: 2019-09-30

## 2019-09-30 VITALS
DIASTOLIC BLOOD PRESSURE: 58 MMHG | BODY MASS INDEX: 23.32 KG/M2 | OXYGEN SATURATION: 99 % | HEART RATE: 56 BPM | HEIGHT: 65 IN | SYSTOLIC BLOOD PRESSURE: 140 MMHG | WEIGHT: 140 LBS | RESPIRATION RATE: 16 BRPM | TEMPERATURE: 97.2 F

## 2019-09-30 DIAGNOSIS — R09.89 RIGHT CAROTID BRUIT: ICD-10-CM

## 2019-09-30 DIAGNOSIS — M47.22 OSTEOARTHRITIS OF SPINE WITH RADICULOPATHY, CERVICAL REGION: ICD-10-CM

## 2019-09-30 DIAGNOSIS — B00.9 HERPES SIMPLEX TYPE 2 INFECTION: ICD-10-CM

## 2019-09-30 DIAGNOSIS — N18.30 CKD (CHRONIC KIDNEY DISEASE) STAGE 3, GFR 30-59 ML/MIN (HCC): ICD-10-CM

## 2019-09-30 DIAGNOSIS — I10 ESSENTIAL HYPERTENSION: Primary | ICD-10-CM

## 2019-09-30 RX ORDER — DICLOFENAC SODIUM 10 MG/G
GEL TOPICAL AS NEEDED
Qty: 100 G | Refills: 2 | Status: SHIPPED | OUTPATIENT
Start: 2019-09-30 | End: 2020-12-07 | Stop reason: SDUPTHER

## 2019-09-30 RX ORDER — VALACYCLOVIR HYDROCHLORIDE 1 G/1
2000 TABLET, FILM COATED ORAL 2 TIMES DAILY
Qty: 30 TAB | Refills: 1 | Status: SHIPPED | OUTPATIENT
Start: 2019-09-30 | End: 2020-03-17 | Stop reason: SDUPTHER

## 2019-09-30 NOTE — PROGRESS NOTES
HPI:  75 y.o.  presents for follow up appointment. No hospital, ER or specialist visits since last primary care visit except as noted below. Hypertension and CKD stage 3 - She sess nephrologist Dr. Mai Patient, last visit around August 2019, consult note currently not available. At last visit with Dr. Jacob Harrison patient states no changes were made, but she states in a phone call earlier in the summer, her lisinopril was increased from 40 mg to 60 mg daily, she is still on torsemide 10 mg and amlodipine 5 mg daily (patient not sure of amlodipine dose today, says it has not been chnaged, my notes and note from Dr. Jacob Harrison in May both say amlodipine 5 mg). She had been craving salt, but with the current anti-hypertensive regimen she has stopped craving salt. Patient reports compliant with medication, no home monitoring. No history of heart disease or stroke. No chest pain, no shortness of breath, no headaches, no lower extremity swelling. Most recent labs were done by Dr. Jacob Harrison in the summer. She is looking to move to a senior community. Due to finances, she is working with [employer]. OA Cspine: She is seeing Dr. Doug Echeverria for cervical neck pain. She had MRI in June. She followed up with Dr. Yusra Morales, had procedure with an injection on her right upper back for \"pinched nerve\". She uses voltaren gel as needed for muscle pain. No oral NSAIDs due to CKD 3. Herpes Simplex - gets outbreak on buttock, about twice a year, but  Had 2 outbreaks back to back over last few weeks ago, uses Valtrex 2 g BID x one day. There has been stress at home, she does not get along with her grandson's girlfriend    She reports Dr. Shabnam Alejo heard something in her right neck and asks me to recheck it.     Patient Active Problem List    Diagnosis    GERD (gastroesophageal reflux disease)     pepcid 20 mg prn (about 2x/month)      Chronic pain     neck pain, Dr. Doug Echeverria      CKD (chronic kidney disease) stage 3, GFR 30-59 ml/min (HCC)    Sinus disorder    Anxiety    Essential hypertension    Osteoarthritis of cervical spine    Hyperlipidemia LDL goal <70    Herpes simplex type 2 infection    Osteoporosis     Reclast 9/24/2012, 10/09/2013, 10/31/2014, 1/29/2016           Past Medical History:   Diagnosis Date    Anxiety     Arthritis     Chronic obstructive pulmonary disease ([facility] [address] 2010    early disease on PFTs, briefly on a daily inhaler    Chronic pain     neck pain, Dr. Audra Byrnes Degenerative disc disease, cervical 1998    cervical spine injections, [facility] Dr. Kirk Corbett kidney     Right    Gallstone 1974    GERD (gastroesophageal reflux disease)     pepcid 20 mg prn (about 2x/month)    Herpes simplex type II infection     Hypercholesterolemia     Hypertension     Nausea & vomiting     During surgery when ether was used    Osteoporosis 2012    Reclast 9/24/2012, 10/09/2013, 10/31/2014, 1/29/2016    PUD (peptic ulcer disease) 2010    endoscopy confirmed, was taking aleve    Stage 3 chronic kidney disease ([facility] [address] [id number]    Trauma 1972    right arm fracture surgically repaired, twisted in washing machine       Social History     Tobacco Use    Smoking status: Current Some Day Smoker     Packs/day: 0.25     Years: 41.00     Pack years: 10.25    Smokeless tobacco: Never Used   Substance Use Topics    Alcohol use: Yes     Comment: occasional    Drug use: No       Outpatient Medications Marked as Taking for the 9/30/19 encounter (Office Visit) with Valeri Javier, PARadha[facility]   Medication Sig Dispense Refill    lisinopril (PRINIVIL, ZESTRIL) 40 mg tablet 60 mg.  2    cholecalciferol (VITAMIN D3) 1,000 unit cap Take  by mouth daily.       amLODIPine (NORVASC) 5 mg tablet TAKE 1 TABLET BY MOUTH EVERY DAY 90 Tab 1    tiZANidine (ZANAFLEX) 2 mg tablet TAKE 1 TABLET BY MOUTH EVERY 8 HOURS AS NEEDED FOR SPASMS      augmented betamethasone dipropionate (DIPROLENE-AF) 0.05 % topical cream THERESE EXT AA BID PRN  0    torsemide (DEMADEX) 10 mg tablet Take 10 mg by mouth daily. 2    carboxymethylcellulose sodium (REFRESH OP) Apply  to eye.  dextran 70/hypromellose (TEARS NATURALE OP) Apply 1 Drop to eye as needed.  CALCIUM-MAGNESIUM-ZINC PO Take 2 Tabs by mouth daily.  mometasone (ELOCON) 0.1 % lotion INSTILL 4 METRIC DROP TWO TIMES DAILY IN EARS, AS NEEDED  3    busPIRone (BUSPAR) 15 mg tablet Take 0.5 Tabs by mouth two (2) times a day. 90 Tab 3    valACYclovir (VALTREX) 1 gram tablet Take 2 Tabs by mouth two (2) times a day. 30 Tab 1    pravastatin (PRAVACHOL) 20 mg tablet Take 1 Tab by mouth nightly. 90 Tab 3    traZODone (DESYREL) 50 mg tablet Take 1 Tab by mouth nightly. (Patient taking differently: Take 50 mg by mouth nightly. Indications: taking 25mg) 90 Tab 3    omega-3 fatty acids-fish oil (FISH OIL) 360-1,200 mg cap Take 1 Tab by mouth daily.  carica papaya (PAPAYA ENZYME) tab Take  by mouth daily as needed (4-5 a day as needed).  fluticasone (FLONASE SENSIMIST) 27.5 mcg/actuation nasal spray 2 Sprays by Nasal route daily.  diclofenac (VOLTAREN) 1 % gel Apply  to affected area as needed.  glucosamine sulfate 1,000 mg cap Take 2 Tabs by mouth daily.  acetaminophen (TYLENOL) 650 mg CR tablet Take 650 mg by mouth every six (6) hours as needed for Pain.  famotidine (PEPCID) 20 mg tablet Take 20 mg by mouth as needed.  melatonin 1 mg tablet Take 1 mg by mouth nightly.  ketoconazole (NIZORAL) 2 % topical cream Apply  to affected area daily. Allergies   Allergen Reactions    Indomethacin Other (comments)     Made her feel weird to the point it scared her  Other reaction(s): Other (see comments)  Made her feel weird to the point it scared her  Made her feel weird to the point it scared her    Other reaction(s):  Other (comments)  Made her feel weird to the point it scared her    Fosamax [Alendronate] Other (comments)     Had a peptic ulcer    Other Medication Nausea and Vomiting     Flu vaccine in [id number]    Cephalexin Nausea and Vomiting    Erythromycin Nausea and Vomiting    Prochlorperazine Other (comments)     Childhood reaction-Neck spasms  Other reaction(s): Other (see comments)  Childhood reaction  Childhood reaction    Other reaction(s): Other (comments)  Childhood reaction-Neck spasms       ROS:  ROS negative except as per HPI. PE:  Visit Vitals  /62 (BP 1 Location: Right arm, BP Patient Position: Sitting)   Pulse (!) 56   Temp 97.2 °F (36.2 °C) (Oral)   Resp 16   Ht 5' 5\" (1.651 m)   Wt 140 lb (63.5 kg)   LMP  (LMP Unknown)   SpO2 99%   BMI 23.30 kg/m²     Gen: alert, oriented, no acute distress  Head: normocephalic, atraumatic  Eyes: pupils equal round reactive to light, sclera clear, conjunctiva clear  Neck: symmetric normal sized thyroid, +faint right carotid bruit, no jugular vein distention  Resp: no increase work of breathing, lungs clear to ausculation bilaterally, no wheezing, rales or rhonchi  CV: S1, S2 normal.  No murmurs, rubs, or gallops. Abd: soft, not tender, not distended. No hepatosplenomegaly. Normal bowel sounds. Neuro: not focal  Skin: no lesion or rash  Extremities: no cyanosis or edema    No results found for this visit on 09/30/19. Assessment/Plan:      ICD-10-CM ICD-9-CM    1. Essential hypertension - 140/58, now being followed by Dr Jerelene Ormond. On lisinopril 60 mg, torsemide 10 mg and amlodipine 5 mg daily, slightly elevated, but reports readings of 309-140 systolic at home, advised to continue home monitoring and report to Dr Jerelene Ormond if not coming down below 140 in next 2 days   I10 401.9    2. CKD (chronic kidney disease) stage 3, GFR 30-59 ml/min ([facility]) - followed by Dr. Jerelene Ormond N18.3 585.3    3. Herpes simplex type 2 infection - flares on buttock B00.9 054.9 valACYclovir (VALTREX) 1 gram tablet   4.  Osteoarthritis of spine with radiculopathy, cervical region - sees Dr. Negin English and Dr. Francesca Mason for injection M47.22 721.0 diclofenac (VOLTAREN) 1 % gel   5. Right carotid bruit - first noted by endocrine Dr. Parker Clancy last month by her report and very faint on exam today, will check doppler R09.89 785.9 [address] [facility] Maintenance reviewed - updated. Current Outpatient Medications   Medication Sig Dispense Refill    valACYclovir (VALTREX) 1 gram tablet Take 2 Tabs by mouth two (2) times a day. Indications: Prevent Recurrent Herpes Simplex Infection 30 Tab 1    diclofenac (VOLTAREN) 1 % gel Apply  to affected area as needed for Pain. 100 g 2    lisinopril (PRINIVIL, ZESTRIL) 40 mg tablet 60 mg.  2    cholecalciferol (VITAMIN D3) 1,000 unit cap Take  by mouth daily.  amLODIPine (NORVASC) 5 mg tablet TAKE 1 TABLET BY MOUTH EVERY DAY 90 Tab 1    tiZANidine (ZANAFLEX) 2 mg tablet TAKE 1 TABLET BY MOUTH EVERY 8 HOURS AS NEEDED FOR SPASMS      augmented betamethasone dipropionate (DIPROLENE-AF) 0.05 % topical cream THERESE EXT AA BID PRN  0    torsemide (DEMADEX) 10 mg tablet Take 10 mg by mouth daily. 2    carboxymethylcellulose sodium (REFRESH OP) Apply  to eye.  dextran 70/hypromellose (TEARS NATURALE OP) Apply 1 Drop to eye as needed.  CALCIUM-MAGNESIUM-ZINC PO Take 2 Tabs by mouth daily.  mometasone (ELOCON) 0.1 % lotion INSTILL 4 METRIC DROP TWO TIMES DAILY IN EARS, AS NEEDED  3    busPIRone (BUSPAR) 15 mg tablet Take 0.5 Tabs by mouth two (2) times a day. 90 Tab 3    pravastatin (PRAVACHOL) 20 mg tablet Take 1 Tab by mouth nightly. 90 Tab 3    traZODone (DESYREL) 50 mg tablet Take 1 Tab by mouth nightly. (Patient taking differently: Take 50 mg by mouth nightly. Indications: taking 25mg) 90 Tab 3    omega-3 fatty acids-fish oil (FISH OIL) 360-1,200 mg cap Take 1 Tab by mouth daily.  carica papaya (PAPAYA ENZYME) tab Take  by mouth daily as needed (4-5 a day as needed).       fluticasone (FLONASE SENSIMIST) 27.5 mcg/actuation nasal spray 2 Sprays by Nasal route daily.  glucosamine sulfate 1,000 mg cap Take 2 Tabs by mouth daily.  acetaminophen (TYLENOL) 650 mg CR tablet Take 650 mg by mouth every six (6) hours as needed for Pain.  famotidine (PEPCID) 20 mg tablet Take 20 mg by mouth as needed.  melatonin 1 mg tablet Take 1 mg by mouth nightly.  ketoconazole (NIZORAL) 2 % topical cream Apply  to affected area daily. Recommended healthy diet low in carbohydrates, fats, sodium and cholesterol. Recommended regular cardiovascular exercise 3-6 times per week for 30-60 minutes daily. Verbal and written instructions (see AVS) provided. Patient expresses understanding of diagnosis and treatment plan. Follow-up and Dispositions    · Return in about 4 months (around 1/30/2020) for HTN, lipids, carotid bruit.        Labs due Jan 2020

## 2019-09-30 NOTE — PROGRESS NOTES
Identified pt with two pt identifiers(name and ). Reviewed record in preparation for visit and have obtained necessary documentation. Chief Complaint   Patient presents with    Referral Follow Up     4 month f/u from Nephrology and ortho. Health Maintenance Due   Topic    COLONOSCOPY     MEDICARE YEARLY EXAM     Influenza Age 5 to Adult         Visit Vitals  /62 (BP 1 Location: Right arm, BP Patient Position: Sitting)   Pulse (!) 56   Temp 97.2 °F (36.2 °C) (Oral)   Resp 16   Ht 5' 5\" (1.651 m)   Wt 140 lb (63.5 kg)   LMP  (LMP Unknown)   SpO2 99%   BMI 23.30 kg/m²     Pain Scale: 3/10    Coordination of Care Questionnaire:  :   1. Have you been to the ER, urgent care clinic since your last visit? Hospitalized since your last visit? No    2. Have you seen or consulted any other health care providers outside of the [address] [place] [address] since your last visit? Include any pap smears or colon screening.  Yes Where: Dermatology 16 Sep 2019 Dr. Thuan Lassiter

## 2019-09-30 NOTE — PATIENT INSTRUCTIONS
Call [phone] for scheduling the ultrasound of your carotid arteries if you do no get a call in the next 48 hours. Learning About High Blood Pressure  What is high blood pressure? Blood pressure is a measure of how hard the blood pushes against the walls of your arteries. It's normal for blood pressure to go up and down throughout the day, but if it stays up, you have high blood pressure. Another name for high blood pressure is hypertension. Two numbers tell you your blood pressure. The first number is the systolic pressure. It shows how hard the blood pushes when your heart is pumping. The second number is the diastolic pressure. It shows how hard the blood pushes between heartbeats, when your heart is relaxed and filling with blood. Your doctor will give you a goal for your blood pressure. Your goal will be based on your health and your age. High blood pressure (hypertension) means that the top number stays high, or the bottom number stays high, or both. High blood pressure increases the risk of stroke, heart attack, and other problems. You and your doctor will talk about your risks of these problems based on your blood pressure. What happens when you have high blood pressure? · Blood flows through your arteries with too much force. Over time, this damages the walls of your arteries. But you can't feel it. High blood pressure usually doesn't cause symptoms. · Fat and calcium start to build up in your arteries. This buildup is called plaque. Plaque makes your arteries narrower and stiffer. Blood can't flow through them as easily. · This lack of good blood flow starts to damage some of the organs in your body. This can lead to problems such as coronary artery disease and heart attack, heart failure, stroke, kidney failure, and eye damage. How can you prevent high blood pressure? · Stay at a healthy weight. · Try to limit how much sodium you eat to less than 2,300 milligrams (mg) a day.  If you limit your sodium to 1,500 mg a day, you can lower your blood pressure even more. ? Buy foods that are labeled \"unsalted,\" \"sodium-free,\" or \"low-sodium. \" Foods labeled \"reduced-sodium\" and \"light sodium\" may still have too much sodium. ? Flavor your food with garlic, lemon juice, onion, vinegar, herbs, and spices instead of salt. Do not use soy sauce, steak sauce, onion salt, garlic salt, mustard, or ketchup on your food. ? Use less salt (or none) when recipes call for it. You can often use half the salt a recipe calls for without losing flavor. · Be physically active. Get at least 30 minutes of exercise on most days of the week. Walking is a good choice. You also may want to do other activities, such as running, swimming, cycling, or playing tennis or team sports. · Limit alcohol to 2 drinks a day for men and 1 drink a day for women. · Eat plenty of fruits, vegetables, and low-fat dairy products. Eat less saturated and total fats. How is high blood pressure treated? · Your doctor will suggest making lifestyle changes to help your heart. For example, your doctor may ask you to eat healthy foods, quit smoking, lose extra weight, and be more active. · If lifestyle changes don't help enough, your doctor may recommend that you take medicine. · When blood pressure is very high, medicines are needed to lower it. Follow-up care is a key part of your treatment and safety. Be sure to make and go to all appointments, and call your doctor if you are having problems. It's also a good idea to know your test results and keep a list of the medicines you take. Where can you learn more? Go to http://alfa-gino.info/. Enter P501 in the search box to learn more about \"Learning About High Blood Pressure. \"  Current as of: April 9, 2019  Content Version: 12.2  © 3121-2887 [employer].  Care instructions adapted under license by [employer] (which disclaims liability or warranty for this information). If you have questions about a medical condition or this instruction, always ask your healthcare professional. [place] [address] any warranty or liability for your use of this information.

## 2019-10-04 ENCOUNTER — HOSPITAL ENCOUNTER (OUTPATIENT)
Dept: VASCULAR SURGERY | Age: 74
Discharge: HOME | End: 2019-10-04
Payer: MEDICARE

## 2019-10-04 DIAGNOSIS — R09.89 RIGHT CAROTID BRUIT: ICD-10-CM

## 2019-10-04 PROBLEM — I65.23 BILATERAL CAROTID ARTERY STENOSIS: Status: ACTIVE | Noted: 2019-10-04

## 2019-10-04 PROBLEM — I67.89 CEREBRAL MICROVASCULAR DISEASE: Status: ACTIVE | Noted: 2019-10-04

## 2019-10-04 LAB
LEFT CCA DIST DIAS: 16.2 CM/S
LEFT CCA DIST SYS: 65.1 CM/S
LEFT CCA PROX DIAS: 21.5 CM/S
LEFT CCA PROX SYS: 75.8 CM/S
LEFT ECA DIAS: 5.91 CM/S
LEFT ECA SYS: 62 CM/S
LEFT ICA DIST DIAS: 30.5 CM/S
LEFT ICA DIST SYS: 90.1 CM/S
LEFT ICA MID DIAS: 25.4 CM/S
LEFT ICA MID SYS: 81 CM/S
LEFT ICA PROX DIAS: 20 CM/S
LEFT ICA PROX SYS: 72.7 CM/S
LEFT ICA/CCA SYS: 1.38
LEFT SUBCLAVIAN DIAS: 0 CM/S
LEFT SUBCLAVIAN SYS: 135 CM/S
LEFT VERTEBRAL DIAS: 13.08 CM/S
LEFT VERTEBRAL SYS: 59.2 CM/S
RIGHT CCA DIST DIAS: 19.2 CM/S
RIGHT CCA DIST SYS: 64.4 CM/S
RIGHT CCA PROX DIAS: 17.5 CM/S
RIGHT CCA PROX SYS: 70.3 CM/S
RIGHT ECA DIAS: 6.03 CM/S
RIGHT ECA SYS: 66.4 CM/S
RIGHT ICA DIST DIAS: 21.8 CM/S
RIGHT ICA DIST SYS: 79.8 CM/S
RIGHT ICA MID DIAS: 16.2 CM/S
RIGHT ICA MID SYS: 65.1 CM/S
RIGHT ICA PROX DIAS: 17.8 CM/S
RIGHT ICA PROX SYS: 58.9 CM/S
RIGHT ICA/CCA SYS: 1.2
RIGHT SUBCLAVIAN DIAS: 0 CM/S
RIGHT SUBCLAVIAN SYS: 121.6 CM/S
RIGHT VERTEBRAL DIAS: 19.55 CM/S
RIGHT VERTEBRAL SYS: 70.7 CM/S

## 2019-10-04 PROCEDURE — 93880 EXTRACRANIAL BILAT STUDY: CPT

## 2019-10-08 NOTE — PROGRESS NOTES
Please notify that carotid test shows mild stenosis less than 50% both carotids. Treatment is to address any risk factors and recheck in 1-2 years. I do NOT recommend baby aspirin due to history of ulcers. Her cholesterol is at goal on pravastatin. Her blood pressure is being controlled by nephrology. Limit alcohol, follow Mediterranean diet, have regular physical activity.

## 2020-01-29 ENCOUNTER — OFFICE VISIT (OUTPATIENT)
Dept: FAMILY MEDICINE CLINIC | Age: 75
End: 2020-01-29

## 2020-01-29 VITALS
WEIGHT: 132.1 LBS | HEART RATE: 66 BPM | SYSTOLIC BLOOD PRESSURE: 125 MMHG | RESPIRATION RATE: 18 BRPM | OXYGEN SATURATION: 98 % | TEMPERATURE: 96.5 F | DIASTOLIC BLOOD PRESSURE: 54 MMHG | HEIGHT: 65 IN | BODY MASS INDEX: 22.01 KG/M2

## 2020-01-29 DIAGNOSIS — M81.0 OSTEOPOROSIS WITHOUT CURRENT PATHOLOGICAL FRACTURE, UNSPECIFIED OSTEOPOROSIS TYPE: ICD-10-CM

## 2020-01-29 DIAGNOSIS — F41.9 ANXIETY: ICD-10-CM

## 2020-01-29 DIAGNOSIS — M47.22 OSTEOARTHRITIS OF SPINE WITH RADICULOPATHY, CERVICAL REGION: ICD-10-CM

## 2020-01-29 DIAGNOSIS — E78.5 HYPERLIPIDEMIA LDL GOAL <70: Primary | ICD-10-CM

## 2020-01-29 DIAGNOSIS — R09.89 RIGHT CAROTID BRUIT: ICD-10-CM

## 2020-01-29 DIAGNOSIS — N18.30 CKD (CHRONIC KIDNEY DISEASE) STAGE 3, GFR 30-59 ML/MIN (HCC): ICD-10-CM

## 2020-01-29 DIAGNOSIS — G47.00 INSOMNIA, UNSPECIFIED TYPE: ICD-10-CM

## 2020-01-29 DIAGNOSIS — I10 ESSENTIAL HYPERTENSION: ICD-10-CM

## 2020-01-29 RX ORDER — TRAZODONE HYDROCHLORIDE 50 MG/1
50 TABLET ORAL
Qty: 90 TAB | Refills: 3 | Status: SHIPPED | OUTPATIENT
Start: 2020-01-29 | End: 2020-12-15

## 2020-01-29 RX ORDER — BUSPIRONE HYDROCHLORIDE 15 MG/1
7.5 TABLET ORAL 2 TIMES DAILY
Qty: 90 TAB | Refills: 3 | Status: SHIPPED | OUTPATIENT
Start: 2020-01-29 | End: 2021-01-18

## 2020-01-29 RX ORDER — PRAVASTATIN SODIUM 20 MG/1
20 TABLET ORAL
Qty: 90 TAB | Refills: 3 | Status: SHIPPED | OUTPATIENT
Start: 2020-01-29 | End: 2020-12-15

## 2020-01-29 NOTE — PROGRESS NOTES
Chris Orantes is a 75 y.o. female  Chief Complaint   Patient presents with    Follow-up     4 month f/u for HTN    Other     Lipids carotid bruit. Health Maintenance Due   Topic Date Due    COLONOSCOPY  11/04/2018    MEDICARE YEARLY EXAM  07/07/2019    Influenza Age 5 to Adult  08/01/2019     Visit Vitals  /54   Pulse 66   Temp 96.5 °F (35.8 °C) (Oral)   Resp 18   Ht 5' 5\" (1.651 m)   Wt 132 lb 1.6 oz (59.9 kg)   SpO2 98%   BMI 21.98 kg/m²     1. Have you been to the ER, urgent care clinic since your last visit? Hospitalized since your last visit? No    2. Have you seen or consulted any other health care providers outside of the [address] [place] [address] since your last visit? Include any pap smears or colon screening.  No.

## 2020-01-29 NOTE — PROGRESS NOTES
Subjective:     Park Bunch is a 75 y.o. female who presents for follow up of hypertension and hyperlipidemia. She is taking medications as directed. She has CKD stage 3, Her HTN is now being followed by renal Dr Scotty Alston. On lisinopril 40 mg, torsemide 10 mg and amlodipine 5 mg daily. She generally follows a low sodium diet  Home BP Monitoring is not measured at home. She denies chest pain, headaches, shortness of breath, vision change and lower extremity edema. She is on pravastatin 20 mg, Labs 1/28/2019 show LDL 57    Right Carotid Bruit - carotid doppler 10/04/2019 shows mild stenosis less than 50% both carotids. I reviewed with her: Treatment is to address any risk factors and recheck in 1-2 years. I do NOT recommend baby aspirin due to history of ulcers. Her cholesterol is at goal on pravastatin. Her blood pressure is being controlled by nephrology. Limit alcohol, follow Mediterranean diet, have regular physical activity. OA Cspine: She is seeing Dr. Rhonda Doll for cervical neck pain, last visit around Sept 2019. She had MRI in June. She followed up with Dr. Wellington Dey, had procedure with an injection on her right upper back for \"pinched nerve\". She uses voltaren gel as needed for muscle pain, with benefit. No oral NSAIDs due to CKD 3. She uses tizanidine prn. Anxiety -  Taking 1/2 tablet of a 15 mg tablet buspar (7.5 mg) BID with relief of her anxiety; for insomnia she takes trazodone 50 mg QHS most nights, sometimes she can take 1/2 tablet (25 mg), but recently had to go up to 50 mg again due to stress at home. Rhinorrhea - profusely on left side, rare sneezing, has h/o seasonal allergies that she has self treated OTC antihistamine; has used Flonase with relief but it's benefit wears off and she often gets nose bleeds.  She wonders if allergic to cats, they got a 2nd one and sxs seem worse, also her sxs are worse at her house and seem to improve when she goes away from home    Osteoporosis - saw endocrine Dr. Luis Soto (I referred for possible Prolia); she had completed Reclast x 4, last treatment 1/2016; Dr. Bobby Corcoran note reviewed from 8/30/2019, he will hold off on treatment for the next 2 yrs, contin calcium and Vit D, repeat DXA 2/2021    Past Medical History:   Diagnosis Date    Anxiety     Arthritis     Chronic obstructive pulmonary disease ([facility] [address] 2010    early disease on PFTs, briefly on a daily inhaler    Chronic pain     neck pain, Dr. Janelle Cm Degenerative disc disease, cervical 1998    cervical spine injections, [facility] Dr. Ryan Tavera kidney     Right    Gallstone 1974    GERD (gastroesophageal reflux disease)     pepcid 20 mg prn (about 2x/month)    Herpes simplex type II infection     Hypercholesterolemia     Hypertension     Nausea & vomiting     During surgery when ether was used    Osteoporosis 2012    Reclast 9/24/2012, 10/09/2013, 10/31/2014, 1/29/2016    PUD (peptic ulcer disease) 2010    endoscopy confirmed, was taking aleve    Stage 3 chronic kidney disease ([facility] [address] 2695-6653    Trauma 1972    right arm fracture surgically repaired, twisted in washing machine     Social History     Tobacco Use    Smoking status: Current Some Day Smoker     Packs/day: 0.25     Years: 41.00     Pack years: 10.25    Smokeless tobacco: Never Used   Substance Use Topics    Alcohol use: Yes     Comment: occasional     family history includes Breast Cancer in her sister; Cancer in her mother; Heart Disease in her father; Psychiatric Disorder in her mother; Stroke in her mother. Outpatient Medications Marked as Taking for the 1/29/20 encounter (Office Visit) with Valeri Leahy PA-C   Medication Sig Dispense Refill    valACYclovir (VALTREX) 1 gram tablet Take 2 Tabs by mouth two (2) times a day. Indications: Prevent Recurrent Herpes Simplex Infection 30 Tab 1    diclofenac (VOLTAREN) 1 % gel Apply  to affected area as needed for Pain.  100 g 2    lisinopril (PRINIVIL, ZESTRIL) 40 mg tablet 60 mg.  2    cholecalciferol (VITAMIN D3) 1,000 unit cap Take  by mouth daily.  amLODIPine (NORVASC) 5 mg tablet TAKE 1 TABLET BY MOUTH EVERY DAY 90 Tab 1    tiZANidine (ZANAFLEX) 2 mg tablet TAKE 1 TABLET BY MOUTH EVERY 8 HOURS AS NEEDED FOR SPASMS      augmented betamethasone dipropionate (DIPROLENE-AF) 0.05 % topical cream THERESE EXT AA BID PRN  0    torsemide (DEMADEX) 10 mg tablet Take 10 mg by mouth daily. 2    carboxymethylcellulose sodium (REFRESH OP) Apply  to eye.  dextran 70/hypromellose (TEARS NATURALE OP) Apply 1 Drop to eye as needed.  CALCIUM-MAGNESIUM-ZINC PO Take 2 Tabs by mouth daily.  mometasone (ELOCON) 0.1 % lotion INSTILL 4 METRIC DROP TWO TIMES DAILY IN EARS, AS NEEDED  3    busPIRone (BUSPAR) 15 mg tablet Take 0.5 Tabs by mouth two (2) times a day. 90 Tab 3    pravastatin (PRAVACHOL) 20 mg tablet Take 1 Tab by mouth nightly. 90 Tab 3    traZODone (DESYREL) 50 mg tablet Take 1 Tab by mouth nightly. (Patient taking differently: Take 50 mg by mouth nightly. Indications: taking 25mg) 90 Tab 3    omega-3 fatty acids-fish oil (FISH OIL) 360-1,200 mg cap Take 1 Tab by mouth daily.  carica papaya (PAPAYA ENZYME) tab Take  by mouth daily as needed (4-5 a day as needed).  fluticasone (FLONASE SENSIMIST) 27.5 mcg/actuation nasal spray 2 Sprays by Nasal route daily.  glucosamine sulfate 1,000 mg cap Take 2 Tabs by mouth daily.  acetaminophen (TYLENOL) 650 mg CR tablet Take 650 mg by mouth every six (6) hours as needed for Pain.  famotidine (PEPCID) 20 mg tablet Take 20 mg by mouth as needed.  melatonin 1 mg tablet Take 1 mg by mouth nightly.  ketoconazole (NIZORAL) 2 % topical cream Apply  to affected area daily. Allergies   Allergen Reactions    Indomethacin Other (comments)     Made her feel weird to the point it scared her  Other reaction(s):  Other (see comments)  Made her feel weird to the point it scared her  Made her feel weird to the point it scared her    Other reaction(s): Other (comments)  Made her feel weird to the point it scared her    Fosamax [Alendronate] Other (comments)     Had a peptic ulcer    Other Medication Nausea and Vomiting     Flu vaccine in [id number]    Cephalexin Nausea and Vomiting    Erythromycin Nausea and Vomiting    Prochlorperazine Other (comments)     Childhood reaction-Neck spasms  Other reaction(s): Other (see comments)  Childhood reaction  Childhood reaction    Other reaction(s): Other (comments)  Childhood reaction-Neck spasms       Review of Systems:  GEN: no weight loss, weight gain, fatigue or night sweats  CV: no PND, orthopnea, or palpitations  Resp: no wheeze, no cough  Abd: no nausea, vomiting or diarrhea  Endocrine: no hair loss, excessive thirst or polyuria    Objective:     Visit Vitals  /54   Pulse 66   Temp 96.5 °F (35.8 °C) (Oral)   Resp 18   Ht 5' 5\" (1.651 m)   Wt 132 lb 1.6 oz (59.9 kg)   LMP  (LMP Unknown)   SpO2 98%   BMI 21.98 kg/m²     General:   alert, cooperative and no distress   Eyes: conjunctivae/sclerae clear. PERRL, EOM's intact   Ears: External auditory canals clear, tympanic membranes clear   Sinuses/Nose: No maxillary or frontal tenderness. Nasal turbinates pale with white mucus. Mouth:  No oral lesions, no pharyngeal erythema, no exudates   Neck: Trachea midline, no thyromegaly or lymphadenopathy; +faint Right bruit   Heart: S1 and S2 normal,no murmurs noted    Lungs: Clear to auscultation bilaterally, no increased work of breathing   Abdomen: Soft, nontender.   Normal bowel sounds   Extremities: No edema or cyanosis       Lab Results   Component Value Date/Time    WBC 6.2 01/28/2019 08:28 AM    HGB 13.4 01/28/2019 08:28 AM    HCT 40.6 01/28/2019 08:28 AM    PLATELET 916 44/42/9194 08:28 AM    MCV 94 01/28/2019 08:28 AM     Lab Results   Component Value Date/Time    Sodium 139 01/28/2019 08:28 AM    Potassium 4.8 01/28/2019 08:28 AM    Chloride 101 01/28/2019 08:28 AM    CO2 22 01/28/2019 08:28 AM    Anion gap 8 08/15/2017 08:00 AM    Glucose 83 01/28/2019 08:28 AM    BUN 13 01/28/2019 08:28 AM    Creatinine 1.08 (H) 01/28/2019 08:28 AM    BUN/Creatinine ratio 12 01/28/2019 08:28 AM    GFR est AA 58 (L) 01/28/2019 08:28 AM    GFR est non-AA 51 (L) 01/28/2019 08:28 AM    Calcium 9.3 01/28/2019 08:28 AM    Bilirubin, total 0.9 01/28/2019 08:28 AM    AST (SGOT) 12 01/28/2019 08:28 AM    Alk. phosphatase 45 01/28/2019 08:28 AM    Protein, total 6.5 01/28/2019 08:28 AM    Albumin 4.4 01/28/2019 08:28 AM    Globulin 2.8 08/15/2017 08:00 AM    A-G Ratio 2.1 01/28/2019 08:28 AM    ALT (SGPT) 10 01/28/2019 08:28 AM     Lab Results   Component Value Date/Time    Cholesterol, total 137 01/28/2019 08:28 AM    HDL Cholesterol 54 01/28/2019 08:28 AM    LDL, calculated 57 01/28/2019 08:28 AM    VLDL, calculated 26 01/28/2019 08:28 AM    Triglyceride 132 01/28/2019 08:28 AM    CHOL/HDL Ratio 2.0 08/15/2017 08:00 AM     Lab Results   Component Value Date/Time    TSH 2.910 01/28/2019 08:28 AM     Lab Results   Component Value Date/Time    Vitamin D 25-Hydroxy 32.2 11/14/2017 11:15 AM    VITAMIN D, 25-HYDROXY 30.2 01/28/2019 08:28 AM           Assessment/Plan:       ICD-10-CM ICD-9-CM    1. Hyperlipidemia LDL goal <70 - pravastatin 20 mg, Labs 1/28/2019 show LDL 57 E78.5 272.4 pravastatin (PRAVACHOL) 20 mg tablet      LIPID PANEL   2. Right carotid bruit -  carotid doppler 10/04/2019 shows mild stenosis less than 50% both carotids. I reviewed with her: Treatment is to address any risk factors and recheck in 1-2 years. I do NOT recommend baby aspirin due to history of ulcers. Her cholesterol is at goal on pravastatin. Her blood pressure is being controlled by nephrology. Limit alcohol, follow Mediterranean diet, have regular physical activity. R09.89 785.9    3. Essential hypertension - 125/54 (previously 140/58) now being followed by Dr J Carlos Lopez.  On lisinopril 40 mg, torsemide 10 mg and amlodipine 5 mg daily E52 390.5 METABOLIC PANEL, COMPREHENSIVE      CBC WITH AUTOMATED DIFF   4. CKD (chronic kidney disease) stage 3, GFR 30-59 ml/min ([facility]) - followed by Dr. Shahzad Carlton, eGFR 51 J21.9 185.8 METABOLIC PANEL, COMPREHENSIVE      VITAMIN D, 25 HYDROXY   5. Osteoarthritis of spine with radiculopathy, cervical region - sees Dr. Blaze Sanchez and Dr. Nikki Campbell for injection; diclofenac gel helps her neck pain, can not use oral NSAIDs due to CKD stage 3, also tizanidine helps per Dr. Casandra Dominguez.   M47.22 721.0    6. Anxiety - Taking 1/2 tablet of a 15 mg tablet buspar (7.5 mg) BID with relief F41.9 300.00 busPIRone (BUSPAR) 15 mg tablet      TSH 3RD GENERATION   7. Insomnia, unspecified type - she takes trazodone 50 mg QHS most nights, sometimes she can take 1/2 tablet (25 mg), but recently had to go up to 50 mg again due to stress at home. G47.00 780.52 traZODone (DESYREL) 50 mg tablet   8. Osteoporosis without current pathological fracture, unspecified osteoporosis type - she had 4 rounds of Reclast, last in 2016. No h/o fragility fracture. DXA on 2/27/2019 shows T score right femoral neck -2.3. FRAX  6.2 % 10 year probability of hip fracturesaw endocrine Dr. Maria  (I referred for possible Prolia); Dr. Rich Denis note reviewed from 8/30/2019, he will hold off on treatment for the next 2 yrs, contin calcium and Vit D, repeat DXA 2/2021 M81.0 733.00 TSH 3RD GENERATION      VITAMIN D, 25 HYDROXY     Medications refilled for new mail order program with new insurance. Recommended healthy diet low in carbohydrates, fats, sodium and cholesterol. Recommended regular cardiovascular exercise 3-6 times per week for 30-60 minutes daily. Verbal and written instructions (see AVS) provided. Patient expresses understanding of diagnosis and treatment plan. Follow-up and Dispositions    · Return in about 4 months (around 5/29/2020) for medicare wellness; also fasting lab appt soon.        Future Appointments   Date Time Provider Amanda Addison   2/6/2020  8:00 AM LAB [facility]   5/27/2020 11:00 AM Valeri Rodriguez PA-C [facility] SCHED

## 2020-01-29 NOTE — PATIENT INSTRUCTIONS
Take Allegra 180 mg (fexofenadine) once daily for several weeks for the runny nose. Schedule fasting lab visit soon. Schedule Medicare Wellness visit this spring Learning About the [address] Diet What is the Mediterranean diet? The Mediterranean diet is a style of eating rather than a diet plan. It features foods eaten in [place], [place], [place] and [place], and other countries along the [facility]. It emphasizes eating foods like fish, fruits, vegetables, beans, high-fiber breads and whole grains, nuts, and olive oil. This style of eating includes limited red meat, cheese, and sweets. Why choose the Mediterranean diet? A Mediterranean-style diet may improve heart health. It contains more fat than other heart-healthy diets. But the fats are mainly from nuts, unsaturated oils (such as fish oils and olive oil), and certain nut or seed oils (such as canola, soybean, or flaxseed oil). These fats may help protect the heart and blood vessels. How can you get started on the Mediterranean diet? Here are some things you can do to switch to a more Mediterranean way of eating. What to eat · Eat a variety of fruits and vegetables each day, such as grapes, blueberries, tomatoes, broccoli, peppers, figs, olives, spinach, eggplant, beans, lentils, and chickpeas. · Eat a variety of whole-grain foods each day, such as oats, brown rice, and whole wheat bread, pasta, and couscous. · Eat fish at least 2 times a week. Try tuna, salmon, mackerel, lake trout, herring, or sardines. · Eat moderate amounts of low-fat dairy products, such as milk, cheese, or yogurt. · Eat moderate amounts of poultry and eggs. · Choose healthy (unsaturated) fats, such as nuts, olive oil, and certain nut or seed oils like canola, soybean, and flaxseed. · Limit unhealthy (saturated) fats, such as butter, palm oil, and coconut oil.  And limit fats found in animal products, such as meat and dairy products made with whole milk. Try to eat red meat only a few times a month in very small amounts. · Limit sweets and desserts to only a few times a week. This includes sugar-sweetened drinks like soda. The Mediterranean diet may also include red wine with your meal1 glass each day for women and up to 2 glasses a day for men. Tips for eating at home · Use herbs, spices, garlic, lemon zest, and citrus juice instead of salt to add flavor to foods. · Add avocado slices to your sandwich instead of gomez. · Have fish for lunch or dinner instead of red meat. Brush the fish with olive oil, and broil or grill it. · Sprinkle your salad with seeds or nuts instead of cheese. · Cook with olive or canola oil instead of butter or oils that are high in saturated fat. · Switch from 2% milk or whole milk to 1% or fat-free milk. · Dip raw vegetables in a vinaigrette dressing or hummus instead of dips made from mayonnaise or sour cream. 
· Have a piece of fruit for dessert instead of a piece of cake. Try baked apples, or have some dried fruit. Tips for eating out · Try broiled, grilled, baked, or poached fish instead of having it fried or breaded. · Ask your  to have your meals prepared with olive oil instead of butter. · Order dishes made with marinara sauce or sauces made from olive oil. Avoid sauces made from cream or mayonnaise. · Choose whole-grain breads, whole wheat pasta and pizza crust, brown rice, beans, and lentils. · Cut back on butter or margarine on bread. Instead, you can dip your bread in a small amount of olive oil. · Ask for a side salad or grilled vegetables instead of french fries or chips. Where can you learn more? Go to http://alfa-gino.info/. Enter 623-632-6068 in the search box to learn more about \"Learning About the Mediterranean Diet. \" Current as of: November 7, 2018 Content Version: 12.2 © 8469-4842 [employer].  Care instructions adapted under license by [address] (which disclaims liability or warranty for this information). If you have questions about a medical condition or this instruction, always ask your healthcare professional. [address] any warranty or liability for your use of this information.

## 2020-02-07 LAB
25(OH)D3+25(OH)D2 SERPL-MCNC: 44.4 NG/ML (ref 30–100)
ALBUMIN SERPL-MCNC: 4.7 G/DL (ref 3.7–4.7)
ALBUMIN/GLOB SERPL: 2.4 {RATIO} (ref 1.2–2.2)
ALP SERPL-CCNC: 52 IU/L (ref 39–117)
ALT SERPL-CCNC: 6 IU/L (ref 0–32)
AST SERPL-CCNC: 10 IU/L (ref 0–40)
BASOPHILS # BLD AUTO: 0.1 X10E3/UL (ref 0–0.2)
BASOPHILS NFR BLD AUTO: 1 %
BILIRUB SERPL-MCNC: 0.7 MG/DL (ref 0–1.2)
BUN SERPL-MCNC: 16 MG/DL (ref 8–27)
BUN/CREAT SERPL: 14 (ref 12–28)
CALCIUM SERPL-MCNC: 9.5 MG/DL (ref 8.7–10.3)
CHLORIDE SERPL-SCNC: 97 MMOL/L (ref 96–106)
CHOLEST SERPL-MCNC: 150 MG/DL (ref 100–199)
CO2 SERPL-SCNC: 22 MMOL/L (ref 20–29)
CREAT SERPL-MCNC: 1.17 MG/DL (ref 0.57–1)
EOSINOPHIL # BLD AUTO: 0.2 X10E3/UL (ref 0–0.4)
EOSINOPHIL NFR BLD AUTO: 3 %
ERYTHROCYTE [DISTWIDTH] IN BLOOD BY AUTOMATED COUNT: 12.1 % (ref 11.7–15.4)
GLOBULIN SER CALC-MCNC: 2 G/DL (ref 1.5–4.5)
GLUCOSE SERPL-MCNC: 87 MG/DL (ref 65–99)
HCT VFR BLD AUTO: 40.2 % (ref 34–46.6)
HDLC SERPL-MCNC: 63 MG/DL
HGB BLD-MCNC: 13.8 G/DL (ref 11.1–15.9)
IMM GRANULOCYTES # BLD AUTO: 0 X10E3/UL (ref 0–0.1)
IMM GRANULOCYTES NFR BLD AUTO: 0 %
INTERPRETATION, 910389: NORMAL
INTERPRETATION: NORMAL
LDLC SERPL CALC-MCNC: 63 MG/DL (ref 0–99)
LYMPHOCYTES # BLD AUTO: 2.6 X10E3/UL (ref 0.7–3.1)
LYMPHOCYTES NFR BLD AUTO: 34 %
MCH RBC QN AUTO: 31.8 PG (ref 26.6–33)
MCHC RBC AUTO-ENTMCNC: 34.3 G/DL (ref 31.5–35.7)
MCV RBC AUTO: 93 FL (ref 79–97)
MONOCYTES # BLD AUTO: 0.6 X10E3/UL (ref 0.1–0.9)
MONOCYTES NFR BLD AUTO: 8 %
NEUTROPHILS # BLD AUTO: 4.1 X10E3/UL (ref 1.4–7)
NEUTROPHILS NFR BLD AUTO: 54 %
PDF IMAGE, 910387: NORMAL
PLATELET # BLD AUTO: 394 X10E3/UL (ref 150–450)
POTASSIUM SERPL-SCNC: 4.8 MMOL/L (ref 3.5–5.2)
PROT SERPL-MCNC: 6.7 G/DL (ref 6–8.5)
RBC # BLD AUTO: 4.34 X10E6/UL (ref 3.77–5.28)
SODIUM SERPL-SCNC: 134 MMOL/L (ref 134–144)
TRIGL SERPL-MCNC: 119 MG/DL (ref 0–149)
TSH SERPL DL<=0.005 MIU/L-ACNC: 2.35 UIU/ML (ref 0.45–4.5)
VLDLC SERPL CALC-MCNC: 24 MG/DL (ref 5–40)
WBC # BLD AUTO: 7.6 X10E3/UL (ref 3.4–10.8)

## 2020-02-10 ENCOUNTER — TELEPHONE (OUTPATIENT)
Dept: FAMILY MEDICINE CLINIC | Age: 75
End: 2020-02-10

## 2020-02-10 RX ORDER — AMLODIPINE BESYLATE 5 MG/1
TABLET ORAL
Qty: 90 TAB | Refills: 1 | Status: SHIPPED | OUTPATIENT
Start: 2020-02-10 | End: 2020-08-04 | Stop reason: SDUPTHER

## 2020-02-11 ENCOUNTER — DOCUMENTATION ONLY (OUTPATIENT)
Dept: FAMILY MEDICINE CLINIC | Age: 75
End: 2020-02-11

## 2020-02-11 NOTE — TELEPHONE ENCOUNTER
Fax was sent to [facility], Fax Number was [phone], at 8:50 am on Tuesday 02/11/2020. Confirmation was Received.

## 2020-02-11 NOTE — TELEPHONE ENCOUNTER
Erendira Hunt, I received request from her dentist regarding planned dental procedure.  I have filled out form and left it on your desk for you to fax back to her dentist.  Aly Wheat

## 2020-02-11 NOTE — PROGRESS NOTES
Fax was sent to [facility], Fax Number was [phone], at 8:50 am on Tuesday 02/11/2020. Confirmation was Received.

## 2020-02-11 NOTE — PROGRESS NOTES
Results reviewed and letter sent. Labs overall look good and stable. Cholesterol looks great and at goal of LDL lower than 70 (your LDL is 63), continue current dose pravastatin. Vitamin D level normal, continue current vitamin D3 supplement. Kidney function remains reduced but stable. Follow with Dr. Dimitris Gupta as scheduled.  Thyroid level normal.

## 2020-02-28 ENCOUNTER — HOSPITAL ENCOUNTER (OUTPATIENT)
Dept: GENERAL RADIOLOGY | Age: 75
Discharge: HOME | End: 2020-02-28
Payer: MEDICARE

## 2020-02-28 DIAGNOSIS — N18.30 CHRONIC KIDNEY DISEASE, STAGE III (MODERATE) (HCC): ICD-10-CM

## 2020-02-28 PROCEDURE — 71046 X-RAY EXAM CHEST 2 VIEWS: CPT

## 2020-03-17 DIAGNOSIS — B00.9 HERPES SIMPLEX TYPE 2 INFECTION: ICD-10-CM

## 2020-03-17 NOTE — TELEPHONE ENCOUNTER
PCP: En Arellano PA-C    Last appt: 2/6/2020  Future Appointments   Date Time Provider Amanda Addison   5/27/2020 11:00 AM Peace Rodriguez PA-C [facility]       Requested Prescriptions     Pending Prescriptions Disp Refills    valACYclovir (VALTREX) 1 gram tablet 30 Tab 1     Sig: Take 2 Tabs by mouth two (2) times a day. Indications: prevent recurrent herpes simplex infection       Pharmacy: [facility]     Patient has 2 days' supply of medication available.     Prior labs and Blood pressures:  BP Readings from Last 3 Encounters:   01/29/20 125/54   09/30/19 140/58   08/30/19 133/60     Lab Results   Component Value Date/Time    Sodium 134 02/06/2020 08:08 AM    Potassium 4.8 02/06/2020 08:08 AM    Chloride 97 02/06/2020 08:08 AM    CO2 22 02/06/2020 08:08 AM    Anion gap 8 08/15/2017 08:00 AM    Glucose 87 02/06/2020 08:08 AM    BUN 16 02/06/2020 08:08 AM    Creatinine 1.17 (H) 02/06/2020 08:08 AM    BUN/Creatinine ratio 14 02/06/2020 08:08 AM    GFR est AA 53 (L) 02/06/2020 08:08 AM    GFR est non-AA 46 (L) 02/06/2020 08:08 AM    Calcium 9.5 02/06/2020 08:08 AM     Lab Results   Component Value Date/Time    Hemoglobin A1c 5.6 07/26/2016 08:15 AM     Lab Results   Component Value Date/Time    Cholesterol, total 150 02/06/2020 08:08 AM    HDL Cholesterol 63 02/06/2020 08:08 AM    LDL, calculated 63 02/06/2020 08:08 AM    VLDL, calculated 24 02/06/2020 08:08 AM    Triglyceride 119 02/06/2020 08:08 AM    CHOL/HDL Ratio 2.0 08/15/2017 08:00 AM     Lab Results   Component Value Date/Time    Vitamin D 25-Hydroxy 32.2 11/14/2017 11:15 AM    VITAMIN D, 25-HYDROXY 44.4 02/06/2020 08:08 AM       Lab Results   Component Value Date/Time    TSH 2.350 02/06/2020 08:08 AM

## 2020-03-18 RX ORDER — VALACYCLOVIR HYDROCHLORIDE 1 G/1
2000 TABLET, FILM COATED ORAL 2 TIMES DAILY
Qty: 30 TAB | Refills: 1 | Status: SHIPPED | OUTPATIENT
Start: 2020-03-18 | End: 2021-01-06

## 2020-05-05 NOTE — DISCHARGE INSTRUCTIONS
Radiofrequency Ablation  Discharge Instructions    You had a radiofrequency ablation today. You will probably have some numbness in your lower back area for the next 6-8 hours. You should begin feeling better after a few days, but it may take up to 2 weeks to notice the difference. The benefit you get from your injection will last a variable amount of time, depending on the severity of your spine problem. · Pain:  Most people do not have any increase in pain after this injection. However, you might experience some soreness a few days at the site of the injection. If this happens, putting an ice pack over the sore area will help. · Bandage: You have a small bandage covering the site of the injection. You may remove it when you get home. · Restrictions:  Some one should drive you home after the injection. After that, you have no restrictions. You may resume your normal level of activity. You may take a shower or bath, and you may eat normally. You should continue your current exercise and/or therapy routine. Medications:  Continue your current medications as prescribed. If you pain decreases, you may reduce the amount of your pain medications. If you stopped taking anticoagulants or blood-thinners before the injection, start them tomorrow. Call Dr. Blade Nolasco at [phone] if you experience:    · Fever (101 degrees Fahrenheit or greater)  · Nausea or vomiting  · Headache unrelieved by your normal pain medication  · Redness or swelling at the injection site that lasts more than 1 day  · New numbness, tingling, weakness, or pain that you didn't have before the injection      If still having pain in 1-2 weeks, call office at [phone] for a follow up appointment.         DISCHARGE SUMMARY from Nurse    The following personal items collected during your admission are returned to you:   Dental Appliance:    Vision:    Hearing Aid:    Jewelry:    Clothing:    Other Valuables:    Valuables sent to safe: If you were given prescriptions, please review the written information on prescribed medications. · You will receive a Post Operative Call from one of the Recovery Room Nurses on the day after your surgery to check on you. It is very important for us to know how you are recovering after your surgery. · You may receive an e-mail or letter in the mail from [employer] regarding your experience with us in the Ambulatory Surgery Unit. Your feedback is valuable to us and we appreciate your participation in the survey. If you have not had your influenza or pneumococcal vaccines, please follow up with your primary care physician. The discharge information has been reviewed with the patient. The patient verbalized understanding. IV discontinued, cath removed intact

## 2020-05-26 ENCOUNTER — TELEPHONE (OUTPATIENT)
Dept: FAMILY MEDICINE CLINIC | Age: 75
End: 2020-05-26

## 2020-05-26 NOTE — TELEPHONE ENCOUNTER
Pt called and verified name and date of birth and pt was notified that it was in regards to a message we had received about her wanting to discuss her labs. Pt was asked if she had received the letter in the mail regarding her labs. Pt was also asking if she still had an appt tomorrow or if it had been rescheduled and if she has an appt with Kenna Hammond NP on 08/04/2020. Pt notified that the appt for tomorrow was cancelled but there is one with Marquis Garcia on 08/04/2020.

## 2020-05-26 NOTE — TELEPHONE ENCOUNTER
----- Message from Ldiia Bashir sent at 5/26/2020 11:25 AM EDT -----  Regarding: NP Michael/Telephone         Please call patient back to discuss labs. Best contact number is [phone].

## 2020-05-27 NOTE — TELEPHONE ENCOUNTER
Called pt to clarify about Labs. Pt states she was just a little concerned because PCP usually goes over Labs with her but she thought she had a appointment tomorrow to discuss, which she don't. Advised pt that Labs were normal and offered to resend letter. Pt states that wont be necessary. Pt states she set up an appointment with NP Aguila Isidro because she was told that PCP had no openings for the date she wanted to be seen. Pt states she has no other questions or concerns as of right now. Verified understanding.

## 2020-05-27 NOTE — TELEPHONE ENCOUNTER
Please let pt know I saw that she called and spoke with Gabriele Sheridan. I can't tell if she received the letter about her labs, and whether or not we told her the results in the last phone call. You can read her my response to her last labs in Feb from the \"letters\" tab at the top of the chart, and mail her another copy if needed, let me know. I am happy to see her in a virtual visit for her routine follow up. She normally sees me, so I didn't know if her appt with London Mclean in August is based on being a Tues since I'm not here on Tuesdays, or if she wanted to see London Mclean specifically. Did she have questions about her labs?

## 2020-08-04 ENCOUNTER — OFFICE VISIT (OUTPATIENT)
Dept: FAMILY MEDICINE CLINIC | Age: 75
End: 2020-08-04
Payer: MEDICARE

## 2020-08-04 VITALS
HEART RATE: 67 BPM | BODY MASS INDEX: 21.33 KG/M2 | SYSTOLIC BLOOD PRESSURE: 132 MMHG | DIASTOLIC BLOOD PRESSURE: 77 MMHG | WEIGHT: 128 LBS | HEIGHT: 65 IN

## 2020-08-04 DIAGNOSIS — E78.5 HYPERLIPIDEMIA LDL GOAL <70: ICD-10-CM

## 2020-08-04 DIAGNOSIS — Z00.00 MEDICARE ANNUAL WELLNESS VISIT, SUBSEQUENT: Primary | ICD-10-CM

## 2020-08-04 DIAGNOSIS — N18.30 CKD (CHRONIC KIDNEY DISEASE) STAGE 3, GFR 30-59 ML/MIN (HCC): ICD-10-CM

## 2020-08-04 DIAGNOSIS — I10 ESSENTIAL HYPERTENSION: ICD-10-CM

## 2020-08-04 DIAGNOSIS — F41.9 ANXIETY: ICD-10-CM

## 2020-08-04 PROCEDURE — G8432 DEP SCR NOT DOC, RNG: HCPCS | Performed by: NURSE PRACTITIONER

## 2020-08-04 PROCEDURE — 1101F PT FALLS ASSESS-DOCD LE1/YR: CPT | Performed by: NURSE PRACTITIONER

## 2020-08-04 PROCEDURE — G8427 DOCREV CUR MEDS BY ELIG CLIN: HCPCS | Performed by: NURSE PRACTITIONER

## 2020-08-04 PROCEDURE — 3017F COLORECTAL CA SCREEN DOC REV: CPT | Performed by: NURSE PRACTITIONER

## 2020-08-04 PROCEDURE — G0439 PPPS, SUBSEQ VISIT: HCPCS | Performed by: NURSE PRACTITIONER

## 2020-08-04 PROCEDURE — G8754 DIAS BP LESS 90: HCPCS | Performed by: NURSE PRACTITIONER

## 2020-08-04 PROCEDURE — G8752 SYS BP LESS 140: HCPCS | Performed by: NURSE PRACTITIONER

## 2020-08-04 RX ORDER — DOXYCYCLINE 100 MG/1
CAPSULE ORAL
COMMUNITY
Start: 2020-07-27 | End: 2021-03-09

## 2020-08-04 RX ORDER — BETAMETHASONE VALERATE 1.2 MG/G
CREAM TOPICAL 2 TIMES DAILY
Qty: 15 G | Refills: 0 | Status: SHIPPED | OUTPATIENT
Start: 2020-08-04 | End: 2021-03-09

## 2020-08-04 RX ORDER — AMLODIPINE BESYLATE 5 MG/1
TABLET ORAL
Qty: 90 TAB | Refills: 1 | Status: SHIPPED | OUTPATIENT
Start: 2020-08-04 | End: 2021-01-27 | Stop reason: SDUPTHER

## 2020-08-04 RX ORDER — MOMETASONE FUROATE 1 MG/ML
SOLUTION TOPICAL
Qty: 30 ML | Refills: 1 | Status: SHIPPED | OUTPATIENT
Start: 2020-08-04

## 2020-08-04 RX ORDER — LEVALBUTEROL TARTRATE 45 UG/1
AEROSOL, METERED ORAL AS NEEDED
COMMUNITY
Start: 2020-07-28 | End: 2021-01-27 | Stop reason: SDUPTHER

## 2020-08-04 NOTE — PATIENT INSTRUCTIONS
1)  
Schedule of Personalized Health Plan The best way to stay healthy is to live a healthy lifestyle. A healthy lifestyle includes regular exercise, eating a well-balanced diet, keeping a healthy weight and not smoking. Regular physical exams and screening tests are another important way to take care of yourself. Preventive exams provided by health care providers can find health problems early when treatment works best and can keep you from getting certain diseases or illnesses. Preventive services include exams, lab tests, screening tests, shots, and learning information to help you take care of your own health. The CDC recommends pneumonia vaccines for anyone 72 years and older. These vaccines are usually only needed once in a lifetime unless your healthcare provider decides differently. The 2 pneumonia shots available presently are PCV 13 (Prevnar 13) and PPSV23 (Pneumovax 23). Adults 72 years or older who have not previously received PCV13, should receive a dose of PCV13 first, followed 1 year later by a dose of PPSV23. All people over 65 should have a yearly flu vaccine. People over 65 are at medium to high risk for Hepatitis B. Hepatitis B is transmitted through body fluids with a common source being sexual activity or IV drug use. Three shots are needed for complete protection. The CDC recommends the herpes zoster (shingles) vaccine for all adults 61 and older, regardless if a prior episode of zoster has been reported. In addition to your physical exam, some screening tests are recommended: 
 
Osteoporosis screening -There are no signs or symptoms of osteoporosis (weakening of bones). You might not know you have the disease until you break a bone. Thats why its so important to get a bone density test to measure your bone strength.  Bone mass measurement is taken with a Dexa scan and is recommended every two years after 72years old or if you have certain risk factors, such as personal history of vertebral fracture or chronic steroid medication use. Diabetes Mellitus screening is recommended every year. This is a blood test, called a hemoglobin A1c, which measures the average blood sugar over a 3 month period. Glaucoma is an eye disease caused by high pressure in the eye. An eye exam is recommended every year. Cardiovascular screening tests that check your cholesterol and other blood fat (lipid) levels are recommended every five years or yearly if you are on medications for cardiovascular disease. Colorectal Cancer screening tests help to find pre-cancerous polyps (growths in the colon) so they can be removed before they turn into cancer. Screening tests are recommended starting at age 48 or earlier if you have a certain risk factors, such as a family history of colon cancer. Breast Cancer screening is done with a mammogram, a low dose x-ray that looks at breast tissue. It is recommended by the [facility] Castro that women 54 years and older get a mammogram every 2 years. After the age of 76, recommendations are based on life expectancy. Cervical Cancer screening is done by a PAP smear during a pelvic exam.  The American College of Obstetricians and Gynecologists states that screening can be discontinued after 72years old if the person has had adequate negative prior screening results and no abnormal history (abnormal = CIN2 or higher level). Here is a list of your current Health Maintenance items including a date when each one is due next: 
Health Maintenance Topic Date Due  Medicare Yearly Exam  07/07/2019  GLAUCOMA SCREENING Q2Y  06/22/2020  Influenza Age 5 to Adult  08/01/2020  Lipid Screen  02/06/2021  
 DTaP/Tdap/Td series (2 - Td) 09/11/2022  Shingrix Vaccine Age 50>  Completed  Pneumococcal 65+ years  Completed You do not have an [address] on file 2) Clinical depression is a medical condition that goes beyond everyday sadness. Depression may cause serious, long-lasting symptoms and often disrupts a persons ability to perform routine tasks. The disorder is the most common psychiatric disorder worldwide. In the [place], about one in six people experiences a bout of clinical depression in their lifetime. There are multiple therapies available to help with depression including psychotherapy, exercise (aerobic exercise and yoga are highly recommended), proper diet and sleep as well as medications. Research shows that psychotherapy and antidepressants may be the best therapies for depression. Please look for a therapist.  Check with your insurance company for referrals for providers in the area that will be covered under your plan. · Keep the numbers for these national suicide hotlines: 8-860-433-TALK (2-242.445.2297) and 9-549-NQPQJTX (0-772.508.1662). If you or someone you know talks about suicide or feeling hopeless, get help right away. ·  
Steps you can do on your own to feel better: 
Deep breathing exercises: Deep breathing triggers a relaxation response, helping to change from the \"fight-or-flight\" response anxiety brings on. Inhale slowly to a count of 4, starting at your belly and then moving into your chest.  Gently hold your breath for 4 counts, then slowly exhale to 4 counts. \"Clench\" exercise - clench various zones in your body for 30 seconds, then an overall body clench Exercise can help many people feel less anxious. Regular cardiovascular exercise (such as fast walking,) release endorphins - the \"feel good\" hormone in our body - and can reduce anxiety. Proper sleep:  Sleep is important to overall health. Not getting enough sleep can cause fatigue, inattention, and irritabililty, causing anxiety levels to increase.  
Healthy Diet: a healthy diet rich in whole grains, vegetables and fruits is healthier than simple carbohydrates found in processed foods. Skipping meals can cause blood sugar to drop and cause jittery feelings that could worsening underlying anxiety. It also a good idea to cut down on or stop drinking coffee and other sources of caffeine. Caffeine can make anxiety worse. Find \"Me\" time - it is important to take some time just to focus on you and help alleviate stress in daily life. This could be daily exercise, walking the dog, sitting in a quiet place without distraction, meditation, etc.  
 
Medical treatments include: ? Psychotherapy  Psychotherapy involves meeting with a mental health counselor to talk about your feelings, relationships, and worries. Therapy can help you find new ways of thinking about your situation so that you feel less anxious. In therapy, you might also learn new skills to reduce anxiety. You can go to Psychology Today's website to find a counselor. · Go to www. [facility]. [employer] 
· Enter your zip code. · Click on your insurance carrier (usually on left side of screen). · Then click any other parameters you desire. This will result in a list of providers. Click on any provider to learn more about them or see the contact information. Please choose a provider, call them, and schedule an appointment. ?Medicines  Medicines used to treat depression and can relieve anxiety. Some people have psychotherapy and take medicines at the same time. Phone apps that can help with anxiety: 
CALM - Use the principles of mindfulness and meditation to ease your mind and keep anxiety at [facility] [address]. The Zafgen interface is just the beginning. Once it opens, there are relaxing sounds and sleep stories. Enjoy guided meditations at various lengths to help with everything from building self-esteem to calming anxiety. DOLORES - Self-help for Anxiety Management - range of self-help methods.  Helps you understand what causes your anxiety, monitor your anxious thoughts and behavior over time and manage your anxiety through self-help exercises and private reflection. SIMPLE HABIT - guided meditation for anxiety relief HXEYHJ  - emotional health assistant. The dorina employs a mood tracker, a chat interface, and guided mindfulness and learns from each user with AI to deliver the most effective support available. [facility] dorina that allows you to speak to a therapist without leaving home, connecting you to [id number] of licensed therapists with medical training. BOOSTERBUDDY This dorina offers a novel way for teens and young adults to improve their resilience and work toward being healthier both physically and emotionally. 7 CUPS 
7 Cups uses trained, volunteer, active listeners to provide free, anonymous, and confidential emotional support to people needing help coping with acute stressors and long-term mental health issues. Alejandrina 23 The MoodTools and FearTools apps provide people with quick resources for tracking their cognitive distortions, activities, and safety plan in case of an emergency. SLEEPBOT SleepBot is a quick and simple way for people to log their sleep and improve their sleep hygiene. [address] UP? [facility] DORINA The [facility] Up? dorina helps people monitor their mood and uses principles of CBT and acceptance commitment therapy (ACT) to help people reframe their thoughts and cognitive distortions. Other apps: [facility], [address] [facility], [place], [employer], [address] [place] [address] Quit That!  - dorina for addictions [address] Clinic: [phone] Crisis: [phone] [address] [facility] Clinic: [phone] Crisis: [phone] [address] [place] [address] Clinic: [phone] Crisis: [phone] 3) Please check your blood pressure through the week at staggered times in the day. (If you check in the morning, it should be at least one hour after your morning blood pressure medications.) Arm monitors are most accurate. If you use a wrist monitor, make sure your wrist is at heart level. You can bring your home monitor to your next visit and have it calibrated with the machine in the office to gauge your readings. Sit  with your feet uncrossed and relax for 5 minutes before taking your BP. Keep a written record of your blood pressure readings and bring it to each appointment. If your systolic (top) blood pressure is consistently greater than 140mmHg or less than [id number], OR the diastolic (bottom) number is consistently greater than 90mmHg or less than 60mmHg, then please schedule an office appointment. Work on healthy eating - no salt diet, more potassium (helps flush out sodium,making healthier heart and arteries) - and incorporating daily exercise into your routine. Losing weight can help reduce your blood pressure! Cardiac symptoms that would need immediate attention include: uncomfortable pressure, squeezing, fullness or pain in the center of your chest. Pain or discomfort in one or both arms, the back, neck, jaw or stomach. Shortness of breath with or without chest discomfort, breaking out in a cold sweat, nausea or lightheadedness. 4) Skin rash on back - lukewarm showers, pat dry. Will send in stronger steroid cream.  
You have been prescribed betamethasone valerate 0.1 % topical cream or ointment. This is a steroid medication. Please use a thin layer to affected area twice a day. Do not use more than 7 days in a row, as this can cause the skin to thin. Do not use in areas of sensitive/thin skin such as groin or around eyes. Discontinue use if you experience a reaction to this medication or rash worsens. 5) Cholesterol well controlled on pravasatin 20mg Try these lifestyle strategies to lower your cholesterol: ? Decrease saturated fats in diet. Saturated fats are found in:  
o meats including fatty beef, pork, lamb, chicken or turkey with skin, and ground beef,  
o high fat cheese,  
o whole fat diary, milk, cream and ice cream, 
o butter 
o most saturated fats are found in animal products ? Eliminate Trans Fats from your diet. Foods that contain TF include: 
o Fast foods: fried chicken, biscuits, fried fish for fried fish sandwichs, Western Alia fries 
o Donuts and muffins 
o Crackers and cookies 
o Cake, cake icing and pies 
o Canned biscuits or refrigerated dough 
o Microwave popcorn 
o Coffee creamer 
o Frozen pizza 
o Stick margarine or vegetable shortening ? Increase the amount of soluble fiber in your diet. Sources of soluble fiber include: 
o oats, peas, beans, apples, citrus fruits, carrots, barley and psyllium ? Include omega-3 fatty acids in your diet, these are found in:  
o FISH! Try and eat 2 servings of fish a week. Good examples include salmon, albacore tuna, halibut, trout and mackerel. 
o Take a fish oil supplement daily ? Look for foods enriched with plant-sterols. There are many products on the market which are fortified with this nutrient including buttery spreads and yogurts. Look for the [employer] and Promise brands. ? Increase your physical activity to at least 150 minutes a week. This is just 5 sessions of thirty minutes a week! ? Losing weight can significantly lower your cholesterol. ? If you are a smoker, QUIT SMOKING, this can significantly lower your cholesterol and overall cardiovascular risk. It also can help to decrease your risk for many other conditions. Atopic Dermatitis: Care Instructions Your Care Instructions Atopic dermatitis (also called eczema) is a skin problem that causes intense itching and a red, raised rash. In severe cases, the rash develops clear fluidfilled blisters. The rash is not contagious.  People with this condition seem to have very sensitive immune systems that are likely to react to things that cause allergies. The immune system is the body's way of fighting infection. There is no cure for atopic dermatitis, but you may be able to control it with care at home. Follow-up care is a key part of your treatment and safety. Be sure to make and go to all appointments, and call your doctor if you are having problems. It's also a good idea to know your test results and keep a list of the medicines you take. How can you care for yourself at home? · Use moisturizer at least twice a day. · If your doctor prescribes a cream, use it as directed. If your doctor prescribes other medicine, take it exactly as directed. · Wash the affected area with water only. Soap can make dryness and itching worse. Pat dry. · Apply a moisturizer after bathing. Use a cream such as Lubriderm, Moisturel, or Cetaphil that does not irritate the skin or cause a rash. Apply the cream while your skin is still damp after lightly drying with a towel. · Use cold, wet cloths to reduce itching. · Keep cool, and stay out of the sun. · If itching affects your normal activities, an over-the-counter antihistamine, such as diphenhydramine (Benadryl) or loratadine (Claritin) may help. Read and follow all instructions on the label. When should you call for help? Call your doctor now or seek immediate medical care if: 
· Your rash gets worse and you have a fever. · You have new blisters or bruises, or the rash spreads and looks like a sunburn. · You have signs of infection, such as: 
? Increased pain, swelling, warmth, or redness. ? Red streaks leading from the rash. ? Pus draining from the rash. ? A fever. · You have crusting or oozing sores. · You have joint aches or body aches along with your rash. Watch closely for changes in your health, and be sure to contact your doctor if: · Your rash does not clear up after 2 to 3 weeks of home treatment. · Itching interferes with your sleep or daily activities. Where can you learn more? Go to http://alfa-gino.info/ Enter Q730 in the search box to learn more about \"Atopic Dermatitis: Care Instructions. \" Current as of: October 31, 2019               Content Version: 12.5 © 2213-1203 [employer]. Care instructions adapted under license by [employer] (which disclaims liability or warranty for this information). If you have questions about a medical condition or this instruction, always ask your healthcare professional. [place] [address] any warranty or liability for your use of this information. Well Visit, Over 72: Care Instructions Your Care Instructions Physical exams can help you stay healthy. Your doctor has checked your overall health and may have suggested ways to take good care of yourself. He or she also may have recommended tests. At home, you can help prevent illness with healthy eating, regular exercise, and other steps. Follow-up care is a key part of your treatment and safety. Be sure to make and go to all appointments, and call your doctor if you are having problems. It's also a good idea to know your test results and keep a list of the medicines you take. How can you care for yourself at home? · Reach and stay at a healthy weight. This will lower your risk for many problems, such as obesity, diabetes, heart disease, and high blood pressure. · Get at least 30 minutes of exercise on most days of the week. Walking is a good choice. You also may want to do other activities, such as running, swimming, cycling, or playing tennis or team sports. · Do not smoke. Smoking can make health problems worse. If you need help quitting, talk to your doctor about stop-smoking programs and medicines. These can increase your chances of quitting for good. · Protect your skin from too much sun. When you're outdoors from 10 a.m. to 4 p.m., stay in the shade or cover up with clothing and a hat with a wide brim. Wear sunglasses that block UV rays. Even when it's cloudy, put broad-spectrum sunscreen (SPF 30 or higher) on any exposed skin. · See a dentist one or two times a year for checkups and to have your teeth cleaned. · Wear a seat belt in the car. Follow your doctor's advice about when to have certain tests. These tests can spot problems early. For men and women · Cholesterol. Your doctor will tell you how often to have this done based on your overall health and other things that can increase your risk for heart attack and stroke. · Blood pressure. Have your blood pressure checked during a routine doctor visit. Your doctor will tell you how often to check your blood pressure based on your age, your blood pressure results, and other factors. · Diabetes. Ask your doctor whether you should have tests for diabetes. · Vision. Experts recommend that you have yearly exams for glaucoma and other age-related eye problems. · Hearing. Tell your doctor if you notice any change in your hearing. You can have tests to find out how well you hear. · Colon cancer tests. Keep having colon cancer tests as your doctor recommends. You can have one of several types of tests. · Heart attack and stroke risk. At least every 4 to 6 years, you should have your risk for heart attack and stroke assessed. Your doctor uses factors such as your age, blood pressure, cholesterol, and whether you smoke or have diabetes to show what your risk for a heart attack or stroke is over the next 10 years. · Osteoporosis. Talk to your doctor about whether you should have a bone density test to find out whether you have thinning bones. Ask your doctor if you need to take a calcium plus vitamin D supplement. You may be able to get enough calcium and vitamin D through your diet. For women · Pap test and pelvic exam. You may no longer need a Pap test. Talk with your doctor about whether to stop or continue to have Pap tests. · Breast exam and mammogram. Ask how often you should have a mammogram, which is an X-ray of your breasts. A mammogram can spot breast cancer before it can be felt and when it is easiest to treat. · Thyroid disease. Talk to your doctor about whether to have your thyroid checked as part of a regular physical exam. Women have an increased chance of a thyroid problem. For men · Prostate exam. Talk to your doctor about whether you should have a blood test (called a PSA test) for prostate cancer. Experts recommend that you discuss the benefits and risks of the test with your doctor before you decide whether to have this test. Some experts say that men ages 79 and older no longer need testing. · Abdominal aortic aneurysm. Ask your doctor whether you should have a test to check for an aneurysm. You may need a test if you ever smoked or if your parent, brother, sister, or child has had an aneurysm. When should you call for help? Watch closely for changes in your health, and be sure to contact your doctor if you have any problems or symptoms that concern you. Where can you learn more? Go to http://alfa-gino.info/ Enter M016 in the search box to learn more about \"Well Visit, Over 65: Care Instructions. \" Current as of: August 22, 2019               Content Version: 12.5 © 5588-6324 [employer]wise[employer]. Care instructions adapted under license by [employer] (which disclaims liability or warranty for this information). If you have questions about a medical condition or this instruction, always ask your healthcare professional. [place] [address] any warranty or liability for your use of this information.

## 2020-08-04 NOTE — PROGRESS NOTES
Identified pt with two pt identifiers(name and ). Reviewed record in preparation for visit and have obtained necessary documentation. Chief Complaint   Patient presents with    Annual Wellness Visit     send [email or web address] link to email:  [email or web address]    Hypertension     4mo f/u    Grief Counseling     family member passed recently        Health Maintenance Due   Topic    Medicare Yearly Exam     GLAUCOMA SCREENING Q2Y     Influenza Age 5 to Adult        Coordination of Care Questionnaire:  :   1) Have you been to an emergency room, urgent care, or hospitalized since your last visit? If yes, where when, and reason for visit? yes   - [facility]    2. Have seen or consulted any other health care provider since your last visit? If yes, where when, and reason for visit? NO      Patient is accompanied by self I have received verbal consent from Martha Rhodes to discuss any/all medical information while they are present in the room.

## 2020-08-04 NOTE — PROGRESS NOTES
Assessment/Plan: Virtual Visit    1. Medicare annual wellness visit, subsequent  -discussed healthy diet and increasing daily exercise  -discussed labs today   -HM: UTD, will get flu vaccine in fall    2. Essential hypertension  -current therapy: amlodipine 5mg + lisinopril 10mg   -BP at home: 130/80's  -advised to monitor BP at home and keep log; goal<140/90 make OV if consistently above goal or <110/60    3. Anxiety  -current therapy: trazodone 50mg + buspirone 15mg prn  -grandson committed suicide 2 weeks ago (hx of SA); daughter are with her to help her through  -discussed taking 1/2 tab of trazodone if she wakes up at 2-3am and can't fall back asleep    4. CKD (chronic kidney disease) stage 3, GFR 30-59 ml/min (HCC)  -current therapy: torsemide 10mg   -mgd by Dr. Juanis Carney    5. Hyperlipidemia LDL goal <70  -current therapy: pravastatin 20mg   -2020: TC: 150, T; HDL: 63; LDL: 63    21 or more minutes were spent on the digital evaluation and management of this patient. RTC 1 year for Alfonso Acosta                Date of visit: 2020       This is an Subsequent Medicare Annual Wellness Visit (AWV), (Performed more than 12 months after effective date of Medicare Part B enrollment and 12 months after last preventive visit)    I have reviewed the patient's medical history in detail and updated the computerized patient record. Tiffanie Joseph is a 75 y.o. female   History obtained from: daughter and the patient.   Patient lives: independently    Cognitive Impairment concerns: no    History     Patient Active Problem List   Diagnosis Code    Anxiety F41.9    Essential hypertension I10    Osteoarthritis of cervical spine M47.812    Hyperlipidemia LDL goal <70 E78.5    Herpes simplex type 2 infection B00.9    Sinus disorder J34.9    CKD (chronic kidney disease) stage 3, GFR 30-59 ml/min (HCC) N18.3    GERD (gastroesophageal reflux disease) K21.9    Chronic pain G89.29    Osteoporosis M81.0    Bilateral carotid artery stenosis I65.23    Cerebral microvascular disease I67.9     Past Medical History:   Diagnosis Date    Anxiety     Arthritis     Chronic obstructive pulmonary disease ([facility] [address] 2010    early disease on PFTs, briefly on a daily inhaler    Chronic pain     neck pain, Dr. Ifeoma Kamara Degenerative disc disease, cervical 1998    cervical spine injections, [facility] Dr. Cristal Hoyt kidney     Right    Gallstone 1974    GERD (gastroesophageal reflux disease)     pepcid 20 mg prn (about 2x/month)    Herpes simplex type II infection     Hypercholesterolemia     Hypertension     Nausea & vomiting     During surgery when ether was used    Osteoporosis 2012    Reclast 9/24/2012, 10/09/2013, 10/31/2014, 1/29/2016    PUD (peptic ulcer disease) 2010    endoscopy confirmed, was taking aleve    Stage 3 chronic kidney disease ([facility] [address] 2853-7703    Trauma 1972    right arm fracture surgically repaired, twisted in washing machine      Past Surgical History:   Procedure Laterality Date    HX APPENDECTOMY      HX BREAST BIOPSY Right     negative 1987    HX CATARACT REMOVAL Bilateral 07/09/2018    HX CHOLECYSTECTOMY  1974    HX HYSTERECTOMY  1974    HX ORTHOPAEDIC  1972    R arm broken, pins put in, taken out 1 year later   Uri Black    from bone on L leg      Allergies   Allergen Reactions    Indomethacin Other (comments)     Made her feel weird to the point it scared her  Other reaction(s): Other (see comments)  Made her feel weird to the point it scared her  Made her feel weird to the point it scared her    Other reaction(s):  Other (comments)  Made her feel weird to the point it scared her    Fosamax [Alendronate] Other (comments)     Had a peptic ulcer    Other Medication Nausea and Vomiting     Flu vaccine in 6715-6918    Cephalexin Nausea and Vomiting    Erythromycin Nausea and Vomiting    Prochlorperazine Other (comments)     Childhood reaction-Neck spasms  Other reaction(s): Other (see comments)  Childhood reaction  Childhood reaction    Other reaction(s): Other (comments)  Childhood reaction-Neck spasms     Current Outpatient Medications   Medication Sig Dispense Refill    valACYclovir (VALTREX) 1 gram tablet Take 2 Tabs by mouth two (2) times a day. Indications: prevent recurrent herpes simplex infection 30 Tab 1    amLODIPine (NORVASC) 5 mg tablet TAKE 1 TABLET BY MOUTH EVERY DAY 90 Tab 1    busPIRone (BUSPAR) 15 mg tablet Take 0.5 Tabs by mouth two (2) times a day. Indications: repeated episodes of anxiety 90 Tab 3    pravastatin (PRAVACHOL) 20 mg tablet Take 1 Tab by mouth nightly. 90 Tab 3    traZODone (DESYREL) 50 mg tablet Take 1 Tab by mouth nightly. 90 Tab 3    diclofenac (VOLTAREN) 1 % gel Apply  to affected area as needed for Pain. 100 g 2    lisinopril (PRINIVIL, ZESTRIL) 40 mg tablet 60 mg.  2    cholecalciferol (VITAMIN D3) 1,000 unit cap Take  by mouth daily.  tiZANidine (ZANAFLEX) 2 mg tablet TAKE 1 TABLET BY MOUTH EVERY 8 HOURS AS NEEDED FOR SPASMS      augmented betamethasone dipropionate (DIPROLENE-AF) 0.05 % topical cream THERESE EXT AA BID PRN  0    torsemide (DEMADEX) 10 mg tablet Take 10 mg by mouth daily. 2    carboxymethylcellulose sodium (REFRESH OP) Apply  to eye.  dextran 70/hypromellose (TEARS NATURALE OP) Apply 1 Drop to eye as needed.  CALCIUM-MAGNESIUM-ZINC PO Take 2 Tabs by mouth daily.  mometasone (ELOCON) 0.1 % lotion INSTILL 4 METRIC DROP TWO TIMES DAILY IN EARS, AS NEEDED  3    omega-3 fatty acids-fish oil (FISH OIL) 360-1,200 mg cap Take 1 Tab by mouth daily.  carica papaya (PAPAYA ENZYME) tab Take  by mouth daily as needed (4-5 a day as needed).  fluticasone (FLONASE SENSIMIST) 27.5 mcg/actuation nasal spray 2 Sprays by Nasal route daily.  glucosamine sulfate 1,000 mg cap Take 2 Tabs by mouth daily.       acetaminophen (TYLENOL) 650 mg CR tablet Take 650 mg by mouth every six (6) hours as needed for Pain.      famotidine (PEPCID) 20 mg tablet Take 20 mg by mouth as needed.  melatonin 1 mg tablet Take 1 mg by mouth nightly.  ketoconazole (NIZORAL) 2 % topical cream Apply  to affected area daily. Family History   Problem Relation Age of Onset    Cancer Mother         Cervical    Stroke Mother     Psychiatric Disorder Mother     Heart Disease Father     Breast Cancer Sister         in her 52's     Social History     Tobacco Use    Smoking status: Current Some Day Smoker     Packs/day: 0.25     Years: 41.00     Pack years: 10.25    Smokeless tobacco: Never Used   Substance Use Topics    Alcohol use: Yes     Comment: occasional          Specialists/Care Team   Mauri Seo has established care with the following healthcare providers:  Patient Care Team:  Rizwana Devlin PA-C as PCP - General (Physician Assistant)  Rizwana Devlin [facility]-C [facility]  Pasquale Aranda MD (Ophthalmology)  Traci Dwyer MD (Nephrology)    Health Risk Assessment     Demographics   female  75 y.o. General Health Questions   -During the past 4 weeks: How would you rate your health in general? Fair  How much have you been bothered by bodily pain? moderately  Has your physical and emotional health limited your social activities with family or friends? no    Emotional Health Questions     3 most recent PHQ Screens 1/29/2020   Little interest or pleasure in doing things Not at all   Feeling down, depressed, irritable, or hopeless Not at all   Total Score PHQ 2 0     Health Habits   Please describe your diet habits: overall healthy  Do you get 5 servings of fruits or vegetables daily? no  Do you exercise regularly?  no    Alcohol and Tobacco Risk Factor Screening:     Social History     Tobacco Use   Smoking Status Current Some Day Smoker    Packs/day: 0.25    Years: 41.00    Pack years: 10.25   Smokeless Tobacco Never Used     Social History     Substance and Sexual Activity   Alcohol Use Yes    Comment: occasional         Activities of Daily Living and Functional Status   Do you need help with eating, walking, dressing, bathing, toileting, the phone, transportation, shopping, preparing meals, housework, laundry, or medications:  no  -Do you need help managing money? no  -In the past four weeks, was someone available to help you if you needed and wanted help with anything? yes  -Are you confident are you that you can control and manage most of your health problems? yes  -Have you been given information to help you keep track of your medications? no  -How often do you have trouble taking your medications as prescribed? never    Hearing Loss   Have you noticed any hearing difficulties? Yes - wears hearing aids    Fall Risk and Home Safety   How often have you been bothered by feeling dizzy when standing up? never  Have you fallen 2 or more times in the past year? no  Does your home have good lighting, grab bars in the bathroom, handrails on the stairs, good lighting? yes  Does you home have throw rugs on floor or clutter you can trip over? no  Do you have smoke detectors and check them regularly? yes  Do you have difficulties driving a car? no  Do you always fasten your seat belt when you are in a car? yes  Fall Risk Assessment:    Fall Risk Assessment, last 12 mths 1/29/2020   Able to walk? Yes   Fall in past 12 months? Yes   Fall with injury? No   Number of falls in past 12 months 1   Fall Risk Score 1         Abuse Screen   Patient is not abused    Evaluation of Cognitive Function   Mood/affect:  neutral  Orientation: Person, Place, Time and Situation  Appearance: age appropriate  Family member/caregiver input: daughter    Physical Examination     Vitals:    08/04/20 1354   Height: 5' 5\" (1.651 m)     Body mass index is 21.98 kg/m². No exam data present  Patient's timed Up & Go test steady or shorter than 30 seconds?  yes  (pt got up to show skin on back)    Advice/Referrals/Counseling (as indicated)   Education and counseling provided for any problems identified above:   Screenings   Vaccines  Annual Flu shot  Healthy eating  Daily exercise    Preventive Services       (Preventive care checklist to be included in patient instructions)  Discussed today Done Previously Not Needed    x   Glaucoma screening   x 2013  Colorectal cancer screening (colonoscopy)    2019- Oporosis  Osteoporosis Screening (DEXA scan)   x 2019  Breast cancer Screening (Mammogram)   x  x Cervical cancer Screening (Pap smear)     x Prostate cancer Screening    2020  Cardiovascular Screening (Lipid panel)   x  x Diabetes screening test (Hgb A1c)     x Abdominal ultrasound for AAA (65-75 male smokers)     x Low-dose CT for lung cancer screening   x   Flu vaccine     x Hepatitis B vaccine (if at risk)    2017  Pneumococcal 23  Prevnar 13    2012  Tdap vaccine    2018  Shingles vaccine     x Screening for Hepatitis C (b 5733-8570)     Discussion of Advance Directive     Patient was offered the opportunity to discuss advance care planning:  yes     Does patient have an Advance Directive:  no   If no, did you provide information on Caring Connections?   yes       Assessment/Plan   Z00.00    Grandson commited suicide 2 weeks ago - he had been fighting  drug addiction and he had fight with his girlfriend     Staying home as much as possible   Going to vet with dog - dauschund; under feet; discussed fall precaution     HTN  Current therapy: amlodipine 5mg + lisinopril 10mg   BP at home: haven't been taking much, has been up and down d/t anxiety   132/77    Anxiety  Current therapy: trazodone 50mg + buspirone 15mg   Has had poor sleeping - waking up     GERD  Current therapy: pepcid 20mg prn     CKD3  Current therapy: torsemide 10mg     XOL  Current therapy: pravastatin 20mg   2020: TC: 150, T; HDL: 63; LDL: 63    Drinks [employer] with milk   Not a big breakfast eater    Had dry eyes in 2019 - saws optometrist   Had tear duct plugs, but kept falling out, so just using artifical tears now    Skin rash on back   Can see on video erythema on left midback. Can't determine if edema is evident on video. Pt denies skin breakdown  Itchy - wants to just scratch her back off  [place], stings when in shower  Discussed supportive tx including lukewarm showers, patting area dry  Was using betamethasone 0.05% - not working well  Will trial betamethasone valerate 0.1%       Shira Rossdwaine, who was evaluated through a patient-initiated, synchronous (real-time) audio-video encounter, and/or her healthcare decision maker, is aware that it is a billable service, with coverage as determined by her insurance carrier. She provided verbal consent to proceed: Yes, and patient identification was verified. It was conducted pursuant to the emergency declaration under the [address] [place] [address] authority and the [employer] General Act. A caregiver was present when appropriate. Ability to conduct physical exam was limited. I was in the office. The patient was at home.       Walter Matos NP

## 2020-12-07 ENCOUNTER — VIRTUAL VISIT (OUTPATIENT)
Dept: FAMILY MEDICINE CLINIC | Age: 75
End: 2020-12-07
Payer: MEDICARE

## 2020-12-07 DIAGNOSIS — R55 SYNCOPE, UNSPECIFIED SYNCOPE TYPE: Primary | ICD-10-CM

## 2020-12-07 DIAGNOSIS — M47.22 OSTEOARTHRITIS OF SPINE WITH RADICULOPATHY, CERVICAL REGION: ICD-10-CM

## 2020-12-07 PROCEDURE — 99213 OFFICE O/P EST LOW 20 MIN: CPT | Performed by: FAMILY MEDICINE

## 2020-12-07 PROCEDURE — G8756 NO BP MEASURE DOC: HCPCS | Performed by: FAMILY MEDICINE

## 2020-12-07 PROCEDURE — 3017F COLORECTAL CA SCREEN DOC REV: CPT | Performed by: FAMILY MEDICINE

## 2020-12-07 PROCEDURE — 1090F PRES/ABSN URINE INCON ASSESS: CPT | Performed by: FAMILY MEDICINE

## 2020-12-07 PROCEDURE — 1101F PT FALLS ASSESS-DOCD LE1/YR: CPT | Performed by: FAMILY MEDICINE

## 2020-12-07 PROCEDURE — G8428 CUR MEDS NOT DOCUMENT: HCPCS | Performed by: FAMILY MEDICINE

## 2020-12-07 PROCEDURE — G8432 DEP SCR NOT DOC, RNG: HCPCS | Performed by: FAMILY MEDICINE

## 2020-12-07 RX ORDER — DICLOFENAC SODIUM 10 MG/G
GEL TOPICAL AS NEEDED
Qty: 100 G | Refills: 0 | Status: SHIPPED | OUTPATIENT
Start: 2020-12-07 | End: 2022-02-14 | Stop reason: SDUPTHER

## 2020-12-07 NOTE — PROGRESS NOTES
**THIS IS A VIRTUAL VISIT VIA A VIDEO SYNCHRONOUS DISCUSSION OVER DOXY. ME PATIENT AGREED TO HAVE THEIR CARE DELIVERED OVER A [employer] VIDEO VISIT IN PLACE OF THEIR REGULARLY SCHEDULED OFFICE VISIT**       Rogers Gibson is a 75 y.o. female who was seen by synchronous (real-time) audio-video technology on 12/7/2020 for Loss of Consciousness        Assessment & Plan:   Diagnoses and all orders for this visit:    1. Syncope, unspecified syncope type  -     REFERRAL TO CARDIOLOGY    2. Osteoarthritis of spine with radiculopathy, cervical region  -     diclofenac (VOLTAREN) 1 % gel; Apply  to affected area as needed for Pain. Subjective:     Syncopal episode last week in [place]. Lots of BMs x 7. Felt nauseated on toilet. Stood up and felt pain in back and passed out. No previous episodes. More sleepy today. No fever. Watch for mucus and fever. Prior to Admission medications    Medication Sig Start Date End Date Taking? Authorizing Provider   doxycycline (VIBRAMYCIN) 100 mg capsule TK ONE C PO  Q 12 H 7/27/20   Provider, Historical   levalbuterol tartrate (XOPENEX) 45 mcg/actuation inhaler Take  by inhalation as needed. 7/28/20   Provider, Historical   mometasone (ELOCON) 0.1 % lotion INSTILL 4 METRIC DROP TWO TIMES DAILY IN EARS, AS NEEDED 8/4/20   Lu BRADLEY, NP   amLODIPine (NORVASC) 5 mg tablet TAKE 1 TABLET BY MOUTH EVERY DAY 8/4/20   Arthea Inches, NP   betamethasone valerate (VALISONE) 0.1 % topical cream Apply  to affected area two (2) times a day. 8/4/20   Arthea Inches, NP   valACYclovir (VALTREX) 1 gram tablet Take 2 Tabs by mouth two (2) times a day. Indications: prevent recurrent herpes simplex infection 3/18/20   Valeri Rodriguez PA-C   busPIRone (BUSPAR) 15 mg tablet Take 0.5 Tabs by mouth two (2) times a day. Indications: repeated episodes of anxiety 1/29/20   Valeri Rodriguez PA-C   pravastatin (PRAVACHOL) 20 mg tablet Take 1 Tab by mouth nightly.  1/29/20   Valeri Rodriguez ZAIDA   traZODone (DESYREL) 50 mg tablet Take 1 Tab by mouth nightly. 1/29/20   Valeri Rodriguez PA-C   diclofenac (VOLTAREN) 1 % gel Apply  to affected area as needed for Pain. 9/30/19   Valeri Rodriguez PA-C   lisinopril (PRINIVIL, ZESTRIL) 40 mg tablet Take 60 mg by mouth daily. 8/8/19   Provider, Historical   tiZANidine (ZANAFLEX) 2 mg tablet TAKE 1 TABLET BY MOUTH EVERY 8 HOURS AS NEEDED FOR SPASMS 6/18/19   Provider, Historical   torsemide (DEMADEX) 10 mg tablet Take 10 mg by mouth daily. 5/3/19   Provider, Historical   carboxymethylcellulose sodium (REFRESH OP) Apply  to eye. Provider, Historical   dextran 70/hypromellose (TEARS NATURALE OP) Apply 1 Drop to eye as needed. Provider, Historical   CALCIUM-MAGNESIUM-ZINC PO Take 2 Tabs by mouth daily. Provider, Historical   omega-3 fatty acids-fish oil (FISH OIL) 360-1,200 mg cap Take 1 Tab by mouth daily. Provider, Historical   carica papaya (PAPAYA ENZYME) tab Take  by mouth daily as needed (4-5 a day as needed). Provider, Historical   fluticasone (FLONASE SENSIMIST) 27.5 mcg/actuation nasal spray 2 Sprays by Nasal route daily. Provider, Historical   glucosamine sulfate 1,000 mg cap Take 2 Tabs by mouth daily. Provider, Historical   acetaminophen (TYLENOL) 650 mg CR tablet Take 650 mg by mouth every six (6) hours as needed for Pain. Provider, Historical   famotidine (PEPCID) 20 mg tablet Take 20 mg by mouth as needed. Provider, Historical   melatonin 1 mg tablet Take 1 mg by mouth nightly. Provider, Historical   ketoconazole (NIZORAL) 2 % topical cream Apply  to affected area daily.     Provider, Historical     Patient Active Problem List   Diagnosis Code    Anxiety F41.9    Essential hypertension I10    Osteoarthritis of cervical spine M47.812    Hyperlipidemia LDL goal <70 E78.5    Herpes simplex type 2 infection B00.9    Sinus disorder J34.9    CKD (chronic kidney disease) stage 3, GFR 30-59 ml/min N18.30    GERD (gastroesophageal reflux disease) K21.9    Chronic pain G89.29    Osteoporosis M81.0    Bilateral carotid artery stenosis I65.23    Cerebral microvascular disease I67.89     Current Outpatient Medications   Medication Sig Dispense Refill    diclofenac (VOLTAREN) 1 % gel Apply  to affected area as needed for Pain. 100 g 0    doxycycline (VIBRAMYCIN) 100 mg capsule TK ONE C PO  Q 12 H      levalbuterol tartrate (XOPENEX) 45 mcg/actuation inhaler Take  by inhalation as needed.  mometasone (ELOCON) 0.1 % lotion INSTILL 4 METRIC DROP TWO TIMES DAILY IN EARS, AS NEEDED 30 mL 1    amLODIPine (NORVASC) 5 mg tablet TAKE 1 TABLET BY MOUTH EVERY DAY 90 Tab 1    betamethasone valerate (VALISONE) 0.1 % topical cream Apply  to affected area two (2) times a day. 15 g 0    valACYclovir (VALTREX) 1 gram tablet Take 2 Tabs by mouth two (2) times a day. Indications: prevent recurrent herpes simplex infection 30 Tab 1    busPIRone (BUSPAR) 15 mg tablet Take 0.5 Tabs by mouth two (2) times a day. Indications: repeated episodes of anxiety 90 Tab 3    pravastatin (PRAVACHOL) 20 mg tablet Take 1 Tab by mouth nightly. 90 Tab 3    traZODone (DESYREL) 50 mg tablet Take 1 Tab by mouth nightly. 90 Tab 3    lisinopril (PRINIVIL, ZESTRIL) 40 mg tablet Take 60 mg by mouth daily. 2    tiZANidine (ZANAFLEX) 2 mg tablet TAKE 1 TABLET BY MOUTH EVERY 8 HOURS AS NEEDED FOR SPASMS      torsemide (DEMADEX) 10 mg tablet Take 10 mg by mouth daily. 2    carboxymethylcellulose sodium (REFRESH OP) Apply  to eye.  dextran 70/hypromellose (TEARS NATURALE OP) Apply 1 Drop to eye as needed.  CALCIUM-MAGNESIUM-ZINC PO Take 2 Tabs by mouth daily.  omega-3 fatty acids-fish oil (FISH OIL) 360-1,200 mg cap Take 1 Tab by mouth daily.  carica papaya (PAPAYA ENZYME) tab Take  by mouth daily as needed (4-5 a day as needed).  fluticasone (FLONASE SENSIMIST) 27.5 mcg/actuation nasal spray 2 Sprays by Nasal route daily.       glucosamine sulfate 1,000 mg cap Take 2 Tabs by mouth daily.  acetaminophen (TYLENOL) 650 mg CR tablet Take 650 mg by mouth every six (6) hours as needed for Pain.  famotidine (PEPCID) 20 mg tablet Take 20 mg by mouth as needed.  melatonin 1 mg tablet Take 1 mg by mouth nightly.  ketoconazole (NIZORAL) 2 % topical cream Apply  to affected area daily. Allergies   Allergen Reactions    Indomethacin Other (comments)     Made her feel weird to the point it scared her  Other reaction(s): Other (see comments)  Made her feel weird to the point it scared her  Made her feel weird to the point it scared her    Other reaction(s): Other (comments)  Made her feel weird to the point it scared her    Fosamax [Alendronate] Other (comments)     Had a peptic ulcer    Other Medication Nausea and Vomiting     Flu vaccine in [id number]    Cephalexin Nausea and Vomiting    Erythromycin Nausea and Vomiting    Prochlorperazine Other (comments)     Childhood reaction-Neck spasms  Other reaction(s): Other (see comments)  Childhood reaction  Childhood reaction    Other reaction(s):  Other (comments)  Childhood reaction-Neck spasms     Past Medical History:   Diagnosis Date    Anxiety     Arthritis     Chronic obstructive pulmonary disease ([facility] [address] 2010    early disease on PFTs, briefly on a daily inhaler    Chronic pain     neck pain, Dr. Loyd Barton Degenerative disc disease, cervical 1998    cervical spine injections, RFA Dr. Yamini Joseph kidney     Right    Gallstone 1974    GERD (gastroesophageal reflux disease)     pepcid 20 mg prn (about 2x/month)    Herpes simplex type II infection     Hypercholesterolemia     Hypertension     Nausea & vomiting     During surgery when ether was used    Osteoporosis 2012    Reclast 9/24/2012, 10/09/2013, 10/31/2014, 1/29/2016    PUD (peptic ulcer disease) 2010    endoscopy confirmed, was taking aleve    Stage 3 chronic kidney disease ([facility] [address] 8594-9717 [address]    right arm fracture surgically repaired, twisted in washing machine     Past Surgical History:   Procedure Laterality Date    HX APPENDECTOMY      HX BREAST BIOPSY Right     negative 1987    HX CATARACT REMOVAL Bilateral 07/09/2018    HX CHOLECYSTECTOMY  1974    HX HYSTERECTOMY  1974    HX ORTHOPAEDIC  1972    R arm broken, pins put in, taken out 1 year later    [address] [facility]    from bone on L leg      Family History   Problem Relation Age of Onset    Cancer Mother         Cervical    Stroke Mother     Psychiatric Disorder Mother     Heart Disease Father     Breast Cancer Sister         in her 52's     Social History     Tobacco Use    Smoking status: Current Some Day Smoker     Packs/day: 0.25     Years: 41.00     Pack years: 10.25    Smokeless tobacco: Never Used   Substance Use Topics    Alcohol use: Yes     Comment: occasional       ROS    Objective:     Patient-Reported Vitals 12/4/2020   Patient-Reported Weight 131   Patient-Reported Height 5'5\"        [INSTRUCTIONS:  \"[x]\" Indicates a positive item  \"[]\" Indicates a negative item  -- DELETE ALL ITEMS NOT EXAMINED]    Constitutional: [x] Appears well-developed and well-nourished [x] No apparent distress      [] Abnormal -     Mental status: [x] Alert and awake  [x] Oriented to person/place/time [x] Able to follow commands    [] Abnormal -     Eyes:   EOM    [x]  Normal    [] Abnormal -   Sclera  [x]  Normal    [] Abnormal -          Discharge [x]  None visible   [] Abnormal -     HENT: [x] Normocephalic, atraumatic  [] Abnormal -   [x] Mouth/Throat: Mucous membranes are moist    External Ears [x] Normal  [] Abnormal -    Neck: [x] No visualized mass [] Abnormal -     Pulmonary/Chest: [x] Respiratory effort normal   [x] No visualized signs of difficulty breathing or respiratory distress        [] Abnormal -      Neurological:        [x] No Facial Asymmetry (Cranial nerve 7 motor function) (limited exam due to video visit)          [x] No gaze palsy        [] Abnormal -          Skin:        [x] No significant exanthematous lesions or discoloration noted on facial skin         [] Abnormal -            Psychiatric:       [x] Normal Affect [] Abnormal -        [x] No Hallucinations    Other pertinent observable physical exam findings:-        We discussed the expected course, resolution and complications of the diagnosis(es) in detail. Medication risks, benefits, costs, interactions, and alternatives were discussed as indicated. I advised her to contact the office if her condition worsens, changes or fails to improve as anticipated. She expressed understanding with the diagnosis(es) and plan. Robb Olivarez, who was evaluated through a patient-initiated, synchronous (real-time) audio-video encounter, and/or her healthcare decision maker, is aware that it is a billable service, with coverage as determined by her insurance carrier. She provided verbal consent to proceed: Yes, and patient identification was verified. It was conducted pursuant to the emergency declaration under the [address] [place] [address] and the [employer] General Act. A caregiver was present when appropriate. Ability to conduct physical exam was limited. I was at home. The patient was at home.       Jacqulyn Lundborg, MD

## 2020-12-10 ENCOUNTER — TELEPHONE (OUTPATIENT)
Dept: FAMILY MEDICINE CLINIC | Age: 75
End: 2020-12-10

## 2020-12-10 NOTE — TELEPHONE ENCOUNTER
The patient called today and was requesting to have her insurance authorization referral done for her appointment with the Cardiologist tomorrow. The patient will like the referral done as soon as possible. The patient is aware that it takes a few days for the insurance authorization to be done. The referral was faxed to Dr. Toro Brood office today.

## 2020-12-11 DIAGNOSIS — E78.5 HYPERLIPIDEMIA LDL GOAL <70: ICD-10-CM

## 2020-12-11 DIAGNOSIS — G47.00 INSOMNIA, UNSPECIFIED TYPE: ICD-10-CM

## 2020-12-15 RX ORDER — PRAVASTATIN SODIUM 20 MG/1
TABLET ORAL
Qty: 90 TAB | Refills: 3 | Status: SHIPPED | OUTPATIENT
Start: 2020-12-15 | End: 2021-08-09 | Stop reason: SDUPTHER

## 2020-12-15 RX ORDER — TRAZODONE HYDROCHLORIDE 50 MG/1
TABLET ORAL
Qty: 90 TAB | Refills: 3 | Status: SHIPPED | OUTPATIENT
Start: 2020-12-15 | End: 2021-09-20 | Stop reason: SDUPTHER

## 2021-01-02 ENCOUNTER — TELEPHONE (OUTPATIENT)
Dept: ENDOCRINOLOGY | Age: 76
End: 2021-01-02

## 2021-01-02 DIAGNOSIS — M81.0 AGE-RELATED OSTEOPOROSIS WITHOUT CURRENT PATHOLOGICAL FRACTURE: Primary | ICD-10-CM

## 2021-01-02 NOTE — TELEPHONE ENCOUNTER
Please call her to move her appointment from 2/2/21 to sometime after the 2nd week of March. She needs to have a bone density scan prior to her next visit but the earliest she can have this done is 2/27/21, which is 2 years after her last scan. Let her know I will place the order for this to be done once her appointment is scheduled and she can call the Patient Care team at [phone] to schedule this appointment to be done in the week before her visit with me.

## 2021-01-27 ENCOUNTER — OFFICE VISIT (OUTPATIENT)
Dept: FAMILY MEDICINE CLINIC | Age: 76
End: 2021-01-27
Payer: MEDICARE

## 2021-01-27 ENCOUNTER — PATIENT MESSAGE (OUTPATIENT)
Dept: FAMILY MEDICINE CLINIC | Age: 76
End: 2021-01-27

## 2021-01-27 VITALS
TEMPERATURE: 96.4 F | BODY MASS INDEX: 22.31 KG/M2 | OXYGEN SATURATION: 97 % | HEART RATE: 73 BPM | HEIGHT: 65 IN | RESPIRATION RATE: 18 BRPM | DIASTOLIC BLOOD PRESSURE: 56 MMHG | SYSTOLIC BLOOD PRESSURE: 158 MMHG | WEIGHT: 133.9 LBS

## 2021-01-27 DIAGNOSIS — J45.30 MILD PERSISTENT ASTHMATIC BRONCHITIS WITHOUT COMPLICATION: ICD-10-CM

## 2021-01-27 DIAGNOSIS — I10 ESSENTIAL HYPERTENSION: ICD-10-CM

## 2021-01-27 DIAGNOSIS — R55 SYNCOPE, UNSPECIFIED SYNCOPE TYPE: Primary | ICD-10-CM

## 2021-01-27 DIAGNOSIS — N18.31 STAGE 3A CHRONIC KIDNEY DISEASE (HCC): ICD-10-CM

## 2021-01-27 DIAGNOSIS — E78.5 HYPERLIPIDEMIA LDL GOAL <70: ICD-10-CM

## 2021-01-27 DIAGNOSIS — R09.89 RIGHT CAROTID BRUIT: ICD-10-CM

## 2021-01-27 DIAGNOSIS — F43.21 GRIEF REACTION: ICD-10-CM

## 2021-01-27 PROCEDURE — G8420 CALC BMI NORM PARAMETERS: HCPCS | Performed by: PHYSICIAN ASSISTANT

## 2021-01-27 PROCEDURE — G8432 DEP SCR NOT DOC, RNG: HCPCS | Performed by: PHYSICIAN ASSISTANT

## 2021-01-27 PROCEDURE — G8536 NO DOC ELDER MAL SCRN: HCPCS | Performed by: PHYSICIAN ASSISTANT

## 2021-01-27 PROCEDURE — G8427 DOCREV CUR MEDS BY ELIG CLIN: HCPCS | Performed by: PHYSICIAN ASSISTANT

## 2021-01-27 PROCEDURE — 1101F PT FALLS ASSESS-DOCD LE1/YR: CPT | Performed by: PHYSICIAN ASSISTANT

## 2021-01-27 PROCEDURE — G8754 DIAS BP LESS 90: HCPCS | Performed by: PHYSICIAN ASSISTANT

## 2021-01-27 PROCEDURE — G8753 SYS BP > OR = 140: HCPCS | Performed by: PHYSICIAN ASSISTANT

## 2021-01-27 PROCEDURE — 1090F PRES/ABSN URINE INCON ASSESS: CPT | Performed by: PHYSICIAN ASSISTANT

## 2021-01-27 PROCEDURE — 99214 OFFICE O/P EST MOD 30 MIN: CPT | Performed by: PHYSICIAN ASSISTANT

## 2021-01-27 RX ORDER — LEVALBUTEROL TARTRATE 45 UG/1
2 AEROSOL, METERED ORAL
Qty: 1 INHALER | Refills: 1 | Status: SHIPPED | OUTPATIENT
Start: 2021-01-27 | End: 2021-05-24 | Stop reason: SDUPTHER

## 2021-01-27 RX ORDER — FLUTICASONE PROPIONATE AND SALMETEROL 250; 50 UG/1; UG/1
1 POWDER RESPIRATORY (INHALATION) EVERY 12 HOURS
Qty: 60 EACH | Refills: 3 | Status: SHIPPED | OUTPATIENT
Start: 2021-01-27 | End: 2021-05-24 | Stop reason: ALTCHOICE

## 2021-01-27 RX ORDER — AMLODIPINE BESYLATE 5 MG/1
TABLET ORAL
Qty: 90 TAB | Refills: 1 | Status: SHIPPED | OUTPATIENT
Start: 2021-01-27 | End: 2021-08-02 | Stop reason: SDUPTHER

## 2021-01-27 NOTE — PROGRESS NOTES
HPI:  67 y.o.  presents for follow up appointment. No hospital, ER or specialist visits since last primary care visit except as noted below. HTN - She is taking medications as directed. She has CKD stage 3, Her HTN is now being followed by renal Dr Wing Crespo - next visit 2/01/2021. On lisinopril 40 mg, torsemide 10 mg and amlodipine 5 mg daily. She generally follows a low sodium diet  Home BP Monitoring is only occasionally measured at home: . She denies chest pain, headaches, shortness of breath, vision change and lower extremity edema.  -she tried to drink up to 4 16 oz bottles of water a day     She is on pravastatin 20 mg, Labs 2/06/2020 show LDL 63 (previously 57)     Right Carotid Bruit - carotid doppler 10/04/2019 shows mild stenosis less than 50% both carotids. I reviewed with her: Treatment is to address any risk factors and recheck in 1-2 years. I do NOT recommend baby aspirin due to history of ulcers. Her cholesterol is at goal on pravastatin. Her blood pressure is being monitored by nephrology. Limit alcohol, follow Mediterranean diet, have regular physical activity.        Syncopal episode 12/2020  -occurred during a time she was having frequent BMs  -she had nausea and a pain in her upper back right before she passed out  -she leaned on the counter and bore down, and then passed out  -fell in the bathroom, bruised her left ribs and laceration on back of scalp that was fixed with staples  -was in [facility], went to ER, they ordered a 2 week heart monitor, she states she never heard the results    -had an episode of dizziness recently  -leaned down to put jacket on her little dog, but felt dizzy when she stood up  -has history of vertigo where the room spins but this dizziness was very different, she states her legs went weak and daughter had to help her to the couch    She saw cardiology Dr Kelly Stanford 12/11/20  Echo and stress test, she was told normal  I can see visit listed, but reports not available    She saw the PA at the ortho clinic that follows the OA of her neck, and felt her syncope and dizziness was not related to her neck disease      She was living with her grandson  She found him hanging, having committed suicide in July  She is now staying with her daughter in [place]  She has an application for some apartments in [place]  She is close to her children and they are all supporting each other during this difficult time  She declines seeing a counselor    She reports having bronchitis late summer  Was treated with xopenex inhaler  She still feels occasional wheeze and tightness, uses xopenex at least 2x/weekly  H/o smoking x 41 yrs      Patient Active Problem List    Diagnosis    Bilateral carotid artery stenosis    Cerebral microvascular disease    GERD (gastroesophageal reflux disease)     pepcid 20 mg prn (about 2x/month)      Chronic pain     neck pain, Dr. Steven Moss      CKD (chronic kidney disease) stage 3, GFR 30-59 ml/min    Sinus disorder    Anxiety    Essential hypertension    Osteoarthritis of cervical spine    Hyperlipidemia LDL goal <70    Herpes simplex type 2 infection    Osteoporosis     Reclast 9/24/2012, 10/09/2013, 10/31/2014, 1/29/2016           Past Medical History:   Diagnosis Date    Anxiety     Arthritis     Chronic obstructive pulmonary disease ([facility] [address] 2010    early disease on PFTs, briefly on a daily inhaler    Chronic pain     neck pain, Dr. Kat Martinez Degenerative disc disease, cervical 1998    cervical spine injections, [facility] Dr. Rashad Devlin kidney     Right    Gallstone 1974    GERD (gastroesophageal reflux disease)     pepcid 20 mg prn (about 2x/month)    Herpes simplex type II infection     Hypercholesterolemia     Hypertension     Nausea & vomiting     During surgery when ether was used    Osteoporosis 2012    Reclast 9/24/2012, 10/09/2013, 10/31/2014, 1/29/2016    PUD (peptic ulcer disease) 2010    endoscopy confirmed, was taking aleve    Stage 3 chronic kidney disease 3491-2416    Trauma 1972    right arm fracture surgically repaired, twisted in washing machine       Social History     Tobacco Use    Smoking status: Current Some Day Smoker     Packs/day: 0.25     Years: 41.00     Pack years: 10.25    Smokeless tobacco: Never Used   Substance Use Topics    Alcohol use: Yes     Comment: occasional    Drug use: No       Outpatient Medications Marked as Taking for the 1/27/21 encounter (Office Visit) with Valeri Rodriguez PA-C   Medication Sig Dispense Refill    busPIRone (BUSPAR) 15 mg tablet TAKE 1/2 TABLET BY MOUTH  TWO TIMES A DAY. INDICATIONS: REPEATED  EPISODES OF ANXIETY 90 Tab 0    valACYclovir (VALTREX) 1 gram tablet Take 2 Tabs by mouth two (2) times a day. DO NOT TAKE WITH TIZANIDINE  Indications: prevent recurrent herpes simplex infection 30 Tab 1    pravastatin (PRAVACHOL) 20 mg tablet TAKE 1 TABLET BY MOUTH  NIGHTLY 90 Tab 3    traZODone (DESYREL) 50 mg tablet TAKE 1 TABLET BY MOUTH  NIGHTLY 90 Tab 3    diclofenac (VOLTAREN) 1 % gel Apply  to affected area as needed for Pain. 100 g 0    levalbuterol tartrate (XOPENEX) 45 mcg/actuation inhaler Take  by inhalation as needed.  mometasone (ELOCON) 0.1 % lotion INSTILL 4 METRIC DROP TWO TIMES DAILY IN EARS, AS NEEDED 30 mL 1    amLODIPine (NORVASC) 5 mg tablet TAKE 1 TABLET BY MOUTH EVERY DAY 90 Tab 1    betamethasone valerate (VALISONE) 0.1 % topical cream Apply  to affected area two (2) times a day. 15 g 0    lisinopril (PRINIVIL, ZESTRIL) 40 mg tablet Take 60 mg by mouth daily. 2    tiZANidine (ZANAFLEX) 2 mg tablet TAKE 1 TABLET BY MOUTH EVERY 8 HOURS AS NEEDED FOR SPASMS      torsemide (DEMADEX) 10 mg tablet Take 10 mg by mouth daily. 2    carboxymethylcellulose sodium (REFRESH OP) Apply  to eye.  CALCIUM-MAGNESIUM-ZINC PO Take 2 Tabs by mouth daily.       omega-3 fatty acids-fish oil (FISH OIL) 360-1,200 mg cap Take 2 Tabs by mouth daily.  carica papaya (PAPAYA ENZYME) tab Take  by mouth daily as needed (4-5 a day as needed).  fluticasone (FLONASE SENSIMIST) 27.5 mcg/actuation nasal spray 2 Sprays by Nasal route daily.  glucosamine sulfate 1,000 mg cap Take 2 Tabs by mouth daily.  acetaminophen (TYLENOL) 650 mg CR tablet Take 650 mg by mouth every six (6) hours as needed for Pain.  famotidine (PEPCID) 20 mg tablet Take 20 mg by mouth as needed.  melatonin 1 mg tablet Take 1 mg by mouth nightly.  ketoconazole (NIZORAL) 2 % topical cream Apply  to affected area daily. Allergies   Allergen Reactions    Indomethacin Other (comments)     Made her feel weird to the point it scared her  Other reaction(s): Other (see comments)  Made her feel weird to the point it scared her  Made her feel weird to the point it scared her    Other reaction(s): Other (comments)  Made her feel weird to the point it scared her    Fosamax [Alendronate] Other (comments)     Had a peptic ulcer    Other Medication Nausea and Vomiting     Flu vaccine in 1149-4001    Cephalexin Nausea and Vomiting    Erythromycin Nausea and Vomiting    Prochlorperazine Other (comments)     Childhood reaction-Neck spasms  Other reaction(s): Other (see comments)  Childhood reaction  Childhood reaction    Other reaction(s): Other (comments)  Childhood reaction-Neck spasms       ROS:  ROS negative except as per HPI. PE:  Visit Vitals  BP (!) 152/69 (BP 1 Location: Left arm, BP Patient Position: Sitting)   Pulse 73   Temp (!) 96.4 °F (35.8 °C)   Resp 18   Ht 5' 5\" (1.651 m)   Wt 133 lb 14.4 oz (60.7 kg)   LMP  (LMP Unknown)   SpO2 97%   BMI 22.28 kg/m²     Gen: alert, oriented, no acute distress  Head: normocephalic, atraumatic  Eyes: pupils equal round reactive to light, sclera clear, conjunctiva clear  Oral: mask on  Neck: +right carotid bruit  Resp: + faint scattered, mild wheeze  CV: S1, S2 normal.  No murmurs, rubs, or gallops. Skin: no lesion or rash  Extremities: no cyanosis or edema    No results found for this visit on 21. Lab Results   Component Value Date/Time    WBC 7.6 2020 08:08 AM    HGB 13.8 2020 08:08 AM    HCT 40.2 2020 08:08 AM    PLATELET 806  08:08 AM    MCV 93 2020 08:08 AM     Lab Results   Component Value Date/Time    Sodium 134 2020 08:08 AM    Potassium 4.8 2020 08:08 AM    Chloride 97 2020 08:08 AM    CO2 22 2020 08:08 AM    Anion gap 8 08/15/2017 08:00 AM    Glucose 87 2020 08:08 AM    BUN 16 2020 08:08 AM    Creatinine 1.17 (H) 2020 08:08 AM    BUN/Creatinine ratio 14 2020 08:08 AM    GFR est AA 53 (L) 2020 08:08 AM    GFR est non-AA 46 (L) 2020 08:08 AM    Calcium 9.5 2020 08:08 AM    Bilirubin, total 0.7 2020 08:08 AM    Alk. phosphatase 52 2020 08:08 AM    Protein, total 6.7 2020 08:08 AM    Albumin 4.7 2020 08:08 AM    Globulin 2.8 08/15/2017 08:00 AM    A-G Ratio 2.4 (H) 2020 08:08 AM    ALT (SGPT) 6 2020 08:08 AM    AST (SGOT) 10 2020 08:08 AM     Lab Results   Component Value Date/Time    Cholesterol, total 150 2020 08:08 AM    HDL Cholesterol 63 2020 08:08 AM    LDL, calculated 63 2020 08:08 AM    VLDL, calculated 24 2020 08:08 AM    Triglyceride 119 2020 08:08 AM    CHOL/HDL Ratio 2.0 08/15/2017 08:00 AM     Lab Results   Component Value Date/Time    TSH 2.350 2020 08:08 AM     Lab Results   Component Value Date/Time    Hemoglobin A1c 5.6 2016 08:15 AM         Assessment/Plan:    1.  Syncope, unspecified syncope type  Episode 2020, occurred after she stood up after having BM in bathroom  Was seen in ER at [facility], had 2 week cardiac monitor but did not hear the results  -saw cardiology Dr Eduin Veloz and reports normal echo and stress test  -had recent episode dizziness after bending over, consider orthostatic episode vs vertigo  -I wonder if she could have had a vagal episode in the bathroom  -has chronic right carotid bruit, carotid doppler 10/04/2019 shows mild stenosis less than 50% both carotids, need to recheck duplex  -she has higher systolic and lower diastolic, I have asked her to review her BP meds with renal Dr Ida García, could she be too dry on the torsemide?, she will see Dr Ida García soon on 2/1/2021     - DUPLEX CAROTID BILATERAL; Future  - CBC WITH AUTOMATED DIFF; Future  - METABOLIC PANEL, COMPREHENSIVE; Future  - HEMOGLOBIN A1C WITH EAG; Future  - TSH 3RD GENERATION; Future    2. Right carotid bruit  carotid doppler 10/04/2019 shows mild stenosis less than 50% both carotids,  Not on ASA due to h/o PUD  LDL at goal on statin  /56 today, HTN managed by renal, has appt next week  Recheck duplex with recent syncope  - DUPLEX CAROTID BILATERAL; Future  - LIPID PANEL; Future    3. Essential hypertension  158/56   followed by renal Dr Ida García - next visit 2/01/2021. On lisinopril 40 mg, torsemide 10 mg and amlodipine 5 mg daily.  -see #1 above  -I will not change any therapy today and she will review with renal  - amLODIPine (NORVASC) 5 mg tablet; TAKE 1 TABLET BY MOUTH EVERY DAY  Dispense: 90 Tab; Refill: 1    4. Stage 3a chronic kidney disease  eGFR 46  Followed by Dr Ida García    5. Hyperlipidemia LDL goal <70  At goal  She is on pravastatin 20 mg, Labs 2/06/2020 show LDL 63 (previously 57)  - LIPID PANEL; Future    6. Mild persistent asthmatic bronchitis without complication  Wheezes on exam today, +smoker, recent acute bronchitis using xopenex 2x/weekly  -new start advair 250 BID, rinse mouth after dose  -recheck 4 wks    - fluticasone propion-salmeteroL (ADVAIR/WIXELA) 250-50 mcg/dose diskus inhaler; Take 1 Puff by inhalation every twelve (12) hours. Dispense: 60 Each; Refill: 3  - levalbuterol tartrate (XOPENEX) 45 mcg/actuation inhaler;  Take 2 Puffs by inhalation every four (4) hours as needed for Wheezing. Dispense: 1 Inhaler; Refill: 1    7. Grief reaction  Grandson committed suicide 7/2020, she lived with him and found him  She declines counseling, but I recommended [facility] if she changes her mind  Reports strong family support        Health Maintenance reviewed - updated. Orders Placed This Encounter    CBC WITH AUTOMATED DIFF     Standing Status:   Future     Standing Expiration Date:   9/16/7091    METABOLIC PANEL, COMPREHENSIVE     Standing Status:   Future     Standing Expiration Date:   5/27/2021    HEMOGLOBIN A1C WITH EAG     Standing Status:   Future     Standing Expiration Date:   5/27/2021    LIPID PANEL     Standing Status:   Future     Standing Expiration Date:   5/27/2021    TSH 3RD GENERATION     Standing Status:   Future     Standing Expiration Date:   5/27/2021    amLODIPine (NORVASC) 5 mg tablet     Sig: TAKE 1 TABLET BY MOUTH EVERY DAY     Dispense:  90 Tab     Refill:  1    fluticasone propion-salmeteroL (ADVAIR/WIXELA) 250-50 mcg/dose diskus inhaler     Sig: Take 1 Puff by inhalation every twelve (12) hours. Dispense:  60 Each     Refill:  3    levalbuterol tartrate (XOPENEX) 45 mcg/actuation inhaler     Sig: Take 2 Puffs by inhalation every four (4) hours as needed for Wheezing. Dispense:  1 Inhaler     Refill:  1       Medications Discontinued During This Encounter   Medication Reason    amLODIPine (NORVASC) 5 mg tablet REORDER    levalbuterol tartrate (XOPENEX) 45 mcg/actuation inhaler REORDER       Current Outpatient Medications   Medication Sig Dispense Refill    amLODIPine (NORVASC) 5 mg tablet TAKE 1 TABLET BY MOUTH EVERY DAY 90 Tab 1    fluticasone propion-salmeteroL (ADVAIR/WIXELA) 250-50 mcg/dose diskus inhaler Take 1 Puff by inhalation every twelve (12) hours. 60 Each 3    levalbuterol tartrate (XOPENEX) 45 mcg/actuation inhaler Take 2 Puffs by inhalation every four (4) hours as needed for Wheezing.  1 Inhaler 1    busPIRone (BUSPAR) 15 mg tablet TAKE 1/2 TABLET BY MOUTH  TWO TIMES A DAY. INDICATIONS: REPEATED  EPISODES OF ANXIETY 90 Tab 0    valACYclovir (VALTREX) 1 gram tablet Take 2 Tabs by mouth two (2) times a day. DO NOT TAKE WITH TIZANIDINE  Indications: prevent recurrent herpes simplex infection 30 Tab 1    pravastatin (PRAVACHOL) 20 mg tablet TAKE 1 TABLET BY MOUTH  NIGHTLY 90 Tab 3    traZODone (DESYREL) 50 mg tablet TAKE 1 TABLET BY MOUTH  NIGHTLY 90 Tab 3    diclofenac (VOLTAREN) 1 % gel Apply  to affected area as needed for Pain. 100 g 0    mometasone (ELOCON) 0.1 % lotion INSTILL 4 METRIC DROP TWO TIMES DAILY IN EARS, AS NEEDED 30 mL 1    betamethasone valerate (VALISONE) 0.1 % topical cream Apply  to affected area two (2) times a day. 15 g 0    lisinopril (PRINIVIL, ZESTRIL) 40 mg tablet Take 60 mg by mouth daily. 2    tiZANidine (ZANAFLEX) 2 mg tablet TAKE 1 TABLET BY MOUTH EVERY 8 HOURS AS NEEDED FOR SPASMS      torsemide (DEMADEX) 10 mg tablet Take 10 mg by mouth daily. 2    carboxymethylcellulose sodium (REFRESH OP) Apply  to eye.  CALCIUM-MAGNESIUM-ZINC PO Take 2 Tabs by mouth daily.  omega-3 fatty acids-fish oil (FISH OIL) 360-1,200 mg cap Take 2 Tabs by mouth daily.  carica papaya (PAPAYA ENZYME) tab Take  by mouth daily as needed (4-5 a day as needed).  fluticasone (FLONASE SENSIMIST) 27.5 mcg/actuation nasal spray 2 Sprays by Nasal route daily.  glucosamine sulfate 1,000 mg cap Take 2 Tabs by mouth daily.  acetaminophen (TYLENOL) 650 mg CR tablet Take 650 mg by mouth every six (6) hours as needed for Pain.  famotidine (PEPCID) 20 mg tablet Take 20 mg by mouth as needed.  melatonin 1 mg tablet Take 1 mg by mouth nightly.  ketoconazole (NIZORAL) 2 % topical cream Apply  to affected area daily.  doxycycline (VIBRAMYCIN) 100 mg capsule TK ONE C PO  Q 12 H      dextran 70/hypromellose (TEARS NATURALE OP) Apply 1 Drop to eye as needed.          Recommended healthy diet low in carbohydrates, fats, sodium and cholesterol. Recommended regular cardiovascular exercise 3-6 times per week for 30-60 minutes daily. Verbal and written instructions (see AVS) provided. Patient expresses understanding of diagnosis and treatment plan. Follow-up and Dispositions    · Return in about 4 weeks (around 2/24/2021) for asthmatic bronchitis.        Future Appointments   Date Time Provider Amanda Addison   2/26/2021 11:00 AM [facility]   3/9/2021 11:50 AM Zeenat Vigil MD RDE [facility] [address] [facility]

## 2021-01-27 NOTE — PROGRESS NOTES
Chief Complaint   Patient presents with    Medication Evaluation     follow up     Room 10    Pt needs refill on amlodipine and inhaler. Pt stats she passed out in the bathroom in December 2, 2020 and hit her head. 1. Have you been to the ER, urgent care clinic since your last visit? Hospitalized since your last visit? No    2. Have you seen or consulted any other health care providers outside of the [address] [place] [address] since your last visit? Include any pap smears or colon screening.  No    Visit Vitals  BP (!) 152/69 (BP 1 Location: Left arm, BP Patient Position: Sitting)   Pulse 73   Temp (!) 96.4 °F (35.8 °C)   Resp 18   Ht 5' 5\" (1.651 m)   Wt 133 lb 14.4 oz (60.7 kg)   LMP  (LMP Unknown)   SpO2 97%   BMI 22.28 kg/m²

## 2021-01-28 ENCOUNTER — TELEPHONE (OUTPATIENT)
Dept: FAMILY MEDICINE CLINIC | Age: 76
End: 2021-01-28

## 2021-01-28 RX ORDER — ALBUTEROL SULFATE 90 UG/1
AEROSOL, METERED RESPIRATORY (INHALATION)
Status: CANCELLED | OUTPATIENT
Start: 2021-01-28

## 2021-01-28 NOTE — TELEPHONE ENCOUNTER
Two pt identifiers confirmed. Informed pt per Theresa HARTMAN that albuterol could cause tremors, or what I usually describe as \"feeling shaky\", so it would have been albuterol she had previously. If we can't get prior auth, then I can always send in [address] [place]. She could try just one puff instead of 2 per dose every 4-6 hrs to see if that helps with the side effect - .PA initiated. Vikram Chambers (Aaron Mullins) - [id number]  Xopenex HFA 45MCG/ACT aerosol  Status: PA Request    Created: January 28th, 2021    Sent: January 28th, 2021    Pt verbalized understanding of information discussed w/ no further questions at this time.

## 2021-01-28 NOTE — TELEPHONE ENCOUNTER
Two pt identifiers confirmed. Pt states, agrees for albuterol alternative. Pt states, in the past she was taking an inhaler that caused tremors. Informed pt will route to notify provider to prevent an inhaler refill that will cause tremors. Pt verbalized understanding of information discussed w/ no further questions at this time.

## 2021-01-28 NOTE — TELEPHONE ENCOUNTER
Warn her that the albuterol could cause tremors, or what I usually describe as \"feeling shaky\", so it would have been albuterol she had previously. Can you go ahead and initiate prior auth for the levalbuterol, and having that information regarding side effects would likely be needed. If we can't get prior auth, then I can always send in [facility]. She could try just one puff instead of 2 per dose every 4-6 hrs to see if that helps with the side effect - .

## 2021-01-28 NOTE — TELEPHONE ENCOUNTER
MINNIE Rodriguez,           The patients insurance does not cover the Xopenex  [facility]. A suggested alternative is the PRO-Air HFA INH. Please review and prescribe if appropriate or obtain a Prior Authorization.      Requested Prescriptions     Pending Prescriptions Disp Refills    albuterol (PROVENTIL HFA, VENTOLIN HFA, PROAIR HFA) 90 mcg/actuation inhaler

## 2021-01-28 NOTE — TELEPHONE ENCOUNTER
Please call pt regarding her rescue inhaler, and message from pharmacy re prior auth. Ask her if she was going to use GoodRx coupon for the levalbuterol (Xopenex), or I can send in the preferred inhaler, albuterol (Proair). The difference is the first one she had by Edwin Aguayo before, the levalbuterol, may have a lower chance of causing a side effect of heart racing/palpitations, so I usually prefer it in pt's with h/o heart rhythm problems such as Afib or PSVT, which I don't think she has a history of. Otherwise, they have the same action in the lungs.

## 2021-01-28 NOTE — TELEPHONE ENCOUNTER
Zen Rausch (AngelitaBrandoamber Nixon) - [id number]  Xopenex HFA 45MCG/ACT aerosol  Status: PA Response - Approved    Two pt identifiers confirmed. Informed pt and pharmacist of approved PA for med. Both verbalized understanding of information discussed w/ no further questions at this time.

## 2021-01-30 LAB
25(OH)D3+25(OH)D2 SERPL-MCNC: 35.2 NG/ML (ref 30–100)
ALBUMIN SERPL-MCNC: 4.6 G/DL (ref 3.7–4.7)
ALBUMIN/GLOB SERPL: 1.8 {RATIO} (ref 1.2–2.2)
ALP SERPL-CCNC: 56 IU/L (ref 39–117)
ALT SERPL-CCNC: 12 IU/L (ref 0–32)
AST SERPL-CCNC: 15 IU/L (ref 0–40)
BASOPHILS # BLD AUTO: 0.1 X10E3/UL (ref 0–0.2)
BASOPHILS NFR BLD AUTO: 1 %
BILIRUB SERPL-MCNC: 0.9 MG/DL (ref 0–1.2)
BUN SERPL-MCNC: 16 MG/DL (ref 8–27)
BUN/CREAT SERPL: 13 (ref 12–28)
CALCIUM SERPL-MCNC: 9.6 MG/DL (ref 8.7–10.3)
CHLORIDE SERPL-SCNC: 96 MMOL/L (ref 96–106)
CHOLEST SERPL-MCNC: 161 MG/DL (ref 100–199)
CO2 SERPL-SCNC: 22 MMOL/L (ref 20–29)
CREAT SERPL-MCNC: 1.27 MG/DL (ref 0.57–1)
EOSINOPHIL # BLD AUTO: 0.1 X10E3/UL (ref 0–0.4)
EOSINOPHIL NFR BLD AUTO: 1 %
ERYTHROCYTE [DISTWIDTH] IN BLOOD BY AUTOMATED COUNT: 12.3 % (ref 11.7–15.4)
GLOBULIN SER CALC-MCNC: 2.5 G/DL (ref 1.5–4.5)
GLUCOSE SERPL-MCNC: 92 MG/DL (ref 65–99)
HCT VFR BLD AUTO: 40.9 % (ref 34–46.6)
HDLC SERPL-MCNC: 65 MG/DL
HGB BLD-MCNC: 13.5 G/DL (ref 11.1–15.9)
IMM GRANULOCYTES # BLD AUTO: 0 X10E3/UL (ref 0–0.1)
IMM GRANULOCYTES NFR BLD AUTO: 0 %
INTERPRETATION, 910389: NORMAL
INTERPRETATION: NORMAL
LDLC SERPL CALC-MCNC: 71 MG/DL (ref 0–99)
LYMPHOCYTES # BLD AUTO: 2.2 X10E3/UL (ref 0.7–3.1)
LYMPHOCYTES NFR BLD AUTO: 23 %
MCH RBC QN AUTO: 31 PG (ref 26.6–33)
MCHC RBC AUTO-ENTMCNC: 33 G/DL (ref 31.5–35.7)
MCV RBC AUTO: 94 FL (ref 79–97)
MONOCYTES # BLD AUTO: 0.9 X10E3/UL (ref 0.1–0.9)
MONOCYTES NFR BLD AUTO: 9 %
NEUTROPHILS # BLD AUTO: 6.4 X10E3/UL (ref 1.4–7)
NEUTROPHILS NFR BLD AUTO: 66 %
PDF IMAGE, 910387: NORMAL
PLATELET # BLD AUTO: 313 X10E3/UL (ref 150–450)
POTASSIUM SERPL-SCNC: 4.8 MMOL/L (ref 3.5–5.2)
PROT SERPL-MCNC: 7.1 G/DL (ref 6–8.5)
RBC # BLD AUTO: 4.35 X10E6/UL (ref 3.77–5.28)
SODIUM SERPL-SCNC: 132 MMOL/L (ref 134–144)
TRIGL SERPL-MCNC: 146 MG/DL (ref 0–149)
TSH SERPL DL<=0.005 MIU/L-ACNC: 1.88 UIU/ML (ref 0.45–4.5)
VLDLC SERPL CALC-MCNC: 25 MG/DL (ref 5–40)
WBC # BLD AUTO: 9.8 X10E3/UL (ref 3.4–10.8)

## 2021-02-04 ENCOUNTER — HOSPITAL ENCOUNTER (OUTPATIENT)
Dept: VASCULAR SURGERY | Age: 76
Discharge: HOME | End: 2021-02-04
Payer: MEDICARE

## 2021-02-04 DIAGNOSIS — R09.89 RIGHT CAROTID BRUIT: ICD-10-CM

## 2021-02-04 DIAGNOSIS — R55 SYNCOPE, UNSPECIFIED SYNCOPE TYPE: ICD-10-CM

## 2021-02-04 PROCEDURE — 93880 EXTRACRANIAL BILAT STUDY: CPT

## 2021-02-04 NOTE — TELEPHONE ENCOUNTER
From: Radha Carbone PA-C  To: Doni Molina  Sent: 1/27/2021 9:44 PM EST  Subject: follow up    Ms Merced Bailey,  I didn't want to cause any confusion about your next follow up with me. I want to see you back at my current office around 4 weeks from now. I had started to enter a 3 months follow up at my new office. So the papers you received mention to see me at my new office - so ignore that statement (I didn't erase that detail when I changed it from 3 months to 4 weeks). It was nice to see you, and I hope you made it home safely!   Valeri

## 2021-02-05 LAB
LEFT CCA DIST DIAS: 19.2 CM/S
LEFT CCA DIST SYS: 80.6 CM/S
LEFT CCA PROX DIAS: 15.7 CM/S
LEFT CCA PROX SYS: 92.3 CM/S
LEFT ECA DIAS: 9.79 CM/S
LEFT ECA SYS: 92.1 CM/S
LEFT ICA DIST DIAS: 21.1 CM/S
LEFT ICA DIST SYS: 85 CM/S
LEFT ICA MID DIAS: 17.3 CM/S
LEFT ICA MID SYS: 84.8 CM/S
LEFT ICA PROX DIAS: 16.2 CM/S
LEFT ICA PROX SYS: 62.9 CM/S
LEFT ICA/CCA SYS: 1.05
LEFT SUBCLAVIAN DIAS: 0 CM/S
LEFT SUBCLAVIAN SYS: 156.8 CM/S
LEFT VERTEBRAL DIAS: 11.25 CM/S
LEFT VERTEBRAL SYS: 45.3 CM/S
RIGHT CCA DIST DIAS: 17.6 CM/S
RIGHT CCA DIST SYS: 83.8 CM/S
RIGHT CCA PROX DIAS: 15.5 CM/S
RIGHT CCA PROX SYS: 76.6 CM/S
RIGHT ECA DIAS: 7.36 CM/S
RIGHT ECA SYS: 73.5 CM/S
RIGHT ICA DIST DIAS: 14.9 CM/S
RIGHT ICA DIST SYS: 63 CM/S
RIGHT ICA MID DIAS: 15.5 CM/S
RIGHT ICA MID SYS: 79.3 CM/S
RIGHT ICA PROX DIAS: 18.3 CM/S
RIGHT ICA PROX SYS: 80.7 CM/S
RIGHT ICA/CCA SYS: 1
RIGHT SUBCLAVIAN DIAS: 0 CM/S
RIGHT SUBCLAVIAN SYS: 123 CM/S
RIGHT VERTEBRAL DIAS: 18.35 CM/S
RIGHT VERTEBRAL SYS: 61.4 CM/S

## 2021-02-13 NOTE — PROGRESS NOTES
brianda lucasg sent  The cholesterol is at goal, continue current pravastatin. The kidney function is just slightly reduced from last check, but still in the same classification as stage 3. I saw your message that Dr Tammy Cummings lowered your torsemide. The remainder of labs are normal.     Will see you in March.

## 2021-02-26 ENCOUNTER — HOSPITAL ENCOUNTER (OUTPATIENT)
Dept: MAMMOGRAPHY | Age: 76
Discharge: HOME | End: 2021-02-26
Attending: INTERNAL MEDICINE
Payer: MEDICARE

## 2021-02-26 DIAGNOSIS — M81.0 AGE-RELATED OSTEOPOROSIS WITHOUT CURRENT PATHOLOGICAL FRACTURE: ICD-10-CM

## 2021-02-26 PROCEDURE — 77080 DXA BONE DENSITY AXIAL: CPT

## 2021-03-01 ENCOUNTER — OFFICE VISIT (OUTPATIENT)
Dept: FAMILY MEDICINE CLINIC | Age: 76
End: 2021-03-01
Payer: MEDICARE

## 2021-03-01 VITALS
BODY MASS INDEX: 22.29 KG/M2 | WEIGHT: 133.8 LBS | HEART RATE: 75 BPM | OXYGEN SATURATION: 96 % | HEIGHT: 65 IN | SYSTOLIC BLOOD PRESSURE: 130 MMHG | TEMPERATURE: 97.2 F | DIASTOLIC BLOOD PRESSURE: 60 MMHG | RESPIRATION RATE: 16 BRPM

## 2021-03-01 DIAGNOSIS — N18.31 STAGE 3A CHRONIC KIDNEY DISEASE (HCC): ICD-10-CM

## 2021-03-01 DIAGNOSIS — Z72.0 TOBACCO USE: ICD-10-CM

## 2021-03-01 DIAGNOSIS — R09.89 RIGHT CAROTID BRUIT: ICD-10-CM

## 2021-03-01 DIAGNOSIS — J45.30 MILD PERSISTENT ASTHMATIC BRONCHITIS WITHOUT COMPLICATION: Primary | ICD-10-CM

## 2021-03-01 DIAGNOSIS — I10 ESSENTIAL HYPERTENSION: ICD-10-CM

## 2021-03-01 DIAGNOSIS — R55 SYNCOPE, UNSPECIFIED SYNCOPE TYPE: ICD-10-CM

## 2021-03-01 DIAGNOSIS — E78.5 HYPERLIPIDEMIA LDL GOAL <70: ICD-10-CM

## 2021-03-01 DIAGNOSIS — M81.0 AGE-RELATED OSTEOPOROSIS WITHOUT CURRENT PATHOLOGICAL FRACTURE: ICD-10-CM

## 2021-03-01 DIAGNOSIS — F41.9 ANXIETY: ICD-10-CM

## 2021-03-01 PROCEDURE — G8420 CALC BMI NORM PARAMETERS: HCPCS | Performed by: PHYSICIAN ASSISTANT

## 2021-03-01 PROCEDURE — 1090F PRES/ABSN URINE INCON ASSESS: CPT | Performed by: PHYSICIAN ASSISTANT

## 2021-03-01 PROCEDURE — G8432 DEP SCR NOT DOC, RNG: HCPCS | Performed by: PHYSICIAN ASSISTANT

## 2021-03-01 PROCEDURE — G8427 DOCREV CUR MEDS BY ELIG CLIN: HCPCS | Performed by: PHYSICIAN ASSISTANT

## 2021-03-01 PROCEDURE — 99214 OFFICE O/P EST MOD 30 MIN: CPT | Performed by: PHYSICIAN ASSISTANT

## 2021-03-01 PROCEDURE — 1101F PT FALLS ASSESS-DOCD LE1/YR: CPT | Performed by: PHYSICIAN ASSISTANT

## 2021-03-01 PROCEDURE — G8752 SYS BP LESS 140: HCPCS | Performed by: PHYSICIAN ASSISTANT

## 2021-03-01 PROCEDURE — G8536 NO DOC ELDER MAL SCRN: HCPCS | Performed by: PHYSICIAN ASSISTANT

## 2021-03-01 PROCEDURE — G8754 DIAS BP LESS 90: HCPCS | Performed by: PHYSICIAN ASSISTANT

## 2021-03-01 RX ORDER — AMOXICILLIN AND CLAVULANATE POTASSIUM 875; 125 MG/1; MG/1
TABLET, FILM COATED ORAL
COMMUNITY
End: 2021-03-09

## 2021-03-01 NOTE — PROGRESS NOTES
HPI:  67 y.o.  presents for follow up appointment. No hospital, ER or specialist visits since last primary care visit except as noted below.     F/U asthmatic bronchitis  -At last visit 1/27/21, she reported increased use of her Xopenex inhaler 2x/week since the late summer of 2020  -I started her on Advair 250  -she also continues to smoke 10-12 cigs/day, increased from 6-8 cigs/day (she is between apartments and staying at varying intervals with 3 of her children until she settles in to her own apartment soon; \"I can quit better when I'm living alone\" she says)  -she was seen at [facility] on 2/21/21 for increasing sinus congestion and a \"huge glob of green mucus\" since last visit and was started on Augmentin and Tessalon; no fever  -she is using the Xopenex less since last visit, she doesn't really think about it anymore, maybe once a week  -she has not been using her Flonase  -she recalls always getting \"bronchitis\" like clockwork in the spring and fall historically  -\"I feel great now\"    No more dizzy spells since last visit  She is drinking 3-4 16 oz bottles of water daily  Carotid duplex was stable    She had phone call with Dr Emeka Way on 2/01/21  -I had suggested to pt perhaps she was too dry on the torsemide, and she reviewed with Dr Trey Elizabeth and she cut the torsemide in half, so she is now taking 5 mg daily instead of 10 mg daily  -she notices a mild ankle edema if she is sitting too long, which she never had before; we reviewed calf exercises, walking, and elevation  -BP at home 120s/70s; and recalls was normal range at [facility]  -she is working on cutting back her salt intake    She will review her DXA from 2/26/21 with Dr Franco Dias on 3/0/21  S/p reclast x 4, last dose 2016    She is still taking the buspar 15 mg, 1/2 tab BID with relief      Patient Active Problem List    Diagnosis    Bilateral carotid artery stenosis    Cerebral microvascular disease    GERD (gastroesophageal reflux disease) pepcid 20 mg prn (about 2x/month)      Chronic pain     neck pain, Dr. Jane Anna      CKD (chronic kidney disease) stage 3, GFR 30-59 ml/min    Sinus disorder    Anxiety    Essential hypertension    Osteoarthritis of cervical spine    Hyperlipidemia LDL goal <70    Herpes simplex type 2 infection    Osteoporosis     Reclast 9/24/2012, 10/09/2013, 10/31/2014, 1/29/2016           Past Medical History:   Diagnosis Date    Anxiety     Arthritis     Chronic obstructive pulmonary disease ([address] 2010    early disease on PFTs, briefly on a daily inhaler    Chronic pain     neck pain, Dr. Vandana Eli Degenerative disc disease, cervical 1998    cervical spine injections, [facility] Dr. Abril Heck kidney     Right    Gallstone 1974    GERD (gastroesophageal reflux disease)     pepcid 20 mg prn (about 2x/month)    Herpes simplex type II infection     Hypercholesterolemia     Hypertension     Nausea & vomiting     During surgery when ether was used    Osteoporosis 2012    Reclast 9/24/2012, 10/09/2013, 10/31/2014, 1/29/2016    PUD (peptic ulcer disease) 2010    endoscopy confirmed, was taking aleve    Stage 3 chronic kidney disease [id number]    Trauma 1972    right arm fracture surgically repaired, twisted in washing machine       Social History     Tobacco Use    Smoking status: Current Some Day Smoker     Packs/day: 0.25     Years: 41.00     Pack years: 10.25    Smokeless tobacco: Never Used   Substance Use Topics    Alcohol use: Yes     Comment: occasional    Drug use: No       Outpatient Medications Marked as Taking for the 3/1/21 encounter (Office Visit) with Valeri De La Rosa PA-C   Medication Sig Dispense Refill    amoxicillin-clavulanate (AUGMENTIN) 875-125 mg per tablet amoxicillin 875 mg-potassium clavulanate 125 mg tablet   TAKE 1 TABLET BY MOUTH EVERY 12 HOURS      busPIRone (BUSPAR) 15 mg tablet TAKE 1/2 TABLET BY MOUTH TWICE DAILY AS DIRECTED 90 Tab 1    amLODIPine (NORVASC) 5 mg tablet TAKE 1 TABLET BY MOUTH EVERY DAY 90 Tab 1    fluticasone propion-salmeteroL (ADVAIR/WIXELA) 250-50 mcg/dose diskus inhaler Take 1 Puff by inhalation every twelve (12) hours. 60 Each 3    levalbuterol tartrate (XOPENEX) 45 mcg/actuation inhaler Take 2 Puffs by inhalation every four (4) hours as needed for Wheezing. 1 Inhaler 1    valACYclovir (VALTREX) 1 gram tablet Take 2 Tabs by mouth two (2) times a day. DO NOT TAKE WITH TIZANIDINE  Indications: prevent recurrent herpes simplex infection 30 Tab 1    pravastatin (PRAVACHOL) 20 mg tablet TAKE 1 TABLET BY MOUTH  NIGHTLY 90 Tab 3    traZODone (DESYREL) 50 mg tablet TAKE 1 TABLET BY MOUTH  NIGHTLY 90 Tab 3    diclofenac (VOLTAREN) 1 % gel Apply  to affected area as needed for Pain. 100 g 0    mometasone (ELOCON) 0.1 % lotion INSTILL 4 METRIC DROP TWO TIMES DAILY IN EARS, AS NEEDED 30 mL 1    betamethasone valerate (VALISONE) 0.1 % topical cream Apply  to affected area two (2) times a day. 15 g 0    lisinopril (PRINIVIL, ZESTRIL) 40 mg tablet Take 40 mg by mouth daily. 2    tiZANidine (ZANAFLEX) 2 mg tablet TAKE 1 TABLET BY MOUTH EVERY 8 HOURS AS NEEDED FOR SPASMS      torsemide (DEMADEX) 10 mg tablet Take 5 mg by mouth daily. 2    carboxymethylcellulose sodium (REFRESH OP) Apply  to eye.  dextran 70/hypromellose (TEARS NATURALE OP) Apply 1 Drop to eye as needed.  CALCIUM-MAGNESIUM-ZINC PO Take 2 Tabs by mouth daily.  omega-3 fatty acids-fish oil (FISH OIL) 360-1,200 mg cap Take 2 Tabs by mouth daily.  carica papaya (PAPAYA ENZYME) tab Take  by mouth daily as needed (4-5 a day as needed).  fluticasone (FLONASE SENSIMIST) 27.5 mcg/actuation nasal spray 2 Sprays by Nasal route daily.  glucosamine sulfate 1,000 mg cap Take 2 Tabs by mouth daily.  acetaminophen (TYLENOL) 650 mg CR tablet Take 650 mg by mouth every six (6) hours as needed for Pain.  famotidine (PEPCID) 20 mg tablet Take 20 mg by mouth as needed.  melatonin 1 mg tablet Take 1 mg by mouth nightly.  ketoconazole (NIZORAL) 2 % topical cream Apply  to affected area daily. Allergies   Allergen Reactions    Indomethacin Other (comments)     Made her feel weird to the point it scared her  Other reaction(s): Other (see comments)  Made her feel weird to the point it scared her  Made her feel weird to the point it scared her    Other reaction(s): Other (comments)  Made her feel weird to the point it scared her    Fosamax [Alendronate] Other (comments)     Had a peptic ulcer    Other Medication Nausea and Vomiting     Flu vaccine in [id number]    Cephalexin Nausea and Vomiting    Erythromycin Nausea and Vomiting    Prochlorperazine Other (comments)     Childhood reaction-Neck spasms  Other reaction(s): Other (see comments)  Childhood reaction  Childhood reaction    Other reaction(s): Other (comments)  Childhood reaction-Neck spasms       ROS:  ROS negative except as per HPI. PE:  Visit Vitals  /60 (BP 1 Location: Left upper arm, BP Patient Position: Sitting)   Pulse 75   Temp 97.2 °F (36.2 °C)   Resp 16   Ht 5' 5\" (1.651 m)   Wt 133 lb 12.8 oz (60.7 kg)   LMP  (LMP Unknown)   SpO2 96%   BMI 22.27 kg/m²     Gen: alert, oriented, no acute distress  Head: normocephalic, atraumatic  Eyes: pupils equal round reactive to light, sclera clear, conjunctiva clear  Oral: mask on  Neck: supple  Resp: no increase work of breathing, + End inspiratory wheeze throughout  CV: S1, S2 normal.  No murmurs, rubs, or gallops. Abd: soft, not tender, not distended. Skin: no lesion or rash  Extremities: no cyanosis or edema    No results found for this visit on 03/01/21. Assessment/Plan:      ICD-10-CM ICD-9-CM    1. Mild persistent asthmatic bronchitis without complication  D58.58 515.62    2. Stage 3a chronic kidney disease  N18.31 585.3    3. Tobacco use  Z72.0 305.1    4. Hyperlipidemia LDL goal <70  E78.5 272.4    5.  Essential hypertension  I10 401.9 6. Right carotid bruit  R09.89 785.9    7. Syncope, unspecified syncope type  R55 780.2    8. Anxiety  F41.9 300.00    9. Age-related osteoporosis without current pathological fracture  M81.0 733.01        Asthma improved on Wixela 250 BID  Continue until next visit  She reports seasonal bronchitis, and she is a smoker, so will continue Gavin Nallelyjaime for now  She is on augmentin, complete therapy, and advised to resume Flonase  Smoking cessation encouraged and reviewed    Dizziness has resolved  Stable carotid doppler  Recent labs with LDL at goal, and overall normal  Feels better on lower dose of torsemide (5 mg daily instead of 10 mg) from renal Dr Anila Alvarado  HTN - well controlled. Continue current medication. Exercise, and low salt/ low caffeine diet were discussed. Anxiety stable on buspar  Has F/U with Dr Brianda Moore re: osteoporosis      Health Maintenance reviewed - updated. No orders of the defined types were placed in this encounter. There are no discontinued medications. Current Outpatient Medications   Medication Sig Dispense Refill    amoxicillin-clavulanate (AUGMENTIN) 875-125 mg per tablet amoxicillin 875 mg-potassium clavulanate 125 mg tablet   TAKE 1 TABLET BY MOUTH EVERY 12 HOURS      busPIRone (BUSPAR) 15 mg tablet TAKE 1/2 TABLET BY MOUTH TWICE DAILY AS DIRECTED 90 Tab 1    amLODIPine (NORVASC) 5 mg tablet TAKE 1 TABLET BY MOUTH EVERY DAY 90 Tab 1    fluticasone propion-salmeteroL (ADVAIR/WIXELA) 250-50 mcg/dose diskus inhaler Take 1 Puff by inhalation every twelve (12) hours. 60 Each 3    levalbuterol tartrate (XOPENEX) 45 mcg/actuation inhaler Take 2 Puffs by inhalation every four (4) hours as needed for Wheezing. 1 Inhaler 1    valACYclovir (VALTREX) 1 gram tablet Take 2 Tabs by mouth two (2) times a day.  DO NOT TAKE WITH TIZANIDINE  Indications: prevent recurrent herpes simplex infection 30 Tab 1    pravastatin (PRAVACHOL) 20 mg tablet TAKE 1 TABLET BY MOUTH  NIGHTLY 90 Tab 3    traZODone (DESYREL) 50 mg tablet TAKE 1 TABLET BY MOUTH  NIGHTLY 90 Tab 3    diclofenac (VOLTAREN) 1 % gel Apply  to affected area as needed for Pain. 100 g 0    mometasone (ELOCON) 0.1 % lotion INSTILL 4 METRIC DROP TWO TIMES DAILY IN EARS, AS NEEDED 30 mL 1    betamethasone valerate (VALISONE) 0.1 % topical cream Apply  to affected area two (2) times a day. 15 g 0    lisinopril (PRINIVIL, ZESTRIL) 40 mg tablet Take 40 mg by mouth daily. 2    tiZANidine (ZANAFLEX) 2 mg tablet TAKE 1 TABLET BY MOUTH EVERY 8 HOURS AS NEEDED FOR SPASMS      torsemide (DEMADEX) 10 mg tablet Take 5 mg by mouth daily. 2    carboxymethylcellulose sodium (REFRESH OP) Apply  to eye.  dextran 70/hypromellose (TEARS NATURALE OP) Apply 1 Drop to eye as needed.  CALCIUM-MAGNESIUM-ZINC PO Take 2 Tabs by mouth daily.  omega-3 fatty acids-fish oil (FISH OIL) 360-1,200 mg cap Take 2 Tabs by mouth daily.  carica papaya (PAPAYA ENZYME) tab Take  by mouth daily as needed (4-5 a day as needed).  fluticasone (FLONASE SENSIMIST) 27.5 mcg/actuation nasal spray 2 Sprays by Nasal route daily.  glucosamine sulfate 1,000 mg cap Take 2 Tabs by mouth daily.  acetaminophen (TYLENOL) 650 mg CR tablet Take 650 mg by mouth every six (6) hours as needed for Pain.  famotidine (PEPCID) 20 mg tablet Take 20 mg by mouth as needed.  melatonin 1 mg tablet Take 1 mg by mouth nightly.  ketoconazole (NIZORAL) 2 % topical cream Apply  to affected area daily.  doxycycline (VIBRAMYCIN) 100 mg capsule TK ONE C PO  Q 12 H         Recommended healthy diet low in carbohydrates, fats, sodium and cholesterol. Recommended regular cardiovascular exercise 3-6 times per week for 30-60 minutes daily. Verbal and written instructions (see AVS) provided. Patient expresses understanding of diagnosis and treatment plan.        Follow-up and Dispositions    · Return in about 3 months (around 6/1/2021) for asthma; call my new office in April to schedule your next appointment.

## 2021-03-01 NOTE — PATIENT INSTRUCTIONS
Stay on the Wixela inhaler twice a day throughout the spring, until your next visit. Stopping Smoking: Care Instructions Your Care Instructions Cigarette smokers crave the nicotine in cigarettes. Giving it up is much harder than simply changing a habit. Your body has to stop craving the nicotine. It is hard to quit, but you can do it. There are many tools that people use to quit smoking. You may find that combining tools works best for you. There are several steps to quitting. First you get ready to quit. Then you get support to help you. After that, you learn new skills and behaviors to become a nonsmoker. For many people, a necessary step is getting and using medicine. Your doctor will help you set up the plan that best meets your needs. You may want to attend a smoking cessation program to help you quit smoking. When you choose a program, look for one that has proven success. Ask your doctor for ideas. You will greatly increase your chances of success if you take medicine as well as get counseling or join a cessation program. 
Some of the changes you feel when you first quit tobacco are uncomfortable. Your body will miss the nicotine at first, and you may feel short-tempered and grumpy. You may have trouble sleeping or concentrating. Medicine can help you deal with these symptoms. You may struggle with changing your smoking habits and rituals. The last step is the tricky one: Be prepared for the smoking urge to continue for a time. This is a lot to deal with, but keep at it. You will feel better. Follow-up care is a key part of your treatment and safety. Be sure to make and go to all appointments, and call your doctor if you are having problems. It's also a good idea to know your test results and keep a list of the medicines you take. How can you care for yourself at home? · Ask your family, friends, and coworkers for support. You have a better chance of quitting if you have help and support. · Join a support group, such as Nicotine Anonymous, for people who are trying to quit smoking. · Consider signing up for a smoking cessation program, such as the American [employer] Association's Freedom from Smoking program. 
· Get text messaging support. Go to the website at www.smokefree. gov to sign up for the [facility] program. 
· Set a quit date. Pick your date carefully so that it is not right in the middle of a big deadline or stressful time. Once you quit, do not even take a puff. Get rid of all ashtrays and lighters after your last cigarette. Clean your house and your clothes so that they do not smell of smoke. · Learn how to be a nonsmoker. Think about ways you can avoid those things that make you reach for a cigarette. ? Avoid situations that put you at greatest risk for smoking. For some people, it is hard to have a drink with friends without smoking. For others, they might skip a coffee break with coworkers who smoke. ? Change your daily routine. Take a different route to work or eat a meal in a different place. · Cut down on stress. Calm yourself or release tension by doing an activity you enjoy, such as reading a book, taking a hot bath, or gardening. · Talk to your doctor or pharmacist about nicotine replacement therapy, which replaces the nicotine in your body. You still get nicotine but you do not use tobacco. Nicotine replacement products help you slowly reduce the amount of nicotine you need. These products come in several forms, many of them available over-the-counter: ? Nicotine patches ? Nicotine gum and lozenges ? Nicotine inhaler · Ask your doctor about bupropion (Wellbutrin) or varenicline (Chantix), which are prescription medicines. They do not contain nicotine. They help you by reducing withdrawal symptoms, such as stress and anxiety. · Some people find hypnosis, acupuncture, and massage helpful for ending the smoking habit. · Eat a healthy diet and get regular exercise. Having healthy habits will help your body move past its craving for nicotine. · Be prepared to keep trying. Most people are not successful the first few times they try to quit. Do not get mad at yourself if you smoke again. Make a list of things you learned and think about when you want to try again, such as next week, next month, or next year. Where can you learn more? Go to http://www.gray.com/ Enter H441 in the search box to learn more about \"Stopping Smoking: Care Instructions. \" Current as of: March 12, 2020               Content Version: 12.6 © 0244-4536 [employer]. Care instructions adapted under license by [employer] (which disclaims liability or warranty for this information). If you have questions about a medical condition or this instruction, always ask your healthcare professional. [address] any warranty or liability for your use of this information.

## 2021-03-01 NOTE — PROGRESS NOTES
Chief Complaint   Patient presents with    Cough     4 week f/u   Room 9    Pt states she is feeling better but still has a snotty nose, has 1 more day of antibiotics. 1. Have you been to the ER, urgent care clinic since your last visit? Hospitalized since your last visit? Yes When: [facility] 2/21/2021 for sinus infection    2. Have you seen or consulted any other health care providers outside of the [address] [place] [address] since your last visit? Include any pap smears or colon screening.  No    Visit Vitals  BP (!) 160/77 (BP 1 Location: Left upper arm, BP Patient Position: Sitting)   Pulse 75   Temp 97.2 °F (36.2 °C)   Resp 16   Ht 5' 5\" (1.651 m)   Wt 133 lb 12.8 oz (60.7 kg)   LMP  (LMP Unknown)   SpO2 96%   BMI 22.27 kg/m²

## 2021-03-09 ENCOUNTER — VIRTUAL VISIT (OUTPATIENT)
Dept: ENDOCRINOLOGY | Age: 76
End: 2021-03-09
Payer: MEDICARE

## 2021-03-09 DIAGNOSIS — M81.0 AGE-RELATED OSTEOPOROSIS WITHOUT CURRENT PATHOLOGICAL FRACTURE: Primary | ICD-10-CM

## 2021-03-09 PROCEDURE — 99213 OFFICE O/P EST LOW 20 MIN: CPT | Performed by: INTERNAL MEDICINE

## 2021-03-09 NOTE — PROGRESS NOTES
Chief Complaint   Patient presents with    Osteoporosis     [email or web address]    Other     pcp and pharmacy confirmed       **THIS IS A VIRTUAL VISIT VIA A VIDEO SYNCHRONOUS DISCUSSION. PATIENT AGREED TO HAVE THEIR CARE DELIVERED OVER A DOXY. ME VIDEO VISIT IN PLACE OF THEIR REGULARLY SCHEDULED OFFICE VISIT**    History of Present Illness: Casandra Srinivasan is a 67 y.o. female here for follow up of osteoporosis. No major change to health since last visit in 8/19 aside from pneumonia twice. Thais Summers in 12/20 after passing out and had a gash in her head and soreness to her left side but no fracture. Otherwise no falls or fractures. Still taking the calcium + mg + zinc 2 tabs daily but no additional vitamin D per Dr. Eddi Monroe recommendation. Tries to do some walking when able to. Has been under a lot of stress as her 27 yo grandson committed suicide in July 2020. Current Outpatient Medications   Medication Sig    busPIRone (BUSPAR) 15 mg tablet TAKE 1/2 TABLET BY MOUTH TWICE DAILY AS DIRECTED    amLODIPine (NORVASC) 5 mg tablet TAKE 1 TABLET BY MOUTH EVERY DAY    fluticasone propion-salmeteroL (ADVAIR/WIXELA) 250-50 mcg/dose diskus inhaler Take 1 Puff by inhalation every twelve (12) hours.  levalbuterol tartrate (XOPENEX) 45 mcg/actuation inhaler Take 2 Puffs by inhalation every four (4) hours as needed for Wheezing.  valACYclovir (VALTREX) 1 gram tablet Take 2 Tabs by mouth two (2) times a day. DO NOT TAKE WITH TIZANIDINE  Indications: prevent recurrent herpes simplex infection (Patient taking differently: Take 2,000 mg by mouth as needed. DO NOT TAKE WITH TIZANIDINE  Indications: prevent recurrent herpes simplex infection)    pravastatin (PRAVACHOL) 20 mg tablet TAKE 1 TABLET BY MOUTH  NIGHTLY    traZODone (DESYREL) 50 mg tablet TAKE 1 TABLET BY MOUTH  NIGHTLY    diclofenac (VOLTAREN) 1 % gel Apply  to affected area as needed for Pain.     mometasone (ELOCON) 0.1 % lotion INSTILL 4 METRIC DROP TWO TIMES DAILY IN EARS, AS NEEDED    lisinopril (PRINIVIL, ZESTRIL) 40 mg tablet Take 40 mg by mouth daily.  tiZANidine (ZANAFLEX) 2 mg tablet TAKE 1 TABLET BY MOUTH EVERY 8 HOURS AS NEEDED FOR SPASMS    torsemide (DEMADEX) 10 mg tablet Take 5 mg by mouth daily.  carboxymethylcellulose sodium (REFRESH OP) Apply  to eye.  dextran 70/hypromellose (TEARS NATURALE OP) Apply 1 Drop to eye as needed.  CALCIUM-MAGNESIUM-ZINC PO Take 2 Tabs by mouth daily.  omega-3 fatty acids-fish oil (FISH OIL) 360-1,200 mg cap Take 2 Tabs by mouth daily.  carica papaya (PAPAYA ENZYME) tab Take  by mouth daily as needed (4-5 a day as needed).  fluticasone (FLONASE SENSIMIST) 27.5 mcg/actuation nasal spray 2 Sprays by Nasal route daily.  glucosamine sulfate 1,000 mg cap Take 2 Tabs by mouth daily.  acetaminophen (TYLENOL) 650 mg CR tablet Take 650 mg by mouth every six (6) hours as needed for Pain.  famotidine (PEPCID) 20 mg tablet Take 20 mg by mouth as needed.  melatonin 1 mg tablet Take 1 mg by mouth nightly.  ketoconazole (NIZORAL) 2 % topical cream Apply  to affected area daily. No current facility-administered medications for this visit. Allergies   Allergen Reactions    Indomethacin Other (comments)     Made her feel weird to the point it scared her  Other reaction(s): Other (see comments)  Made her feel weird to the point it scared her  Made her feel weird to the point it scared her    Other reaction(s): Other (comments)  Made her feel weird to the point it scared her    Fosamax [Alendronate] Other (comments)     Had a peptic ulcer    Other Medication Nausea and Vomiting     Flu vaccine in 1720-5320    Cephalexin Nausea and Vomiting    Erythromycin Nausea and Vomiting    Prochlorperazine Other (comments)     Childhood reaction-Neck spasms  Other reaction(s): Other (see comments)  Childhood reaction  Childhood reaction    Other reaction(s):  Other (comments)  Childhood reaction-Neck spasms     Review of Systems: PER HPI    Physical Examination:  - GENERAL: NCAT, Appears well nourished   - EYES: EOMI, non-icteric, no proptosis   - Ear/Nose/Throat: NCAT, no visible inflammation or masses   - CARDIOVASCULAR: no cyanosis, no visible JVD   - RESPIRATORY: respiratory effort normal without any distress or labored breathing   - MUSCULOSKELETAL: Normal ROM of neck and upper extremities observed   - SKIN: No rash on face  - NEUROLOGIC:  No facial asymmetry (Cranial nerve 7 motor function), No gaze palsy   - PSYCHIATRIC: Normal affect, Normal insight and judgement       Data Reviewed:   Bone Mineral Density     Indication: Postmenopausal  Age: 67  Sex: Female.     Menopause status: Postmenopausal.  Hormone replacement therapy: yes/no      Number of falls in the past year: 1   Risk factors for osteoporosis: History of smoking       Current medication for osteoporosis: None.      Comparison: 2/27/2019 performed on a [employer] unit     Technique: Imaging was performed on the [address] [place] [address]  meta-analysis fracture risk calculator (FRAX) analysis was performed for 10 year  fracture risk probability assessment     Excluded sites: None      Findings:     Fractures identified on Lateral scanogram: None     Femoral Neck Left:  Bone mineral density (gm/cm2): 0.785 g/cm?  % of peak bone mass: 76%  % for age matched controls: 105%  T-score: -1.8   Z-score: 0.3      Femoral Neck Right:  Bone mineral density (gm/cm2): 0.756 g/cm?  % of peak bone mass: 73%  % for age matched controls:  101%  T-score: -2.0   Z-score: 0.1      Total Hip Left:  Bone mineral density (gm/cm2): 0.757 g/cm?  % of peak bone mass: 75%  % for age matched controls: 99%  T-score: -2.0   Z-score: -0.1      Total Hip Right:  Bone mineral density (gm/cm2): 0.738 g/cm?  % of peak bone mass: 73%  % for age matched controls:  96%  T-score: -2.1   Z-score: -0.2      Lumbar Spine: L1-L4  Bone mineral density (gm/cm2): 0.935 g/cm?  % of peak bone mass: 79%  % for age matched controls: 98%  T-score: -2.1   Z-score: -0.1      33% Radius Left:  Bone mineral density (gm/cm2): 0.691 g/cm?  % of peak bone mass: 79%  % for age matched controls:  103%  T-score: -2.1   Z-score: 0.2      IMPRESSION     This patient is osteopenic using the World Health Organization criteria  Compared to the prior study, bone mineral density measurements are not  comparable as the patient was imaged on different units. 10 year probability of major osteoporotic fracture: 6.2%  10 year probability of hip fracture: 2.5%    Assessment/Plan:     1. Age-related osteoporosis without current pathological fracture: Recalls a doctor starting her on weekly fosamax sometime in the [address] and took this for about 5 years and was found to have ulcers in her stomach so she was taken off this med. Was in [place] from 2007 to 2016 and was seeing her PCP and was recommended to start Reclast and had 4 doses in 9/12, 10/13, 10/14 and 1/16 and hasn't had any treatment since then. No history of any fracture. No FH of osteoporosis or hip fracture for sure but her mother may have had this condition as she was stooped over. Went into menopause in her early 45s and was never put on HRT due a doctor being concerned of her breast health. Did have a period of time she drank 4-7 drinks per day for about 5 years in her 45s but not on a regular basis since then. Did smoke up to 1 ppd at the most but currently is about 1/4-1/2 ppd for the past 40 years. Did take PPI for a year after her ulcer diagnosis but since then just takes H2 blocker as needed. No chronic steroid exposure. Drinks a glass of milk every morning and has cheese at lunch and occ yogurt and eats some green veggies. Takes 1000 units of D3 daily and a calcium-mg-zn tab 2 tabs per day. No fall risk. Not currently doing any walk or weight bearing activity.   Did have a DXA in Feb 2017 in [facility] that comments on a T-score of -2.2 at the L-spine and -2.0 at the femurs. In 2/19 her DXA showed a T-score of -2.1 at the L-spine, and -2.3 at the right fem neck and -2.0 at the right total hip. Her FRAX score is 6.2% for hip fracture and 15% for major osteoporotic fracture. We discussed that currently her bone density is in the osteopenic range at all sites and I'm assuming must have improved from the osteoporotic range in the past.  Although the bone density scans from 2017 and 2019 cannot be compared directly as they were done on different machines, the T-scores are similar enough to assume that she is relatively stable over the past 2 years. She is not a high fall risk and can improve her weight bearing activities so we agreed to hold off on any treatment for the next 2 years and work on cutting back on her smoking and continue her calcium and vitamin D as her most recent D level was normal at 30 in 1/19. Repeat DXA in 2/21 (done on a different machine) showed T- score of -2.1 at the L-spine and -2.0 at the right fem neck and -2.1 at the right total hip. FRAX 6.2% for MOP and 2.5% for hip fracture so both are below treatment thresholds and all sites are still in the osteopenic range so we can hold on any treatment for another 2 years. - cont calcium-mg-zn 2 tabs daily  - repeat DXA in 2/23        Patient Instructions   1) Your bone density scan shows all of your sites are in the osteopenic range (mild bone loss) and are relatively stable since 2019 although we can't compare for sure given the scans were done on different machines so we can hold on any treatment for the next 2 years. 2) Our office will contact you in September 2022 to arrange a follow up for March 2023. 3) I will arrange a repeat bone density scan prior to your next visit in March 2023. 4) If you were to have any fractures in the next 2 years, please let me know so we can move up your appointment.         Follow-up and Dispositions    · Return in about 2 years (around 3/9/2023). I spent a total of 23 minutes on the day of this visit.         Copy sent to:  Glen Catherine PA-C as PCP - General (Physician Assistant)  Rory Zavaleta MD (Nephrology)

## 2021-03-09 NOTE — PATIENT INSTRUCTIONS
1) Your bone density scan shows all of your sites are in the osteopenic range (mild bone loss) and are relatively stable since 2019 although we can't compare for sure given the scans were done on different machines so we can hold on any treatment for the next 2 years. 2) Our office will contact you in September 2022 to arrange a follow up for March 2023. 3) I will arrange a repeat bone density scan prior to your next visit in March 2023. 4) If you were to have any fractures in the next 2 years, please let me know so we can move up your appointment.

## 2021-05-24 ENCOUNTER — OFFICE VISIT (OUTPATIENT)
Dept: INTERNAL MEDICINE CLINIC | Age: 76
End: 2021-05-24
Payer: MEDICARE

## 2021-05-24 VITALS
BODY MASS INDEX: 22.17 KG/M2 | TEMPERATURE: 97.8 F | HEART RATE: 70 BPM | SYSTOLIC BLOOD PRESSURE: 134 MMHG | HEIGHT: 65 IN | WEIGHT: 133.1 LBS | OXYGEN SATURATION: 97 % | DIASTOLIC BLOOD PRESSURE: 72 MMHG | RESPIRATION RATE: 16 BRPM

## 2021-05-24 DIAGNOSIS — J30.2 SEASONAL ALLERGIC RHINITIS, UNSPECIFIED TRIGGER: ICD-10-CM

## 2021-05-24 DIAGNOSIS — J31.0 GUSTATORY RHINITIS: ICD-10-CM

## 2021-05-24 DIAGNOSIS — F41.9 ANXIETY: ICD-10-CM

## 2021-05-24 DIAGNOSIS — J45.40 MODERATE PERSISTENT ASTHMATIC BRONCHITIS WITHOUT COMPLICATION: Primary | ICD-10-CM

## 2021-05-24 PROCEDURE — G8420 CALC BMI NORM PARAMETERS: HCPCS | Performed by: PHYSICIAN ASSISTANT

## 2021-05-24 PROCEDURE — G8752 SYS BP LESS 140: HCPCS | Performed by: PHYSICIAN ASSISTANT

## 2021-05-24 PROCEDURE — G8536 NO DOC ELDER MAL SCRN: HCPCS | Performed by: PHYSICIAN ASSISTANT

## 2021-05-24 PROCEDURE — G8427 DOCREV CUR MEDS BY ELIG CLIN: HCPCS | Performed by: PHYSICIAN ASSISTANT

## 2021-05-24 PROCEDURE — G8432 DEP SCR NOT DOC, RNG: HCPCS | Performed by: PHYSICIAN ASSISTANT

## 2021-05-24 PROCEDURE — 99213 OFFICE O/P EST LOW 20 MIN: CPT | Performed by: PHYSICIAN ASSISTANT

## 2021-05-24 PROCEDURE — 1090F PRES/ABSN URINE INCON ASSESS: CPT | Performed by: PHYSICIAN ASSISTANT

## 2021-05-24 PROCEDURE — G8754 DIAS BP LESS 90: HCPCS | Performed by: PHYSICIAN ASSISTANT

## 2021-05-24 PROCEDURE — 1101F PT FALLS ASSESS-DOCD LE1/YR: CPT | Performed by: PHYSICIAN ASSISTANT

## 2021-05-24 RX ORDER — BUDESONIDE AND FORMOTEROL FUMARATE DIHYDRATE 160; 4.5 UG/1; UG/1
1 AEROSOL RESPIRATORY (INHALATION) 2 TIMES DAILY
Qty: 1 INHALER | Refills: 2 | Status: SHIPPED | OUTPATIENT
Start: 2021-05-24 | End: 2021-06-30 | Stop reason: SDUPTHER

## 2021-05-24 RX ORDER — IPRATROPIUM BROMIDE 42 UG/1
1-2 SPRAY, METERED NASAL 4 TIMES DAILY
Qty: 15 ML | Refills: 2 | Status: SHIPPED | OUTPATIENT
Start: 2021-05-24 | End: 2021-07-27

## 2021-05-24 RX ORDER — LEVALBUTEROL TARTRATE 45 UG/1
2 AEROSOL, METERED ORAL
Qty: 1 INHALER | Refills: 1 | Status: SHIPPED | OUTPATIENT
Start: 2021-05-24 | End: 2021-07-26

## 2021-05-24 RX ORDER — MONTELUKAST SODIUM 10 MG/1
10 TABLET ORAL DAILY
Qty: 30 TABLET | Refills: 2 | Status: SHIPPED | OUTPATIENT
Start: 2021-05-24 | End: 2021-08-02 | Stop reason: SDUPTHER

## 2021-05-24 NOTE — PATIENT INSTRUCTIONS
Learning About Asthma What is asthma? Asthma is a lifelong condition that can make it hard to breathe. It causes the airways that lead to the lungs to swell and get inflamed. Some people have a hard time breathing only at certain times. This may be during allergy season, when they get a cold, or when they exercise. Others have breathing problems a lot of the time. When asthma symptoms suddenly get worse (or flare up), the airways tighten and become narrower. This makes it hard to breathe, and you may wheeze or cough. These flare-ups are also called asthma attacks or exacerbations (say \"nu-PEF-qn-BAY-rodriguez\"). Even though asthma is a lifelong condition, treatment can help you feel and breathe better and help keep your lungs healthy. What are the symptoms of asthma? When you have asthma, you may: · Wheeze, making a loud or soft whistling noise when you breathe in and out. · Cough a lot. This is the only symptom for some people. · Feel tightness in your chest. 
· Feel short of breath. You may have rapid, shallow breathing or trouble breathing. · Have trouble sleeping because you're coughing or having a hard time breathing. · Get tired quickly during exercise. Symptoms may start soon after you're around things (triggers) that cause your asthma attacks. This is an early phase response. Or they may start several hours after exposure (late phase response). A late phase response can make it harder to figure out what triggers your symptoms. Symptoms can be mild or severe. You may have symptoms daily or just now and then. Or you may have something in between. Some people have symptoms that get worse at night, such as a cough and shortness of breath. How is it treated? Asthma is treated with medicine to help you breathe easier, along with self-care. Medicines used to treat asthma include: 
Daily controller medicine. This medicine prevents asthma attacks. It helps stop problems before they happen.  It also reduces inflammation in your lungs. These things help you control your asthma. Short-acting (quick-relief) medicine. This medicine is for times when you can't prevent symptoms and need to treat them fast. It helps relax the airways and allows you to breathe easier. Oral or injected corticosteroids (systemic corticosteroids). These medicines may be used to treat asthma attacks. Treatment also includes things you can do to control your symptoms, like avoiding your triggers and following your asthma action plan. How can you prevent an asthma attack? There's no certain way to prevent asthma. But you can reduce your risk of asthma attacks by avoiding things that cause them. And using your asthma controller medicine helps prevent them. The goal is to reduce how many attacks you have, how long they last, and how bad they get. Start by avoiding your asthma triggers. For example: · Don't smoke. And avoid being around others when they smoke. · Avoid things you're allergic to, like pet dander, dust mites, cockroaches, or pollen. · If exercise is a trigger, ask your doctor about using a quick-relief medicine before you exercise. · Stay inside when air pollution, pollen, or dust levels are high. · Try not to exercise outside when it's cold and dry. Also be sure to: · Ask your doctor about getting the flu and pneumococcal vaccines. Illnesses, like colds, flu, or pneumonia can make symptoms worse. · Avoid taking aspirin, ibuprofen, or similar medicines if they make symptoms worse. Follow-up care is a key part of your treatment and safety. Be sure to make and go to all appointments, and call your doctor if you are having problems. It's also a good idea to know your test results and keep a list of the medicines you take. Where can you learn more? Go to http://www.gray.com/ Enter [phone] in the search box to learn more about \"Learning About Asthma. \" Current as of: October 26, 2020               Content Version: 12.8 © 5028-0727 [employer]. Care instructions adapted under license by [employer] (which disclaims liability or warranty for this information). If you have questions about a medical condition or this instruction, always ask your healthcare professional. [address] any warranty or liability for your use of this information.

## 2021-05-24 NOTE — PROGRESS NOTES
HPI:  67 y.o.  presents for follow up appointment. No hospital, ER or specialist visits since last primary care visit except as noted below. F/U asthmatic bronchitis  Started   Wixela 250 in 1/2021  At follow up [address] 3/1/2021 she was feeling great  However, since last visit, she reports with the season change, warmer weather, and pollen, she is now using the Xopenex 4x/day  -she is using Flonase sensimist now since March, she reports getting drippy nose all the time especially with eating, minor PND, intermittent sneezing (some days yes, some days not at all)  -she is on zyrtec daily  -no itchy eyes    Hypertension - compliant with medication, no home monitoring. No history of heart disease or stroke. No chest pain, no shortness of breath, no headaches, no lower extremity swelling.         Using buspar 7.5 mg BID, may switch to TID as she feels jittery/anxious sometimes    Sees Dr Yinka Johnson on Wed      Patient Active Problem List    Diagnosis    Bilateral carotid artery stenosis    Cerebral microvascular disease    GERD (gastroesophageal reflux disease)     pepcid 20 mg prn (about 2x/month)      Chronic pain     neck pain, Dr. Blaze Tolbert      CKD (chronic kidney disease) stage 3, GFR 30-59 ml/min ([facility])    Sinus disorder    Anxiety    Essential hypertension    Osteoarthritis of cervical spine    Hyperlipidemia LDL goal <70    Herpes simplex type 2 infection    Osteoporosis     Reclast 9/24/2012, 10/09/2013, 10/31/2014, 1/29/2016           Past Medical History:   Diagnosis Date    Anxiety     Arthritis     Chronic obstructive pulmonary disease ([facility] [address] 2010    early disease on PFTs, briefly on a daily inhaler    Chronic pain     neck pain, Dr. Blaze Tolbert    Degenerative disc disease, cervical 1998    cervical spine injections, [facility] Dr. Sterling Freed kidney     Right    Gallstone 1974    GERD (gastroesophageal reflux disease)     pepcid 20 mg prn (about 2x/month)    Herpes simplex type II infection     Hypercholesterolemia     Hypertension     Nausea & vomiting     During surgery when ether was used    Osteoporosis 2012    Reclast 9/24/2012, 10/09/2013, 10/31/2014, 1/29/2016    PUD (peptic ulcer disease) 2010    endoscopy confirmed, was taking aleve    Stage 3 chronic kidney disease ([facility] [address] [id number]    Trauma 1972    right arm fracture surgically repaired, twisted in washing machine       Social History     Tobacco Use    Smoking status: Current Some Day Smoker     Packs/day: 0.25     Years: 41.00     Pack years: 10.25    Smokeless tobacco: Never Used   Vaping Use    Vaping Use: Never used   Substance Use Topics    Alcohol use: Yes     Comment: occasional    Drug use: No       Outpatient Medications Marked as Taking for the 5/24/21 encounter (Office Visit) with Valeri Rodriguez PA-C   Medication Sig Dispense Refill    busPIRone (BUSPAR) 15 mg tablet TAKE 1/2 TABLET BY MOUTH TWICE DAILY AS DIRECTED 90 Tab 1    amLODIPine (NORVASC) 5 mg tablet TAKE 1 TABLET BY MOUTH EVERY DAY 90 Tab 1    fluticasone propion-salmeteroL (ADVAIR/WIXELA) 250-50 mcg/dose diskus inhaler Take 1 Puff by inhalation every twelve (12) hours. 60 Each 3    levalbuterol tartrate (XOPENEX) 45 mcg/actuation inhaler Take 2 Puffs by inhalation every four (4) hours as needed for Wheezing. 1 Inhaler 1    valACYclovir (VALTREX) 1 gram tablet Take 2 Tabs by mouth two (2) times a day. DO NOT TAKE WITH TIZANIDINE  Indications: prevent recurrent herpes simplex infection (Patient taking differently: Take 2,000 mg by mouth as needed. DO NOT TAKE WITH TIZANIDINE  Indications: prevent recurrent herpes simplex infection) 30 Tab 1    pravastatin (PRAVACHOL) 20 mg tablet TAKE 1 TABLET BY MOUTH  NIGHTLY 90 Tab 3    traZODone (DESYREL) 50 mg tablet TAKE 1 TABLET BY MOUTH  NIGHTLY 90 Tab 3    diclofenac (VOLTAREN) 1 % gel Apply  to affected area as needed for Pain.  100 g 0    mometasone (ELOCON) 0.1 % lotion INSTILL 4 METRIC DROP TWO TIMES DAILY IN EARS, AS NEEDED 30 mL 1    lisinopril (PRINIVIL, ZESTRIL) 40 mg tablet Take 40 mg by mouth daily. 2    tiZANidine (ZANAFLEX) 2 mg tablet TAKE 1 TABLET BY MOUTH EVERY 8 HOURS AS NEEDED FOR SPASMS      torsemide (DEMADEX) 10 mg tablet Take 5 mg by mouth daily. 2    carboxymethylcellulose sodium (REFRESH OP) Apply  to eye.  dextran 70/hypromellose (TEARS NATURALE OP) Apply 1 Drop to eye as needed.  CALCIUM-MAGNESIUM-ZINC PO Take 2 Tabs by mouth daily.  omega-3 fatty acids-fish oil (FISH OIL) 360-1,200 mg cap Take 2 Tabs by mouth daily.  carica papaya (PAPAYA ENZYME) tab Take  by mouth daily as needed (4-5 a day as needed).  fluticasone (FLONASE SENSIMIST) 27.5 mcg/actuation nasal spray 2 Sprays by Nasal route daily.  glucosamine sulfate 1,000 mg cap Take 2 Tabs by mouth daily.  acetaminophen (TYLENOL) 650 mg CR tablet Take 650 mg by mouth every six (6) hours as needed for Pain.  famotidine (PEPCID) 20 mg tablet Take 20 mg by mouth as needed.  melatonin 1 mg tablet Take 1 mg by mouth nightly.  ketoconazole (NIZORAL) 2 % topical cream Apply  to affected area daily. Allergies   Allergen Reactions    Indomethacin Other (comments)     Made her feel weird to the point it scared her  Other reaction(s): Other (see comments)  Made her feel weird to the point it scared her  Made her feel weird to the point it scared her    Other reaction(s): Other (comments)  Made her feel weird to the point it scared her    Fosamax [Alendronate] Other (comments)     Had a peptic ulcer    Other Medication Nausea and Vomiting     Flu vaccine in [id number]    Cephalexin Nausea and Vomiting    Erythromycin Nausea and Vomiting    Prochlorperazine Other (comments)     Childhood reaction-Neck spasms  Other reaction(s): Other (see comments)  Childhood reaction  Childhood reaction    Other reaction(s):  Other (comments)  Childhood reaction-Neck spasms       ROS:  ROS negative except as per HPI. PE:  Visit Vitals  /72 (BP 1 Location: Left arm, BP Patient Position: Sitting, BP Cuff Size: Adult)   Pulse 70   Temp 97.8 °F (36.6 °C) (Temporal)   Resp 16   Ht 5' 5\" (1.651 m)   Wt 133 lb 1.6 oz (60.4 kg)   LMP  (LMP Unknown)   SpO2 97%   BMI 22.15 kg/m²     Gen: alert, oriented, no acute distress  Head: normocephalic, atraumatic  Eyes: pupils equal round reactive to light, sclera clear, conjunctiva clear  Oral: moist mucus membranes, no oral lesions, no pharyngeal inflammation or exudate  Neck: supple, no lymphadenopathy  Resp: no increase work of breathing, +scattered wheezes  CV: S1, S2 normal.  No murmurs, rubs, or gallops. Skin: no lesion or rash  Extremities: no cyanosis or edema    No results found for this visit on 05/24/21. Assessment/Plan:      ICD-10-CM ICD-9-CM    1. Moderate persistent asthmatic bronchitis without complication  Q35.09 280.51 budesonide-formoteroL (SYMBICORT) 160-4.5 mcg/actuation HFAA      levalbuterol tartrate (XOPENEX) 45 mcg/actuation inhaler      montelukast (SINGULAIR) 10 mg tablet   2. Seasonal allergic rhinitis, unspecified trigger  J30.2 477.9 montelukast (SINGULAIR) 10 mg tablet   3. Gustatory rhinitis  J31.0 472.0 ipratropium (ATROVENT) 42 mcg (0.06 %) nasal spray   4. Anxiety  F41.9 300.00      Initial improvement on Advair 250, but now with season change has increased use of xopenex and it has become less effective  Will switch to Symbicort  Add singulair and atrovent nasal  Reviewed risk of increased depression or suicidal ideation on singulair and to stop immediately if she notices worsening mood  She continues to have family support to help her over the death of her grandson  OK to increase buspar 7.5 mg from BID to TID if needed        Health Maintenance reviewed - updated. Orders Placed This Encounter    budesonide-formoteroL (SYMBICORT) 160-4.5 mcg/actuation HFAA     Sig: Take 1 Puff by inhalation two (2) times a day. (TO REPLACE WIXELA)     Dispense:  1 Inhaler     Refill:  2    ipratropium (ATROVENT) 42 mcg (0.06 %) nasal spray     Si-2 Sprays by Both Nostrils route four (4) times daily. Dispense:  15 mL     Refill:  2    levalbuterol tartrate (XOPENEX) 45 mcg/actuation inhaler     Sig: Take 2 Puffs by inhalation every four (4) hours as needed for Wheezing. Dispense:  1 Inhaler     Refill:  1    montelukast (SINGULAIR) 10 mg tablet     Sig: Take 1 Tablet by mouth daily. Dispense:  30 Tablet     Refill:  2       Medications Discontinued During This Encounter   Medication Reason    fluticasone propion-salmeteroL (ADVAIR/WIXELA) 250-50 mcg/dose diskus inhaler Alternate Therapy    levalbuterol tartrate (XOPENEX) 45 mcg/actuation inhaler REORDER       Current Outpatient Medications   Medication Sig Dispense Refill    budesonide-formoteroL (SYMBICORT) 160-4.5 mcg/actuation HFAA Take 1 Puff by inhalation two (2) times a day. (TO REPLACE WIXELA) 1 Inhaler 2    ipratropium (ATROVENT) 42 mcg (0.06 %) nasal spray 1-2 Sprays by Both Nostrils route four (4) times daily. 15 mL 2    levalbuterol tartrate (XOPENEX) 45 mcg/actuation inhaler Take 2 Puffs by inhalation every four (4) hours as needed for Wheezing. 1 Inhaler 1    montelukast (SINGULAIR) 10 mg tablet Take 1 Tablet by mouth daily. 30 Tablet 2    busPIRone (BUSPAR) 15 mg tablet TAKE 1/2 TABLET BY MOUTH TWICE DAILY AS DIRECTED 90 Tab 1    amLODIPine (NORVASC) 5 mg tablet TAKE 1 TABLET BY MOUTH EVERY DAY 90 Tab 1    valACYclovir (VALTREX) 1 gram tablet Take 2 Tabs by mouth two (2) times a day. DO NOT TAKE WITH TIZANIDINE  Indications: prevent recurrent herpes simplex infection (Patient taking differently: Take 2,000 mg by mouth as needed.  DO NOT TAKE WITH TIZANIDINE  Indications: prevent recurrent herpes simplex infection) 30 Tab 1    pravastatin (PRAVACHOL) 20 mg tablet TAKE 1 TABLET BY MOUTH  NIGHTLY 90 Tab 3    traZODone (DESYREL) 50 mg tablet TAKE 1 TABLET BY MOUTH  NIGHTLY 90 Tab 3    diclofenac (VOLTAREN) 1 % gel Apply  to affected area as needed for Pain. 100 g 0    mometasone (ELOCON) 0.1 % lotion INSTILL 4 METRIC DROP TWO TIMES DAILY IN EARS, AS NEEDED 30 mL 1    lisinopril (PRINIVIL, ZESTRIL) 40 mg tablet Take 40 mg by mouth daily. 2    tiZANidine (ZANAFLEX) 2 mg tablet TAKE 1 TABLET BY MOUTH EVERY 8 HOURS AS NEEDED FOR SPASMS      torsemide (DEMADEX) 10 mg tablet Take 5 mg by mouth daily. 2    carboxymethylcellulose sodium (REFRESH OP) Apply  to eye.  dextran 70/hypromellose (TEARS NATURALE OP) Apply 1 Drop to eye as needed.  CALCIUM-MAGNESIUM-ZINC PO Take 2 Tabs by mouth daily.  omega-3 fatty acids-fish oil (FISH OIL) 360-1,200 mg cap Take 2 Tabs by mouth daily.  carica papaya (PAPAYA ENZYME) tab Take  by mouth daily as needed (4-5 a day as needed).  fluticasone (FLONASE SENSIMIST) 27.5 mcg/actuation nasal spray 2 Sprays by Nasal route daily.  glucosamine sulfate 1,000 mg cap Take 2 Tabs by mouth daily.  acetaminophen (TYLENOL) 650 mg CR tablet Take 650 mg by mouth every six (6) hours as needed for Pain.  famotidine (PEPCID) 20 mg tablet Take 20 mg by mouth as needed.  melatonin 1 mg tablet Take 1 mg by mouth nightly.  ketoconazole (NIZORAL) 2 % topical cream Apply  to affected area daily. Recommended healthy diet low in carbohydrates, fats, sodium and cholesterol. Recommended regular cardiovascular exercise 3-6 times per week for 30-60 minutes daily. Verbal and written instructions (see AVS) provided. Patient expresses understanding of diagnosis and treatment plan. Follow-up and Dispositions    · Return in about 4 weeks (around 6/21/2021) for asthma.        Future Appointments   Date Time Provider Amanda Addison   6/24/2021 11:00 AM Valeri Rodriguez PA-C [facility]

## 2021-05-24 NOTE — PROGRESS NOTES
Jose Berg is a 67 y.o. female     Chief Complaint   Patient presents with    Hypertension     follow up    GERD     follow up       Visit Vitals  /72 (BP 1 Location: Left arm, BP Patient Position: Sitting, BP Cuff Size: Adult)   Pulse 70   Temp 97.8 °F (36.6 °C) (Temporal)   Resp 16   Ht 5' 5\" (1.651 m)   Wt 133 lb 1.6 oz (60.4 kg)   LMP  (LMP Unknown)   SpO2 97%   BMI 22.15 kg/m²       Health Maintenance Due   Topic Date Due    Hepatitis C Screening  Never done       1. Have you been to the ER, urgent care clinic since your last visit? Hospitalized since your last visit? No    2. Have you seen or consulted any other health care providers outside of the [address] [place] [address] since your last visit? Include any pap smears or colon screening. No    Has not had covid vaccine the information to get scheduled was given to patient. Patient stated she has vertigo at night but its only when she turn to the left.

## 2021-06-24 ENCOUNTER — TELEPHONE (OUTPATIENT)
Dept: INTERNAL MEDICINE CLINIC | Age: 76
End: 2021-06-24

## 2021-06-24 ENCOUNTER — OFFICE VISIT (OUTPATIENT)
Dept: INTERNAL MEDICINE CLINIC | Age: 76
End: 2021-06-24
Payer: MEDICARE

## 2021-06-24 VITALS
WEIGHT: 132.5 LBS | RESPIRATION RATE: 16 BRPM | HEIGHT: 65 IN | OXYGEN SATURATION: 94 % | TEMPERATURE: 97.5 F | BODY MASS INDEX: 22.08 KG/M2 | DIASTOLIC BLOOD PRESSURE: 72 MMHG | SYSTOLIC BLOOD PRESSURE: 130 MMHG | HEART RATE: 60 BPM

## 2021-06-24 DIAGNOSIS — J30.2 SEASONAL ALLERGIC RHINITIS, UNSPECIFIED TRIGGER: ICD-10-CM

## 2021-06-24 DIAGNOSIS — F41.9 ANXIETY: ICD-10-CM

## 2021-06-24 DIAGNOSIS — J44.9 CHRONIC OBSTRUCTIVE PULMONARY DISEASE, UNSPECIFIED COPD TYPE (HCC): Primary | ICD-10-CM

## 2021-06-24 DIAGNOSIS — J45.40 MODERATE PERSISTENT ASTHMATIC BRONCHITIS WITHOUT COMPLICATION: ICD-10-CM

## 2021-06-24 DIAGNOSIS — F17.200 TOBACCO DEPENDENCE: ICD-10-CM

## 2021-06-24 DIAGNOSIS — J31.0 GUSTATORY RHINITIS: ICD-10-CM

## 2021-06-24 PROCEDURE — G8427 DOCREV CUR MEDS BY ELIG CLIN: HCPCS | Performed by: PHYSICIAN ASSISTANT

## 2021-06-24 PROCEDURE — 1090F PRES/ABSN URINE INCON ASSESS: CPT | Performed by: PHYSICIAN ASSISTANT

## 2021-06-24 PROCEDURE — G8754 DIAS BP LESS 90: HCPCS | Performed by: PHYSICIAN ASSISTANT

## 2021-06-24 PROCEDURE — G8536 NO DOC ELDER MAL SCRN: HCPCS | Performed by: PHYSICIAN ASSISTANT

## 2021-06-24 PROCEDURE — G8752 SYS BP LESS 140: HCPCS | Performed by: PHYSICIAN ASSISTANT

## 2021-06-24 PROCEDURE — 99214 OFFICE O/P EST MOD 30 MIN: CPT | Performed by: PHYSICIAN ASSISTANT

## 2021-06-24 PROCEDURE — G8432 DEP SCR NOT DOC, RNG: HCPCS | Performed by: PHYSICIAN ASSISTANT

## 2021-06-24 PROCEDURE — G8420 CALC BMI NORM PARAMETERS: HCPCS | Performed by: PHYSICIAN ASSISTANT

## 2021-06-24 PROCEDURE — 1101F PT FALLS ASSESS-DOCD LE1/YR: CPT | Performed by: PHYSICIAN ASSISTANT

## 2021-06-24 RX ORDER — AZITHROMYCIN 250 MG/1
TABLET, FILM COATED ORAL
Qty: 6 TABLET | Refills: 0 | Status: SHIPPED | OUTPATIENT
Start: 2021-06-24 | End: 2022-02-14

## 2021-06-24 NOTE — TELEPHONE ENCOUNTER
I spoke with patient. She had spoken with the pharmacy to inform them she has taken Zpak twice in recent years and tolerated well.  She is not allergic to it and asked the pharmacy to remove that from their allergy list.

## 2021-06-24 NOTE — PATIENT INSTRUCTIONS
Managing Your Allergies: Care Instructions  Your Care Instructions     Managing your allergies is an important part of staying healthy. Your doctor will help you find out what may be the cause of the allergies. Common causes of symptoms are house dust and dust mites, animal dander, mold, and pollen. As soon as you know what triggers your symptoms, try to avoid those things. This can help prevent allergy symptoms, asthma, and other health problems. Ask your doctor about allergy medicine or immunotherapy. These treatments may help reduce or prevent allergy symptoms. Follow-up care is a key part of your treatment and safety. Be sure to make and go to all appointments, and call your doctor if you are having problems. It's also a good idea to know your test results and keep a list of the medicines you take. How can you care for yourself at home? · If you have been told by your doctor that dust or dust mites are causing your allergy, decrease the dust around your bed:  ? Wash sheets, pillowcases, and other bedding in hot water every week. ? Use dust-proof covers for pillows, duvets, and mattresses. Avoid plastic covers because they tear easily and do not \"breathe. \" Wash as instructed on the label. ? Do not use any blankets and pillows that you do not need. ? Use blankets that you can wash in your washing machine. ? Consider removing drapes and carpets, which attract and hold dust, from your bedroom. · If you are allergic to house dust and mites, do not use home humidifiers. Your doctor can suggest ways you can control dust and mites. · Look for signs of cockroaches. Cockroaches cause allergic reactions. Use cockroach baits to get rid of them. Then, clean your home well. Cockroaches like areas where grocery bags, newspapers, empty bottles, or cardboard boxes are stored. Do not keep these inside your home, and keep trash and food containers sealed.  Seal off any spots where cockroaches might enter your home.  · If you are allergic to mold, get rid of furniture, rugs, and drapes that smell musty. Check for mold in the bathroom. · If you are allergic to outdoor pollen or mold spores, use air-conditioning. Change or clean all filters every month. Keep windows closed. · If you are allergic to pollen, stay inside when pollen counts are high. Use a vacuum  with a HEPA filter or a double-thickness filter at least two times each week. · Stay inside when air pollution is bad. Avoid paint fumes, perfumes, and other strong odors. · Avoid conditions that make your allergies worse. Stay away from smoke. Do not smoke or let anyone else smoke in your house. Do not use fireplaces or wood-burning stoves. · If you are allergic to your pets, change the air filter in your furnace every month. Use high-efficiency filters. · If you are allergic to pet dander, keep pets outside or out of your bedroom. Old carpet and cloth furniture can hold a lot of animal dander. You may need to replace them. When should you call for help? Give an epinephrine shot if:    · You think you are having a severe allergic reaction. After giving an epinephrine shot call 911, even if you feel better. Call 911 if:    · You have symptoms of a severe allergic reaction. These may include:  ? Sudden raised, red areas (hives) all over your body. ? Swelling of the throat, mouth, lips, or tongue. ? Trouble breathing. ? Passing out (losing consciousness). Or you may feel very lightheaded or suddenly feel weak, confused, or restless.     · You have been given an epinephrine shot, even if you feel better. Call your doctor now or seek immediate medical care if:    · You have symptoms of an allergic reaction, such as:  ? A rash or hives (raised, red areas on the skin). ? Itching. ? Swelling. ? Belly pain, nausea, or vomiting.    Watch closely for changes in your health, and be sure to contact your doctor if:    · Your allergies get worse.     · You need help controlling your allergies.     · You have questions about allergy testing.     · You do not get better as expected. Where can you learn more? Go to http://www.gray.com/  Enter L249 in the search box to learn more about \"Managing Your Allergies: Care Instructions. \"  Current as of: November 6, 2020               Content Version: 12.8  © 6591-9029 [employer]. Care instructions adapted under license by [employer] (which disclaims liability or warranty for this information). If you have questions about a medical condition or this instruction, always ask your healthcare professional. [place] [address] any warranty or liability for your use of this information. Stopping Smoking: Care Instructions  Your Care Instructions     Cigarette smokers crave the nicotine in cigarettes. Giving it up is much harder than simply changing a habit. Your body has to stop craving the nicotine. It is hard to quit, but you can do it. There are many tools that people use to quit smoking. You may find that combining tools works best for you. There are several steps to quitting. First you get ready to quit. Then you get support to help you. After that, you learn new skills and behaviors to become a nonsmoker. For many people, a necessary step is getting and using medicine. Your doctor will help you set up the plan that best meets your needs. You may want to attend a smoking cessation program to help you quit smoking. When you choose a program, look for one that has proven success. Ask your doctor for ideas. You will greatly increase your chances of success if you take medicine as well as get counseling or join a cessation program.  Some of the changes you feel when you first quit tobacco are uncomfortable. Your body will miss the nicotine at first, and you may feel short-tempered and grumpy. You may have trouble sleeping or concentrating.  Medicine can help you deal with these symptoms. You may struggle with changing your smoking habits and rituals. The last step is the tricky one: Be prepared for the smoking urge to continue for a time. This is a lot to deal with, but keep at it. You will feel better. Follow-up care is a key part of your treatment and safety. Be sure to make and go to all appointments, and call your doctor if you are having problems. It's also a good idea to know your test results and keep a list of the medicines you take. How can you care for yourself at home? · Ask your family, friends, and coworkers for support. You have a better chance of quitting if you have help and support. · Join a support group, such as Nicotine Anonymous, for people who are trying to quit smoking. · Consider signing up for a smoking cessation program, such as the American Lung Association's Freedom from Smoking program.  · Get text messaging support. Go to the website at www.smokefree. gov to sign up for the [facility] program.  · Set a quit date. Pick your date carefully so that it is not right in the middle of a big deadline or stressful time. Once you quit, do not even take a puff. Get rid of all ashtrays and lighters after your last cigarette. Clean your house and your clothes so that they do not smell of smoke. · Learn how to be a nonsmoker. Think about ways you can avoid those things that make you reach for a cigarette. ? Avoid situations that put you at greatest risk for smoking. For some people, it is hard to have a drink with friends without smoking. For others, they might skip a coffee break with coworkers who smoke. ? Change your daily routine. Take a different route to work or eat a meal in a different place. · Cut down on stress. Calm yourself or release tension by doing an activity you enjoy, such as reading a book, taking a hot bath, or gardening.   · Talk to your doctor or pharmacist about nicotine replacement therapy, which replaces the nicotine in your body. You still get nicotine but you do not use tobacco. Nicotine replacement products help you slowly reduce the amount of nicotine you need. These products come in several forms, many of them available over-the-counter:  ? Nicotine patches  ? Nicotine gum and lozenges  ? Nicotine inhaler  · Ask your doctor about bupropion (Wellbutrin) or varenicline (Chantix), which are prescription medicines. They do not contain nicotine. They help you by reducing withdrawal symptoms, such as stress and anxiety. · Some people find hypnosis, acupuncture, and massage helpful for ending the smoking habit. · Eat a healthy diet and get regular exercise. Having healthy habits will help your body move past its craving for nicotine. · Be prepared to keep trying. Most people are not successful the first few times they try to quit. Do not get mad at yourself if you smoke again. Make a list of things you learned and think about when you want to try again, such as next week, next month, or next year. Where can you learn more? Go to http://www.gray.com/  Enter [id number] in the search box to learn more about \"Stopping Smoking: Care Instructions. \"  Current as of: March 12, 2020               Content Version: 12.8  © 2006-2021 [employer]. Care instructions adapted under license by [employer] (which disclaims liability or warranty for this information). If you have questions about a medical condition or this instruction, always ask your healthcare professional. [place] [address] any warranty or liability for your use of this information.

## 2021-06-24 NOTE — PROGRESS NOTES
HPI:  67 y.o.  presents for follow up appointment. No hospital, ER or specialist visits since last primary care visit except as noted below.     F/U asthma  Recap of last visit 5/24/21: Initial improvement on Advair 250, but now with season change has increased use of xopenex and it has become less effective  Will switch to Symbicort  Add singulair and atrovent nasal  Reviewed risk of increased depression or suicidal ideation on singulair and to stop immediately if she notices worsening mood  She continues to have family support to help her over the death of her grandson  OK to increase buspar 7.5 mg from BID to TID if needed    TODAY - she is using the Symbicort 1 puff BID, and is still using the Xopenex 4 times daily, she is taking the singulair and atrovent nasal spray as well along with her Flonase Sensimist  -she reports she is feeling better, but notices on hot humid days she is coughing more  -she is bringing up a little yellow mucus  -she still smokes 1/2 ppd now  -she has used the patch in the past to quit cigarettes but couldn't get off of the patch, reports it was too expensive, so she started smoking again, is not interested in quitting now since she is in the process of moving, would like to try to quit in earnest \"once I am living alone\" (she has been taking turns staying with her children)  -she saw a pulmonologist when living in [place]  -she has had po prednisone in the past but \"I don't like the way it makes me feel and it makes my blood pressure go really high\", it made her jittery  -the gustatory rhinitis is resolved with the atrovent nasal spray    She states the paperwork is final for her new apartment, and she will move in soon  They are doing some renovations  I worry about construction dust, but they are taking out old carpet and putting down new hardwood floors  She states she has a large air purifier    Anxiety, grief reaction  She is taking 7.5 mg TID with relief  She feels well on this regimen        Patient Active Problem List    Diagnosis    Bilateral carotid artery stenosis    Cerebral microvascular disease    GERD (gastroesophageal reflux disease)     pepcid 20 mg prn (about 2x/month)      Chronic pain     neck pain, Dr. Neymar Giles      CKD (chronic kidney disease) stage 3, GFR 30-59 ml/min ([facility])    Sinus disorder    Anxiety    Essential hypertension    Osteoarthritis of cervical spine    Hyperlipidemia LDL goal <70    Herpes simplex type 2 infection    Osteoporosis     Reclast 9/24/2012, 10/09/2013, 10/31/2014, 1/29/2016           Past Medical History:   Diagnosis Date    Anxiety     Arthritis     Chronic obstructive pulmonary disease ([facility] [address] 2010    early disease on PFTs, briefly on a daily inhaler    Chronic pain     neck pain, Dr. Keshia Lentz Degenerative disc disease, cervical 1998    cervical spine injections, [facility] Dr. Princess Castillo kidney     Right    Gallstone 1974    GERD (gastroesophageal reflux disease)     pepcid 20 mg prn (about 2x/month)    Herpes simplex type II infection     Hypercholesterolemia     Hypertension     Nausea & vomiting     During surgery when ether was used    Osteoporosis 2012    Reclast 9/24/2012, 10/09/2013, 10/31/2014, 1/29/2016    PUD (peptic ulcer disease) 2010    endoscopy confirmed, was taking aleve    Stage 3 chronic kidney disease ([facility] [address] 3024-3570    Trauma 1972    right arm fracture surgically repaired, twisted in washing machine       Social History     Tobacco Use    Smoking status: Current Some Day Smoker     Packs/day: 0.25     Years: 41.00     Pack years: 10.25    Smokeless tobacco: Never Used   Vaping Use    Vaping Use: Never used   Substance Use Topics    Alcohol use: Yes     Comment: occasional    Drug use: No       Outpatient Medications Marked as Taking for the 6/24/21 encounter (Office Visit) with Valeri Lagunas PA-C   Medication Sig Dispense Refill    budesonide-formoteroL (SYMBICORT) 160-4.5 mcg/actuation HFAA Take 1 Puff by inhalation two (2) times a day. (TO REPLACE WIXELA) 1 Inhaler 2    ipratropium (ATROVENT) 42 mcg (0.06 %) nasal spray 1-2 Sprays by Both Nostrils route four (4) times daily. 15 mL 2    levalbuterol tartrate (XOPENEX) 45 mcg/actuation inhaler Take 2 Puffs by inhalation every four (4) hours as needed for Wheezing. 1 Inhaler 1    montelukast (SINGULAIR) 10 mg tablet Take 1 Tablet by mouth daily. 30 Tablet 2    busPIRone (BUSPAR) 15 mg tablet TAKE 1/2 TABLET BY MOUTH TWICE DAILY AS DIRECTED 90 Tab 1    amLODIPine (NORVASC) 5 mg tablet TAKE 1 TABLET BY MOUTH EVERY DAY 90 Tab 1    valACYclovir (VALTREX) 1 gram tablet Take 2 Tabs by mouth two (2) times a day. DO NOT TAKE WITH TIZANIDINE  Indications: prevent recurrent herpes simplex infection (Patient taking differently: Take 2,000 mg by mouth as needed. DO NOT TAKE WITH TIZANIDINE  Indications: prevent recurrent herpes simplex infection) 30 Tab 1    pravastatin (PRAVACHOL) 20 mg tablet TAKE 1 TABLET BY MOUTH  NIGHTLY 90 Tab 3    traZODone (DESYREL) 50 mg tablet TAKE 1 TABLET BY MOUTH  NIGHTLY 90 Tab 3    diclofenac (VOLTAREN) 1 % gel Apply  to affected area as needed for Pain. 100 g 0    mometasone (ELOCON) 0.1 % lotion INSTILL 4 METRIC DROP TWO TIMES DAILY IN EARS, AS NEEDED 30 mL 1    lisinopril (PRINIVIL, ZESTRIL) 40 mg tablet Take 40 mg by mouth daily. 2    tiZANidine (ZANAFLEX) 2 mg tablet TAKE 1 TABLET BY MOUTH EVERY 8 HOURS AS NEEDED FOR SPASMS      torsemide (DEMADEX) 10 mg tablet Take 5 mg by mouth daily. 2    carboxymethylcellulose sodium (REFRESH OP) Apply  to eye.  dextran 70/hypromellose (TEARS NATURALE OP) Apply 1 Drop to eye as needed.  CALCIUM-MAGNESIUM-ZINC PO Take 2 Tabs by mouth daily.  omega-3 fatty acids-fish oil (FISH OIL) 360-1,200 mg cap Take 2 Tabs by mouth daily.  carica papaya (PAPAYA ENZYME) tab Take  by mouth daily as needed (4-5 a day as needed).       fluticasone (FLONASE SENSIMIST) 27.5 mcg/actuation nasal spray 2 Sprays by Nasal route daily.  glucosamine sulfate 1,000 mg cap Take 2 Tabs by mouth daily.  acetaminophen (TYLENOL) 650 mg CR tablet Take 650 mg by mouth every six (6) hours as needed for Pain.  famotidine (PEPCID) 20 mg tablet Take 20 mg by mouth as needed.  melatonin 1 mg tablet Take 1 mg by mouth nightly.  ketoconazole (NIZORAL) 2 % topical cream Apply  to affected area daily. Allergies   Allergen Reactions    Indomethacin Other (comments)     Made her feel weird to the point it scared her  Other reaction(s): Other (see comments)  Made her feel weird to the point it scared her  Made her feel weird to the point it scared her    Other reaction(s): Other (comments)  Made her feel weird to the point it scared her    Fosamax [Alendronate] Other (comments)     Had a peptic ulcer    Other Medication Nausea and Vomiting     Flu vaccine in [id number]    Cephalexin Nausea and Vomiting    Erythromycin Nausea and Vomiting    Prochlorperazine Other (comments)     Childhood reaction-Neck spasms  Other reaction(s): Other (see comments)  Childhood reaction  Childhood reaction    Other reaction(s): Other (comments)  Childhood reaction-Neck spasms       ROS:  ROS negative except as per HPI. PE:  Visit Vitals  /72 (BP 1 Location: Left arm, BP Patient Position: Sitting, BP Cuff Size: Adult)   Pulse 60   Temp 97.5 °F (36.4 °C) (Oral)   Resp 16   Ht 5' 5\" (1.651 m)   Wt 132 lb 8 oz (60.1 kg)   LMP  (LMP Unknown)   SpO2 94%   BMI 22.05 kg/m²     Gen: alert, oriented, no acute distress  Head: normocephalic, atraumatic  Eyes: pupils equal round reactive to light, sclera clear, conjunctiva clear  Neck: supple  Resp: no increase work of breathing, lungs clear to ausculation bilaterally, no wheezing, rales or rhonchi  CV: S1, S2 normal.  No murmurs, rubs, or gallops.   Skin: no lesion or rash  Extremities: no cyanosis or edema    No results found for this visit on 06/24/21. Assessment/Plan:      ICD-10-CM ICD-9-CM    1. Chronic obstructive pulmonary disease, unspecified COPD type ([facility])  J44.9 496 tiotropium (Spiriva with HandiHaler) 18 mcg inhalation capsule      REFERRAL TO PULMONARY DISEASE      azithromycin (ZITHROMAX) 250 mg tablet   2. Moderate persistent asthmatic bronchitis without complication  S81.20 562.91 REFERRAL TO PULMONARY DISEASE      azithromycin (ZITHROMAX) 250 mg tablet   3. Seasonal allergic rhinitis, unspecified trigger  J30.2 477.9    4. Gustatory rhinitis  J31.0 472.0    5. Tobacco dependence  F17.200 305.1    6. Anxiety  F41.9 300.00      I suspect her asthma is mixed with COPD given her smoking history  Still smoking 1/2 ppd  She does not wish to quit now due to stress, but I encouraged to proceed with the patch again once she has moved as she is open to this  Switched from Advair 250 to Symbicort 160 1 puff BID, added singulair and Atrovent nasal spray, also on Flonase  Sill using Xopenex QID  Will increase Symbicort to 2 puffs BID  Add Spiriva  Shawna Oas since has now developed yellow mucus  No wheezing today  Considered po prednisone but she declines stating intolerance and elevated BP  Environmental allergen control discussed given she is moving to an apartment that is old but has undergone recent renovation  She has seen pulmonary in the past but not since living in [facility], so referral given  Continue current buspar 7.5 mg TID (my refill count suggests she should be low on medicine, but she declined refill, dosing will need to be increased on her next script)        Health Maintenance reviewed - updated.   Covid vaccine noted as being overdue for 2nd shot  She has not yet started the vaccine series, but is listed on her chart from [address] This Encounter    [address] Pulmonary Disease [address]     Referral Priority:   Routine     Referral Type:   Consultation     Referral Reason:   Specialty Services Required Referred to Provider:   Pavel Wong MD     Number of Visits Requested:   1    tiotropium (Spiriva with HandiHaler) 18 mcg inhalation capsule     Sig: Take 1 Capsule by inhalation daily. Dispense:  30 Capsule     Refill:  2    azithromycin (ZITHROMAX) 250 mg tablet     Sig: Take 2 tablets today, then take 1 tablet daily. Dispense:  6 Tablet     Refill:  0       There are no discontinued medications. Current Outpatient Medications   Medication Sig Dispense Refill    tiotropium (Spiriva with HandiHaler) 18 mcg inhalation capsule Take 1 Capsule by inhalation daily. 30 Capsule 2    azithromycin (ZITHROMAX) 250 mg tablet Take 2 tablets today, then take 1 tablet daily. 6 Tablet 0    budesonide-formoteroL (SYMBICORT) 160-4.5 mcg/actuation HFAA Take 1 Puff by inhalation two (2) times a day. (TO REPLACE WIXELA) 1 Inhaler 2    ipratropium (ATROVENT) 42 mcg (0.06 %) nasal spray 1-2 Sprays by Both Nostrils route four (4) times daily. 15 mL 2    levalbuterol tartrate (XOPENEX) 45 mcg/actuation inhaler Take 2 Puffs by inhalation every four (4) hours as needed for Wheezing. 1 Inhaler 1    montelukast (SINGULAIR) 10 mg tablet Take 1 Tablet by mouth daily. 30 Tablet 2    busPIRone (BUSPAR) 15 mg tablet TAKE 1/2 TABLET BY MOUTH TWICE DAILY AS DIRECTED 90 Tab 1    amLODIPine (NORVASC) 5 mg tablet TAKE 1 TABLET BY MOUTH EVERY DAY 90 Tab 1    valACYclovir (VALTREX) 1 gram tablet Take 2 Tabs by mouth two (2) times a day. DO NOT TAKE WITH TIZANIDINE  Indications: prevent recurrent herpes simplex infection (Patient taking differently: Take 2,000 mg by mouth as needed. DO NOT TAKE WITH TIZANIDINE  Indications: prevent recurrent herpes simplex infection) 30 Tab 1    pravastatin (PRAVACHOL) 20 mg tablet TAKE 1 TABLET BY MOUTH  NIGHTLY 90 Tab 3    traZODone (DESYREL) 50 mg tablet TAKE 1 TABLET BY MOUTH  NIGHTLY 90 Tab 3    diclofenac (VOLTAREN) 1 % gel Apply  to affected area as needed for Pain.  100 g 0    mometasone (ELOCON) 0.1 % lotion INSTILL 4 METRIC DROP TWO TIMES DAILY IN EARS, AS NEEDED 30 mL 1    lisinopril (PRINIVIL, ZESTRIL) 40 mg tablet Take 40 mg by mouth daily. 2    tiZANidine (ZANAFLEX) 2 mg tablet TAKE 1 TABLET BY MOUTH EVERY 8 HOURS AS NEEDED FOR SPASMS      torsemide (DEMADEX) 10 mg tablet Take 5 mg by mouth daily. 2    carboxymethylcellulose sodium (REFRESH OP) Apply  to eye.  dextran 70/hypromellose (TEARS NATURALE OP) Apply 1 Drop to eye as needed.  CALCIUM-MAGNESIUM-ZINC PO Take 2 Tabs by mouth daily.  omega-3 fatty acids-fish oil (FISH OIL) 360-1,200 mg cap Take 2 Tabs by mouth daily.  carica papaya (PAPAYA ENZYME) tab Take  by mouth daily as needed (4-5 a day as needed).  fluticasone (FLONASE SENSIMIST) 27.5 mcg/actuation nasal spray 2 Sprays by Nasal route daily.  glucosamine sulfate 1,000 mg cap Take 2 Tabs by mouth daily.  acetaminophen (TYLENOL) 650 mg CR tablet Take 650 mg by mouth every six (6) hours as needed for Pain.  famotidine (PEPCID) 20 mg tablet Take 20 mg by mouth as needed.  melatonin 1 mg tablet Take 1 mg by mouth nightly.  ketoconazole (NIZORAL) 2 % topical cream Apply  to affected area daily. Recommended healthy diet low in carbohydrates, fats, sodium and cholesterol. Recommended regular cardiovascular exercise 3-6 times per week for 30-60 minutes daily. Verbal and written instructions (see AVS) provided. Patient expresses understanding of diagnosis and treatment plan. Follow-up and Dispositions    · Return in about 4 weeks (around 7/22/2021) for asthma. Future Appointments   Date Time Provider Amanda Addison   7/26/2021 10:30 AM Valeri Rodriguez, ZAIDA VELÁSQUEZ BS [facility]       Greater than 40 min was spent with her, of which more than 50% was spent on counseling.

## 2021-06-24 NOTE — TELEPHONE ENCOUNTER
Please inform pharmacy -   -we have erythromycin listed as \"allergy\" that causes N/V, and not azithromycin  -pt informed me she has taken and tolerated Zpak before.  -please ask pharmacy to confirm this with pt and notify me if any new information  -what do they have as her allergy or adverse reaction to Sandie Alvarezd?

## 2021-06-24 NOTE — PROGRESS NOTES
Ismael Glass is a 67 y.o. female     Chief Complaint   Patient presents with    Asthma     4 wk follow up       Visit Vitals  /72 (BP 1 Location: Left arm, BP Patient Position: Sitting, BP Cuff Size: Adult)   Pulse 60   Temp 97.5 °F (36.4 °C) (Oral)   Resp 16   Ht 5' 5\" (1.651 m)   Wt 132 lb 8 oz (60.1 kg)   LMP  (LMP Unknown)   SpO2 94%   BMI 22.05 kg/m²       Health Maintenance Due   Topic Date Due    Hepatitis C Screening  Never done    COVID-19 Vaccine (2 - Inadvertent mRNA 2-dose series) 03/29/2021       1. Have you been to the ER, urgent care clinic since your last visit? Hospitalized since your last visit? No    2. Have you seen or consulted any other health care providers outside of the [address] [place] [address] since your last visit? Include any pap smears or colon screening. No    Patient waiting until she moves into her own apartment to get covid vaccine.

## 2021-06-24 NOTE — TELEPHONE ENCOUNTER
Geovani called stating the patietn is allergic to Zithromax. Please call and advise.       Thank you,  Jaclyn Wall

## 2021-06-30 DIAGNOSIS — J45.40 MODERATE PERSISTENT ASTHMATIC BRONCHITIS WITHOUT COMPLICATION: ICD-10-CM

## 2021-06-30 RX ORDER — BUDESONIDE AND FORMOTEROL FUMARATE DIHYDRATE 160; 4.5 UG/1; UG/1
2 AEROSOL RESPIRATORY (INHALATION) 2 TIMES DAILY
Qty: 3 INHALER | Refills: 1 | Status: SHIPPED | OUTPATIENT
Start: 2021-06-30 | End: 2021-08-02 | Stop reason: SDUPTHER

## 2021-06-30 NOTE — TELEPHONE ENCOUNTER
Patient states Valeri changed her dose. She stated it is now 2 puffs 2x times daily    Last Refill: 05/24/21  Last Visit: 6/24/2021   Next Visit: 7/26/2021    Requested Prescriptions     Pending Prescriptions Disp Refills    budesonide-formoteroL (SYMBICORT) 160-4.5 mcg/actuation HFAA 1 Inhaler 2     Sig: Take 1 Puff by inhalation two (2) times a day.  (TO [facility])

## 2021-07-21 DIAGNOSIS — J45.40 MODERATE PERSISTENT ASTHMATIC BRONCHITIS WITHOUT COMPLICATION: ICD-10-CM

## 2021-07-21 DIAGNOSIS — J31.0 GUSTATORY RHINITIS: ICD-10-CM

## 2021-07-26 NOTE — TELEPHONE ENCOUNTER
Last Refill: 05/24/21,  Last Visit: 6/24/2021   Next Visit: 8/2/2021    Requested Prescriptions     Pending Prescriptions Disp Refills    levalbuterol tartrate (Alisa Ast) 45 mcg/actuation inhaler [Pharmacy Med Name: LEVALBUTEROL HFA INH (200PF) 15GM] 1 Inhaler      Sig: INHALE 2 PUFFS BY MOUTH EVERY 4 HOURS AS NEEDED FOR WHEEZING    ipratropium (ATROVENT) 42 mcg (0.06 %) nasal spray [Pharmacy Med Name: IPRATROPIUM 0.06% ARNIE SP 15ML (165)] 15 mL 2     Sig: USE 1 TO 2 SPRAYS IN EACH NOSTRIL FOUR TIMES DAILY

## 2021-07-27 RX ORDER — IPRATROPIUM BROMIDE 42 UG/1
SPRAY, METERED NASAL
Qty: 15 ML | Refills: 2 | Status: SHIPPED | OUTPATIENT
Start: 2021-07-27 | End: 2021-10-14 | Stop reason: SDUPTHER

## 2021-07-27 RX ORDER — LEVALBUTEROL TARTRATE 45 UG/1
AEROSOL, METERED ORAL
Qty: 1 INHALER | Refills: 2 | Status: SHIPPED | OUTPATIENT
Start: 2021-07-27 | End: 2021-10-31

## 2021-08-02 ENCOUNTER — OFFICE VISIT (OUTPATIENT)
Dept: INTERNAL MEDICINE CLINIC | Age: 76
End: 2021-08-02
Payer: MEDICARE

## 2021-08-02 VITALS
SYSTOLIC BLOOD PRESSURE: 130 MMHG | RESPIRATION RATE: 16 BRPM | HEIGHT: 65 IN | HEART RATE: 70 BPM | TEMPERATURE: 97.5 F | OXYGEN SATURATION: 96 % | DIASTOLIC BLOOD PRESSURE: 72 MMHG | WEIGHT: 131 LBS | BODY MASS INDEX: 21.83 KG/M2

## 2021-08-02 DIAGNOSIS — R42 VERTIGO: ICD-10-CM

## 2021-08-02 DIAGNOSIS — J30.2 SEASONAL ALLERGIC RHINITIS, UNSPECIFIED TRIGGER: ICD-10-CM

## 2021-08-02 DIAGNOSIS — J45.40 MODERATE PERSISTENT ASTHMATIC BRONCHITIS WITHOUT COMPLICATION: Primary | ICD-10-CM

## 2021-08-02 DIAGNOSIS — N18.31 STAGE 3A CHRONIC KIDNEY DISEASE (HCC): ICD-10-CM

## 2021-08-02 DIAGNOSIS — H91.93 BILATERAL HEARING LOSS, UNSPECIFIED HEARING LOSS TYPE: ICD-10-CM

## 2021-08-02 DIAGNOSIS — I10 ESSENTIAL HYPERTENSION: ICD-10-CM

## 2021-08-02 DIAGNOSIS — F41.9 ANXIETY: ICD-10-CM

## 2021-08-02 DIAGNOSIS — Z72.0 TOBACCO USE: ICD-10-CM

## 2021-08-02 PROCEDURE — G8432 DEP SCR NOT DOC, RNG: HCPCS | Performed by: PHYSICIAN ASSISTANT

## 2021-08-02 PROCEDURE — 1090F PRES/ABSN URINE INCON ASSESS: CPT | Performed by: PHYSICIAN ASSISTANT

## 2021-08-02 PROCEDURE — G8427 DOCREV CUR MEDS BY ELIG CLIN: HCPCS | Performed by: PHYSICIAN ASSISTANT

## 2021-08-02 PROCEDURE — G8752 SYS BP LESS 140: HCPCS | Performed by: PHYSICIAN ASSISTANT

## 2021-08-02 PROCEDURE — 99214 OFFICE O/P EST MOD 30 MIN: CPT | Performed by: PHYSICIAN ASSISTANT

## 2021-08-02 PROCEDURE — G8420 CALC BMI NORM PARAMETERS: HCPCS | Performed by: PHYSICIAN ASSISTANT

## 2021-08-02 PROCEDURE — 1101F PT FALLS ASSESS-DOCD LE1/YR: CPT | Performed by: PHYSICIAN ASSISTANT

## 2021-08-02 PROCEDURE — G8754 DIAS BP LESS 90: HCPCS | Performed by: PHYSICIAN ASSISTANT

## 2021-08-02 PROCEDURE — G8536 NO DOC ELDER MAL SCRN: HCPCS | Performed by: PHYSICIAN ASSISTANT

## 2021-08-02 RX ORDER — BUSPIRONE HYDROCHLORIDE 15 MG/1
TABLET ORAL
Qty: 135 TABLET | Refills: 1 | Status: SHIPPED | OUTPATIENT
Start: 2021-08-02 | End: 2022-01-20

## 2021-08-02 RX ORDER — MONTELUKAST SODIUM 10 MG/1
10 TABLET ORAL DAILY
Qty: 90 TABLET | Refills: 1 | Status: SHIPPED | OUTPATIENT
Start: 2021-08-02 | End: 2022-01-20

## 2021-08-02 RX ORDER — BUDESONIDE AND FORMOTEROL FUMARATE DIHYDRATE 160; 4.5 UG/1; UG/1
2 AEROSOL RESPIRATORY (INHALATION) 2 TIMES DAILY
Qty: 3 INHALER | Refills: 1 | Status: SHIPPED | OUTPATIENT
Start: 2021-08-02 | End: 2021-12-21 | Stop reason: SDUPTHER

## 2021-08-02 RX ORDER — LISINOPRIL 40 MG/1
40 TABLET ORAL DAILY
Qty: 90 TABLET | Refills: 2 | Status: SHIPPED | OUTPATIENT
Start: 2021-08-02 | End: 2022-04-22

## 2021-08-02 RX ORDER — AMLODIPINE BESYLATE 5 MG/1
TABLET ORAL
Qty: 90 TABLET | Refills: 1 | Status: SHIPPED | OUTPATIENT
Start: 2021-08-02 | End: 2022-01-20

## 2021-08-02 NOTE — PATIENT INSTRUCTIONS
Ask Dr Villa Murcia about switching the lisinopril to valsartan or other similar medicine, due to the cough you are having. Vertigo: Care Instructions  Your Care Instructions     Vertigo is the feeling that you or your surroundings are moving when there is no actual movement. It is often described as a feeling of spinning, whirling, falling, or tilting. Vertigo may make you vomit or feel nauseated. You may have trouble standing or walking and may lose your balance. Vertigo is often related to an inner ear problem, but it can have other more serious causes. If vertigo continues, you may need more tests to find its cause. Follow-up care is a key part of your treatment and safety. Be sure to make and go to all appointments, and call your doctor if you are having problems. It's also a good idea to know your test results and keep a list of the medicines you take. How can you care for yourself at home? · Do not lie flat on your back. Prop yourself up slightly. This may reduce the spinning feeling. Keep your eyes open. · Move slowly so that you do not fall. · If your doctor recommends medicine, take it exactly as directed. · Do not drive while you are having vertigo. Certain exercises, called Mahoney-Daroff exercises, can help decrease vertigo. To do Mahoney-Daroff exercises:  · Sit on the edge of a bed or sofa and quickly lie down on the side that causes the worst vertigo. Lie on your side with your ear down. · Stay in this position for at least 30 seconds or until the vertigo goes away. · Sit up. If this causes vertigo, wait for it to stop. · Repeat the procedure on the other side. · Repeat this 10 times. Do these exercises 2 times a day until the vertigo is gone. When should you call for help? Call 911 anytime you think you may need emergency care. For example, call if:    · You passed out (lost consciousness).     · You have symptoms of a stroke.  These may include:  ? Sudden numbness, tingling, weakness, or loss of movement in your face, arm, or leg, especially on only one side of your body. ? Sudden vision changes. ? Sudden trouble speaking. ? Sudden confusion or trouble understanding simple statements. ? Sudden problems with walking or balance. ? A sudden, severe headache that is different from past headaches. Call your doctor now or seek immediate medical care if:    · Vertigo occurs with a fever, a headache, or ringing in your ears.     · You have new or increased nausea and vomiting. Watch closely for changes in your health, and be sure to contact your doctor if:    · Vertigo gets worse or happens more often.     · Vertigo has not gotten better after 2 weeks. Where can you learn more? Go to http://www.gray.com/  Enter Y172 in the search box to learn more about \"Vertigo: Care Instructions. \"  Current as of: December 2, 2020               Content Version: 12.8  © 9133-0740 [employer]. Care instructions adapted under license by [employer] (which disclaims liability or warranty for this information). If you have questions about a medical condition or this instruction, always ask your healthcare professional. [place] [address] any warranty or liability for your use of this information.

## 2021-08-02 NOTE — PROGRESS NOTES
Noemi Temple is a 67 y.o. female     Chief Complaint   Patient presents with    Asthma     1 month follow up       Visit Vitals  /72 (BP 1 Location: Left arm, BP Patient Position: Sitting, BP Cuff Size: Adult)   Pulse 70   Temp 97.5 °F (36.4 °C) (Oral)   Resp 16   Ht 5' 5\" (1.651 m)   Wt 131 lb (59.4 kg)   LMP  (LMP Unknown)   SpO2 96%   BMI 21.80 kg/m²       Health Maintenance Due   Topic Date Due    Hepatitis C Screening  Never done    COVID-19 Vaccine (1) Never done    Low dose CT lung screening  Never done    Medicare Yearly Exam  08/05/2021       1. Have you been to the ER, urgent care clinic since your last visit? Hospitalized since your last visit? No    2. Have you seen or consulted any other health care providers outside of the [address] [place] [address] since your last visit? Include any pap smears or colon screening.  No

## 2021-08-02 NOTE — PROGRESS NOTES
HPI:  67 y.o.  presents for follow up appointment. No hospital, ER or specialist visits since last primary care visit except as noted below.     F/U asthma and COPD   last visit 6/24/21  Recap from last visit: I suspect her asthma is mixed with COPD given her smoking history  Still smoking 1/2 ppd  She does not wish to quit now due to stress, but I encouraged to proceed with the patch again once she has moved as she is open to this  Switched from [address] 250 to Symbicort 160 1 puff BID, added singulair and Atrovent nasal spray, also on Flonase  Sill using Xopenex QID  Will increase Symbicort to 2 puffs BID  Add Spiriva  Eva Luis since has now developed yellow mucus  No wheezing today  Considered po prednisone but she declines stating intolerance and elevated BP  Environmental allergen control discussed given she is moving to an apartment that is old but has undergone recent renovation  She has seen pulmonary in the past but not since living in [facility], so referral given  Continue current buspar 7.5 mg TID (my refill count suggests she should be low on medicine, but she declined refill, dosing will need to be increased on her next script)    Now on Spiriva, Symbicort 2 puffs BID, Xopenix prn, Singulair, Atrovent nasal spray and Flonase  Completed Zpak  No longer coughing and mucus is cleared  She is breathing better and feels great  \"I have never breathed this good\"  Still uses the Xopenex about 3 times per day  Not previously diagnosed with COPD  Prior to last year, she was just using Xopenex prn, about twice floy  Has appt with Dr Annia Vick on 8/4/21  Still smoking, has plans to quit once she moves in to her own apartment    Vertigo  In 2010 had \"neck therapy\" for vertigo and 2 treatments of PT cured her symptoms  She states that since she fell, her vertigo has returned  She had a fall when bearing down in the bathroom 12/2020 when in [facility], hit her head with syncopal episode  -reports wearing a 2 wk heart monitor from the ER in [address] [place] [address], but we do not have the results  -saw cardiology Dr Kristin Wylie in follow up and reports normal echo and stress test  -has chronic right carotid bruit, carotid doppler 10/04/2019 shows mild stenosis less than 50% both carotid  -I rechecked carotid duplex 2/4/2021, milde stenosis bilateral ICA's (<50%), no cahnge from prior exam 10/4/2019  -I was concerned about possible orthostasis and low diastolic BP, I was worried she was too dry with her torsemide, she reviewed with renal Dr Lolis Leon, who lowered her torsemide from 10 mg to 5 mg daily   -she was feeling better after the lowering of torsemide    -she now reports she has had intermittent vertigo since then.  She feels like her entire body is swaying on the waves, worse when she turns too quickly - but does not happen all of the time  -she has hearing loss, she wears 1 hearing aid in the left ear since she could not afford both      HTN and CKD stage 3  Followed by Dr Lolis Leon  On lisinopril 40 mg, torsemide 5 mg (lowered from 10 mg 2/2021 due to syncope possibly related to orthostatic episode)  and amlodipine 5 mg daily  She is on lisinopril since prior to 2016 with good tolerance    Anxiety  Doing well on buspar 7.5 mg TID    Patient Active Problem List    Diagnosis    Bilateral carotid artery stenosis    Cerebral microvascular disease    GERD (gastroesophageal reflux disease)     pepcid 20 mg prn (about 2x/month)      Chronic pain     neck pain, Dr. Halima Burton      CKD (chronic kidney disease) stage 3, GFR 30-59 ml/min ([facility])    Sinus disorder    Anxiety    Essential hypertension    Osteoarthritis of cervical spine    Hyperlipidemia LDL goal <70    Herpes simplex type 2 infection    Osteoporosis     Reclast 9/24/2012, 10/09/2013, 10/31/2014, 1/29/2016           Past Medical History:   Diagnosis Date    Anxiety     Arthritis     Chronic obstructive pulmonary disease ([address] 2010    early disease on PFTs, briefly on a daily inhaler  Chronic pain     neck pain, Dr. Alexandra García Degenerative disc disease, cervical 1998    cervical spine injections, [facility] Dr. Dylan Hall kidney     Right    Gallstone 1974    GERD (gastroesophageal reflux disease)     pepcid 20 mg prn (about 2x/month)    Herpes simplex type II infection     Hypercholesterolemia     Hypertension     Nausea & vomiting     During surgery when ether was used    Osteoporosis 2012    Reclast 9/24/2012, 10/09/2013, 10/31/2014, 1/29/2016    PUD (peptic ulcer disease) 2010    endoscopy confirmed, was taking aleve    Stage 3 chronic kidney disease ([address] [id number]    Trauma 1972    right arm fracture surgically repaired, twisted in washing machine       Social History     Tobacco Use    Smoking status: Current Some Day Smoker     Packs/day: 0.50     Years: 41.00     Pack years: 20.50    Smokeless tobacco: Never Used   Vaping Use    Vaping Use: Never used   Substance Use Topics    Alcohol use: Yes     Comment: occasional    Drug use: No       Outpatient Medications Marked as Taking for the 8/2/21 encounter (Office Visit) with Valeri Rodriguez PA-C   Medication Sig Dispense Refill    levalbuterol tartrate (XOPENEX) 45 mcg/actuation inhaler INHALE 2 PUFFS BY MOUTH EVERY 4 HOURS AS NEEDED FOR WHEEZING 1 Inhaler 2    ipratropium (ATROVENT) 42 mcg (0.06 %) nasal spray USE 1 TO 2 SPRAYS IN EACH NOSTRIL FOUR TIMES DAILY 15 mL 2    budesonide-formoteroL (SYMBICORT) 160-4.5 mcg/actuation HFAA Take 2 Puffs by inhalation two (2) times a day. (TO REPLACE WIXELA) 3 Inhaler 1    tiotropium (Spiriva with HandiHaler) 18 mcg inhalation capsule Take 1 Capsule by inhalation daily. 30 Capsule 2    montelukast (SINGULAIR) 10 mg tablet Take 1 Tablet by mouth daily.  30 Tablet 2    busPIRone (BUSPAR) 15 mg tablet TAKE 1/2 TABLET BY MOUTH TWICE DAILY AS DIRECTED 90 Tab 1    amLODIPine (NORVASC) 5 mg tablet TAKE 1 TABLET BY MOUTH EVERY DAY 90 Tab 1    valACYclovir (VALTREX) 1 gram tablet Take 2 Tabs by mouth two (2) times a day. DO NOT TAKE WITH TIZANIDINE  Indications: prevent recurrent herpes simplex infection (Patient taking differently: Take 2,000 mg by mouth as needed. DO NOT TAKE WITH TIZANIDINE  Indications: prevent recurrent herpes simplex infection) 30 Tab 1    pravastatin (PRAVACHOL) 20 mg tablet TAKE 1 TABLET BY MOUTH  NIGHTLY 90 Tab 3    traZODone (DESYREL) 50 mg tablet TAKE 1 TABLET BY MOUTH  NIGHTLY 90 Tab 3    diclofenac (VOLTAREN) 1 % gel Apply  to affected area as needed for Pain. 100 g 0    mometasone (ELOCON) 0.1 % lotion INSTILL 4 METRIC DROP TWO TIMES DAILY IN EARS, AS NEEDED 30 mL 1    lisinopril (PRINIVIL, ZESTRIL) 40 mg tablet Take 40 mg by mouth daily. 2    tiZANidine (ZANAFLEX) 2 mg tablet TAKE 1 TABLET BY MOUTH EVERY 8 HOURS AS NEEDED FOR SPASMS      carboxymethylcellulose sodium (REFRESH OP) Apply  to eye.  dextran 70/hypromellose (TEARS NATURALE OP) Apply 1 Drop to eye as needed.  CALCIUM-MAGNESIUM-ZINC PO Take 2 Tabs by mouth daily.  omega-3 fatty acids-fish oil (FISH OIL) 360-1,200 mg cap Take 2 Tabs by mouth daily.  fluticasone (FLONASE SENSIMIST) 27.5 mcg/actuation nasal spray 2 Sprays by Nasal route daily.  glucosamine sulfate 1,000 mg cap Take 2 Tabs by mouth daily.  acetaminophen (TYLENOL) 650 mg CR tablet Take 650 mg by mouth every six (6) hours as needed for Pain.  famotidine (PEPCID) 20 mg tablet Take 20 mg by mouth as needed.  melatonin 1 mg tablet Take 1 mg by mouth nightly. Allergies   Allergen Reactions    Indomethacin Other (comments)     Made her feel weird to the point it scared her  Other reaction(s): Other (see comments)  Made her feel weird to the point it scared her  Made her feel weird to the point it scared her    Other reaction(s):  Other (comments)  Made her feel weird to the point it scared her    Fosamax [Alendronate] Other (comments)     Had a peptic ulcer    Other Medication Nausea and Vomiting     Flu vaccine in [id number]    Cephalexin Nausea and Vomiting    Erythromycin Nausea and Vomiting    Prochlorperazine Other (comments)     Childhood reaction-Neck spasms  Other reaction(s): Other (see comments)  Childhood reaction  Childhood reaction    Other reaction(s): Other (comments)  Childhood reaction-Neck spasms       ROS:  ROS negative except as per HPI. PE:  Visit Vitals  /72 (BP 1 Location: Left arm, BP Patient Position: Sitting, BP Cuff Size: Adult)   Pulse 70   Temp 97.5 °F (36.4 °C) (Oral)   Resp 16   Ht 5' 5\" (1.651 m)   Wt 131 lb (59.4 kg)   LMP  (LMP Unknown)   SpO2 96%   BMI 21.80 kg/m²     Gen: alert, oriented, no acute distress  Head: normocephalic, atraumatic  Eyes: pupils equal round reactive to light, sclera clear, conjunctiva clear  Neck: symmetric normal sized thyroid, no carotid bruits  Resp: no increase work of breathing, lungs with scattered wheeze  CV: S1, S2 normal.  No murmurs, rubs, or gallops. Abd: soft, not tender, not distended. Skin: no lesion or rash  Extremities: no cyanosis or edema    No results found for this visit on 08/02/21. Lab Results   Component Value Date/Time    Sodium 132 (L) 01/29/2021 09:31 AM    Potassium 4.8 01/29/2021 09:31 AM    Chloride 96 01/29/2021 09:31 AM    CO2 22 01/29/2021 09:31 AM    Anion gap 8 08/15/2017 08:00 AM    Glucose 92 01/29/2021 09:31 AM    BUN 16 01/29/2021 09:31 AM    Creatinine 1.27 (H) 01/29/2021 09:31 AM    BUN/Creatinine ratio 13 01/29/2021 09:31 AM    GFR est AA 47 (L) 01/29/2021 09:31 AM    GFR est non-AA 41 (L) 01/29/2021 09:31 AM    Calcium 9.6 01/29/2021 09:31 AM    Bilirubin, total 0.9 01/29/2021 09:31 AM    Alk.  phosphatase 56 01/29/2021 09:31 AM    Protein, total 7.1 01/29/2021 09:31 AM    Albumin 4.6 01/29/2021 09:31 AM    Globulin 2.8 08/15/2017 08:00 AM    A-G Ratio 1.8 01/29/2021 09:31 AM    ALT (SGPT) 12 01/29/2021 09:31 AM    AST (SGOT) 15 01/29/2021 09:31 AM     Lab Results Component Value Date/Time    WBC 9.8 01/29/2021 09:31 AM    HGB 13.5 01/29/2021 09:31 AM    HCT 40.9 01/29/2021 09:31 AM    PLATELET 289 51/71/3428 09:31 AM    MCV 94 01/29/2021 09:31 AM     Lab Results   Component Value Date/Time    Cholesterol, total 161 01/29/2021 09:31 AM    HDL Cholesterol 65 01/29/2021 09:31 AM    LDL, calculated 71 01/29/2021 09:31 AM    LDL, calculated 63 02/06/2020 08:08 AM    VLDL, calculated 25 01/29/2021 09:31 AM    VLDL, calculated 24 02/06/2020 08:08 AM    Triglyceride 146 01/29/2021 09:31 AM    CHOL/HDL Ratio 2.0 08/15/2017 08:00 AM     Lab Results   Component Value Date/Time    TSH 1.880 01/29/2021 09:31 AM         Assessment/Plan:    1. Moderate persistent asthmatic bronchitis without complication  Now on Spiriva (started 6/2021), Symbicort 2 puffs BID, Xopenix prn, Singulair, Atrovent nasal spray and Flonase  I suspect her asthma is mixed with COPD given her smoking history  Still smoking 1/2 ppd, cessation encouraged but she is not ready yet  Reports she is breathing better with the addition of spiriva, but she still has wheezing and using Xopenex TID  I have asked her to review with Dr Rory Aquino switching lisinopril to valsartan  She has appt for initial visit with pulmonary Dr Francesca Guerrero on 8/4/21    - budesonide-formoteroL (SYMBICORT) 160-4.5 mcg/actuation HFAA; Take 2 Puffs by inhalation two (2) times a day. (TO REPLACE WIXELA)  Dispense: 3 Inhaler; Refill: 1  - montelukast (SINGULAIR) 10 mg tablet; Take 1 Tablet by mouth daily. Dispense: 90 Tablet; Refill: 1    2. Seasonal allergic rhinitis, unspecified trigger  On Singulair, Atrovent nasal spray and Flonase    - montelukast (SINGULAIR) 10 mg tablet; Take 1 Tablet by mouth daily. Dispense: 90 Tablet; Refill: 1    3.  Essential hypertension  Followed by Dr Rory Aquino  On lisinopril 40 mg, torsemide 5 mg (lowered from 10 mg 2/2021 due to syncope possibly related to orthostatic episode)  and amlodipine 5 mg daily  She is to ask Dr Rory Aquino if can change her lisinopril 40 mg to an ARB such as valsartan  - amLODIPine (NORVASC) 5 mg tablet; TAKE 1 TABLET BY MOUTH EVERY DAY  Dispense: 90 Tablet; Refill: 1    4. Stage 3a chronic kidney disease (HCC)  eGFR 41  Followed by renal Dr Claudia Earl    5. Vertigo  Recurrent persistent vertigo after head injury 12/2020  She also has imbalance hearing with bilateral loss and correction only on left side  She asks about PT  I would like her to eval by ENT since she had the trauma and asymmetric hearing  Meanwhile I have explained home exercises for vertigo, handout given    - REFERRAL TO ENT-OTOLARYNGOLOGY    6. Bilateral hearing loss, unspecified hearing loss type  Only corrected on left side with 1 hearing aid since she could not afford the 2nd one for right ear    7. Anxiety  Doing well on buspar 7.5 mg TID  - busPIRone (BUSPAR) 15 mg tablet; TAKE 1/2 TABLET BY MOUTH 3 TIMES DAILY AS DIRECTED  Dispense: 135 Tablet; Refill: 1    8. Tobacco use  Still smoking 1/2 ppd and not ready to quit yet  She plans to quit once she moves in to her own apartment      Health Maintenance reviewed - updated. Orders Placed This Encounter    [facility]     Referral Priority:   Routine     Referral Type:   Consultation     Referral Reason:   Specialty Services Required     Referred to Provider:   Josee Gay MD     Number of Visits Requested:   1    budesonide-formoteroL (SYMBICORT) 160-4.5 mcg/actuation HFAA     Sig: Take 2 Puffs by inhalation two (2) times a day. (TO REPLACE WIXELA)     Dispense:  3 Inhaler     Refill:  1    busPIRone (BUSPAR) 15 mg tablet     Sig: TAKE 1/2 TABLET BY MOUTH 3 TIMES DAILY AS DIRECTED     Dispense:  135 Tablet     Refill:  1    amLODIPine (NORVASC) 5 mg tablet     Sig: TAKE 1 TABLET BY MOUTH EVERY DAY     Dispense:  90 Tablet     Refill:  1    lisinopriL (PRINIVIL, ZESTRIL) 40 mg tablet     Sig: Take 1 Tablet by mouth daily.      Dispense:  90 Tablet     Refill:  2    montelukast (SINGULAIR) 10 mg tablet     Sig: Take 1 Tablet by mouth daily. Dispense:  90 Tablet     Refill:  1       Medications Discontinued During This Encounter   Medication Reason    lisinopril (PRINIVIL, ZESTRIL) 40 mg tablet REORDER    amLODIPine (NORVASC) 5 mg tablet REORDER    busPIRone (BUSPAR) 15 mg tablet REORDER    montelukast (SINGULAIR) 10 mg tablet REORDER    budesonide-formoteroL (SYMBICORT) 160-4.5 mcg/actuation HFAA REORDER       Current Outpatient Medications   Medication Sig Dispense Refill    budesonide-formoteroL (SYMBICORT) 160-4.5 mcg/actuation HFAA Take 2 Puffs by inhalation two (2) times a day. (TO REPLACE WIXELA) 3 Inhaler 1    busPIRone (BUSPAR) 15 mg tablet TAKE 1/2 TABLET BY MOUTH 3 TIMES DAILY AS DIRECTED 135 Tablet 1    amLODIPine (NORVASC) 5 mg tablet TAKE 1 TABLET BY MOUTH EVERY DAY 90 Tablet 1    lisinopriL (PRINIVIL, ZESTRIL) 40 mg tablet Take 1 Tablet by mouth daily. 90 Tablet 2    montelukast (SINGULAIR) 10 mg tablet Take 1 Tablet by mouth daily. 90 Tablet 1    levalbuterol tartrate (XOPENEX) 45 mcg/actuation inhaler INHALE 2 PUFFS BY MOUTH EVERY 4 HOURS AS NEEDED FOR WHEEZING 1 Inhaler 2    ipratropium (ATROVENT) 42 mcg (0.06 %) nasal spray USE 1 TO 2 SPRAYS IN EACH NOSTRIL FOUR TIMES DAILY 15 mL 2    tiotropium (Spiriva with HandiHaler) 18 mcg inhalation capsule Take 1 Capsule by inhalation daily. 30 Capsule 2    valACYclovir (VALTREX) 1 gram tablet Take 2 Tabs by mouth two (2) times a day. DO NOT TAKE WITH TIZANIDINE  Indications: prevent recurrent herpes simplex infection (Patient taking differently: Take 2,000 mg by mouth as needed. DO NOT TAKE WITH TIZANIDINE  Indications: prevent recurrent herpes simplex infection) 30 Tab 1    pravastatin (PRAVACHOL) 20 mg tablet TAKE 1 TABLET BY MOUTH  NIGHTLY 90 Tab 3    traZODone (DESYREL) 50 mg tablet TAKE 1 TABLET BY MOUTH  NIGHTLY 90 Tab 3    diclofenac (VOLTAREN) 1 % gel Apply  to affected area as needed for Pain.  100 g 0  mometasone (ELOCON) 0.1 % lotion INSTILL 4 METRIC DROP TWO TIMES DAILY IN EARS, AS NEEDED 30 mL 1    tiZANidine (ZANAFLEX) 2 mg tablet TAKE 1 TABLET BY MOUTH EVERY 8 HOURS AS NEEDED FOR SPASMS      carboxymethylcellulose sodium (REFRESH OP) Apply  to eye.  dextran 70/hypromellose (TEARS NATURALE OP) Apply 1 Drop to eye as needed.  CALCIUM-MAGNESIUM-ZINC PO Take 2 Tabs by mouth daily.  omega-3 fatty acids-fish oil (FISH OIL) 360-1,200 mg cap Take 2 Tabs by mouth daily.  fluticasone (FLONASE SENSIMIST) 27.5 mcg/actuation nasal spray 2 Sprays by Nasal route daily.  glucosamine sulfate 1,000 mg cap Take 2 Tabs by mouth daily.  acetaminophen (TYLENOL) 650 mg CR tablet Take 650 mg by mouth every six (6) hours as needed for Pain.  famotidine (PEPCID) 20 mg tablet Take 20 mg by mouth as needed.  melatonin 1 mg tablet Take 1 mg by mouth nightly.  azithromycin (ZITHROMAX) 250 mg tablet Take 2 tablets today, then take 1 tablet daily. (Patient not taking: Reported on 8/2/2021) 6 Tablet 0    torsemide (DEMADEX) 10 mg tablet Take 5 mg by mouth daily. 2    carica papaya (PAPAYA ENZYME) tab Take  by mouth daily as needed (4-5 a day as needed). (Patient not taking: Reported on 8/2/2021)      ketoconazole (NIZORAL) 2 % topical cream Apply  to affected area daily. (Patient not taking: Reported on 8/2/2021)         Recommended healthy diet low in carbohydrates, fats, sodium and cholesterol. Recommended regular cardiovascular exercise 3-6 times per week for 30-60 minutes daily. Verbal and written instructions (see AVS) provided. Patient expresses understanding of diagnosis and treatment plan. Follow-up and Dispositions    · Return in about 2 months (around 10/2/2021) for AWV, cholsterol, vertigo, asthma.        Future Appointments   Date Time Provider Amanda Addison   10/14/2021 11:00 AM Valeri Rodriguez, [facility] BS AMB       Greater than 30 min was spent with her, of which more than 50% was spent on counseling.

## 2021-08-09 DIAGNOSIS — E78.5 HYPERLIPIDEMIA LDL GOAL <70: ICD-10-CM

## 2021-08-09 RX ORDER — PRAVASTATIN SODIUM 20 MG/1
TABLET ORAL
Qty: 90 TABLET | Refills: 3 | Status: SHIPPED | OUTPATIENT
Start: 2021-08-09 | End: 2022-07-20

## 2021-08-09 NOTE — TELEPHONE ENCOUNTER
Last Refill: 08/02/2021  Last Visit: 8/2/2021   Next Visit: 10/14/2021    Requested Prescriptions     Pending Prescriptions Disp Refills    pravastatin (PRAVACHOL) 20 mg tablet 90 Tablet 3

## 2021-09-09 ENCOUNTER — APPOINTMENT (OUTPATIENT)
Dept: GENERAL RADIOLOGY | Age: 76
End: 2021-09-09
Attending: STUDENT IN AN ORGANIZED HEALTH CARE EDUCATION/TRAINING PROGRAM
Payer: MEDICARE

## 2021-09-09 ENCOUNTER — HOSPITAL ENCOUNTER (EMERGENCY)
Age: 76
Discharge: HOME | End: 2021-09-09
Attending: EMERGENCY MEDICINE
Payer: MEDICARE

## 2021-09-09 VITALS
TEMPERATURE: 98.1 F | HEART RATE: 60 BPM | BODY MASS INDEX: 21.99 KG/M2 | DIASTOLIC BLOOD PRESSURE: 71 MMHG | RESPIRATION RATE: 16 BRPM | HEIGHT: 65 IN | SYSTOLIC BLOOD PRESSURE: 171 MMHG | OXYGEN SATURATION: 100 % | WEIGHT: 132 LBS

## 2021-09-09 DIAGNOSIS — R55 SYNCOPE AND COLLAPSE: ICD-10-CM

## 2021-09-09 DIAGNOSIS — E87.1 HYPONATREMIA: Primary | ICD-10-CM

## 2021-09-09 LAB
ALBUMIN SERPL-MCNC: 4 G/DL (ref 3.5–5)
ALBUMIN/GLOB SERPL: 1.1 {RATIO} (ref 1.1–2.2)
ALP SERPL-CCNC: 54 U/L (ref 45–117)
ALT SERPL-CCNC: 19 U/L (ref 12–78)
ANION GAP SERPL CALC-SCNC: 8 MMOL/L (ref 5–15)
APPEARANCE UR: CLEAR
AST SERPL-CCNC: 14 U/L (ref 15–37)
ATRIAL RATE: 65 BPM
BACTERIA URNS QL MICRO: NEGATIVE /HPF
BASOPHILS # BLD: 0 K/UL (ref 0–0.1)
BASOPHILS NFR BLD: 0 % (ref 0–1)
BILIRUB SERPL-MCNC: 1 MG/DL (ref 0.2–1)
BILIRUB UR QL: NEGATIVE
BNP SERPL-MCNC: 28 PG/ML
BUN SERPL-MCNC: 17 MG/DL (ref 6–20)
BUN/CREAT SERPL: 14 (ref 12–20)
CALCIUM SERPL-MCNC: 9.6 MG/DL (ref 8.5–10.1)
CALCULATED P AXIS, ECG09: 60 DEGREES
CALCULATED R AXIS, ECG10: -7 DEGREES
CALCULATED T AXIS, ECG11: 61 DEGREES
CHLORIDE SERPL-SCNC: 93 MMOL/L (ref 97–108)
CK SERPL-CCNC: 83 U/L (ref 26–192)
CO2 SERPL-SCNC: 27 MMOL/L (ref 21–32)
COLOR UR: NORMAL
CREAT SERPL-MCNC: 1.2 MG/DL (ref 0.55–1.02)
DIAGNOSIS, 93000: NORMAL
DIFFERENTIAL METHOD BLD: ABNORMAL
EOSINOPHIL # BLD: 0.1 K/UL (ref 0–0.4)
EOSINOPHIL NFR BLD: 1 % (ref 0–7)
EPITH CASTS URNS QL MICRO: NORMAL /LPF
ERYTHROCYTE [DISTWIDTH] IN BLOOD BY AUTOMATED COUNT: 12.9 % (ref 11.5–14.5)
GLOBULIN SER CALC-MCNC: 3.5 G/DL (ref 2–4)
GLUCOSE SERPL-MCNC: 97 MG/DL (ref 65–100)
GLUCOSE UR STRIP.AUTO-MCNC: NEGATIVE MG/DL
HCT VFR BLD AUTO: 41.5 % (ref 35–47)
HGB BLD-MCNC: 13.7 G/DL (ref 11.5–16)
HGB UR QL STRIP: NEGATIVE
IMM GRANULOCYTES # BLD AUTO: 0.1 K/UL (ref 0–0.04)
IMM GRANULOCYTES NFR BLD AUTO: 1 % (ref 0–0.5)
KETONES UR QL STRIP.AUTO: NEGATIVE MG/DL
LEUKOCYTE ESTERASE UR QL STRIP.AUTO: NEGATIVE
LYMPHOCYTES # BLD: 1.5 K/UL (ref 0.8–3.5)
LYMPHOCYTES NFR BLD: 11 % (ref 12–49)
MCH RBC QN AUTO: 30.8 PG (ref 26–34)
MCHC RBC AUTO-ENTMCNC: 33 G/DL (ref 30–36.5)
MCV RBC AUTO: 93.3 FL (ref 80–99)
MONOCYTES # BLD: 1.2 K/UL (ref 0–1)
MONOCYTES NFR BLD: 9 % (ref 5–13)
NEUTS SEG # BLD: 10.6 K/UL (ref 1.8–8)
NEUTS SEG NFR BLD: 78 % (ref 32–75)
NITRITE UR QL STRIP.AUTO: NEGATIVE
NRBC # BLD: 0 K/UL (ref 0–0.01)
NRBC BLD-RTO: 0 PER 100 WBC
P-R INTERVAL, ECG05: 140 MS
PH UR STRIP: 6 [PH] (ref 5–8)
PLATELET # BLD AUTO: 275 K/UL (ref 150–400)
PMV BLD AUTO: 8.8 FL (ref 8.9–12.9)
POTASSIUM SERPL-SCNC: 4.8 MMOL/L (ref 3.5–5.1)
PROT SERPL-MCNC: 7.5 G/DL (ref 6.4–8.2)
PROT UR STRIP-MCNC: NEGATIVE MG/DL
Q-T INTERVAL, ECG07: 370 MS
QRS DURATION, ECG06: 92 MS
QTC CALCULATION (BEZET), ECG08: 384 MS
RBC # BLD AUTO: 4.45 M/UL (ref 3.8–5.2)
RBC #/AREA URNS HPF: NORMAL /HPF (ref 0–5)
RBC MORPH BLD: ABNORMAL
SODIUM SERPL-SCNC: 128 MMOL/L (ref 136–145)
SP GR UR REFRACTOMETRY: 1.01 (ref 1–1.03)
TROPONIN I SERPL-MCNC: <0.05 NG/ML
UA: UC IF INDICATED,UAUC: NORMAL
UROBILINOGEN UR QL STRIP.AUTO: 0.2 EU/DL (ref 0.2–1)
VENTRICULAR RATE, ECG03: 65 BPM
WBC # BLD AUTO: 13.5 K/UL (ref 3.6–11)
WBC URNS QL MICRO: NORMAL /HPF (ref 0–4)

## 2021-09-09 PROCEDURE — 83880 ASSAY OF NATRIURETIC PEPTIDE: CPT

## 2021-09-09 PROCEDURE — 85025 COMPLETE CBC W/AUTO DIFF WBC: CPT

## 2021-09-09 PROCEDURE — 80053 COMPREHEN METABOLIC PANEL: CPT

## 2021-09-09 PROCEDURE — 71045 X-RAY EXAM CHEST 1 VIEW: CPT

## 2021-09-09 PROCEDURE — 96360 HYDRATION IV INFUSION INIT: CPT

## 2021-09-09 PROCEDURE — 99285 EMERGENCY DEPT VISIT HI MDM: CPT

## 2021-09-09 PROCEDURE — 36415 COLL VENOUS BLD VENIPUNCTURE: CPT

## 2021-09-09 PROCEDURE — 93005 ELECTROCARDIOGRAM TRACING: CPT

## 2021-09-09 PROCEDURE — 84484 ASSAY OF TROPONIN QUANT: CPT

## 2021-09-09 PROCEDURE — 81001 URINALYSIS AUTO W/SCOPE: CPT

## 2021-09-09 PROCEDURE — 82550 ASSAY OF CK (CPK): CPT

## 2021-09-09 PROCEDURE — 74011250636 HC RX REV CODE- 250/636: Performed by: EMERGENCY MEDICINE

## 2021-09-09 RX ADMIN — SODIUM CHLORIDE 500 ML: 9 INJECTION, SOLUTION INTRAVENOUS at 15:00

## 2021-09-09 NOTE — DISCHARGE INSTRUCTIONS
It was a pleasure taking care of you at [facility] Emergency Department today. We know that when you come to [facility], you are entrusting us with your health, comfort, and safety. Our physicians and nurses honor that trust, and we truly appreciate the opportunity to care for you and your loved ones. We also value your feedback. If you receive a survey about your Emergency Department experience today, please fill it out. We care about our patients' feedback, and we listen to what you have to say. Thank you! You have a low sodium today that I think is accounting for some of the way you are feeling. We have given you some IV fluids to help this but I do think some increased sodium in your diet will help. Please follow-up with your nephrologist, Dr. Helio Goncalves, for reevaluation of your sodium within the next week.

## 2021-09-12 ENCOUNTER — HOSPITAL ENCOUNTER (EMERGENCY)
Age: 76
Discharge: HOME | End: 2021-09-12
Attending: EMERGENCY MEDICINE
Payer: MEDICARE

## 2021-09-12 ENCOUNTER — APPOINTMENT (OUTPATIENT)
Dept: CT IMAGING | Age: 76
End: 2021-09-12
Attending: EMERGENCY MEDICINE
Payer: MEDICARE

## 2021-09-12 VITALS
WEIGHT: 128 LBS | HEART RATE: 58 BPM | OXYGEN SATURATION: 99 % | BODY MASS INDEX: 21.33 KG/M2 | DIASTOLIC BLOOD PRESSURE: 47 MMHG | TEMPERATURE: 97.8 F | HEIGHT: 65 IN | RESPIRATION RATE: 19 BRPM | SYSTOLIC BLOOD PRESSURE: 128 MMHG

## 2021-09-12 DIAGNOSIS — R41.0 TRANSIENT DISORIENTATION: Primary | ICD-10-CM

## 2021-09-12 DIAGNOSIS — R53.1 GENERALIZED WEAKNESS: ICD-10-CM

## 2021-09-12 DIAGNOSIS — E87.1 HYPONATREMIA: ICD-10-CM

## 2021-09-12 LAB
ALBUMIN SERPL-MCNC: 3.8 G/DL (ref 3.5–5)
ALBUMIN/GLOB SERPL: 1.1 {RATIO} (ref 1.1–2.2)
ALP SERPL-CCNC: 56 U/L (ref 45–117)
ALT SERPL-CCNC: 19 U/L (ref 12–78)
ANION GAP SERPL CALC-SCNC: 7 MMOL/L (ref 5–15)
APPEARANCE UR: CLEAR
AST SERPL-CCNC: 11 U/L (ref 15–37)
BACTERIA URNS QL MICRO: NEGATIVE /HPF
BASOPHILS # BLD: 0 K/UL (ref 0–0.1)
BASOPHILS NFR BLD: 1 % (ref 0–1)
BILIRUB SERPL-MCNC: 0.8 MG/DL (ref 0.2–1)
BILIRUB UR QL: NEGATIVE
BUN SERPL-MCNC: 13 MG/DL (ref 6–20)
BUN/CREAT SERPL: 13 (ref 12–20)
CALCIUM SERPL-MCNC: 8.8 MG/DL (ref 8.5–10.1)
CHLORIDE SERPL-SCNC: 98 MMOL/L (ref 97–108)
CO2 SERPL-SCNC: 27 MMOL/L (ref 21–32)
COLOR UR: NORMAL
CREAT SERPL-MCNC: 1.04 MG/DL (ref 0.55–1.02)
DIFFERENTIAL METHOD BLD: NORMAL
EOSINOPHIL # BLD: 0.2 K/UL (ref 0–0.4)
EOSINOPHIL NFR BLD: 3 % (ref 0–7)
EPITH CASTS URNS QL MICRO: NORMAL /LPF
ERYTHROCYTE [DISTWIDTH] IN BLOOD BY AUTOMATED COUNT: 13.1 % (ref 11.5–14.5)
GLOBULIN SER CALC-MCNC: 3.5 G/DL (ref 2–4)
GLUCOSE SERPL-MCNC: 81 MG/DL (ref 65–100)
GLUCOSE UR STRIP.AUTO-MCNC: NEGATIVE MG/DL
HCT VFR BLD AUTO: 39.6 % (ref 35–47)
HGB BLD-MCNC: 13 G/DL (ref 11.5–16)
HGB UR QL STRIP: NEGATIVE
HYALINE CASTS URNS QL MICRO: NORMAL /LPF (ref 0–5)
IMM GRANULOCYTES # BLD AUTO: 0 K/UL (ref 0–0.04)
IMM GRANULOCYTES NFR BLD AUTO: 0 % (ref 0–0.5)
KETONES UR QL STRIP.AUTO: NEGATIVE MG/DL
LEUKOCYTE ESTERASE UR QL STRIP.AUTO: NEGATIVE
LYMPHOCYTES # BLD: 1.7 K/UL (ref 0.8–3.5)
LYMPHOCYTES NFR BLD: 23 % (ref 12–49)
MCH RBC QN AUTO: 30.8 PG (ref 26–34)
MCHC RBC AUTO-ENTMCNC: 32.8 G/DL (ref 30–36.5)
MCV RBC AUTO: 93.8 FL (ref 80–99)
MONOCYTES # BLD: 0.8 K/UL (ref 0–1)
MONOCYTES NFR BLD: 11 % (ref 5–13)
NEUTS SEG # BLD: 4.6 K/UL (ref 1.8–8)
NEUTS SEG NFR BLD: 62 % (ref 32–75)
NITRITE UR QL STRIP.AUTO: NEGATIVE
NRBC # BLD: 0 K/UL (ref 0–0.01)
NRBC BLD-RTO: 0 PER 100 WBC
PH UR STRIP: 6 [PH] (ref 5–8)
PLATELET # BLD AUTO: 308 K/UL (ref 150–400)
PMV BLD AUTO: 8.9 FL (ref 8.9–12.9)
POTASSIUM SERPL-SCNC: 4.4 MMOL/L (ref 3.5–5.1)
PROT SERPL-MCNC: 7.3 G/DL (ref 6.4–8.2)
PROT UR STRIP-MCNC: NEGATIVE MG/DL
RBC # BLD AUTO: 4.22 M/UL (ref 3.8–5.2)
RBC #/AREA URNS HPF: NORMAL /HPF (ref 0–5)
SODIUM SERPL-SCNC: 132 MMOL/L (ref 136–145)
SP GR UR REFRACTOMETRY: 1 (ref 1–1.03)
UA: UC IF INDICATED,UAUC: NORMAL
UROBILINOGEN UR QL STRIP.AUTO: 0.2 EU/DL (ref 0.2–1)
WBC # BLD AUTO: 7.3 K/UL (ref 3.6–11)
WBC URNS QL MICRO: NORMAL /HPF (ref 0–4)

## 2021-09-12 PROCEDURE — 36415 COLL VENOUS BLD VENIPUNCTURE: CPT

## 2021-09-12 PROCEDURE — 99284 EMERGENCY DEPT VISIT MOD MDM: CPT

## 2021-09-12 PROCEDURE — 81001 URINALYSIS AUTO W/SCOPE: CPT

## 2021-09-12 PROCEDURE — 80053 COMPREHEN METABOLIC PANEL: CPT

## 2021-09-12 PROCEDURE — 70450 CT HEAD/BRAIN W/O DYE: CPT

## 2021-09-12 PROCEDURE — 96360 HYDRATION IV INFUSION INIT: CPT

## 2021-09-12 PROCEDURE — 85025 COMPLETE CBC W/AUTO DIFF WBC: CPT

## 2021-09-12 PROCEDURE — 74011250636 HC RX REV CODE- 250/636: Performed by: EMERGENCY MEDICINE

## 2021-09-12 RX ADMIN — SODIUM CHLORIDE 1000 ML: 9 INJECTION, SOLUTION INTRAVENOUS at 12:28

## 2021-09-12 NOTE — ED NOTES
Patient arrives to ED room from triage with a cc of fatigue. Patient was seen here last Thursday for similar symptoms and was diagnosed with low sodium. Patient felt faint and weak this am, so she ate a sandwich and some chips and still did not feel better so she wants to get checked out. Patient has no complaints at this time. Patient is alert and oriented x 4, resting comfortably in stretcher with side rails up and call bell within reach.

## 2021-09-12 NOTE — ED PROVIDER NOTES
EMERGENCY DEPARTMENT HISTORY AND PHYSICAL EXAM      Please note that this dictation was completed with the assistance of \"Dragon\", the computer voice recognition software. Quite often unanticipated grammatical, syntax, homophones, and other interpretive errors are inadvertently transcribed by the computer software. Please disregard these errors and any errors that have escaped final proofreading. Thank you. Patient Name: Vickie Jiang  : 1945  MRN: [id number]  History of Presenting Illness     Chief Complaint   Patient presents with    Fatigue     feeling weak disoriented shakey today; not feeling this way yesterday; she has low sodium     History Provided By: Patient    HPI: Vickie Jiang, 67 y.o. female with past medical history as documented below presents to the ED with c/o of generalized weakness and concern for low sodium. Pt notes having hx of hyponatremia and feels similar sx's. Feels \"shaky. \" Denies dizziness, focal numbness or weakness. She has tried to increase her fluid intake of late. Patient was seen here last Thursday for similar symptoms and was diagnosed with low sodium. Patient felt faint and weak this am, so she ate a sandwich and some chips and still did not feel better so she wants to get checked out. Pt denies any other exacerbating or ameliorating factors. Additionally, pt specifically denies any recent fever, chills, headache, nausea, vomiting, abdominal pain, CP, SOB, lightheadedness, dizziness, numbness, lower extremity swelling, heart palpitations, urinary sxs, diarrhea, constipation, melena, hematochezia, cough, or congestion. There are no other complaints, changes or physical findings pertinent to the HPI at this time.     PCP: Maverick Burnett PA-C    Past History   Past Medical History:  Past Medical History:   Diagnosis Date    Anxiety     Arthritis     Chronic obstructive pulmonary disease ([facility] [address] 2010    early disease on PFTs, briefly on a daily inhaler    Chronic pain     neck pain, Dr. Johnson Soni Degenerative disc disease, cervical 1998    cervical spine injections, [facility] Dr. López Pimple kidney     Right    Gallstone 1974    GERD (gastroesophageal reflux disease)     pepcid 20 mg prn (about 2x/month)    Herpes simplex type II infection     Hypercholesterolemia     Hypertension     Nausea & vomiting     During surgery when ether was used    Osteoporosis 2012    Reclast 9/24/2012, 10/09/2013, 10/31/2014, 1/29/2016    PUD (peptic ulcer disease) 2010    endoscopy confirmed, was taking aleve    Stage 3 chronic kidney disease ([address] 0215-1726    Trauma 1972    right arm fracture surgically repaired, twisted in washing machine       Past Surgical History:  Past Surgical History:   Procedure Laterality Date    HX APPENDECTOMY      HX BREAST BIOPSY Right     negative 1987    HX CATARACT REMOVAL Bilateral 07/09/2018    HX CHOLECYSTECTOMY  1974    HX HYSTERECTOMY  1974    HX ORTHOPAEDIC  1972    R arm broken, pins put in, taken out 1 year later    [address] [place] [address]    from bone on L leg        Family History:  Family history reviewed and non-contributory  Family History   Problem Relation Age of Onset    Cancer Mother         Cervical    Stroke Mother     Psychiatric Disorder Mother     Heart Disease Father     Breast Cancer Sister         in her 52's         Social History:  Social History     Tobacco Use    Smoking status: Current Some Day Smoker     Packs/day: 0.50     Years: 41.00     Pack years: 20.50    Smokeless tobacco: Never Used   Vaping Use    Vaping Use: Never used   Substance Use Topics    Alcohol use: Yes     Comment: occasional    Drug use: No       Allergies: Allergies   Allergen Reactions    Indomethacin Other (comments)     Made her feel weird to the point it scared her  Other reaction(s):  Other (see comments)  Made her feel weird to the point it scared her  Made her feel weird to the point it scared her    Other reaction(s): Other (comments)  Made her feel weird to the point it scared her    Fosamax [Alendronate] Other (comments)     Had a peptic ulcer    Other Medication Nausea and Vomiting     Flu vaccine in [id number]    Cephalexin Nausea and Vomiting    Erythromycin Nausea and Vomiting    Prochlorperazine Other (comments)     Childhood reaction-Neck spasms  Other reaction(s): Other (see comments)  Childhood reaction  Childhood reaction    Other reaction(s): Other (comments)  Childhood reaction-Neck spasms       Current Medications:  No current facility-administered medications on file prior to encounter. Current Outpatient Medications on File Prior to Encounter   Medication Sig Dispense Refill    pravastatin (PRAVACHOL) 20 mg tablet TAKE 1 TABLET BY MOUTH  NIGHTLY 90 Tablet 3    budesonide-formoteroL (SYMBICORT) 160-4.5 mcg/actuation HFAA Take 2 Puffs by inhalation two (2) times a day. (TO REPLACE WIXELA) 3 Inhaler 1    busPIRone (BUSPAR) 15 mg tablet TAKE 1/2 TABLET BY MOUTH 3 TIMES DAILY AS DIRECTED 135 Tablet 1    amLODIPine (NORVASC) 5 mg tablet TAKE 1 TABLET BY MOUTH EVERY DAY 90 Tablet 1    lisinopriL (PRINIVIL, ZESTRIL) 40 mg tablet Take 1 Tablet by mouth daily. 90 Tablet 2    montelukast (SINGULAIR) 10 mg tablet Take 1 Tablet by mouth daily. 90 Tablet 1    levalbuterol tartrate (XOPENEX) 45 mcg/actuation inhaler INHALE 2 PUFFS BY MOUTH EVERY 4 HOURS AS NEEDED FOR WHEEZING 1 Inhaler 2    ipratropium (ATROVENT) 42 mcg (0.06 %) nasal spray USE 1 TO 2 SPRAYS IN EACH NOSTRIL FOUR TIMES DAILY 15 mL 2    tiotropium (Spiriva with HandiHaler) 18 mcg inhalation capsule Take 1 Capsule by inhalation daily. 30 Capsule 2    azithromycin (ZITHROMAX) 250 mg tablet Take 2 tablets today, then take 1 tablet daily. (Patient not taking: Reported on 8/2/2021) 6 Tablet 0    valACYclovir (VALTREX) 1 gram tablet Take 2 Tabs by mouth two (2) times a day.  DO NOT TAKE WITH TIZANIDINE  Indications: prevent recurrent herpes simplex infection (Patient taking differently: Take 2,000 mg by mouth as needed. DO NOT TAKE WITH TIZANIDINE  Indications: prevent recurrent herpes simplex infection) 30 Tab 1    traZODone (DESYREL) 50 mg tablet TAKE 1 TABLET BY MOUTH  NIGHTLY 90 Tab 3    diclofenac (VOLTAREN) 1 % gel Apply  to affected area as needed for Pain. 100 g 0    mometasone (ELOCON) 0.1 % lotion INSTILL 4 METRIC DROP TWO TIMES DAILY IN EARS, AS NEEDED 30 mL 1    tiZANidine (ZANAFLEX) 2 mg tablet TAKE 1 TABLET BY MOUTH EVERY 8 HOURS AS NEEDED FOR SPASMS      torsemide (DEMADEX) 10 mg tablet Take 5 mg by mouth daily. 2    carboxymethylcellulose sodium (REFRESH OP) Apply  to eye.  dextran 70/hypromellose (TEARS NATURALE OP) Apply 1 Drop to eye as needed.  CALCIUM-MAGNESIUM-ZINC PO Take 2 Tabs by mouth daily.  omega-3 fatty acids-fish oil (FISH OIL) 360-1,200 mg cap Take 2 Tabs by mouth daily.  carica papaya (PAPAYA ENZYME) tab Take  by mouth daily as needed (4-5 a day as needed). (Patient not taking: Reported on 8/2/2021)      fluticasone (FLONASE SENSIMIST) 27.5 mcg/actuation nasal spray 2 Sprays by Nasal route daily.  glucosamine sulfate 1,000 mg cap Take 2 Tabs by mouth daily.  acetaminophen (TYLENOL) 650 mg CR tablet Take 650 mg by mouth every six (6) hours as needed for Pain.  famotidine (PEPCID) 20 mg tablet Take 20 mg by mouth as needed.  melatonin 1 mg tablet Take 1 mg by mouth nightly.  ketoconazole (NIZORAL) 2 % topical cream Apply  to affected area daily. (Patient not taking: Reported on 8/2/2021)       Review of Systems   A complete ROS was reviewed by me today and all other systems negative, unless otherwise specified below:  Review of Systems   Constitutional: Positive for fatigue. Negative for chills and fever. HENT: Negative. Negative for congestion and sore throat. Eyes: Negative. Respiratory: Negative.   Negative for cough, chest tightness, shortness of breath and wheezing. Cardiovascular: Negative. Negative for chest pain, palpitations and leg swelling. Gastrointestinal: Negative. Negative for abdominal distention, abdominal pain, blood in stool, constipation, diarrhea, nausea and vomiting. Endocrine: Negative. Genitourinary: Negative. Negative for dysuria, flank pain, frequency, hematuria and urgency. Musculoskeletal: Negative. Negative for arthralgias, back pain and myalgias. Skin: Negative. Negative for color change and rash. Neurological: Positive for weakness. Negative for dizziness, syncope, speech difficulty, light-headedness, numbness and headaches. Hematological: Negative. Psychiatric/Behavioral: Negative. Negative for confusion and self-injury. The patient is not nervous/anxious. All other systems reviewed and are negative. Physical Exam   Physical Exam  Vitals and nursing note reviewed. Constitutional:       General: She is not in acute distress. Appearance: She is well-developed. She is not diaphoretic. HENT:      Head: Normocephalic and atraumatic. Mouth/Throat:      Pharynx: No oropharyngeal exudate. Eyes:      Conjunctiva/sclera: Conjunctivae normal.   Cardiovascular:      Rate and Rhythm: Normal rate and regular rhythm. Heart sounds: Normal heart sounds. Pulmonary:      Effort: Pulmonary effort is normal. No respiratory distress. Breath sounds: Normal breath sounds. No wheezing or rales. Chest:      Chest wall: No tenderness. Abdominal:      General: Bowel sounds are normal. There is no distension. Palpations: Abdomen is soft. There is no mass. Tenderness: There is no abdominal tenderness. There is no guarding or rebound. Musculoskeletal:         General: Normal range of motion. Cervical back: Normal range of motion. Skin:     General: Skin is warm. Neurological:      Mental Status: She is alert and oriented to person, place, and time. Cranial Nerves:  No cranial nerve deficit. Motor: No abnormal muscle tone. Diagnostic Study Results     Labs -   I have personally reviewed and interpreted all available laboratory results. Recent Results (from the past 24 hour(s))   CBC WITH AUTOMATED DIFF    Collection Time: 09/12/21 11:19 AM   Result Value Ref Range    WBC 7.3 3.6 - 11.0 K/uL    RBC 4.22 3.80 - 5.20 M/uL    HGB 13.0 11.5 - 16.0 g/dL    HCT 39.6 35.0 - 47.0 %    MCV 93.8 80.0 - 99.0 FL    MCH 30.8 26.0 - 34.0 PG    MCHC 32.8 30.0 - 36.5 g/dL    RDW 13.1 11.5 - 14.5 %    PLATELET 508 636 - 263 K/uL    MPV 8.9 8.9 - 12.9 FL    NRBC 0.0 0  WBC    ABSOLUTE NRBC 0.00 0.00 - 0.01 K/uL    NEUTROPHILS 62 32 - 75 %    LYMPHOCYTES 23 12 - 49 %    MONOCYTES 11 5 - 13 %    EOSINOPHILS 3 0 - 7 %    BASOPHILS 1 0 - 1 %    IMMATURE GRANULOCYTES 0 0.0 - 0.5 %    ABS. NEUTROPHILS 4.6 1.8 - 8.0 K/UL    ABS. LYMPHOCYTES 1.7 0.8 - 3.5 K/UL    ABS. MONOCYTES 0.8 0.0 - 1.0 K/UL    ABS. EOSINOPHILS 0.2 0.0 - 0.4 K/UL    ABS. BASOPHILS 0.0 0.0 - 0.1 K/UL    ABS. IMM. GRANS. 0.0 0.00 - 0.04 K/UL    DF AUTOMATED     METABOLIC PANEL, COMPREHENSIVE    Collection Time: 09/12/21 11:19 AM   Result Value Ref Range    Sodium 132 (L) 136 - 145 mmol/L    Potassium 4.4 3.5 - 5.1 mmol/L    Chloride 98 97 - 108 mmol/L    CO2 27 21 - 32 mmol/L    Anion gap 7 5 - 15 mmol/L    Glucose 81 65 - 100 mg/dL    BUN 13 6 - 20 MG/DL    Creatinine 1.04 (H) 0.55 - 1.02 MG/DL    BUN/Creatinine ratio 13 12 - 20      GFR est AA >60 >60 ml/min/1.73m2    GFR est non-AA 52 (L) >60 ml/min/1.73m2    Calcium 8.8 8.5 - 10.1 MG/DL    Bilirubin, total 0.8 0.2 - 1.0 MG/DL    ALT (SGPT) 19 12 - 78 U/L    AST (SGOT) 11 (L) 15 - 37 U/L    Alk.  phosphatase 56 45 - 117 U/L    Protein, total 7.3 6.4 - 8.2 g/dL    Albumin 3.8 3.5 - 5.0 g/dL    Globulin 3.5 2.0 - 4.0 g/dL    A-G Ratio 1.1 1.1 - 2.2     URINALYSIS W/ REFLEX CULTURE    Collection Time: 09/12/21  1:33 PM    Specimen: Urine   Result Value Ref Range Color YELLOW/STRAW      Appearance CLEAR CLEAR      Specific gravity 1.005 1.003 - 1.030      pH (UA) 6.0 5.0 - 8.0      Protein Negative NEG mg/dL    Glucose Negative NEG mg/dL    Ketone Negative NEG mg/dL    Bilirubin Negative NEG      Blood Negative NEG      Urobilinogen 0.2 0.2 - 1.0 EU/dL    Nitrites Negative NEG      Leukocyte Esterase Negative NEG      WBC 0-4 0 - 4 /hpf    RBC 0-5 0 - 5 /hpf    Epithelial cells FEW FEW /lpf    Bacteria Negative NEG /hpf    UA:UC IF INDICATED CULTURE NOT INDICATED BY UA RESULT CNI      Hyaline cast 0-2 0 - 5 /lpf       Radiologic Studies -   I have personally reviewed and interpreted all available imaging studies and agree with radiology interpretation and report. CT HEAD WO CONT   Final Result   No acute intracranial abnormality              CT Results  (Last 48 hours)               09/12/21 1219  CT HEAD WO CONT Final result    Impression:  No acute intracranial abnormality               Narrative:  EXAM: CT HEAD WO CONT       INDICATION: dizziness, AMS       COMPARISON: None. CONTRAST: None. TECHNIQUE: Unenhanced CT of the head was performed using 5 mm images. Brain and   bone windows were generated. Coronal and sagittal reformats. CT dose reduction   was achieved through use of a standardized protocol tailored for this   examination and automatic exposure control for dose modulation. FINDINGS:   The ventricles and sulci are normal in size, shape and configuration. . There is   no significant white matter disease. There is no intracranial hemorrhage,   extra-axial collection, or mass effect. The basilar cisterns are open. No CT   evidence of acute infarct. The bone windows demonstrate no abnormalities. Mild sinus mucosal inflammatory   change sphenoid sinus.                CXR Results  (Last 48 hours)    None          Medical Decision Making   I reviewed the vital signs, available nursing notes, past medical history, past surgical history, family history and social history. Vital Signs-Reviewed the patient's vital signs. Patient Vitals for the past 24 hrs:   Temp Pulse Resp BP SpO2   09/12/21 1345  (!) 58 19 (!) 128/47 99 %   09/12/21 1315  62 13 (!) 139/52 99 %   09/12/21 1245  64 14 (!) 142/60 99 %   09/12/21 1111 97.8 °F (36.6 °C) 66 16 (!) 163/63 99 %       Records Reviewed: Nursing Notes, Old Medical Records, Previous electrocardiograms, Previous Radiology Studies and Previous Laboratory Studies    Provider Notes (Medical Decision Making):   Patient presenting with generalized fatigue/weakness. Stable vitals and nontoxic appearing. DDx: infection, anemia, electrolyte anomoly (hypo or hyperkalemia, hypomagnesemia), hypothyroid, dehydration, depression, CA, ACS. Will obtain EKG, UA, labwork for any urgent/emergent pathology. Will reassess and monitor while in ED. ED Course:   I am the first physician for this patient's ED visit today. Initial assessment performed. I discussed presenting problems, concerns and my formulated plan for today's visit with the patient and any available family members at bedside. I encouraged them to ask questions as they arise throughout the visit. Social History     Tobacco Use    Smoking status: Current Some Day Smoker     Packs/day: 0.50     Years: 41.00     Pack years: 20.50    Smokeless tobacco: Never Used   Vaping Use    Vaping Use: Never used   Substance Use Topics    Alcohol use: Yes     Comment: occasional    Drug use: No       I reviewed our electronic medical record system for any past medical records that were available that may contribute to the patient's current condition, the nursing notes and vital signs from today's visit.       ED Orders Placed :  Orders Placed This Encounter    CT HEAD WO CONT    CBC WITH AUTOMATED DIFF    METABOLIC PANEL, COMPREHENSIVE    URINALYSIS W/ REFLEX CULTURE    INSERT PERIPHERAL IV ONE TIME STAT    sodium chloride 0.9 % bolus infusion 1,000 mL       ED Medications Administered:  Medications   sodium chloride 0.9 % bolus infusion 1,000 mL (0 mL IntraVENous IV Completed 9/12/21 1328)        Progress Note:  Patient has been reassessed and reports feeling better and symptoms have improved significantly after ED treatment. Rehan Best's final labs and imaging have been reviewed with her and available family and/or caregiver. They have been counseled regarding her diagnosis. She verbally conveys understanding and agreement of the signs, symptoms, diagnosis, treatment and prognosis and additionally agrees to follow up as recommended with Dr. Sorin Higuera, Valeri KAUR PA-C and/or specialist in 24 - 48 hours. She also agrees with the care-plan we created together and conveys that all of her questions have been answered. I have also put together a packet of discharge instructions for her that include: 1) educational information regarding their diagnosis, 2) how to care for their diagnosis at home, as well a 3) list of reasons why they would want to return to the ED prior to their follow-up appointment should the patient's condition change or symptoms worsen. I have answered all questions to the patient's satisfaction. Strict return precautions given. She both understood and agreed with plan as discussed. Vital signs stable for discharge. Disposition:  DISCHARGE  The pt is ready for discharge. The pt's signs, symptoms, diagnosis, and discharge instructions have been discussed and pt has conveyed their understanding. The pt is to follow up as recommended or return to ER should their symptoms worsen. Plan has been discussed and pt is in agreement. Plan:  1. Return precautions as discussed with patient and available family and/or caregiver. 2.   Discharge Medication List as of 9/12/2021  1:47 PM        3.    Follow-up Information     Follow up With Specialties Details Why Contact Info    [facility] EMERGENCY DEPT Emergency Medicine  As needed, If symptoms worsen [address] [place] [address]  [phone]    Katie Winston PA-C Physician Assistant  As needed, If symptoms worsen [address]  [address] [address] [phone]            Instructed to return to ED if worse  Diagnosis   Clinical Impression:  1. Transient disorientation    2. Hyponatremia    3. Generalized weakness        Attestation:  Ramila Gray MD, am the attending of record for this patient. I personally performed the services described in this documentation on this date, 9/12/2021 for patient, Jonny Johnson. I have reviewed the chart and verified that the record is accurate and complete.

## 2021-09-12 NOTE — ED NOTES
Patient received discharge papers. IV out, vitals stable, respirations unlabored with clear breath sounds and skin intact. Patient ambulatory to waiting room with daughter.

## 2021-09-12 NOTE — DISCHARGE INSTRUCTIONS
It was a pleasure taking care of you in our Emergency Department today. We know that when you come to [facility], you are entrusting us with your health, comfort, and safety. Our physicians and nurses honor that trust, and truly appreciate the opportunity to care for you and your loved ones. We also value your feedback. If you receive a survey about your Emergency Department experience today, please fill it out. We care about our patients' feedback, and we listen to what you have to say. Thank you! Dr. Christiano Crain MD.      _________________________________________________________________________  I have included a copy of all your lab results and/or radiologic studies so you can have them easily available at your follow-up visit. We hope you feel better and please do not hesitate to contact the ED if you have any questions at all! Vitals:    09/12/21 1111   BP: (!) 163/63   Pulse: 66   Temp: 97.8 °F (36.6 °C)   Resp: 16   Height: 5' 5\" (1.651 m)   Weight: 58.1 kg (128 lb)   SpO2: 99%       Recent Results (from the past 12 hour(s))   CBC WITH AUTOMATED DIFF    Collection Time: 09/12/21 11:19 AM   Result Value Ref Range    WBC 7.3 3.6 - 11.0 K/uL    RBC 4.22 3.80 - 5.20 M/uL    HGB 13.0 11.5 - 16.0 g/dL    HCT 39.6 35.0 - 47.0 %    MCV 93.8 80.0 - 99.0 FL    MCH 30.8 26.0 - 34.0 PG    MCHC 32.8 30.0 - 36.5 g/dL    RDW 13.1 11.5 - 14.5 %    PLATELET 066 726 - 448 K/uL    MPV 8.9 8.9 - 12.9 FL    NRBC 0.0 0  WBC    ABSOLUTE NRBC 0.00 0.00 - 0.01 K/uL    NEUTROPHILS 62 32 - 75 %    LYMPHOCYTES 23 12 - 49 %    MONOCYTES 11 5 - 13 %    EOSINOPHILS 3 0 - 7 %    BASOPHILS 1 0 - 1 %    IMMATURE GRANULOCYTES 0 0.0 - 0.5 %    ABS. NEUTROPHILS 4.6 1.8 - 8.0 K/UL    ABS. LYMPHOCYTES 1.7 0.8 - 3.5 K/UL    ABS. MONOCYTES 0.8 0.0 - 1.0 K/UL    ABS. EOSINOPHILS 0.2 0.0 - 0.4 K/UL    ABS. BASOPHILS 0.0 0.0 - 0.1 K/UL    ABS. IMM.  GRANS. 0.0 0.00 - 0.04 K/UL    DF AUTOMATED METABOLIC PANEL, COMPREHENSIVE    Collection Time: 09/12/21 11:19 AM   Result Value Ref Range    Sodium 132 (L) 136 - 145 mmol/L    Potassium 4.4 3.5 - 5.1 mmol/L    Chloride 98 97 - 108 mmol/L    CO2 27 21 - 32 mmol/L    Anion gap 7 5 - 15 mmol/L    Glucose 81 65 - 100 mg/dL    BUN 13 6 - 20 MG/DL    Creatinine 1.04 (H) 0.55 - 1.02 MG/DL    BUN/Creatinine ratio 13 12 - 20      GFR est AA >60 >60 ml/min/1.73m2    GFR est non-AA 52 (L) >60 ml/min/1.73m2    Calcium 8.8 8.5 - 10.1 MG/DL    Bilirubin, total 0.8 0.2 - 1.0 MG/DL    ALT (SGPT) 19 12 - 78 U/L    AST (SGOT) 11 (L) 15 - 37 U/L    Alk. phosphatase 56 45 - 117 U/L    Protein, total 7.3 6.4 - 8.2 g/dL    Albumin 3.8 3.5 - 5.0 g/dL    Globulin 3.5 2.0 - 4.0 g/dL    A-G Ratio 1.1 1.1 - 2.2     URINALYSIS W/ REFLEX CULTURE    Collection Time: 09/12/21  1:33 PM    Specimen: Urine   Result Value Ref Range    Color YELLOW/STRAW      Appearance CLEAR CLEAR      Specific gravity 1.005 1.003 - 1.030      pH (UA) 6.0 5.0 - 8.0      Protein Negative NEG mg/dL    Glucose Negative NEG mg/dL    Ketone Negative NEG mg/dL    Bilirubin Negative NEG      Blood Negative NEG      Urobilinogen 0.2 0.2 - 1.0 EU/dL    Nitrites Negative NEG      Leukocyte Esterase Negative NEG      WBC 0-4 0 - 4 /hpf    RBC 0-5 0 - 5 /hpf    Epithelial cells FEW FEW /lpf    Bacteria Negative NEG /hpf    UA:UC IF INDICATED CULTURE NOT INDICATED BY UA RESULT CNI      Hyaline cast 0-2 0 - 5 /lpf       CT HEAD WO CONT   Final Result   No acute intracranial abnormality              CT Results  (Last 48 hours)                 09/12/21 1219  CT HEAD WO CONT Final result    Impression:  No acute intracranial abnormality               Narrative:  EXAM: CT HEAD WO CONT       INDICATION: dizziness, AMS       COMPARISON: None. CONTRAST: None. TECHNIQUE: Unenhanced CT of the head was performed using 5 mm images. Brain and   bone windows were generated. Coronal and sagittal reformats.  CT dose reduction was achieved through use of a standardized protocol tailored for this   examination and automatic exposure control for dose modulation. FINDINGS:   The ventricles and sulci are normal in size, shape and configuration. . There is   no significant white matter disease. There is no intracranial hemorrhage,   extra-axial collection, or mass effect. The basilar cisterns are open. No CT   evidence of acute infarct. The bone windows demonstrate no abnormalities. Mild sinus mucosal inflammatory   change sphenoid sinus.

## 2021-09-13 NOTE — ED PROVIDER NOTES
EMERGENCY DEPARTMENT HISTORY AND PHYSICAL EXAM      Date: 9/9/2021  Patient Name: Denisha Gallagher    History of Presenting Illness     Chief Complaint   Patient presents with    Dizziness     pt got weak, dizzy, sob and felt like she was going to pass out at around 930-1000 this morning    Shortness of Breath       History Provided By: Patient    HPI: Denisha Gallagher, 67 y.o. female with a past medical history significant for medical problems as stated below presents to the ED with cc of moderate to severe lightheadedness, weakness, and shortness of breath. Patient reports feeling the symptoms since waking up this morning. She reports she does not believe she is drinking of fluids and feeling overall weak. No complains of chest pain, fevers, chills, abdominal pain, vomiting, diarrhea, or any other associated symptoms. No other exacerbating or mounting factors. There are no other complaints, changes, or physical findings at this time. PCP: Valeri Rodriguez PA-C    No current facility-administered medications on file prior to encounter. Current Outpatient Medications on File Prior to Encounter   Medication Sig Dispense Refill    pravastatin (PRAVACHOL) 20 mg tablet TAKE 1 TABLET BY MOUTH  NIGHTLY 90 Tablet 3    budesonide-formoteroL (SYMBICORT) 160-4.5 mcg/actuation HFAA Take 2 Puffs by inhalation two (2) times a day. (TO REPLACE WIXELA) 3 Inhaler 1    busPIRone (BUSPAR) 15 mg tablet TAKE 1/2 TABLET BY MOUTH 3 TIMES DAILY AS DIRECTED 135 Tablet 1    amLODIPine (NORVASC) 5 mg tablet TAKE 1 TABLET BY MOUTH EVERY DAY 90 Tablet 1    lisinopriL (PRINIVIL, ZESTRIL) 40 mg tablet Take 1 Tablet by mouth daily. 90 Tablet 2    montelukast (SINGULAIR) 10 mg tablet Take 1 Tablet by mouth daily.  90 Tablet 1    levalbuterol tartrate (XOPENEX) 45 mcg/actuation inhaler INHALE 2 PUFFS BY MOUTH EVERY 4 HOURS AS NEEDED FOR WHEEZING 1 Inhaler 2    ipratropium (ATROVENT) 42 mcg (0.06 %) nasal spray USE 1 TO 2 SPRAYS IN EACH NOSTRIL FOUR TIMES DAILY 15 mL 2    tiotropium (Spiriva with HandiHaler) 18 mcg inhalation capsule Take 1 Capsule by inhalation daily. 30 Capsule 2    azithromycin (ZITHROMAX) 250 mg tablet Take 2 tablets today, then take 1 tablet daily. (Patient not taking: Reported on 8/2/2021) 6 Tablet 0    valACYclovir (VALTREX) 1 gram tablet Take 2 Tabs by mouth two (2) times a day. DO NOT TAKE WITH TIZANIDINE  Indications: prevent recurrent herpes simplex infection (Patient taking differently: Take 2,000 mg by mouth as needed. DO NOT TAKE WITH TIZANIDINE  Indications: prevent recurrent herpes simplex infection) 30 Tab 1    traZODone (DESYREL) 50 mg tablet TAKE 1 TABLET BY MOUTH  NIGHTLY 90 Tab 3    diclofenac (VOLTAREN) 1 % gel Apply  to affected area as needed for Pain. 100 g 0    mometasone (ELOCON) 0.1 % lotion INSTILL 4 METRIC DROP TWO TIMES DAILY IN EARS, AS NEEDED 30 mL 1    tiZANidine (ZANAFLEX) 2 mg tablet TAKE 1 TABLET BY MOUTH EVERY 8 HOURS AS NEEDED FOR SPASMS      torsemide (DEMADEX) 10 mg tablet Take 5 mg by mouth daily. 2    carboxymethylcellulose sodium (REFRESH OP) Apply  to eye.  dextran 70/hypromellose (TEARS NATURALE OP) Apply 1 Drop to eye as needed.  CALCIUM-MAGNESIUM-ZINC PO Take 2 Tabs by mouth daily.  omega-3 fatty acids-fish oil (FISH OIL) 360-1,200 mg cap Take 2 Tabs by mouth daily.  carica papaya (PAPAYA ENZYME) tab Take  by mouth daily as needed (4-5 a day as needed). (Patient not taking: Reported on 8/2/2021)      fluticasone (FLONASE SENSIMIST) 27.5 mcg/actuation nasal spray 2 Sprays by Nasal route daily.  glucosamine sulfate 1,000 mg cap Take 2 Tabs by mouth daily.  acetaminophen (TYLENOL) 650 mg CR tablet Take 650 mg by mouth every six (6) hours as needed for Pain.  famotidine (PEPCID) 20 mg tablet Take 20 mg by mouth as needed.  melatonin 1 mg tablet Take 1 mg by mouth nightly.       ketoconazole (NIZORAL) 2 % topical cream Apply  to affected area daily. (Patient not taking: Reported on 8/2/2021)         Past History     Past Medical History:  Past Medical History:   Diagnosis Date    Anxiety     Arthritis     Chronic obstructive pulmonary disease ([facility] [address] 2010    early disease on PFTs, briefly on a daily inhaler    Chronic pain     neck pain, Dr. Kindra Jo Degenerative disc disease, cervical 1998    cervical spine injections, [facility] Dr. Marta Heck kidney     Right    Gallstone 1974    GERD (gastroesophageal reflux disease)     pepcid 20 mg prn (about 2x/month)    Herpes simplex type II infection     Hypercholesterolemia     Hypertension     Nausea & vomiting     During surgery when ether was used    Osteoporosis 2012    Reclast 9/24/2012, 10/09/2013, 10/31/2014, 1/29/2016    PUD (peptic ulcer disease) 2010    endoscopy confirmed, was taking aleve    Stage 3 chronic kidney disease ([facility] [address] [id number]    Trauma 1972    right arm fracture surgically repaired, twisted in washing machine       Past Surgical History:  Past Surgical History:   Procedure Laterality Date    HX APPENDECTOMY      HX BREAST BIOPSY Right     negative 1987    HX CATARACT REMOVAL Bilateral 07/09/2018    HX CHOLECYSTECTOMY  1974    HX HYSTERECTOMY  1974    HX ORTHOPAEDIC  1972    R arm broken, pins put in, taken out 1 year later    [address] [place] [address]    from bone on L leg        Family History:  Family History   Problem Relation Age of Onset    Cancer Mother         Cervical    Stroke Mother     Psychiatric Disorder Mother     Heart Disease Father     Breast Cancer Sister         in her 52's       Social History:  Social History     Tobacco Use    Smoking status: Current Some Day Smoker     Packs/day: 0.50     Years: 41.00     Pack years: 20.50    Smokeless tobacco: Never Used   Vaping Use    Vaping Use: Never used   Substance Use Topics    Alcohol use: Yes     Comment: occasional    Drug use: No       Allergies:   Allergies   Allergen Reactions    Indomethacin Other (comments)     Made her feel weird to the point it scared her  Other reaction(s): Other (see comments)  Made her feel weird to the point it scared her  Made her feel weird to the point it scared her    Other reaction(s): Other (comments)  Made her feel weird to the point it scared her    Fosamax [Alendronate] Other (comments)     Had a peptic ulcer    Other Medication Nausea and Vomiting     Flu vaccine in [id number]    Cephalexin Nausea and Vomiting    Erythromycin Nausea and Vomiting    Prochlorperazine Other (comments)     Childhood reaction-Neck spasms  Other reaction(s): Other (see comments)  Childhood reaction  Childhood reaction    Other reaction(s): Other (comments)  Childhood reaction-Neck spasms         Review of Systems   Review of Systems   Constitutional: Positive for fatigue. Negative for chills, diaphoresis and fever. HENT: Negative for ear pain and sore throat. Eyes: Negative for pain and redness. Respiratory: Positive for shortness of breath. Negative for cough. Cardiovascular: Negative for chest pain and leg swelling. Gastrointestinal: Negative for abdominal pain, diarrhea, nausea and vomiting. Endocrine: Negative for cold intolerance and heat intolerance. Genitourinary: Negative for flank pain and hematuria. Musculoskeletal: Negative for back pain and neck stiffness. Skin: Negative for rash and wound. Neurological: Positive for weakness. Negative for dizziness, syncope and headaches. All other systems reviewed and are negative. Physical Exam   Physical Exam  Vitals and nursing note reviewed. Constitutional:       General: She is not in acute distress. Appearance: She is well-developed. She is not ill-appearing. Comments: Dry Mucous membranes   HENT:      Head: Normocephalic and atraumatic. Mouth/Throat:      Pharynx: No oropharyngeal exudate.    Eyes:      Conjunctiva/sclera: Conjunctivae normal.      Pupils: Pupils are equal, round, and reactive to light. Cardiovascular:      Rate and Rhythm: Normal rate and regular rhythm. Heart sounds: No murmur heard. Pulmonary:      Effort: Pulmonary effort is normal. No respiratory distress. Breath sounds: Normal breath sounds. No wheezing. Abdominal:      General: Bowel sounds are normal. There is no distension. Palpations: Abdomen is soft. Tenderness: There is no abdominal tenderness. Musculoskeletal:         General: No deformity. Normal range of motion. Cervical back: Normal range of motion. Skin:     General: Skin is warm and dry. Findings: No rash. Neurological:      Mental Status: She is alert and oriented to person, place, and time.       Coordination: Coordination normal.   Psychiatric:         Behavior: Behavior normal.         Diagnostic Study Results     Labs -     Recent Results (from the past 168 hour(s))   EKG, 12 LEAD, INITIAL    Collection Time: 09/09/21 11:33 AM   Result Value Ref Range    Ventricular Rate 65 BPM    Atrial Rate 65 BPM    P-R Interval 140 ms    QRS Duration 92 ms    Q-T Interval 370 ms    QTC Calculation (Bezet) 384 ms    Calculated P Axis 60 degrees    Calculated R Axis -7 degrees    Calculated T Axis 61 degrees    Diagnosis       Normal sinus rhythm  Septal infarct , age undetermined  No previous ECGs available  Confirmed by AJAY Choe ([id number]) on 9/9/2021 5:53:13 PM     CBC WITH AUTOMATED DIFF    Collection Time: 09/09/21 11:45 AM   Result Value Ref Range    WBC 13.5 (H) 3.6 - 11.0 K/uL    RBC 4.45 3.80 - 5.20 M/uL    HGB 13.7 11.5 - 16.0 g/dL    HCT 41.5 35.0 - 47.0 %    MCV 93.3 80.0 - 99.0 FL    MCH 30.8 26.0 - 34.0 PG    MCHC 33.0 30.0 - 36.5 g/dL    RDW 12.9 11.5 - 14.5 %    PLATELET 678 260 - 485 K/uL    MPV 8.8 (L) 8.9 - 12.9 FL    NRBC 0.0 0  WBC    ABSOLUTE NRBC 0.00 0.00 - 0.01 K/uL    NEUTROPHILS 78 (H) 32 - 75 %    LYMPHOCYTES 11 (L) 12 - 49 %    MONOCYTES 9 5 - 13 %    EOSINOPHILS 1 0 - 7 %    BASOPHILS 0 0 - 1 %    IMMATURE GRANULOCYTES 1 (H) 0.0 - 0.5 %    ABS. NEUTROPHILS 10.6 (H) 1.8 - 8.0 K/UL    ABS. LYMPHOCYTES 1.5 0.8 - 3.5 K/UL    ABS. MONOCYTES 1.2 (H) 0.0 - 1.0 K/UL    ABS. EOSINOPHILS 0.1 0.0 - 0.4 K/UL    ABS. BASOPHILS 0.0 0.0 - 0.1 K/UL    ABS. IMM. GRANS. 0.1 (H) 0.00 - 0.04 K/UL    DF SMEAR SCANNED      RBC COMMENTS ADDY CELLS  PRESENT       METABOLIC PANEL, COMPREHENSIVE    Collection Time: 09/09/21 11:45 AM   Result Value Ref Range    Sodium 128 (L) 136 - 145 mmol/L    Potassium 4.8 3.5 - 5.1 mmol/L    Chloride 93 (L) 97 - 108 mmol/L    CO2 27 21 - 32 mmol/L    Anion gap 8 5 - 15 mmol/L    Glucose 97 65 - 100 mg/dL    BUN 17 6 - 20 MG/DL    Creatinine 1.20 (H) 0.55 - 1.02 MG/DL    BUN/Creatinine ratio 14 12 - 20      GFR est AA 53 (L) >60 ml/min/1.73m2    GFR est non-AA 44 (L) >60 ml/min/1.73m2    Calcium 9.6 8.5 - 10.1 MG/DL    Bilirubin, total 1.0 0.2 - 1.0 MG/DL    ALT (SGPT) 19 12 - 78 U/L    AST (SGOT) 14 (L) 15 - 37 U/L    Alk.  phosphatase 54 45 - 117 U/L    Protein, total 7.5 6.4 - 8.2 g/dL    Albumin 4.0 3.5 - 5.0 g/dL    Globulin 3.5 2.0 - 4.0 g/dL    A-G Ratio 1.1 1.1 - 2.2     CK W/ REFLX CKMB    Collection Time: 09/09/21 11:45 AM   Result Value Ref Range    CK 83 26 - 192 U/L   TROPONIN I    Collection Time: 09/09/21 11:45 AM   Result Value Ref Range    Troponin-I, Qt. <0.05 <0.05 ng/mL   NT-PRO BNP    Collection Time: 09/09/21 11:45 AM   Result Value Ref Range    NT pro-BNP 28 <450 PG/ML   URINALYSIS W/ REFLEX CULTURE    Collection Time: 09/09/21  2:52 PM    Specimen: Urine   Result Value Ref Range    Color YELLOW/STRAW      Appearance CLEAR CLEAR      Specific gravity 1.007 1.003 - 1.030      pH (UA) 6.0 5.0 - 8.0      Protein Negative NEG mg/dL    Glucose Negative NEG mg/dL    Ketone Negative NEG mg/dL    Bilirubin Negative NEG      Blood Negative NEG      Urobilinogen 0.2 0.2 - 1.0 EU/dL    Nitrites Negative NEG      Leukocyte Esterase Negative NEG      WBC 0-4 0 - 4 /hpf    RBC 0-5 0 - 5 /hpf    Epithelial cells FEW FEW /lpf    Bacteria Negative NEG /hpf    UA:UC IF INDICATED CULTURE NOT INDICATED BY UA RESULT CNI     CBC WITH AUTOMATED DIFF    Collection Time: 09/12/21 11:19 AM   Result Value Ref Range    WBC 7.3 3.6 - 11.0 K/uL    RBC 4.22 3.80 - 5.20 M/uL    HGB 13.0 11.5 - 16.0 g/dL    HCT 39.6 35.0 - 47.0 %    MCV 93.8 80.0 - 99.0 FL    MCH 30.8 26.0 - 34.0 PG    MCHC 32.8 30.0 - 36.5 g/dL    RDW 13.1 11.5 - 14.5 %    PLATELET 792 218 - 949 K/uL    MPV 8.9 8.9 - 12.9 FL    NRBC 0.0 0  WBC    ABSOLUTE NRBC 0.00 0.00 - 0.01 K/uL    NEUTROPHILS 62 32 - 75 %    LYMPHOCYTES 23 12 - 49 %    MONOCYTES 11 5 - 13 %    EOSINOPHILS 3 0 - 7 %    BASOPHILS 1 0 - 1 %    IMMATURE GRANULOCYTES 0 0.0 - 0.5 %    ABS. NEUTROPHILS 4.6 1.8 - 8.0 K/UL    ABS. LYMPHOCYTES 1.7 0.8 - 3.5 K/UL    ABS. MONOCYTES 0.8 0.0 - 1.0 K/UL    ABS. EOSINOPHILS 0.2 0.0 - 0.4 K/UL    ABS. BASOPHILS 0.0 0.0 - 0.1 K/UL    ABS. IMM. GRANS. 0.0 0.00 - 0.04 K/UL    DF AUTOMATED     METABOLIC PANEL, COMPREHENSIVE    Collection Time: 09/12/21 11:19 AM   Result Value Ref Range    Sodium 132 (L) 136 - 145 mmol/L    Potassium 4.4 3.5 - 5.1 mmol/L    Chloride 98 97 - 108 mmol/L    CO2 27 21 - 32 mmol/L    Anion gap 7 5 - 15 mmol/L    Glucose 81 65 - 100 mg/dL    BUN 13 6 - 20 MG/DL    Creatinine 1.04 (H) 0.55 - 1.02 MG/DL    BUN/Creatinine ratio 13 12 - 20      GFR est AA >60 >60 ml/min/1.73m2    GFR est non-AA 52 (L) >60 ml/min/1.73m2    Calcium 8.8 8.5 - 10.1 MG/DL    Bilirubin, total 0.8 0.2 - 1.0 MG/DL    ALT (SGPT) 19 12 - 78 U/L    AST (SGOT) 11 (L) 15 - 37 U/L    Alk.  phosphatase 56 45 - 117 U/L    Protein, total 7.3 6.4 - 8.2 g/dL    Albumin 3.8 3.5 - 5.0 g/dL    Globulin 3.5 2.0 - 4.0 g/dL    A-G Ratio 1.1 1.1 - 2.2     URINALYSIS W/ REFLEX CULTURE    Collection Time: 09/12/21  1:33 PM    Specimen: Urine   Result Value Ref Range    Color YELLOW/STRAW      Appearance CLEAR CLEAR      Specific gravity 1.005 1.003 - 1.030      pH (UA) 6.0 5.0 - 8.0      Protein Negative NEG mg/dL    Glucose Negative NEG mg/dL    Ketone Negative NEG mg/dL    Bilirubin Negative NEG      Blood Negative NEG      Urobilinogen 0.2 0.2 - 1.0 EU/dL    Nitrites Negative NEG      Leukocyte Esterase Negative NEG      WBC 0-4 0 - 4 /hpf    RBC 0-5 0 - 5 /hpf    Epithelial cells FEW FEW /lpf    Bacteria Negative NEG /hpf    UA:UC IF INDICATED CULTURE NOT INDICATED BY UA RESULT CNI      Hyaline cast 0-2 0 - 5 /lpf       Recent Results (from the past 24 hour(s))   URINALYSIS W/ REFLEX CULTURE    Collection Time: 09/12/21  1:33 PM    Specimen: Urine   Result Value Ref Range    Color YELLOW/STRAW      Appearance CLEAR CLEAR      Specific gravity 1.005 1.003 - 1.030      pH (UA) 6.0 5.0 - 8.0      Protein Negative NEG mg/dL    Glucose Negative NEG mg/dL    Ketone Negative NEG mg/dL    Bilirubin Negative NEG      Blood Negative NEG      Urobilinogen 0.2 0.2 - 1.0 EU/dL    Nitrites Negative NEG      Leukocyte Esterase Negative NEG      WBC 0-4 0 - 4 /hpf    RBC 0-5 0 - 5 /hpf    Epithelial cells FEW FEW /lpf    Bacteria Negative NEG /hpf    UA:UC IF INDICATED CULTURE NOT INDICATED BY UA RESULT CNI      Hyaline cast 0-2 0 - 5 /lpf       Radiologic Studies -   XR CHEST PORT   Final Result   No acute cardiopulmonary disease. CT Results  (Last 48 hours)               09/12/21 1219  CT HEAD WO CONT Final result    Impression:  No acute intracranial abnormality               Narrative:  EXAM: CT HEAD WO CONT       INDICATION: dizziness, AMS       COMPARISON: None. CONTRAST: None. TECHNIQUE: Unenhanced CT of the head was performed using 5 mm images. Brain and   bone windows were generated. Coronal and sagittal reformats. CT dose reduction   was achieved through use of a standardized protocol tailored for this   examination and automatic exposure control for dose modulation.          FINDINGS:   The ventricles and sulci are normal in size, shape and configuration. . There is   no significant white matter disease. There is no intracranial hemorrhage,   extra-axial collection, or mass effect. The basilar cisterns are open. No CT   evidence of acute infarct. The bone windows demonstrate no abnormalities. Mild sinus mucosal inflammatory   change sphenoid sinus. CXR Results  (Last 48 hours)    None            Medical Decision Making   I am the first provider for this patient. I reviewed the vital signs, available nursing notes, past medical history, past surgical history, family history and social history. Vital Signs-Reviewed the patient's vital signs. No data found. Vitals:    09/09/21 1142 09/09/21 1332 09/09/21 1400 09/09/21 1539   BP: (!) 158/61 (!) 147/59 (!) 121/54 (!) 171/71   Pulse: 74 62 60 60   Resp: 16 14 18 16   Temp: 98.1 °F (36.7 °C)      SpO2: 100% 97% 98% 100%   Weight: 59.9 kg (132 lb)      Height: 5' 5\" (1.651 m)      . CBCMDMWEAKNESS    Records Reviewed: Nursing records and medical records reviewed    MDM:  Patient presenting with generalized fatigue/weakness. Stable vitals and nontoxic appearing. DDx: infection, anemia, electrolyte anomoly (hypo or hyperkalemia, hypomagnesemia), hypothyroid, dehydration, depression, CA, ACS. Will obtain EKG, UA, labwork for any urgent/emergent pathology. Will reassess and monitor while in ED. Provider Notes (Medical Decision Making):   Patient is a 39-year-old female presenting with some mild hyponatremia and weakness and dizziness. Patient feeling much better after IV fluids. I believe symptoms are due to a low sodium levels and patient is requesting discharge at this time. I reported that she must follow-up with her primary care doctor to have her sodium checked within the next week. She return to the ER if she worsens in any way. ED Course:   Initial assessment performed.  The patients presenting problems have been discussed, and they are in agreement with the care plan formulated and outlined with them. I have encouraged them to ask questions as they arise throughout their visit. Medications Administered     sodium chloride 0.9 % bolus infusion 500 mL     Admin Date  09/09/2021 Action  New Bag Dose  500 mL Rate  500 mL/hr Route  IntraVENous Administered By  Blaze Sheridan RN                    Critical Care:  None      Disposition:  12:40 PM  Dhara Best's  results have been reviewed with her. She has been counseled regarding her diagnosis. She verbally conveys understanding and agreement of the signs, symptoms, diagnosis, treatment and prognosis and additionally agrees to follow up as recommended with Dr. Kylie Conner, Valeri KAUR PASAMEER in 24 - 48 hours. She also agrees with the care-plan and conveys that all of her questions have been answered. I have also put together some discharge instructions for her that include: 1) educational information regarding their diagnosis, 2) how to care for their diagnosis at home, as well a 3) list of reasons why they would want to return to the ED prior to their follow-up appointment, should their condition change. DISCHARGE PLAN:  1. Discharge Medication List as of 9/9/2021  3:44 PM        2. Follow-up Information     Follow up With Specialties Details Why [id number] 3 days For a follow-up evaluation. Joselito Carvajal [address] [place] [address]  [phone]      \[facility] [place] [facility]\"" EMERGENCY DEPT Emergency Medicine In 1 day If symptoms worsen 39 Evelina Hernandez MD Nephrology In 1 week For a follow-up evaluation. [place] [id number]          3. Return to ED if worse     Diagnosis     Clinical Impression:   1. Hyponatremia    2.  Syncope and collapse        Attestations:    Alexandra Cain MD    Please note that this dictation was completed with Dragon, the computer voice recognition software. Quite often unanticipated grammatical, syntax, homophones, and other interpretive errors are inadvertently transcribed by the computer software. Please disregard these errors. Please excuse any errors that have escaped final proofreading. Thank you.

## 2021-09-20 ENCOUNTER — OFFICE VISIT (OUTPATIENT)
Dept: INTERNAL MEDICINE CLINIC | Age: 76
End: 2021-09-20
Payer: MEDICARE

## 2021-09-20 VITALS
HEART RATE: 82 BPM | BODY MASS INDEX: 21.39 KG/M2 | DIASTOLIC BLOOD PRESSURE: 76 MMHG | RESPIRATION RATE: 16 BRPM | SYSTOLIC BLOOD PRESSURE: 142 MMHG | TEMPERATURE: 97.7 F | WEIGHT: 128.4 LBS | HEIGHT: 65 IN | OXYGEN SATURATION: 95 %

## 2021-09-20 DIAGNOSIS — J30.2 SEASONAL ALLERGIC RHINITIS, UNSPECIFIED TRIGGER: ICD-10-CM

## 2021-09-20 DIAGNOSIS — B00.9 HERPES SIMPLEX TYPE 2 INFECTION: ICD-10-CM

## 2021-09-20 DIAGNOSIS — E87.1 HYPONATREMIA: Primary | ICD-10-CM

## 2021-09-20 DIAGNOSIS — R30.0 DYSURIA: ICD-10-CM

## 2021-09-20 DIAGNOSIS — G47.00 INSOMNIA, UNSPECIFIED TYPE: ICD-10-CM

## 2021-09-20 LAB
APPEARANCE UR: CLEAR
BACTERIA URNS QL MICRO: NEGATIVE /HPF
BILIRUB UR QL: NEGATIVE
COLOR UR: NORMAL
EPITH CASTS URNS QL MICRO: NORMAL /LPF
GLUCOSE UR STRIP.AUTO-MCNC: NEGATIVE MG/DL
HGB UR QL STRIP: NEGATIVE
KETONES UR QL STRIP.AUTO: NEGATIVE MG/DL
LEUKOCYTE ESTERASE UR QL STRIP.AUTO: NEGATIVE
NITRITE UR QL STRIP.AUTO: NEGATIVE
PH UR STRIP: 6 [PH] (ref 5–8)
PROT UR STRIP-MCNC: NEGATIVE MG/DL
RBC #/AREA URNS HPF: NORMAL /HPF (ref 0–5)
SP GR UR REFRACTOMETRY: <1.005 (ref 1–1.03)
UROBILINOGEN UR QL STRIP.AUTO: 0.2 EU/DL (ref 0.2–1)
WBC URNS QL MICRO: NORMAL /HPF (ref 0–4)

## 2021-09-20 PROCEDURE — G8427 DOCREV CUR MEDS BY ELIG CLIN: HCPCS | Performed by: PHYSICIAN ASSISTANT

## 2021-09-20 PROCEDURE — 99214 OFFICE O/P EST MOD 30 MIN: CPT | Performed by: PHYSICIAN ASSISTANT

## 2021-09-20 PROCEDURE — G8754 DIAS BP LESS 90: HCPCS | Performed by: PHYSICIAN ASSISTANT

## 2021-09-20 PROCEDURE — 1090F PRES/ABSN URINE INCON ASSESS: CPT | Performed by: PHYSICIAN ASSISTANT

## 2021-09-20 PROCEDURE — G8753 SYS BP > OR = 140: HCPCS | Performed by: PHYSICIAN ASSISTANT

## 2021-09-20 PROCEDURE — G8536 NO DOC ELDER MAL SCRN: HCPCS | Performed by: PHYSICIAN ASSISTANT

## 2021-09-20 PROCEDURE — 1101F PT FALLS ASSESS-DOCD LE1/YR: CPT | Performed by: PHYSICIAN ASSISTANT

## 2021-09-20 PROCEDURE — G8420 CALC BMI NORM PARAMETERS: HCPCS | Performed by: PHYSICIAN ASSISTANT

## 2021-09-20 PROCEDURE — G8432 DEP SCR NOT DOC, RNG: HCPCS | Performed by: PHYSICIAN ASSISTANT

## 2021-09-20 RX ORDER — TRAZODONE HYDROCHLORIDE 50 MG/1
50 TABLET ORAL
Qty: 90 TABLET | Refills: 3 | Status: SHIPPED | OUTPATIENT
Start: 2021-09-20 | End: 2022-08-19

## 2021-09-20 RX ORDER — AZELASTINE 1 MG/ML
1 SPRAY, METERED NASAL 2 TIMES DAILY
Qty: 1 EACH | Refills: 5 | Status: SHIPPED | OUTPATIENT
Start: 2021-09-20 | End: 2022-02-14

## 2021-09-20 RX ORDER — VALACYCLOVIR HYDROCHLORIDE 1 G/1
2000 TABLET, FILM COATED ORAL 2 TIMES DAILY
Qty: 30 TABLET | Refills: 1 | Status: SHIPPED | OUTPATIENT
Start: 2021-09-20 | End: 2022-10-25

## 2021-09-20 RX ORDER — NITROFURANTOIN 25; 75 MG/1; MG/1
100 CAPSULE ORAL 2 TIMES DAILY
Qty: 10 CAPSULE | Refills: 0 | Status: SHIPPED | OUTPATIENT
Start: 2021-09-20 | End: 2021-09-25

## 2021-09-20 NOTE — PROGRESS NOTES
Iraida Medel is a 67 y.o. female     Chief Complaint   Patient presents with    ED Follow-up     seen at [facility] on 9/12/21 for low sodium       Visit Vitals  BP (!) 142/76 (BP 1 Location: Left arm, BP Patient Position: Sitting, BP Cuff Size: Adult)   Pulse 82   Temp 97.7 °F (36.5 °C) (Temporal)   Resp 16   Ht 5' 5\" (1.651 m)   Wt 128 lb 6.4 oz (58.2 kg)   LMP  (LMP Unknown)   SpO2 95%   BMI 21.37 kg/m²       Health Maintenance Due   Topic Date Due    Hepatitis C Screening  Never done    COVID-19 Vaccine (1) Never done    Low dose CT lung screening  Never done    Medicare Yearly Exam  08/05/2021    Flu Vaccine (1) Never done       1. Have you been to the ER, urgent care clinic since your last visit? Hospitalized since your last visit? Yes seen at [facility] on 9/12/21 for low sodium. 2. Have you seen or consulted any other health care providers outside of the [address] [place] [address] since your last visit? Include any pap smears or colon screening.  No

## 2021-09-20 NOTE — PROGRESS NOTES
HPI:  67 y.o.  presents for follow up appointment. No hospital, ER or specialist visits since last primary care visit except as noted below.     Hyponatremia  Had onset of dizziness, dyspnea, presyncope  Was seen in ER 9/9/21 and again with continued sxs on 9/12/21  The low Na was first diagnosed on 9/9, Na 128, and replenished with IV fluids  Head CT unremarkable on 9/12, Na 132, replenished with saline bolus    Had follow up with renal Dr Marlene Salazar on 9/16/21  She had been drinking 5 - 16 oz bottles water daily, and she reports Dr Marlene Salazar told her that was too much  She has cut back to 3 - 16 oz bottles and is feeling better  No changes in her medications  Checked labs at visit and Na level was normal at 134, eGFR 49  Has follow up again on 9/29/21 with Dr Yung Horner    C/O constant drippy nose  On singulair  Atrovent nasal spray, seems to help some days and other days not  Flonase daily, she ran out of this recently about 2-3 wks ago  Tried zyrtec but felt a little groggy yesterday with the one dose she took  Has not tried azelastine   Symptoms increased with season change, living in new house with a smoker and a big hairy dog     Dysuria  End-stream discomfort x 2 days  No hematuria  She used to drink cranberry juice every morning, but has stopped that recently in her current living arrangements    Had a flare of her herpes infection on her buttock  Needs refill of valtrex    Routine refill for trazodone      Patient Active Problem List    Diagnosis    Bilateral carotid artery stenosis    Cerebral microvascular disease    GERD (gastroesophageal reflux disease)     pepcid 20 mg prn (about 2x/month)      Chronic pain     neck pain, Dr. Andrae Dudley CKD (chronic kidney disease) stage 3, GFR 30-59 ml/min ([facility])    Sinus disorder    Anxiety    Essential hypertension    Osteoarthritis of cervical spine    Hyperlipidemia LDL goal <70    Herpes simplex type 2 infection    Osteoporosis     Reclast 9/24/2012, 10/09/2013, 10/31/2014, 1/29/2016           Past Medical History:   Diagnosis Date    Anxiety     Arthritis     Chronic obstructive pulmonary disease ([facility] [address] 2010    early disease on PFTs, briefly on a daily inhaler    Chronic pain     neck pain, Dr. Anisa Frazier Degenerative disc disease, cervical 1998    cervical spine injections, [facility] Dr. Gabe Anderson kidney     Right    Gallstone 1974    GERD (gastroesophageal reflux disease)     pepcid 20 mg prn (about 2x/month)    Herpes simplex type II infection     Hypercholesterolemia     Hypertension     Nausea & vomiting     During surgery when ether was used    Osteoporosis 2012    Reclast 9/24/2012, 10/09/2013, 10/31/2014, 1/29/2016    PUD (peptic ulcer disease) 2010    endoscopy confirmed, was taking aleve    Stage 3 chronic kidney disease ([facility] [address] 7512-7278    Trauma 1972    right arm fracture surgically repaired, twisted in washing machine       Social History     Tobacco Use    Smoking status: Current Some Day Smoker     Packs/day: 0.50     Years: 41.00     Pack years: 20.50    Smokeless tobacco: Never Used   Vaping Use    Vaping Use: Never used   Substance Use Topics    Alcohol use: Yes     Comment: occasional    Drug use: No       Outpatient Medications Marked as Taking for the 9/20/21 encounter (Office Visit) with Valeri Rodriguez PA-C   Medication Sig Dispense Refill    Spiriva with HandiHaler 18 mcg inhalation capsule INHALE THE CONTENTS OF 1 CAPSULE VIA INHALATION DEVICE DAILY 90 Capsule 1    pravastatin (PRAVACHOL) 20 mg tablet TAKE 1 TABLET BY MOUTH  NIGHTLY 90 Tablet 3    budesonide-formoteroL (SYMBICORT) 160-4.5 mcg/actuation HFAA Take 2 Puffs by inhalation two (2) times a day.  (TO REPLACE WIXELA) 3 Inhaler 1    busPIRone (BUSPAR) 15 mg tablet TAKE 1/2 TABLET BY MOUTH 3 TIMES DAILY AS DIRECTED 135 Tablet 1    amLODIPine (NORVASC) 5 mg tablet TAKE 1 TABLET BY MOUTH EVERY DAY 90 Tablet 1    lisinopriL (PRINIVIL, ZESTRIL) 40 mg tablet Take 1 Tablet by mouth daily. 90 Tablet 2    montelukast (SINGULAIR) 10 mg tablet Take 1 Tablet by mouth daily. 90 Tablet 1    levalbuterol tartrate (XOPENEX) 45 mcg/actuation inhaler INHALE 2 PUFFS BY MOUTH EVERY 4 HOURS AS NEEDED FOR WHEEZING 1 Inhaler 2    ipratropium (ATROVENT) 42 mcg (0.06 %) nasal spray USE 1 TO 2 SPRAYS IN EACH NOSTRIL FOUR TIMES DAILY 15 mL 2    valACYclovir (VALTREX) 1 gram tablet Take 2 Tabs by mouth two (2) times a day. DO NOT TAKE WITH TIZANIDINE  Indications: prevent recurrent herpes simplex infection (Patient taking differently: Take 2,000 mg by mouth as needed. DO NOT TAKE WITH TIZANIDINE  Indications: prevent recurrent herpes simplex infection) 30 Tab 1    traZODone (DESYREL) 50 mg tablet TAKE 1 TABLET BY MOUTH  NIGHTLY 90 Tab 3    diclofenac (VOLTAREN) 1 % gel Apply  to affected area as needed for Pain. 100 g 0    mometasone (ELOCON) 0.1 % lotion INSTILL 4 METRIC DROP TWO TIMES DAILY IN EARS, AS NEEDED 30 mL 1    tiZANidine (ZANAFLEX) 2 mg tablet TAKE 1 TABLET BY MOUTH EVERY 8 HOURS AS NEEDED FOR SPASMS      torsemide (DEMADEX) 10 mg tablet Take 5 mg by mouth daily. 2    carboxymethylcellulose sodium (REFRESH OP) Apply  to eye.  dextran 70/hypromellose (TEARS NATURALE OP) Apply 1 Drop to eye as needed.  omega-3 fatty acids-fish oil (FISH OIL) 360-1,200 mg cap Take 2 Tabs by mouth daily.  fluticasone (FLONASE SENSIMIST) 27.5 mcg/actuation nasal spray 2 Sprays by Nasal route daily.  glucosamine sulfate 1,000 mg cap Take 2 Tabs by mouth daily.  acetaminophen (TYLENOL) 650 mg CR tablet Take 650 mg by mouth every six (6) hours as needed for Pain.  famotidine (PEPCID) 20 mg tablet Take 20 mg by mouth as needed.  melatonin 1 mg tablet Take 1 mg by mouth nightly. Allergies   Allergen Reactions    Indomethacin Other (comments)     Made her feel weird to the point it scared her  Other reaction(s):  Other (see comments)  Made her feel weird to the point it scared her  Made her feel weird to the point it scared her    Other reaction(s): Other (comments)  Made her feel weird to the point it scared her    Fosamax [Alendronate] Other (comments)     Had a peptic ulcer    Other Medication Nausea and Vomiting     Flu vaccine in [id number]    Cephalexin Nausea and Vomiting    Erythromycin Nausea and Vomiting    Prochlorperazine Other (comments)     Childhood reaction-Neck spasms  Other reaction(s): Other (see comments)  Childhood reaction  Childhood reaction    Other reaction(s): Other (comments)  Childhood reaction-Neck spasms       ROS:  ROS negative except as per HPI. PE:  Visit Vitals  BP (!) 142/76 (BP 1 Location: Left arm, BP Patient Position: Sitting, BP Cuff Size: Adult)   Pulse 82   Temp 97.7 °F (36.5 °C) (Temporal)   Resp 16   Ht 5' 5\" (1.651 m)   Wt 128 lb 6.4 oz (58.2 kg)   LMP  (LMP Unknown)   SpO2 95%   BMI 21.37 kg/m²     Gen: alert, oriented, no acute distress  Head: normocephalic, atraumatic  Ears: external auditory canals clear, TMs without erythema or effusion  Eyes: pupils equal round reactive to light, sclera clear, conjunctiva clear  Oral: moist mucus membranes, no oral lesions, no pharyngeal inflammation or exudate  Neck: symmetric normal sized thyroid, no carotid bruits, no jugular vein distention  Resp: no increase work of breathing, lungs clear to ausculation bilaterally, no wheezing, rales or rhonchi  CV: S1, S2 normal.  No murmurs, rubs, or gallops. Abd: soft, not tender, not distended. No hepatosplenomegaly. Normal bowel sounds. No CVAT  Mild suprapubic tenderness   Neuro: cranial nerves intact, normal strength and movement in all extremities,sensation intact and symmetric. Skin: no lesion or rash  Extremities: no cyanosis or edema    No results found for this visit on 09/20/21. Assessment/Plan:      ICD-10-CM ICD-9-CM    1. Hyponatremia  E87.1 276.1    2.  Seasonal allergic rhinitis, unspecified trigger  J30.2 477.9 azelastine (ASTELIN) 137 mcg (0.1 %) nasal spray   3. Dysuria  R30.0 788. 1 URINALYSIS W/MICROSCOPIC      CULTURE, URINE      nitrofurantoin, macrocrystal-monohydrate, (MACROBID) 100 mg capsule      CULTURE, URINE      URINALYSIS W/MICROSCOPIC      CANCELED: AMB POC URINALYSIS DIP STICK AUTO W/O MICRO   4. Insomnia, unspecified type  G47.00 780.52 traZODone (DESYREL) 50 mg tablet   5. Herpes simplex type 2 infection  B00.9 054.9 valACYclovir (VALTREX) 1 gram tablet       1. Low Na from drinking too much water per renal   Has had f/u with DR Viky Graham with recheck labs and normal Na levels  No change in medicaion  F/u as scheduled 9/29/21    2. Resume flonase  Trial of azelastine, stop zyrtec when start azelastine  May use atrovent nasal prn  Continue singulair    3.. will send out urine  Treat empirically with macrobid  Of note she recently reduce water intake from 5 - 16 oz bottles to 3 bottles per renal due to low Na, this surely should be an adequate amount of water intake  -she also has not been taking her cranberry juice each morning over the last 2 mos, so she will resume that    4. Refill trazodone    5. Refill valtrex  Had recent outbreak likely due to stress      Health Maintenance reviewed - updated. Orders Placed This Encounter    CULTURE, URINE     Standing Status:   Future     Number of Occurrences:   1     Standing Expiration Date:   9/20/2022    URINALYSIS W/MICROSCOPIC     Standing Status:   Future     Number of Occurrences:   1     Standing Expiration Date:   9/20/2022    traZODone (DESYREL) 50 mg tablet     Sig: Take 1 Tablet by mouth nightly. Dispense:  90 Tablet     Refill:  3     Requesting 1 year supply    valACYclovir (VALTREX) 1 gram tablet     Sig: Take 2 Tablets by mouth two (2) times a day.  DO NOT TAKE WITH TIZANIDINE  Indications: prevent recurrent herpes simplex infection     Dispense:  30 Tablet     Refill:  1    azelastine (ASTELIN) 137 mcg (0.1 %) nasal spray     [place] by Both Nostrils route two (2) times a day. Use in each nostril as directed     Dispense:  1 Each     Refill:  5    nitrofurantoin, macrocrystal-monohydrate, (MACROBID) 100 mg capsule     Sig: Take 1 Capsule by mouth two (2) times a day for 5 days. Dispense:  10 Capsule     Refill:  0       Medications Discontinued During This Encounter   Medication Reason    traZODone (DESYREL) 50 mg tablet REORDER    valACYclovir (VALTREX) 1 gram tablet REORDER       Current Outpatient Medications   Medication Sig Dispense Refill    traZODone (DESYREL) 50 mg tablet Take 1 Tablet by mouth nightly. 90 Tablet 3    valACYclovir (VALTREX) 1 gram tablet Take 2 Tablets by mouth two (2) times a day. DO NOT TAKE WITH TIZANIDINE  Indications: prevent recurrent herpes simplex infection 30 Tablet 1    azelastine (ASTELIN) 137 mcg (0.1 %) nasal spray 1 [place] by Both Nostrils route two (2) times a day. Use in each nostril as directed 1 Each 5    nitrofurantoin, macrocrystal-monohydrate, (MACROBID) 100 mg capsule Take 1 Capsule by mouth two (2) times a day for 5 days. 10 Capsule 0    Spiriva with HandiHaler 18 mcg inhalation capsule INHALE THE CONTENTS OF 1 CAPSULE VIA INHALATION DEVICE DAILY 90 Capsule 1    pravastatin (PRAVACHOL) 20 mg tablet TAKE 1 TABLET BY MOUTH  NIGHTLY 90 Tablet 3    budesonide-formoteroL (SYMBICORT) 160-4.5 mcg/actuation HFAA Take 2 Puffs by inhalation two (2) times a day. (TO REPLACE WIXELA) 3 Inhaler 1    busPIRone (BUSPAR) 15 mg tablet TAKE 1/2 TABLET BY MOUTH 3 TIMES DAILY AS DIRECTED 135 Tablet 1    amLODIPine (NORVASC) 5 mg tablet TAKE 1 TABLET BY MOUTH EVERY DAY 90 Tablet 1    lisinopriL (PRINIVIL, ZESTRIL) 40 mg tablet Take 1 Tablet by mouth daily. 90 Tablet 2    montelukast (SINGULAIR) 10 mg tablet Take 1 Tablet by mouth daily.  90 Tablet 1    levalbuterol tartrate (XOPENEX) 45 mcg/actuation inhaler INHALE 2 PUFFS BY MOUTH EVERY 4 HOURS AS NEEDED FOR WHEEZING 1 Inhaler 2  ipratropium (ATROVENT) 42 mcg (0.06 %) nasal spray USE 1 TO 2 SPRAYS IN EACH NOSTRIL FOUR TIMES DAILY 15 mL 2    diclofenac (VOLTAREN) 1 % gel Apply  to affected area as needed for Pain. 100 g 0    mometasone (ELOCON) 0.1 % lotion INSTILL 4 METRIC DROP TWO TIMES DAILY IN EARS, AS NEEDED 30 mL 1    tiZANidine (ZANAFLEX) 2 mg tablet TAKE 1 TABLET BY MOUTH EVERY 8 HOURS AS NEEDED FOR SPASMS      torsemide (DEMADEX) 10 mg tablet Take 5 mg by mouth daily. 2    carboxymethylcellulose sodium (REFRESH OP) Apply  to eye.  dextran 70/hypromellose (TEARS NATURALE OP) Apply 1 Drop to eye as needed.  omega-3 fatty acids-fish oil (FISH OIL) 360-1,200 mg cap Take 2 Tabs by mouth daily.  fluticasone (FLONASE SENSIMIST) 27.5 mcg/actuation nasal spray 2 Sprays by Nasal route daily.  glucosamine sulfate 1,000 mg cap Take 2 Tabs by mouth daily.  acetaminophen (TYLENOL) 650 mg CR tablet Take 650 mg by mouth every six (6) hours as needed for Pain.  famotidine (PEPCID) 20 mg tablet Take 20 mg by mouth as needed.  melatonin 1 mg tablet Take 1 mg by mouth nightly.  azithromycin (ZITHROMAX) 250 mg tablet Take 2 tablets today, then take 1 tablet daily. (Patient not taking: Reported on 8/2/2021) 6 Tablet 0    CALCIUM-MAGNESIUM-ZINC PO Take 2 Tabs by mouth daily. (Patient not taking: Reported on 9/20/2021)      carica papaya (PAPAYA ENZYME) tab Take  by mouth daily as needed (4-5 a day as needed). (Patient not taking: Reported on 8/2/2021)      ketoconazole (NIZORAL) 2 % topical cream Apply  to affected area daily. (Patient not taking: Reported on 8/2/2021)         Recommended healthy diet low in carbohydrates, fats, sodium and cholesterol. Recommended regular cardiovascular exercise 3-6 times per week for 30-60 minutes daily. Verbal and written instructions (see AVS) provided. Patient expresses understanding of diagnosis and treatment plan.     follow up as previously scheduled    Future Appointments   Date Time Provider Amanda Addison   10/14/2021 11:00 AM Valeri Rodriguez PA-C [facility]

## 2021-09-21 LAB
BACTERIA SPEC CULT: NORMAL
SERVICE CMNT-IMP: NORMAL

## 2021-09-22 NOTE — PROGRESS NOTES
Please call pt to inform-  -her urine was clear and no infection grew on culture  -she can stop the antibiotic.  -resume the cranberry juice, but otherwise continue with the water intake as advised by renal Dr Dona Ortiz

## 2021-10-14 ENCOUNTER — TELEPHONE (OUTPATIENT)
Dept: INTERNAL MEDICINE CLINIC | Age: 76
End: 2021-10-14

## 2021-10-14 ENCOUNTER — OFFICE VISIT (OUTPATIENT)
Dept: INTERNAL MEDICINE CLINIC | Age: 76
End: 2021-10-14
Payer: MEDICARE

## 2021-10-14 VITALS
DIASTOLIC BLOOD PRESSURE: 70 MMHG | WEIGHT: 130.7 LBS | HEIGHT: 65 IN | TEMPERATURE: 97.2 F | OXYGEN SATURATION: 97 % | BODY MASS INDEX: 21.77 KG/M2 | RESPIRATION RATE: 16 BRPM | SYSTOLIC BLOOD PRESSURE: 134 MMHG | HEART RATE: 68 BPM

## 2021-10-14 DIAGNOSIS — I10 ESSENTIAL HYPERTENSION: ICD-10-CM

## 2021-10-14 DIAGNOSIS — Z00.00 MEDICARE ANNUAL WELLNESS VISIT, SUBSEQUENT: Primary | ICD-10-CM

## 2021-10-14 DIAGNOSIS — F41.9 ANXIETY: ICD-10-CM

## 2021-10-14 DIAGNOSIS — N18.31 STAGE 3A CHRONIC KIDNEY DISEASE (HCC): ICD-10-CM

## 2021-10-14 DIAGNOSIS — J31.0 GUSTATORY RHINITIS: ICD-10-CM

## 2021-10-14 DIAGNOSIS — Z72.0 TOBACCO USE: ICD-10-CM

## 2021-10-14 DIAGNOSIS — J44.9 CHRONIC OBSTRUCTIVE PULMONARY DISEASE, UNSPECIFIED COPD TYPE (HCC): ICD-10-CM

## 2021-10-14 DIAGNOSIS — Z87.891 PERSONAL HISTORY OF TOBACCO USE, PRESENTING HAZARDS TO HEALTH: ICD-10-CM

## 2021-10-14 PROCEDURE — 1090F PRES/ABSN URINE INCON ASSESS: CPT | Performed by: PHYSICIAN ASSISTANT

## 2021-10-14 PROCEDURE — G8752 SYS BP LESS 140: HCPCS | Performed by: PHYSICIAN ASSISTANT

## 2021-10-14 PROCEDURE — 99213 OFFICE O/P EST LOW 20 MIN: CPT | Performed by: PHYSICIAN ASSISTANT

## 2021-10-14 PROCEDURE — G8432 DEP SCR NOT DOC, RNG: HCPCS | Performed by: PHYSICIAN ASSISTANT

## 2021-10-14 PROCEDURE — 1101F PT FALLS ASSESS-DOCD LE1/YR: CPT | Performed by: PHYSICIAN ASSISTANT

## 2021-10-14 PROCEDURE — G8420 CALC BMI NORM PARAMETERS: HCPCS | Performed by: PHYSICIAN ASSISTANT

## 2021-10-14 PROCEDURE — G8427 DOCREV CUR MEDS BY ELIG CLIN: HCPCS | Performed by: PHYSICIAN ASSISTANT

## 2021-10-14 PROCEDURE — G8536 NO DOC ELDER MAL SCRN: HCPCS | Performed by: PHYSICIAN ASSISTANT

## 2021-10-14 PROCEDURE — G0296 VISIT TO DETERM LDCT ELIG: HCPCS | Performed by: PHYSICIAN ASSISTANT

## 2021-10-14 PROCEDURE — G8754 DIAS BP LESS 90: HCPCS | Performed by: PHYSICIAN ASSISTANT

## 2021-10-14 RX ORDER — IPRATROPIUM BROMIDE 42 UG/1
SPRAY, METERED NASAL
Qty: 15 ML | Refills: 2 | Status: SHIPPED | OUTPATIENT
Start: 2021-10-14 | End: 2021-12-20

## 2021-10-14 NOTE — PROGRESS NOTES
This is a Subsequent Medicare Annual Wellness Exam (AWV) (Performed 12 months after IPPE or effective date of Medicare Part B enrollment)    I have reviewed the patient's medical history in detail and updated the computerized patient record. History     Past Medical History:   Diagnosis Date    Anxiety     Arthritis     Chronic obstructive pulmonary disease ([facility] [address] 2010    early disease on PFTs, briefly on a daily inhaler    Chronic pain     neck pain, Dr. Dane Womack Degenerative disc disease, cervical 1998    cervical spine injections, [facility] Dr. Jurado Joanna kidney     Right    Gallstone 1974    GERD (gastroesophageal reflux disease)     pepcid 20 mg prn (about 2x/month)    Herpes simplex type II infection     Hypercholesterolemia     Hypertension     Nausea & vomiting     During surgery when ether was used    Osteoporosis 2012    Reclast 9/24/2012, 10/09/2013, 10/31/2014, 1/29/2016    PUD (peptic ulcer disease) 2010    endoscopy confirmed, was taking aleve    Stage 3 chronic kidney disease ([facility] [place] [address] 2315-0706    Trauma 1972    right arm fracture surgically repaired, twisted in washing machine      Past Surgical History:   Procedure Laterality Date    HX APPENDECTOMY      HX BREAST BIOPSY Right     negative 1987    HX CATARACT REMOVAL Bilateral 07/09/2018    HX CHOLECYSTECTOMY  1974    HX HYSTERECTOMY  1974    HX ORTHOPAEDIC  1972    R arm broken, pins put in, taken out 1 year later   [place]    from bone on L leg      Current Outpatient Medications   Medication Sig Dispense Refill    traZODone (DESYREL) 50 mg tablet Take 1 Tablet by mouth nightly. 90 Tablet 3    valACYclovir (VALTREX) 1 gram tablet Take 2 Tablets by mouth two (2) times a day. DO NOT TAKE WITH TIZANIDINE  Indications: prevent recurrent herpes simplex infection 30 Tablet 1    azelastine (ASTELIN) 137 mcg (0.1 %) nasal spray 1 [place] by Both Nostrils route two (2) times a day.  Use in each nostril as directed 1 Each 5    Spiriva with HandiHaler 18 mcg inhalation capsule INHALE THE CONTENTS OF 1 CAPSULE VIA INHALATION DEVICE DAILY 90 Capsule 1    pravastatin (PRAVACHOL) 20 mg tablet TAKE 1 TABLET BY MOUTH  NIGHTLY 90 Tablet 3    budesonide-formoteroL (SYMBICORT) 160-4.5 mcg/actuation HFAA Take 2 Puffs by inhalation two (2) times a day. (TO REPLACE WIXELA) 3 Inhaler 1    busPIRone (BUSPAR) 15 mg tablet TAKE 1/2 TABLET BY MOUTH 3 TIMES DAILY AS DIRECTED 135 Tablet 1    amLODIPine (NORVASC) 5 mg tablet TAKE 1 TABLET BY MOUTH EVERY DAY 90 Tablet 1    lisinopriL (PRINIVIL, ZESTRIL) 40 mg tablet Take 1 Tablet by mouth daily. 90 Tablet 2    montelukast (SINGULAIR) 10 mg tablet Take 1 Tablet by mouth daily. 90 Tablet 1    levalbuterol tartrate (XOPENEX) 45 mcg/actuation inhaler INHALE 2 PUFFS BY MOUTH EVERY 4 HOURS AS NEEDED FOR WHEEZING 1 Inhaler 2    ipratropium (ATROVENT) 42 mcg (0.06 %) nasal spray USE 1 TO 2 SPRAYS IN EACH NOSTRIL FOUR TIMES DAILY 15 mL 2    diclofenac (VOLTAREN) 1 % gel Apply  to affected area as needed for Pain. 100 g 0    mometasone (ELOCON) 0.1 % lotion INSTILL 4 METRIC DROP TWO TIMES DAILY IN EARS, AS NEEDED 30 mL 1    tiZANidine (ZANAFLEX) 2 mg tablet TAKE 1 TABLET BY MOUTH EVERY 8 HOURS AS NEEDED FOR SPASMS      torsemide (DEMADEX) 10 mg tablet Take 5 mg by mouth daily. 2    carboxymethylcellulose sodium (REFRESH OP) Apply  to eye.  omega-3 fatty acids-fish oil (FISH OIL) 360-1,200 mg cap Take 2 Tabs by mouth daily.  glucosamine sulfate 1,000 mg cap Take 2 Tabs by mouth daily.  acetaminophen (TYLENOL) 650 mg CR tablet Take 650 mg by mouth every six (6) hours as needed for Pain.  famotidine (PEPCID) 20 mg tablet Take 20 mg by mouth as needed.  melatonin 1 mg tablet Take 1 mg by mouth nightly.  azithromycin (ZITHROMAX) 250 mg tablet Take 2 tablets today, then take 1 tablet daily.  (Patient not taking: Reported on 8/2/2021) 6 Tablet 0    dextran 70/hypromellose (TEARS NATURALE OP) Apply 1 Drop to eye as needed.  CALCIUM-MAGNESIUM-ZINC PO Take 2 Tabs by mouth daily. (Patient not taking: Reported on 9/20/2021)      carica papaya (PAPAYA ENZYME) tab Take  by mouth daily as needed (4-5 a day as needed). (Patient not taking: Reported on 8/2/2021)      fluticasone (FLONASE SENSIMIST) 27.5 mcg/actuation nasal spray 2 Sprays by Nasal route daily. (Patient not taking: Reported on 10/14/2021)      ketoconazole (NIZORAL) 2 % topical cream Apply  to affected area daily. (Patient not taking: Reported on 8/2/2021)       Allergies   Allergen Reactions    Indomethacin Other (comments)     Made her feel weird to the point it scared her  Other reaction(s): Other (see comments)  Made her feel weird to the point it scared her  Made her feel weird to the point it scared her    Other reaction(s): Other (comments)  Made her feel weird to the point it scared her    Fosamax [Alendronate] Other (comments)     Had a peptic ulcer    Other Medication Nausea and Vomiting     Flu vaccine in [id number]    Cephalexin Nausea and Vomiting    Erythromycin Nausea and Vomiting    Prochlorperazine Other (comments)     Childhood reaction-Neck spasms  Other reaction(s): Other (see comments)  Childhood reaction  Childhood reaction    Other reaction(s):  Other (comments)  Childhood reaction-Neck spasms     Family History   Problem Relation Age of Onset    Cancer Mother         Cervical    Stroke Mother     Psychiatric Disorder Mother     Heart Disease Father     Breast Cancer Sister         in her 52's     Social History     Tobacco Use    Smoking status: Current Some Day Smoker     Packs/day: 0.50     Years: 41.00     Pack years: 20.50    Smokeless tobacco: Never Used   Substance Use Topics    Alcohol use: Yes     Comment: occasional     Patient Active Problem List    Diagnosis    Bilateral carotid artery stenosis    Cerebral microvascular disease    GERD (gastroesophageal reflux disease)     pepcid 20 mg prn (about 2x/month)      Chronic pain     neck pain, Dr. Paola Morel CKD (chronic kidney disease) stage 3, GFR 30-59 ml/min ([facility])    Sinus disorder    Anxiety    Essential hypertension    Osteoarthritis of cervical spine    Hyperlipidemia LDL goal <70    Herpes simplex type 2 infection    Osteoporosis     Reclast 9/24/2012, 10/09/2013, 10/31/2014, 1/29/2016         Depression Risk Factor Screening:     3 most recent PHQ Screens 10/14/2021   Little interest or pleasure in doing things Not at all   Feeling down, depressed, irritable, or hopeless Not at all   Total Score PHQ 2 0   Trouble falling or staying asleep, or sleeping too much -   Feeling tired or having little energy -   Poor appetite, weight loss, or overeating -   Feeling bad about yourself - or that you are a failure or have let yourself or your family down -   Trouble concentrating on things such as school, work, reading, or watching TV -   Moving or speaking so slowly that other people could have noticed; or the opposite being so fidgety that others notice -   Thoughts of being better off dead, or hurting yourself in some way -   PHQ 9 Score -   How difficult have these problems made it for you to do your work, take care of your home and get along with others -     Alcohol Risk Factor Screening:   Do you average more than 1 drink per night or more than 7 drinks a week:  No    On any one occasion in the past three months have you have had more than 3 drinks containing alcohol:  No    Functional Ability and Level of Safety:   Hearing Loss  Has hearing aid on left side, could not afford one for the right side    Activities of Daily Living  The home contains: Freeze Tag bars  Patient does total self care    Fall Risk  Fall Risk Assessment, last 12 mths 10/14/2021   Able to walk? Yes   Fall in past 12 months? 0   Do you feel unsteady?  0   Are you worried about falling 0   Is the gait abnormal? -   Number of falls in past 12 months -   Fall with injury? -       Abuse Screen  Patient is not abused    Cognitive Screening   Evaluation of Cognitive Function:  Has your family/caregiver stated any concerns about your memory: no      Patient Care Team   Patient Care Team:  Cherrie Flanagan PA-C as PCP - General (Physician Assistant)  Cherrie Flanagan PA-C as PCP - [facility] Empaneled Provider  Nandini Mcgraw MD (Ophthalmology)  Dary Curry MD (Nephrology)  Alistair Bruno MD as Consulting Provider (Endocrinology)    Assessment/Plan   Education and counseling provided:  Are appropriate based on today's review and evaluation  End-of-Life planning (with patient's consent)  Pneumococcal Vaccine  Influenza Vaccine  Appropriate cancer screening tests    Diagnoses and all orders for this visit:    1. Medicare annual wellness visit, subsequent    2. Personal history of tobacco use, presenting hazards to health  -     CT LOW DOSE LUNG CANCER SCREENING; Future    3. Chronic obstructive pulmonary disease, unspecified COPD type ([facility] [address]    4. Gustatory rhinitis  -     ipratropium (ATROVENT) 42 mcg (0.06 %) nasal spray; USE 1 TO 2 SPRAYS IN EACH NOSTRIL FOUR TIMES DAILY    5. Essential hypertension    6. Stage 3a chronic kidney disease ([facility] [address]    7. Tobacco use    8. Anxiety        Health Maintenance Due   Topic Date Due    Hepatitis C Screening  Never done    COVID-19 Vaccine (1) Never done    Low dose CT lung screening  Never done    Medicare Yearly Exam  08/05/2021    Flu Vaccine (1) Never done     She will schedule flu shot nurse visit  She plans to schedule Covid shot later  [facility] test with next labs    Discussed with patient current guidelines for screening for lung cancer. Current recommendations are to obtain yearly screening LDCT yearly for age 46-80, or until smoke free for 15 years. Patient has 40 pack year history of cigarette smoking and currently 9-14 cigarettes daily.   Discussed with patient risks and benefits of screening, including over-diagnosis, false positive rate, and total radiation exposure. Patient currently exhibits no signs or symptoms suggestive of lung cancer. Discussed with patient importance of compliance with yearly annual lung cancer screenings and willingness to undergo diagnosis and treatment if screening scan is positive. In addition, patient was counseled regarding (remaining smoke free/total smoking cessation). HPI:  Vini Pineda also presents for follow up of chronic conditions.     Asthma and COPD  Had initial visit with pulmonary Dr Nadia Camargo on 8/4/21  No changes in medicines yet  Has PFTs scheduled on  on 10/27/21  Now on Spiriva, Symbicort 2 puffs BID, Xopenix prn, Singulair, Atrovent nasal spray     After last visit, she had constant drippy nose, we did a trial of azelastine which she states helped for about a month and then did not need it any longer and has been doing well  She was previously on flonase, did not resume this after last visit and reports she is doing well on current therapy now    Anxiety  Doing well on buspar 7.5 mg TID    HTN and CKD stage 3  Followed by Dr Kenneth Small  On lisinopril 40 mg, torsemide 5 mg (lowered from 10 mg 2/2021 due to syncope possibly related to orthostatic episode)  and amlodipine 5 mg daily  She is on lisinopril since prior to 2016 with good tolerance  -recent hyponatremia considered to be due to drinking too much water, now resolved on less water intake, no more than 6 glasses (or 3 - 16 oz bottles) daily per renal    She is living with her son  Her future new apartment is still being worked on and hopes to get notice that she can set a moving date soon    She is not interested in quitting smoking until she moves and is living alone  Too much stress living with others, she declines alternate or additional medication to treat anxiety other than the buspar  She has tried to cut back her smoking, may have 9-10 cig on one day, but may smoke 11-14 the next day        Patient Active Problem List    Diagnosis    Bilateral carotid artery stenosis    Cerebral microvascular disease    GERD (gastroesophageal reflux disease)     pepcid 20 mg prn (about 2x/month)      Chronic pain     neck pain, Dr. Alexander Augustine      CKD (chronic kidney disease) stage 3, GFR 30-59 ml/min ([facility])    Sinus disorder    Anxiety    Essential hypertension    Osteoarthritis of cervical spine    Hyperlipidemia LDL goal <70    Herpes simplex type 2 infection    Osteoporosis     Reclast 9/24/2012, 10/09/2013, 10/31/2014, 1/29/2016         See Past Medical History, Surgical History, Social History, Medications, and Allergies documented above. ROS:  ROS negative except as per HPI. PE:  Visit Vitals  /70 (BP 1 Location: Left arm, BP Patient Position: Sitting, BP Cuff Size: Adult)   Pulse 68   Temp 97.2 °F (36.2 °C) (Temporal)   Resp 16   Ht 5' 5\" (1.651 m)   Wt 130 lb 11.2 oz (59.3 kg)   LMP  (LMP Unknown)   SpO2 97%   BMI 21.75 kg/m²     Gen: alert, oriented, no acute distress  Head: normocephalic, atraumatic  Eyes: pupils equal round reactive to light, sclera clear, conjunctiva clear  Oral: masked  Neck: no carotid bruits, no jugular vein distention  Resp: no increase work of breathing, lungs clear to ausculation bilaterally, no wheezing, rales or rhonchi  CV: S1, S2 normal.  No murmurs, rubs, or gallops. Abd: soft, not tender, not distended. Neuro: grossly intact  Skin: no lesion or rash  Extremities: no cyanosis or edema    No results found for this visit on 10/14/21.   Lab Results   Component Value Date/Time    WBC 7.3 09/12/2021 11:19 AM    HGB 13.0 09/12/2021 11:19 AM    HCT 39.6 09/12/2021 11:19 AM    PLATELET 268 20/90/0474 11:19 AM    MCV 93.8 09/12/2021 11:19 AM     Lab Results   Component Value Date/Time    Sodium 132 (L) 09/12/2021 11:19 AM    Potassium 4.4 09/12/2021 11:19 AM    Chloride 98 09/12/2021 11:19 AM    CO2 27 09/12/2021 11:19 AM    Anion gap 7 09/12/2021 11:19 AM    Glucose 81 09/12/2021 11:19 AM    BUN 13 09/12/2021 11:19 AM    Creatinine 1.04 (H) 09/12/2021 11:19 AM    BUN/Creatinine ratio 13 09/12/2021 11:19 AM    GFR est AA >60 09/12/2021 11:19 AM    GFR est non-AA 52 (L) 09/12/2021 11:19 AM    Calcium 8.8 09/12/2021 11:19 AM    Bilirubin, total 0.8 09/12/2021 11:19 AM    Alk. phosphatase 56 09/12/2021 11:19 AM    Protein, total 7.3 09/12/2021 11:19 AM    Albumin 3.8 09/12/2021 11:19 AM    Globulin 3.5 09/12/2021 11:19 AM    A-G Ratio 1.1 09/12/2021 11:19 AM    ALT (SGPT) 19 09/12/2021 11:19 AM    AST (SGOT) 11 (L) 09/12/2021 11:19 AM     Lab Results   Component Value Date/Time    Cholesterol, total 161 01/29/2021 09:31 AM    HDL Cholesterol 65 01/29/2021 09:31 AM    LDL, calculated 71 01/29/2021 09:31 AM    LDL, calculated 63 02/06/2020 08:08 AM    VLDL, calculated 25 01/29/2021 09:31 AM    VLDL, calculated 24 02/06/2020 08:08 AM    Triglyceride 146 01/29/2021 09:31 AM    CHOL/HDL Ratio 2.0 08/15/2017 08:00 AM     Lab Results   Component Value Date/Time    TSH 1.880 01/29/2021 09:31 AM     Lab Results   Component Value Date/Time    Vitamin D 25-Hydroxy 32.2 11/14/2017 11:15 AM    VITAMIN D, 25-HYDROXY 35.2 01/29/2021 09:31 AM           Assessment/Plan:      ICD-10-CM ICD-9-CM    1. Medicare annual wellness visit, subsequent  Z00.00 V70.0    2. Personal history of tobacco use, presenting hazards to health  Z87.891 V15.82 CT LOW DOSE LUNG CANCER SCREENING   3. [facility] pulmonary disease, unspecified [facility] ([facility]  J44.9 496    4. Gustatory rhinitis  J31.0 472.0 ipratropium (ATROVENT) 42 mcg (0.06 %) nasal spray   5. Essential hypertension  I10 401.9    6. Stage 3a chronic kidney disease (HCC)  N18.31 585.3    7.  Tobacco use  Z72.0 305.1        Low dose CT for lung cancer screening in smoker, see detailed discussion above    COPD - has seen pulmonary Dr Kaden Christine for initial visit in August 2021 and has PFTs coming up 10/28/2021, no changes as of yet to my regimen,  on Spiriva, Symbicort 2 puffs BID, Xopenix prn, Singulair, Atrovent nasal spray    atrovent nasal spray has worked well for her rhinitis  Azelastine worked well for allergies, but stopped the medicine once her symptoms were controlled and feels well on current regimen, advised may use azelastine prn    HTN - well controlled, On lisinopril 40 mg, torsemide 5 mg (lowered from 10 mg 2/2021 due to syncope possibly related to orthostatic episode)  and amlodipine 5 mg daily. Continue current medication. Exercise, and low salt/ low caffeine diet were discussed.  -she has been on lisinopril with good tolerance, but I reviewed pulmonary may consider switching to ARB if her cough is problematic, but appears to have good control on current inhalers    CKD - followed by renal Dr Blaze Harrison, who also manages her HTN,  Most recent eGFR 52    Smoking cessation encouraged, she reports she is under too much stress now to try to quit completely but has reduced (only 9 cigarettes on good days), I offered other treatment for anxiety in addition to buspar and she declines    Of note, prior urinary sxs with (-)culture, she reports sxs resolved when adding back her cranberry juice, and we also reviewed Kegel exercises    Orders Placed This Encounter    Low Dose CT for Lung Cancer Screening     Standing Status:   Future     Standing Expiration Date:   11/14/2022     Order Specific Question:   Is this a low dose CT or a routine CT?      Answer:   Low Dose CT [1]     Order Specific Question:   Reason for exam     Answer:   Lung Cancer Screening     Order Specific Question:   Smoking Status     Answer:   Current Some Day Smoker [2]     Order Specific Question:   Smoking history; number of pack-years (1 pack year = smoking 1 pack / day for 1 year; one pack = 20 cigarettes)     Answer:   36     Order Specific Question:   Please select Pack Year eligibility     Answer:   30 or more pack years: Meets USPSTF & CMS eligibility requirements Order Specific Question:   The patient age is 49-80 years. **NOTE: If \"NO\" this study may not be covered by insurance. Answer:   Yes     Order Specific Question:   Please select AGE eligibility     Answer:   55-77: Meets USPSTF & CMS eligibility requirements     Order Specific Question:   Does the patient show any signs or symptoms of lung cancer? Answer:   No     Order Specific Question:   Is this the first (baseline) CT or an annual exam?     Answer:   Baseline [1]     Order Specific Question:   Is there documentation of shared decision making? Answer:   Yes     Order Specific Question:   The patient was informed of the importance of adherence to annual screening, impact of comorbidities and ability/willingness to undergo diagnosis and treatment     Answer:   Yes     Order Specific Question:   The patient was informed of the importance of smoking cessation and/or maintaining smoking abstinence, including the offer of Medicare-covered tobacco cessation counseling services, if applicable     Answer: Yes    ipratropium (ATROVENT) 42 mcg (0.06 %) nasal spray     Sig: USE 1 TO 2 SPRAYS IN EACH NOSTRIL FOUR TIMES DAILY     Dispense:  15 mL     Refill:  2       Medications Discontinued During This Encounter   Medication Reason    ipratropium (ATROVENT) 42 mcg (0.06 %) nasal spray REORDER       Current Outpatient Medications   Medication Sig Dispense Refill    ipratropium (ATROVENT) 42 mcg (0.06 %) nasal spray USE 1 TO 2 SPRAYS IN EACH NOSTRIL FOUR TIMES DAILY 15 mL 2    traZODone (DESYREL) 50 mg tablet Take 1 Tablet by mouth nightly. 90 Tablet 3    valACYclovir (VALTREX) 1 gram tablet Take 2 Tablets by mouth two (2) times a day. DO NOT TAKE WITH TIZANIDINE  Indications: prevent recurrent herpes simplex infection 30 Tablet 1    azelastine (ASTELIN) 137 mcg (0.1 %) nasal spray 1 [place] by Both Nostrils route two (2) times a day.  Use in each nostril as directed 1 Each 5    Spiriva with HandiHaler 18 mcg inhalation capsule INHALE THE CONTENTS OF 1 CAPSULE VIA INHALATION DEVICE DAILY 90 Capsule 1    pravastatin (PRAVACHOL) 20 mg tablet TAKE 1 TABLET BY MOUTH  NIGHTLY 90 Tablet 3    budesonide-formoteroL (SYMBICORT) 160-4.5 mcg/actuation HFAA Take 2 Puffs by inhalation two (2) times a day. (TO REPLACE WIXELA) 3 Inhaler 1    busPIRone (BUSPAR) 15 mg tablet TAKE 1/2 TABLET BY MOUTH 3 TIMES DAILY AS DIRECTED 135 Tablet 1    amLODIPine (NORVASC) 5 mg tablet TAKE 1 TABLET BY MOUTH EVERY DAY 90 Tablet 1    lisinopriL (PRINIVIL, ZESTRIL) 40 mg tablet Take 1 Tablet by mouth daily. 90 Tablet 2    montelukast (SINGULAIR) 10 mg tablet Take 1 Tablet by mouth daily. 90 Tablet 1    levalbuterol tartrate (XOPENEX) 45 mcg/actuation inhaler INHALE 2 PUFFS BY MOUTH EVERY 4 HOURS AS NEEDED FOR WHEEZING 1 Inhaler 2    diclofenac (VOLTAREN) 1 % gel Apply  to affected area as needed for Pain. 100 g 0    mometasone (ELOCON) 0.1 % lotion INSTILL 4 METRIC DROP TWO TIMES DAILY IN EARS, AS NEEDED 30 mL 1    tiZANidine (ZANAFLEX) 2 mg tablet TAKE 1 TABLET BY MOUTH EVERY 8 HOURS AS NEEDED FOR SPASMS      torsemide (DEMADEX) 10 mg tablet Take 5 mg by mouth daily. 2    carboxymethylcellulose sodium (REFRESH OP) Apply  to eye.  omega-3 fatty acids-fish oil (FISH OIL) 360-1,200 mg cap Take 2 Tabs by mouth daily.  glucosamine sulfate 1,000 mg cap Take 2 Tabs by mouth daily.  acetaminophen (TYLENOL) 650 mg CR tablet Take 650 mg by mouth every six (6) hours as needed for Pain.  famotidine (PEPCID) 20 mg tablet Take 20 mg by mouth as needed.  melatonin 1 mg tablet Take 1 mg by mouth nightly.  azithromycin (ZITHROMAX) 250 mg tablet Take 2 tablets today, then take 1 tablet daily. (Patient not taking: Reported on 8/2/2021) 6 Tablet 0    dextran 70/hypromellose (TEARS NATURALE OP) Apply 1 Drop to eye as needed.  CALCIUM-MAGNESIUM-ZINC PO Take 2 Tabs by mouth daily.  (Patient not taking: Reported on 9/20/2021)  carica papaya (PAPAYA ENZYME) tab Take  by mouth daily as needed (4-5 a day as needed). (Patient not taking: Reported on 8/2/2021)      fluticasone (FLONASE SENSIMIST) 27.5 mcg/actuation nasal spray 2 Sprays by Nasal route daily. (Patient not taking: Reported on 10/14/2021)      ketoconazole (NIZORAL) 2 % topical cream Apply  to affected area daily. (Patient not taking: Reported on 8/2/2021)         Recommended healthy diet low in carbohydrates, fats, sodium and cholesterol. Recommended regular cardiovascular exercise 3-6 times per week for 30-60 minutes daily. Verbal and written instructions (see AVS) provided. Patient expresses understanding of diagnosis and treatment plan. Follow-up and Dispositions    · Return in about 4 months (around 2/14/2022) for HTN, COPD, anxiety. No future appointments.

## 2021-10-14 NOTE — PATIENT INSTRUCTIONS
Medicare Wellness Visit, Female     The best way to live healthy is to have a lifestyle where you eat a well-balanced diet, exercise regularly, limit alcohol use, and quit all forms of tobacco/nicotine, if applicable. Regular preventive services are another way to keep healthy. Preventive services (vaccines, screening tests, monitoring & exams) can help personalize your care plan, which helps you manage your own care. Screening tests can find health problems at the earliest stages, when they are easiest to treat. [facility] follows the current, evidence-based guidelines published by the [facility] ([facility]) when recommending preventive services for our patients. Because we follow these guidelines, sometimes recommendations change over time as research supports it. (For example, mammograms used to be recommended annually. Even though Medicare will still pay for an annual mammogram, the newer guidelines recommend a mammogram every two years for women of average risk). Of course, you and your doctor may decide to screen more often for some diseases, based on your risk and your co-morbidities (chronic disease you are already diagnosed with). Preventive services for you include:  - Medicare offers their members a free annual wellness visit, which is time for you and your primary care provider to discuss and plan for your preventive service needs. Take advantage of this benefit every year!  -All adults over the age of 72 should receive the recommended pneumonia vaccines. Current USPSTF guidelines recommend a series of two vaccines for the best pneumonia protection.   -All adults should have a flu vaccine yearly and a tetanus vaccine every 10 years.   -All adults age 48 and older should receive the shingles vaccines (series of two vaccines).       -All adults age 38-68 who are overweight should have a diabetes screening test once every three years.   -All adults born between [birth year] and 1966 should be screened once for Hepatitis C.  -Other screening tests and preventive services for persons with diabetes include: an eye exam to screen for diabetic retinopathy, a kidney function test, a foot exam, and stricter control over your cholesterol.   -Cardiovascular screening for adults with routine risk involves an electrocardiogram (ECG) at intervals determined by your doctor.   -Colorectal cancer screenings should be done for adults age 54-65 with no increased risk factors for colorectal cancer. There are a number of acceptable methods of screening for this type of cancer. Each test has its own benefits and drawbacks. Discuss with your doctor what is most appropriate for you during your annual wellness visit. The different tests include: colonoscopy (considered the best screening method), a fecal occult blood test, a fecal DNA test, and sigmoidoscopy.    -A bone mass density test is recommended when a woman turns 65 to screen for osteoporosis. This test is only recommended one time, as a screening. Some providers will use this same test as a disease monitoring tool if you already have osteoporosis. -Breast cancer screenings are recommended every other year for women of normal risk, age 54-69.  -Cervical cancer screenings for women over age 72 are only recommended with certain risk factors.      Here is a list of your current Health Maintenance items (your personalized list of preventive services) with a due date:  Health Maintenance Due   Topic Date Due    Hepatitis C Test  Never done    COVID-19 Vaccine (1) Never done    Smoker or Former Smoker - [address]  Never done    Annual Well Visit  08/05/2021    Yearly Flu Vaccine (1) Never done

## 2021-10-14 NOTE — PROGRESS NOTES
Chin Thomas is a 67 y.o. female     Chief Complaint   Patient presents with    Cholesterol Problem     2M follow up    Annual Wellness Visit     medicare wellness       Visit Vitals  /70 (BP 1 Location: Left arm, BP Patient Position: Sitting, BP Cuff Size: Adult)   Pulse 68   Temp 97.2 °F (36.2 °C) (Temporal)   Resp 16   Ht 5' 5\" (1.651 m)   Wt 130 lb 11.2 oz (59.3 kg)   LMP  (LMP Unknown)   SpO2 97%   BMI 21.75 kg/m²       Health Maintenance Due   Topic Date Due    Hepatitis C Screening  Never done    COVID-19 Vaccine (1) Never done    Low dose CT lung screening  Never done    Medicare Yearly Exam  08/05/2021    Flu Vaccine (1) Never done       1. Have you been to the ER, urgent care clinic since your last visit? Hospitalized since your last visit? no    2. Have you seen or consulted any other health care providers outside of the [address] [place] [address] since your last visit? Include any pap smears or colon screening.  No     Flu shot declined patient will call to scheduled a nurse visit to get Flu shot Please enter lab orders for the patient's upcoming physical appointment. Physical scheduled: 10/17/19     Preferred lab: [facility].  Pt states she will get her labs drawn thru her employer and will bring those results to appt

## 2021-10-14 NOTE — TELEPHONE ENCOUNTER
Patient calling back to let JONATHAN Mejia know that she checked with her insurance and they will cover a CT low dose lung cancer screening.

## 2021-10-15 NOTE — TELEPHONE ENCOUNTER
OK, that is great. I filled out all of the order paperwork, and the schedulers should call her to schedule this. Please give her  number in case they don't call her in the next 48 business hours.

## 2021-10-25 ENCOUNTER — HOSPITAL ENCOUNTER (OUTPATIENT)
Dept: CT IMAGING | Age: 76
Discharge: HOME | End: 2021-10-25
Payer: MEDICARE

## 2021-10-25 DIAGNOSIS — Z87.891 PERSONAL HISTORY OF TOBACCO USE, PRESENTING HAZARDS TO HEALTH: ICD-10-CM

## 2021-10-25 PROCEDURE — 71271 CT THORAX LUNG CANCER SCR C-: CPT

## 2021-10-28 ENCOUNTER — TRANSCRIBE ORDER (OUTPATIENT)
Dept: SCHEDULING | Age: 76
End: 2021-10-28

## 2021-10-28 DIAGNOSIS — R91.1 LUNG NODULE, SOLITARY: Primary | ICD-10-CM

## 2021-10-28 NOTE — PROGRESS NOTES
Mychart msg sent  The CT scan shows a 6 mm nodule on the upper right lung. A follow up CT in 3 months is recommended. I see Dr Airam Simon just ordered this follow up test for you at your visit with him yesterday.

## 2021-12-16 ENCOUNTER — TELEPHONE (OUTPATIENT)
Dept: INTERNAL MEDICINE CLINIC | Age: 76
End: 2021-12-16

## 2021-12-16 NOTE — TELEPHONE ENCOUNTER
Patient's daughter called in regarding her mother. Stated the patient is moving into an apartment next week and she would like to bring her dog with her. Unsure if Valeri was able to fill out any emotional support  paperwork for the patient so that the pet fee for her new apartment could be waived?     [phone]

## 2021-12-19 DIAGNOSIS — J31.0 GUSTATORY RHINITIS: ICD-10-CM

## 2021-12-20 RX ORDER — IPRATROPIUM BROMIDE 42 UG/1
SPRAY, METERED NASAL
Qty: 15 ML | Refills: 2 | Status: SHIPPED | OUTPATIENT
Start: 2021-12-20 | End: 2022-03-14

## 2021-12-21 DIAGNOSIS — J45.40 MODERATE PERSISTENT ASTHMATIC BRONCHITIS WITHOUT COMPLICATION: ICD-10-CM

## 2021-12-21 NOTE — TELEPHONE ENCOUNTER
Patient called in stated the pharmacy will not fill her Symbicort without a prior-auth. Advised patient to call the pharmacy and get them to send it to us. Provided patient with our fax number. Patient also stated she is moving into an apartment and is hoping that Valeri will write a letter stating her pet is an emotional support animal and is beneficial to helping with her anxiety and high blood pressure. Per patient with this letter she can avoid a $300 pet fee.     Cb: [phone]

## 2021-12-21 NOTE — TELEPHONE ENCOUNTER
**Patient called back and stated she was incorrect a prior-auth was not required for the Symbicort and to disregard she just needed a refill. Per patient the pharmacy confused a medication prescribed by her specialist.**    PCP: Connie Keane PA-C    Last appt: 10/14/2021  Future Appointments   Date Time Provider Amanda Addison   2/10/2022 12:30 PM [facility]   2/14/2022 11:00 AM Valeri Rodriguez PA-C [facility] BS AMB       Requested Prescriptions     Pending Prescriptions Disp Refills    budesonide-formoteroL (SYMBICORT) 160-4.5 mcg/actuation HFAA       Sig: Take 2 Puffs by inhalation two (2) times a day.  (TO REPLACE WIXELA)       Prior labs and Blood pressures:  BP Readings from Last 3 Encounters:   10/14/21 134/70   09/20/21 (!) 142/76   09/12/21 (!) 128/47     Lab Results   Component Value Date/Time    Sodium 132 (L) 09/12/2021 11:19 AM    Potassium 4.4 09/12/2021 11:19 AM    Chloride 98 09/12/2021 11:19 AM    CO2 27 09/12/2021 11:19 AM    Anion gap 7 09/12/2021 11:19 AM    Glucose 81 09/12/2021 11:19 AM    BUN 13 09/12/2021 11:19 AM    Creatinine 1.04 (H) 09/12/2021 11:19 AM    BUN/Creatinine ratio 13 09/12/2021 11:19 AM    GFR est AA >60 09/12/2021 11:19 AM    GFR est non-AA 52 (L) 09/12/2021 11:19 AM    Calcium 8.8 09/12/2021 11:19 AM     Lab Results   Component Value Date/Time    Hemoglobin A1c 5.6 07/26/2016 08:15 AM     Lab Results   Component Value Date/Time    Cholesterol, total 161 01/29/2021 09:31 AM    HDL Cholesterol 65 01/29/2021 09:31 AM    LDL, calculated 71 01/29/2021 09:31 AM    LDL, calculated 63 02/06/2020 08:08 AM    VLDL, calculated 25 01/29/2021 09:31 AM    VLDL, calculated 24 02/06/2020 08:08 AM    Triglyceride 146 01/29/2021 09:31 AM    CHOL/HDL Ratio 2.0 08/15/2017 08:00 AM     Lab Results   Component Value Date/Time    Vitamin D 25-Hydroxy 32.2 11/14/2017 11:15 AM    VITAMIN D, 25-HYDROXY 35.2 01/29/2021 09:31 AM       Lab Results   Component Value Date/Time    TSH 1.880 01/29/2021 09:31 AM

## 2021-12-22 RX ORDER — BUDESONIDE AND FORMOTEROL FUMARATE DIHYDRATE 160; 4.5 UG/1; UG/1
2 AEROSOL RESPIRATORY (INHALATION) 2 TIMES DAILY
Qty: 3 EACH | Refills: 1 | Status: SHIPPED | OUTPATIENT
Start: 2021-12-22 | End: 2022-06-13 | Stop reason: SDUPTHER

## 2021-12-22 NOTE — TELEPHONE ENCOUNTER
She will need appt for letter about her dog since we have not specifically addressed topic in [address] before, needs to be documented in visit.

## 2022-01-03 ENCOUNTER — VIRTUAL VISIT (OUTPATIENT)
Dept: INTERNAL MEDICINE CLINIC | Age: 77
End: 2022-01-03
Payer: MEDICARE

## 2022-01-03 DIAGNOSIS — F41.9 ANXIETY: Primary | ICD-10-CM

## 2022-01-03 DIAGNOSIS — Z87.891 PERSONAL HISTORY OF TOBACCO USE, PRESENTING HAZARDS TO HEALTH: ICD-10-CM

## 2022-01-03 PROCEDURE — 1090F PRES/ABSN URINE INCON ASSESS: CPT | Performed by: PHYSICIAN ASSISTANT

## 2022-01-03 PROCEDURE — G8756 NO BP MEASURE DOC: HCPCS | Performed by: PHYSICIAN ASSISTANT

## 2022-01-03 PROCEDURE — G8432 DEP SCR NOT DOC, RNG: HCPCS | Performed by: PHYSICIAN ASSISTANT

## 2022-01-03 PROCEDURE — G8427 DOCREV CUR MEDS BY ELIG CLIN: HCPCS | Performed by: PHYSICIAN ASSISTANT

## 2022-01-03 PROCEDURE — 99213 OFFICE O/P EST LOW 20 MIN: CPT | Performed by: PHYSICIAN ASSISTANT

## 2022-01-03 PROCEDURE — 1101F PT FALLS ASSESS-DOCD LE1/YR: CPT | Performed by: PHYSICIAN ASSISTANT

## 2022-01-03 NOTE — PROGRESS NOTES
Abhishek Moreland is a 67 y.o. female who was seen by synchronous (real-time) audio-video technology on 1/3/2022 for Other (discuss animal support)        Assessment & Plan:   Diagnoses and all orders for this visit:    1. Anxiety    2. Personal history of tobacco use, presenting hazards to health      Letter written that I prescribe that she maintain her pet dog, a dachshund named Christine Galeano, as an emotional support animal to mitigate her symptoms of stress and anxiety. She will access this letter in [address] [address] current Buspar  Encouraged smoking cessation    Follow up as previously scheduled    Future Appointments   Date Time Provider Amanda Addison   2/10/2022 12:30 PM [facility] CT 2 [facility]   2/14/2022 11:00 AM Valeri Rodriguez PA-C [facility] BS AMB       712  Subjective:      Anxiety  -Doing well on buspar 7.5 mg TID  -since last visit she has moved in to her new apartment on 12/23/21 (and is not living with her son)  -this new apartment has been a long wait since July 2020; she had been living with her grandson at the time and she found him after he committed suicide; since that time she has been taking turns living with her children while waiting for her own apartment  -it was stressful with the instability of not having her own home, and her dog was a constant support for her during these difficult times   -she has her dog since he was 8 mos old, since 2008, a 15 yo daschund named Christine Galeano  -she states her dog is in tune to her emotions and knows when she is having a bad day  -she takes him out with her running errands where he is allowed, such as [employer] or paying bills,and when she travels  -she feels emotional support from her dog and he comforts her when she is feeling down and helps her feel calm when she is anxious  -we have discussed smoking cessation but she has not been able to be successful during these times of stress and we are hopeful that she now has her own stable home she will be able to overcome that louis; not having her dog during this time would be a setback in trying to stop smoking    -she will need a letter attesting to her needs to maintain Danie as her emotional support animal      Prior to Admission medications    Medication Sig Start Date End Date Taking? Authorizing Provider   budesonide-formoteroL (SYMBICORT) 160-4.5 mcg/actuation HFAA Take 2 Puffs by inhalation two (2) times a day. (TO REPLACE WIXELA) 12/22/21  Yes Valeri Rodriguez PA-C   levalbuterol tartrate (XOPENEX) 45 mcg/actuation inhaler INHALE 2 PUFFS BY MOUTH EVERY 4 HOURS AS NEEDED FOR WHEEZING 10/31/21  Yes Valeri Rodriguez PA-C   traZODone (DESYREL) 50 mg tablet Take 1 Tablet by mouth nightly. 9/20/21  Yes Valeri Rodriguez PA-C   valACYclovir (VALTREX) 1 gram tablet Take 2 Tablets by mouth two (2) times a day. DO NOT TAKE WITH TIZANIDINE  Indications: prevent recurrent herpes simplex infection 9/20/21  Yes Valeri Rodriguez PA-C   azelastine (ASTELIN) 137 mcg (0.1 %) nasal spray 1 [place]en by Both Nostrils route two (2) times a day. Use in each nostril as directed 9/20/21  Yes Valeri Rodriguez PA-C   Spiriva with HandiHaler 18 mcg inhalation capsule INHALE THE CONTENTS OF 1 CAPSULE VIA INHALATION DEVICE DAILY 9/16/21  Yes Valeri Rodriguez PA-C   pravastatin (PRAVACHOL) 20 mg tablet TAKE 1 TABLET BY MOUTH  NIGHTLY 8/9/21  Yes Valeri Rodriguez PA-C   busPIRone (BUSPAR) 15 mg tablet TAKE 1/2 TABLET BY MOUTH 3 TIMES DAILY AS DIRECTED 8/2/21  Yes Valeri Rodriguez PA-C   amLODIPine (NORVASC) 5 mg tablet TAKE 1 TABLET BY MOUTH EVERY DAY 8/2/21  Yes Valeri Rodriguez PA-C   lisinopriL (PRINIVIL, ZESTRIL) 40 mg tablet Take 1 Tablet by mouth daily. 8/2/21  Yes Valeri Rodriguez PA-C   montelukast (SINGULAIR) 10 mg tablet Take 1 Tablet by mouth daily. 8/2/21  Yes Valeri Rodriguez PA-C   diclofenac (VOLTAREN) 1 % gel Apply  to affected area as needed for Pain.  12/7/20  Yes Read, Kirill Higuera MD   mometasone (ELOCON) 0.1 % lotion INSTILL 4 METRIC DROP TWO TIMES DAILY IN EARS, AS NEEDED 8/4/20  Yes Gabino BRADLEY NP   tiZANidine (ZANAFLEX) 2 mg tablet TAKE 1 TABLET BY MOUTH EVERY 8 HOURS AS NEEDED FOR SPASMS 6/18/19  Yes Provider, Historical   torsemide (DEMADEX) 10 mg tablet Take 5 mg by mouth daily. 5/3/19  Yes Provider, Historical   carboxymethylcellulose sodium (REFRESH OP) Apply  to eye. Yes Provider, Historical   dextran 70/hypromellose (TEARS NATURALE OP) Apply 1 Drop to eye as needed. Yes Provider, Historical   omega-3 fatty acids-fish oil (FISH OIL) 360-1,200 mg cap Take 2 Tabs by mouth daily. Yes Provider, Historical   glucosamine sulfate 1,000 mg cap Take 2 Tabs by mouth daily. Yes Provider, Historical   acetaminophen (TYLENOL) 650 mg CR tablet Take 650 mg by mouth every six (6) hours as needed for Pain. Yes Provider, Historical   famotidine (PEPCID) 20 mg tablet Take 20 mg by mouth as needed. Yes Provider, Historical   melatonin 1 mg tablet Take 1 mg by mouth nightly. Yes Provider, Historical   ipratropium (ATROVENT) 42 mcg (0.06 %) nasal spray USE 1 TO 2 SPRAYS IN EACH NOSTRIL FOUR TIMES DAILY 12/20/21   Valeri Rodriguez PA-C   azithromycin (ZITHROMAX) 250 mg tablet Take 2 tablets today, then take 1 tablet daily. Patient not taking: Reported on 8/2/2021 6/24/21   Valeri Rodriguez PA-C   CALCIUM-MAGNESIUM-ZINC PO Take 2 Tabs by mouth daily. Patient not taking: Reported on 9/20/2021    Provider, Historical   carica papaya (PAPAYA ENZYME) tab Take  by mouth daily as needed (4-5 a day as needed). Patient not taking: Reported on 8/2/2021    Provider, Historical   fluticasone (FLONASE SENSIMIST) 27.5 mcg/actuation nasal spray 2 Sprays by Nasal route daily. Patient not taking: Reported on 10/14/2021    Provider, Historical   ketoconazole (NIZORAL) 2 % topical cream Apply  to affected area daily.   Patient not taking: Reported on 8/2/2021    Provider, Historical     Patient Active Problem List    Diagnosis Date Noted    Bilateral carotid artery stenosis 10/04/2019    Cerebral microvascular disease 10/04/2019    GERD (gastroesophageal reflux disease)     Chronic pain     CKD (chronic kidney disease) stage 3, GFR 30-59 ml/min ([facility]) 07/26/2018    Sinus disorder 11/15/2016    Anxiety 07/21/2016    Essential hypertension 07/21/2016    Osteoarthritis of cervical spine 07/21/2016    Hyperlipidemia LDL goal <70 07/21/2016    Herpes simplex type 2 infection 07/21/2016    Osteoporosis 01/01/2012     Current Outpatient Medications   Medication Sig Dispense Refill    budesonide-formoteroL (SYMBICORT) 160-4.5 mcg/actuation HFAA Take 2 Puffs by inhalation two (2) times a day. (TO REPLACE WIXELA) 3 Each 1    levalbuterol tartrate (XOPENEX) 45 mcg/actuation inhaler INHALE 2 PUFFS BY MOUTH EVERY 4 HOURS AS NEEDED FOR WHEEZING 15 g 2    traZODone (DESYREL) 50 mg tablet Take 1 Tablet by mouth nightly. 90 Tablet 3    valACYclovir (VALTREX) 1 gram tablet Take 2 Tablets by mouth two (2) times a day. DO NOT TAKE WITH TIZANIDINE  Indications: prevent recurrent herpes simplex infection 30 Tablet 1    azelastine (ASTELIN) 137 mcg (0.1 %) nasal spray 1 [place] by Both Nostrils route two (2) times a day. Use in each nostril as directed 1 Each 5    Spiriva with HandiHaler 18 mcg inhalation capsule INHALE THE CONTENTS OF 1 CAPSULE VIA INHALATION DEVICE DAILY 90 Capsule 1    pravastatin (PRAVACHOL) 20 mg tablet TAKE 1 TABLET BY MOUTH  NIGHTLY 90 Tablet 3    busPIRone (BUSPAR) 15 mg tablet TAKE 1/2 TABLET BY MOUTH 3 TIMES DAILY AS DIRECTED 135 Tablet 1    amLODIPine (NORVASC) 5 mg tablet TAKE 1 TABLET BY MOUTH EVERY DAY 90 Tablet 1    lisinopriL (PRINIVIL, ZESTRIL) 40 mg tablet Take 1 Tablet by mouth daily. 90 Tablet 2    montelukast (SINGULAIR) 10 mg tablet Take 1 Tablet by mouth daily. 90 Tablet 1    diclofenac (VOLTAREN) 1 % gel Apply  to affected area as needed for Pain.  100 g 0    mometasone (ELOCON) 0.1 % lotion INSTILL 4 METRIC DROP TWO TIMES DAILY IN [facility], AS NEEDED 30 mL 1    tiZANidine (ZANAFLEX) 2 mg tablet TAKE 1 TABLET BY MOUTH EVERY 8 HOURS AS NEEDED FOR SPASMS      torsemide (DEMADEX) 10 mg tablet Take 5 mg by mouth daily. 2    carboxymethylcellulose sodium (REFRESH OP) Apply  to eye.  dextran 70/hypromellose (TEARS NATURALE OP) Apply 1 Drop to eye as needed.  omega-3 fatty acids-fish oil (FISH OIL) 360-1,200 mg cap Take 2 Tabs by mouth daily.  glucosamine sulfate 1,000 mg cap Take 2 Tabs by mouth daily.  acetaminophen (TYLENOL) 650 mg CR tablet Take 650 mg by mouth every six (6) hours as needed for Pain.  famotidine (PEPCID) 20 mg tablet Take 20 mg by mouth as needed.  melatonin 1 mg tablet Take 1 mg by mouth nightly.  ipratropium (ATROVENT) 42 mcg (0.06 %) nasal spray USE 1 TO 2 SPRAYS IN EACH NOSTRIL FOUR TIMES DAILY 15 mL 2    azithromycin (ZITHROMAX) 250 mg tablet Take 2 tablets today, then take 1 tablet daily. (Patient not taking: Reported on 8/2/2021) 6 Tablet 0    CALCIUM-MAGNESIUM-ZINC PO Take 2 Tabs by mouth daily. (Patient not taking: Reported on 9/20/2021)      carica papaya (PAPAYA ENZYME) tab Take  by mouth daily as needed (4-5 a day as needed). (Patient not taking: Reported on 8/2/2021)      fluticasone (FLONASE SENSIMIST) 27.5 mcg/actuation nasal spray 2 Sprays by Nasal route daily. (Patient not taking: Reported on 10/14/2021)      ketoconazole (NIZORAL) 2 % topical cream Apply  to affected area daily. (Patient not taking: Reported on 8/2/2021)       Allergies   Allergen Reactions    Indomethacin Other (comments)     Made her feel weird to the point it scared her  Other reaction(s): Other (see comments)  Made her feel weird to the point it scared her  Made her feel weird to the point it scared her    Other reaction(s):  Other (comments)  Made her feel weird to the point it scared her    Fosamax [Alendronate] Other (comments)     Had a peptic ulcer    Other Medication Nausea and Vomiting     Flu vaccine in [id number]    Cephalexin Nausea and Vomiting    Erythromycin Nausea and Vomiting    Prochlorperazine Other (comments)     Childhood reaction-Neck spasms  Other reaction(s): Other (see comments)  Childhood reaction  Childhood reaction    Other reaction(s):  Other (comments)  Childhood reaction-Neck spasms     Past Medical History:   Diagnosis Date    Anxiety     Arthritis     Chronic obstructive pulmonary disease ([facility] 2010    early disease on PFTs, briefly on a daily inhaler    Chronic pain     neck pain, Dr. Valentin Bain Degenerative disc disease, cervical 1998    cervical spine injections, [facility] Dr. Eduin Crews kidney     Right    Gallstone 1974    GERD (gastroesophageal reflux disease)     pepcid 20 mg prn (about 2x/month)    Herpes simplex type II infection     Hypercholesterolemia     Hypertension     Nausea & vomiting     During surgery when ether was used    Osteoporosis 2012    Reclast 9/24/2012, 10/09/2013, 10/31/2014, 1/29/2016    PUD (peptic ulcer disease) 2010    endoscopy confirmed, was taking aleve    Stage 3 chronic kidney disease ([facility] [id number]    Trauma 1972    right arm fracture surgically repaired, twisted in washing machine     Past Surgical History:   Procedure Laterality Date    HX APPENDECTOMY      HX BREAST BIOPSY Right     negative 1987    HX CATARACT REMOVAL Bilateral 07/09/2018    HX CHOLECYSTECTOMY  1974    HX HYSTERECTOMY  1974    HX ORTHOPAEDIC  1972    R arm broken, pins put in, taken out 1 year later   [place]    from bone on L leg      Family History   Problem Relation Age of Onset    Cancer Mother         Cervical    Stroke Mother     Psychiatric Disorder Mother     Heart Disease Father     Breast Cancer Sister         in her 52's     Social History     Tobacco Use    Smoking status: Current Some Day Smoker     Packs/day: 0.50     Years: 41.00     Pack years: 20.50    Smokeless tobacco: Never Used   Substance Use Topics    Alcohol use: Yes     Comment: occasional       ROS  Per HPI    Objective:     Patient-Reported Vitals 12/7/2020   Patient-Reported Weight -   Patient-Reported Height -   Patient-Reported Systolic  210   Patient-Reported Diastolic 70      General: alert, cooperative, no distress   Mental  status: normal mood, behavior, speech, dress, motor activity, and thought processes, able to follow commands   HENT: NCAT   Neck: no visualized mass   Resp: no respiratory distress   Neuro: no gross deficits   Skin: no discoloration or lesions of concern on visible areas   Psychiatric: normal affect, consistent with stated mood, no evidence of hallucinations     Additional exam findings: her dachshund, Marcio Gold, is on her couch      We discussed the expected course, resolution and complications of the diagnosis(es) in detail. Medication risks, benefits, costs, interactions, and alternatives were discussed as indicated. I advised her to contact the office if her condition worsens, changes or fails to improve as anticipated. She expressed understanding with the diagnosis(es) and plan. Mildred Chadwick, was evaluated through a synchronous (real-time) audio-video encounter. The patient (or guardian if applicable) is aware that this is a billable service. Verbal consent to proceed has been obtained within the past 12 months. The visit was conducted pursuant to the emergency declaration under the [address] [place] [address] authority and the [employer] Act. Patient identification was verified, and a caregiver was present when appropriate. The patient was located in a state where the provider was credentialed to provide care.     Valeri So PA-C

## 2022-01-03 NOTE — LETTER
1/4/2022 12:04 PM    Ms. Roz Casey Bryce 1998      Dear Ms. Niels Capellan,    You have been a patient under my care since January 23, 2019. I am intimately familiar with your history and the limitations imposed by your emotional/mental related illness. Due to this emotional/mental disability, I am aware of certain limitations related to coping with stress and anxiety. In order to alleviate these difficulties, and to enhance your ability to live independently and to fully use and enjoy the dwelling unit you now rent, I prescribe that you, Ms Niels Capellan, maintain your pet dog, a dachshund named Montez Diaz, as an emotional support animal. The presence of Montez Diaz is necessary for your emotional/mental health because his presence will mitigate your symptoms.          Sincerely,      Valeri Andre PA-C

## 2022-01-03 NOTE — PROGRESS NOTES
Jodee Wheeler is a 67 y.o. female     Chief Complaint   Patient presents with    Other     discuss animal support       Visit Vitals  LMP  (LMP Unknown)       Health Maintenance Due   Topic Date Due    COVID-19 Vaccine (1) Never done    Medicare Yearly Exam  08/05/2021    Flu Vaccine (1) Never done       1. Have you been to the ER, urgent care clinic since your last visit? Hospitalized since your last visit? No     2. Have you seen or consulted any other health care providers outside of the [address] [place] [address] since your last visit? Include any pap smears or colon screening.  No

## 2022-01-12 DIAGNOSIS — J45.40 MODERATE PERSISTENT ASTHMATIC BRONCHITIS WITHOUT COMPLICATION: ICD-10-CM

## 2022-01-13 RX ORDER — LEVALBUTEROL TARTRATE 45 UG/1
AEROSOL, METERED ORAL
Qty: 15 G | Refills: 2 | Status: SHIPPED | OUTPATIENT
Start: 2022-01-13 | End: 2022-03-25

## 2022-01-20 DIAGNOSIS — J30.2 SEASONAL ALLERGIC RHINITIS, UNSPECIFIED TRIGGER: ICD-10-CM

## 2022-01-20 DIAGNOSIS — J45.40 MODERATE PERSISTENT ASTHMATIC BRONCHITIS WITHOUT COMPLICATION: ICD-10-CM

## 2022-01-20 DIAGNOSIS — F41.9 ANXIETY: ICD-10-CM

## 2022-01-20 DIAGNOSIS — I10 ESSENTIAL HYPERTENSION: ICD-10-CM

## 2022-01-20 RX ORDER — MONTELUKAST SODIUM 10 MG/1
10 TABLET ORAL DAILY
Qty: 90 TABLET | Refills: 1 | Status: SHIPPED | OUTPATIENT
Start: 2022-01-20 | End: 2022-07-20

## 2022-01-20 RX ORDER — BUSPIRONE HYDROCHLORIDE 15 MG/1
TABLET ORAL
Qty: 135 TABLET | Refills: 1 | Status: SHIPPED | OUTPATIENT
Start: 2022-01-20 | End: 2022-07-20

## 2022-01-20 RX ORDER — AMLODIPINE BESYLATE 5 MG/1
TABLET ORAL
Qty: 90 TABLET | Refills: 1 | Status: SHIPPED | OUTPATIENT
Start: 2022-01-20 | End: 2022-07-20

## 2022-02-05 DIAGNOSIS — J44.9 CHRONIC OBSTRUCTIVE PULMONARY DISEASE, UNSPECIFIED COPD TYPE (HCC): ICD-10-CM

## 2022-02-07 RX ORDER — TIOTROPIUM BROMIDE 18 UG/1
CAPSULE ORAL; RESPIRATORY (INHALATION)
Qty: 90 CAPSULE | Refills: 1 | Status: SHIPPED | OUTPATIENT
Start: 2022-02-07 | End: 2022-07-20

## 2022-02-10 ENCOUNTER — HOSPITAL ENCOUNTER (OUTPATIENT)
Dept: CT IMAGING | Age: 77
Discharge: HOME | End: 2022-02-10
Attending: INTERNAL MEDICINE
Payer: MEDICARE

## 2022-02-10 DIAGNOSIS — R91.1 LUNG NODULE, SOLITARY: ICD-10-CM

## 2022-02-10 PROCEDURE — 71250 CT THORAX DX C-: CPT

## 2022-02-14 ENCOUNTER — OFFICE VISIT (OUTPATIENT)
Dept: INTERNAL MEDICINE CLINIC | Age: 77
End: 2022-02-14
Payer: MEDICARE

## 2022-02-14 VITALS
OXYGEN SATURATION: 96 % | HEART RATE: 77 BPM | SYSTOLIC BLOOD PRESSURE: 138 MMHG | HEIGHT: 65 IN | TEMPERATURE: 98.3 F | DIASTOLIC BLOOD PRESSURE: 58 MMHG | WEIGHT: 122.8 LBS | BODY MASS INDEX: 20.46 KG/M2 | RESPIRATION RATE: 16 BRPM

## 2022-02-14 DIAGNOSIS — J30.0 VASOMOTOR RHINITIS: ICD-10-CM

## 2022-02-14 DIAGNOSIS — Z23 NEEDS FLU SHOT: ICD-10-CM

## 2022-02-14 DIAGNOSIS — F17.200 TOBACCO DEPENDENCE: ICD-10-CM

## 2022-02-14 DIAGNOSIS — I10 ESSENTIAL HYPERTENSION: Primary | ICD-10-CM

## 2022-02-14 DIAGNOSIS — J44.9 CHRONIC OBSTRUCTIVE PULMONARY DISEASE, UNSPECIFIED COPD TYPE (HCC): ICD-10-CM

## 2022-02-14 DIAGNOSIS — N18.31 STAGE 3A CHRONIC KIDNEY DISEASE (HCC): ICD-10-CM

## 2022-02-14 DIAGNOSIS — F41.9 ANXIETY: ICD-10-CM

## 2022-02-14 DIAGNOSIS — M47.22 OSTEOARTHRITIS OF SPINE WITH RADICULOPATHY, CERVICAL REGION: ICD-10-CM

## 2022-02-14 PROCEDURE — G8536 NO DOC ELDER MAL SCRN: HCPCS | Performed by: PHYSICIAN ASSISTANT

## 2022-02-14 PROCEDURE — G0008 ADMIN INFLUENZA VIRUS VAC: HCPCS | Performed by: PHYSICIAN ASSISTANT

## 2022-02-14 PROCEDURE — G8752 SYS BP LESS 140: HCPCS | Performed by: PHYSICIAN ASSISTANT

## 2022-02-14 PROCEDURE — G8420 CALC BMI NORM PARAMETERS: HCPCS | Performed by: PHYSICIAN ASSISTANT

## 2022-02-14 PROCEDURE — 1101F PT FALLS ASSESS-DOCD LE1/YR: CPT | Performed by: PHYSICIAN ASSISTANT

## 2022-02-14 PROCEDURE — G8427 DOCREV CUR MEDS BY ELIG CLIN: HCPCS | Performed by: PHYSICIAN ASSISTANT

## 2022-02-14 PROCEDURE — 90694 VACC AIIV4 NO PRSRV 0.5ML IM: CPT | Performed by: PHYSICIAN ASSISTANT

## 2022-02-14 PROCEDURE — G8754 DIAS BP LESS 90: HCPCS | Performed by: PHYSICIAN ASSISTANT

## 2022-02-14 PROCEDURE — G8432 DEP SCR NOT DOC, RNG: HCPCS | Performed by: PHYSICIAN ASSISTANT

## 2022-02-14 PROCEDURE — 99214 OFFICE O/P EST MOD 30 MIN: CPT | Performed by: PHYSICIAN ASSISTANT

## 2022-02-14 PROCEDURE — 1090F PRES/ABSN URINE INCON ASSESS: CPT | Performed by: PHYSICIAN ASSISTANT

## 2022-02-14 RX ORDER — DICLOFENAC SODIUM 10 MG/G
GEL TOPICAL AS NEEDED
Qty: 100 G | Refills: 0 | Status: SHIPPED | OUTPATIENT
Start: 2022-02-14 | End: 2022-06-13 | Stop reason: SDUPTHER

## 2022-02-14 RX ORDER — OLOPATADINE HYDROCHLORIDE 665 UG/1
SPRAY NASAL 2 TIMES DAILY
COMMUNITY

## 2022-02-14 NOTE — PROGRESS NOTES
After obtaining consent, and per orders of LOAN MINOR PA-C, injection of FLU VACCINE given by Mile Cunningham. Patient instructed to remain in clinic for 20 minutes afterwards, and to report any adverse reaction to me immediately. Administered FLU VACCINE in LEFT DELTOID patient tolerated well.     LOT #:[id number]     EXP:6/30/22    MCCARTNEY:[id number]

## 2022-02-14 NOTE — PROGRESS NOTES
Susan Sam is a 67 y.o. female    Chief Complaint   Patient presents with    Blood Pressure Check     4 month follow up    COPD    Anxiety       Visit Vitals  BP (!) 138/58 (BP 1 Location: Left upper arm, BP Patient Position: Sitting, BP Cuff Size: Small adult)   Pulse 77   Temp 98.3 °F (36.8 °C)   Resp 16   Ht 5' 5\" (1.651 m)   Wt 122 lb 12.8 oz (55.7 kg)   LMP  (LMP Unknown)   SpO2 96%   BMI 20.43 kg/m²           1. Have you been to the ER, urgent care clinic since your last visit? Hospitalized since your last visit? YES    2. Have you seen or consulted any other health care providers outside of the [address] [place] [address] since your last visit? Include any pap smears or colon screening.  NO

## 2022-02-14 NOTE — PATIENT INSTRUCTIONS
Learning About the [facility] Diet  What is the Mediterranean diet? The Mediterranean diet is a style of eating rather than a diet plan. It features foods eaten in [place], [place], [place], and other countries along the [facility]. It emphasizes eating foods like fish, fruits, vegetables, beans, high-fiber breads and whole grains, nuts, and olive oil. This style of eating includes limited red meat, cheese, and sweets. Why choose the Mediterranean diet? A Mediterranean-style diet may improve heart health. It contains more fat than other heart-healthy diets. But the fats are mainly from nuts, unsaturated oils (such as fish oils and olive oil), and certain nut or seed oils (such as canola, soybean, or flaxseed oil). These fats may help protect the heart and blood vessels. How can you get started on the Mediterranean diet? Here are some things you can do to switch to a more Mediterranean way of eating. What to eat  · Eat a variety of fruits and vegetables each day, such as grapes, blueberries, tomatoes, broccoli, peppers, figs, olives, spinach, eggplant, beans, lentils, and chickpeas. · Eat a variety of whole-grain foods each day, such as oats, brown rice, and whole wheat bread, pasta, and couscous. · Eat fish at least 2 times a week. Try tuna, salmon, mackerel, lake trout, herring, or sardines. · Eat moderate amounts of low-fat dairy products, such as milk, cheese, or yogurt. · Eat moderate amounts of poultry and eggs. · Choose healthy (unsaturated) fats, such as nuts, olive oil, and certain nut or seed oils like canola, soybean, and flaxseed. · Limit unhealthy (saturated) fats, such as butter, palm oil, and coconut oil. And limit fats found in animal products, such as meat and dairy products made with whole milk. Try to eat red meat only a few times a month in very small amounts. · Limit sweets and desserts to only a few times a week.  This includes sugar-sweetened drinks like soda. The Mediterranean diet may also include red wine with your meal1 glass each day for women and up to 2 glasses a day for men. Tips for eating at home  · Use herbs, spices, garlic, lemon zest, and citrus juice instead of salt to add flavor to foods. · Add avocado slices to your sandwich instead of gomez. · Have fish for lunch or dinner instead of red meat. Brush the fish with olive oil, and broil or grill it. · Sprinkle your salad with seeds or nuts instead of cheese. · Cook with olive or canola oil instead of butter or oils that are high in saturated fat. · Switch from 2% milk or whole milk to 1% or fat-free milk. · Dip raw vegetables in a vinaigrette dressing or hummus instead of dips made from mayonnaise or sour cream.  · Have a piece of fruit for dessert instead of a piece of cake. Try baked apples, or have some dried fruit. Tips for eating out  · Try broiled, grilled, baked, or poached fish instead of having it fried or breaded. · Ask your  to have your meals prepared with olive oil instead of butter. · Order dishes made with marinara sauce or sauces made from olive oil. Avoid sauces made from cream or mayonnaise. · Choose whole-grain breads, whole wheat pasta and pizza crust, brown rice, beans, and lentils. · Cut back on butter or margarine on bread. Instead, you can dip your bread in a small amount of olive oil. · Ask for a side salad or grilled vegetables instead of french fries or chips. Where can you learn more? Go to http://www.aguilera.com/  Enter O407 in the search box to learn more about \"Learning About the Mediterranean Diet. \"  Current as of: December 17, 2020               Content Version: 13.0  © 9278-0905 [employer]. Care instructions adapted under license by [employer] (which disclaims liability or warranty for this information).  If you have questions about a medical condition or this instruction, always ask your healthcare professional. [address] any warranty or liability for your use of this information. Vaccine Information Statement    Influenza (Flu) Vaccine (Inactivated or Recombinant): What You Need to Know    Many vaccine information statements are available in [place] and other languages. See www.immunize.org/vis. Hojas de información sobre vacunas están disponibles en español y en muchos otros idiomas. Visite www.immunize.org/vis. 1. Why get vaccinated? Influenza vaccine can prevent influenza (flu). Flu is a contagious disease that spreads around the [place] every year, usually between October and May. Anyone can get the flu, but it is more dangerous for some people. Infants and young children, people 72 years and older, pregnant people, and people with certain health conditions or a weakened immune system are at greatest risk of flu complications. Pneumonia, bronchitis, sinus infections, and ear infections are examples of flu-related complications. If you have a medical condition, such as heart disease, cancer, or diabetes, flu can make it worse. Flu can cause fever and chills, sore throat, muscle aches, fatigue, cough, headache, and runny or stuffy nose. Some people may have vomiting and diarrhea, though this is more common in children than adults. In an average year, thousands of people in the [facility] die from flu, and many more are hospitalized. Flu vaccine prevents millions of illnesses and flu-related visits to the doctor each year. 2. Influenza vaccines     CDC recommends everyone 6 months and older get vaccinated every flu season. Children 6 months through 6years of age may need 2 doses during a single flu season. Everyone else needs only 1 dose each flu season. It takes about 2 weeks for protection to develop after vaccination. There are many flu viruses, and they are always changing.  Each year a new flu vaccine is made to protect against the influenza viruses believed to be likely to cause disease in the upcoming flu season. Even when the vaccine doesnt exactly match these viruses, it may still provide some protection. Influenza vaccine does not cause flu. Influenza vaccine may be given at the same time as other vaccines. 3. Talk with your health care provider    Tell your vaccination provider if the person getting the vaccine:   Has had an allergic reaction after a previous dose of influenza vaccine, or has any severe, life-threatening allergies    Has ever had Guillain-Barré Syndrome (also called GBS)    In some cases, your health care provider may decide to postpone influenza vaccination until a future visit. Influenza vaccine can be administered at any time during pregnancy. People who are or will be pregnant during influenza season should receive inactivated influenza vaccine. People with minor illnesses, such as a cold, may be vaccinated. People who are moderately or severely ill should usually wait until they recover before getting influenza vaccine. Your health care provider can give you more information. 4. Risks of a vaccine reaction     Soreness, redness, and swelling where the shot is given, fever, muscle aches, and headache can happen after influenza vaccination.  There may be a very small increased risk of Guillain-Barré Syndrome (GBS) after inactivated influenza vaccine (the flu shot). [facility] children who get the flu shot along with pneumococcal vaccine (PCV13) and/or DTaP vaccine at the same time might be slightly more likely to have a seizure caused by fever. Tell your health care provider if a child who is getting flu vaccine has ever had a seizure. People sometimes faint after medical procedures, including vaccination. Tell your provider if you feel dizzy or have vision changes or ringing in the ears.     As with any medicine, there is a very remote chance of a vaccine causing a severe allergic reaction, other serious injury, or death. 5. What if there is a serious problem? An allergic reaction could occur after the vaccinated person leaves the clinic. If you see signs of a severe allergic reaction (hives, swelling of the face and throat, difficulty breathing, a fast heartbeat, dizziness, or weakness), call 9-1-1 and get the person to the nearest hospital.    For other signs that concern you, call your health care provider. Adverse reactions should be reported to the Vaccine Adverse Event Reporting System (VAERS). Your health care provider will usually file this report, or you can do it yourself. Visit the VAERS website at www.vaers. [facility].gov or call 2-762.618.7269. VAERS is only for reporting reactions, and VAERS staff members do not give medical advice. 6. The National Vaccine Injury Compensation Program    The [facility] Vaccine Injury Compensation Program (VICP) is a federal program that was created to compensate people who may have been injured by certain vaccines. Claims regarding alleged injury or death due to vaccination have a time limit for filing, which may be as short as two years. Visit the VICP website at www.hrsa.gov/vaccinecompensation or call 5-432.374.5388 to learn about the program and about filing a claim. 7. How can I learn more?  Ask your health care provider.  Call your local or state health department.  Visit the website of the Food and Drug Administration (FDA) for vaccine package inserts and additional information at www.fda.gov/vaccines-blood-biologics/vaccines.  Contact the Centers for Disease Control and Prevention (CDC):  - Call 4-341.285.2759 (1-800-CDC-INFO) or  - Visit CDCs influenza website at www.cdc.gov/flu. Vaccine Information Statement   Inactivated Influenza Vaccine   8/6/2021  42 [facility] [id number]   Department of Health and Human Services  Centers for Disease Control and Prevention    Office Use Only         Learning About Benefits From Quitting Smoking  How does quitting smoking make you healthier? If you're thinking about quitting smoking, you may have a few reasons to be smoke-free. Your health may be one of them. · When you quit smoking, you lower your risks for cancer, lung disease, heart attack, stroke, blood vessel disease, and blindness from macular degeneration. · When you're smoke-free, you get sick less often, and you heal faster. You are less likely to get colds, flu, bronchitis, and pneumonia. · As a nonsmoker, you may find that your mood is better and you are less stressed. When and how will you feel healthier? Quitting has real health benefits that start from day 1 of being smoke-free. And the longer you stay smoke-free, the healthier you get and the better you feel. The first hours  · After just 20 minutes, your blood pressure and heart rate go down. That means there's less stress on your heart and blood vessels. · Within 12 hours, the level of carbon monoxide in your blood drops back to normal. That makes room for more oxygen. With more oxygen in your body, you may notice that you have more energy than when you smoked. After 2 weeks  · Your lungs start to work better. · Your risk of heart attack starts to drop. After 1 month  · When your lungs are clear, you cough less and breathe deeper, so it's easier to be active. · Your sense of taste and smell return. That means you can enjoy food more than you have since you started smoking. Over the years  · Over the years, your risks of heart disease, heart attack, and stroke are lower. · After 10 years, your risk of dying from lung cancer is cut by about half. And your risk for many other types of cancer is lower too. How would quitting help others in your life? When you quit smoking, you improve the health of everyone who now breathes in your smoke. · Their heart, lung, and cancer risks drop, much like yours. · They are sick less.  For babies and small children, living smoke-free means they're less likely to have ear infections, pneumonia, and bronchitis. · If you're a woman who is or will be pregnant someday, quitting smoking means a healthier . · Children who are close to you are less likely to become adult smokers. Where can you learn more? Go to http://www.gray.com/  Enter O319 in the search box to learn more about \"Learning About Benefits From Quitting Smoking. \"  Current as of: 2021               Content Version: 13.0  © 1742-3854 [employer]. Care instructions adapted under license by "[employer] (which disclaims liability or warranty for this information). If you have questions about a medical condition or this instruction, always ask your healthcare professional. [address] any warranty or liability for your use of this information.

## 2022-02-14 NOTE — PROGRESS NOTES
HPI:  67 y.o.  presents for follow up appointment. No hospital, ER or specialist visits since last primary care visit except as noted below.     Asthma and COPD  Had initial visit with pulmonary Dr An Mitchell on 8/4/21, next appt on 2/16/22  No changes in medicines yet  Has PFTs scheduled on  on 10/27/21  Now on Spiriva, Symbicort 2 puffs BID, Xopenix prn, Singulair, Atrovent nasal spray prn   Symptoms are worse for her on the very cold days    She saw allergist, Dr Herman Canseco  Her skin testing was all negative  Diagnosed with vasomotor rhinitis  Is now on olopatadine nasal spray 2 sprays per nostril once daily, in addition to the atrovent nasal QID  She was relieved to hear she is not allergic to her dog      Anxiety  Doing well on buspar 7.5 mg TID  I recommend she maintain her pet dog, a dacabebahund named Aspen Gutierrez, as an emotional support animal to mitigate her symptoms of stress and anxiety.  -she is unpacking in her new apartment, she moved in on 12/23/21, she is feeling much more relaxed having her own space now    Tobacco use  She is down to 5-6 cig/day now that she has moved, back in the fall 10/2021 she was 12-13 on most days and 9 on a good day       HTN and CKD stage 3  Followed by Dr Glen pAodaca, next appt 2/15/22  Most recent eGFR 52 on 9/2021  On lisinopril 40 mg, torsemide 5 mg (lowered from 10 mg 2/2021 due to syncope possibly related to orthostatic episode)  and amlodipine 5 mg daily  She is on lisinopril since prior to 2016 with good tolerance  -recent hyponatremia considered to be due to drinking too much water, now resolved on less water intake, no more than 6 glasses (or 3 - 16 oz bottles) daily per renal    Cervical arthritis  Had seen pain management in the past, Dr Edenilson Barroso, and had some injections C5-6 and C6-7, last procedure [id number]; last saw his NP in 1/2021 and to F/U prn  Can use tizanidine, voltaren gel, and tylenol prn since then to manage her pain and works well without any further invasive procedures    Patient Active Problem List    Diagnosis    Bilateral carotid artery stenosis    Cerebral microvascular disease    GERD (gastroesophageal reflux disease)     pepcid 20 mg prn (about 2x/month)      Chronic pain     neck pain, Dr. Edwards Stable      CKD (chronic kidney disease) stage 3, GFR 30-59 ml/min ([facility])    Sinus disorder    Anxiety    Essential hypertension    Osteoarthritis of cervical spine    Hyperlipidemia LDL goal <70    Herpes simplex type 2 infection    Osteoporosis     Reclast 9/24/2012, 10/09/2013, 10/31/2014, 1/29/2016           Past Medical History:   Diagnosis Date    Anxiety     Arthritis     Chronic obstructive pulmonary disease ([facility] 2010    early disease on PFTs, briefly on a daily inhaler    Chronic pain     neck pain, Dr. Addie Mccoy Degenerative disc disease, cervical 1998    cervical spine injections, [facility] Dr. March Le kidney     Right    Gallstone 1974    GERD (gastroesophageal reflux disease)     pepcid 20 mg prn (about 2x/month)    Herpes simplex type II infection     Hypercholesterolemia     Hypertension     Nausea & vomiting     During surgery when ether was used    Osteoporosis 2012    Reclast 9/24/2012, 10/09/2013, 10/31/2014, 1/29/2016    PUD (peptic ulcer disease) 2010    endoscopy confirmed, was taking aleve    Stage 3 chronic kidney disease ([facility] [id number]    Trauma 1972    right arm fracture surgically repaired, twisted in washing machine       Social History     Tobacco Use    Smoking status: Current Some Day Smoker     Packs/day: 0.50     Years: 41.00     Pack years: 20.50    Smokeless tobacco: Never Used   Vaping Use    Vaping Use: Never used   Substance Use Topics    Alcohol use: Yes     Comment: occasional    Drug use: No       Outpatient Medications Marked as Taking for the 2/14/22 encounter (Office Visit) with Valeri Jaime PA-C   Medication Sig Dispense Refill    olopatadine (PATANASE) 0.6 % spry two (2) times a day.      Spiriva with HandiHaler 18 mcg inhalation capsule INHALE THE CONTENTS OF 1 CAPSULE VIA INHALATION DEVICE DAILY 90 Capsule 1    busPIRone (BUSPAR) 15 mg tablet TAKE 1/2 TABLET BY MOUTH THREE TIMES DAILY AS DIRECTED 135 Tablet 1    amLODIPine (NORVASC) 5 mg tablet TAKE 1 TABLET BY MOUTH EVERY DAY 90 Tablet 1    montelukast (SINGULAIR) 10 mg tablet TAKE 1 TABLET BY MOUTH DAILY 90 Tablet 1    levalbuterol tartrate (XOPENEX) 45 mcg/actuation inhaler INHALE 2 PUFFS BY MOUTH EVERY 4 HOURS AS NEEDED FOR WHEEZING 15 g 2    budesonide-formoteroL (SYMBICORT) 160-4.5 mcg/actuation HFAA Take 2 Puffs by inhalation two (2) times a day. (TO REPLACE WIXELA) 3 Each 1    ipratropium (ATROVENT) 42 mcg (0.06 %) nasal spray USE 1 TO 2 SPRAYS IN EACH NOSTRIL FOUR TIMES DAILY 15 mL 2    traZODone (DESYREL) 50 mg tablet Take 1 Tablet by mouth nightly. 90 Tablet 3    valACYclovir (VALTREX) 1 gram tablet Take 2 Tablets by mouth two (2) times a day. DO NOT TAKE WITH TIZANIDINE  Indications: prevent recurrent herpes simplex infection 30 Tablet 1    pravastatin (PRAVACHOL) 20 mg tablet TAKE 1 TABLET BY MOUTH  NIGHTLY 90 Tablet 3    lisinopriL (PRINIVIL, ZESTRIL) 40 mg tablet Take 1 Tablet by mouth daily. 90 Tablet 2    diclofenac (VOLTAREN) 1 % gel Apply  to affected area as needed for Pain. 100 g 0    mometasone (ELOCON) 0.1 % lotion INSTILL 4 METRIC DROP TWO TIMES DAILY IN EARS, AS NEEDED 30 mL 1    tiZANidine (ZANAFLEX) 2 mg tablet TAKE 1 TABLET BY MOUTH EVERY 8 HOURS AS NEEDED FOR SPASMS      torsemide (DEMADEX) 10 mg tablet Take 5 mg by mouth daily. 2    carboxymethylcellulose sodium (REFRESH OP) Apply  to eye.  dextran 70/hypromellose (TEARS NATURALE OP) Apply 1 Drop to eye as needed.  omega-3 fatty acids-fish oil (FISH OIL) 360-1,200 mg cap Take 2 Tabs by mouth daily.  glucosamine sulfate 1,000 mg cap Take 2 Tabs by mouth daily.       acetaminophen (TYLENOL) 650 mg CR tablet Take 650 mg by mouth every six (6) hours as needed for Pain.  famotidine (PEPCID) 20 mg tablet Take 20 mg by mouth as needed.  melatonin 1 mg tablet Take 1 mg by mouth nightly. Allergies   Allergen Reactions    Indomethacin Other (comments)     Made her feel weird to the point it scared her  Other reaction(s): Other (see comments)  Made her feel weird to the point it scared her  Made her feel weird to the point it scared her    Other reaction(s): Other (comments)  Made her feel weird to the point it scared her    Fosamax [Alendronate] Other (comments)     Had a peptic ulcer    Other Medication Nausea and Vomiting     Flu vaccine in [id number]    Cephalexin Nausea and Vomiting    Erythromycin Nausea and Vomiting    Prochlorperazine Other (comments)     Childhood reaction-Neck spasms  Other reaction(s): Other (see comments)  Childhood reaction  Childhood reaction    Other reaction(s): Other (comments)  Childhood reaction-Neck spasms       ROS:  ROS negative except as per HPI. PE:  Visit Vitals  BP (!) 138/58 (BP 1 Location: Left upper arm, BP Patient Position: Sitting, BP Cuff Size: Small adult)   Pulse 77   Temp 98.3 °F (36.8 °C)   Resp 16   Ht 5' 5\" (1.651 m)   Wt 122 lb 12.8 oz (55.7 kg)   LMP  (LMP Unknown)   SpO2 96%   BMI 20.43 kg/m²     Gen: alert, oriented, no acute distress  Head: normocephalic, atraumatic  Eyes: pupils equal round reactive to light, sclera clear, conjunctiva clear  Oral: masked  Neck: symmetric normal sized thyroid, no carotid bruits, no lymphadenopathy   Resp: no increase work of breathing, lungs clear to ausculation bilaterally, no wheezing, rales or rhonchi  CV: S1, S2 normal.  No murmurs, rubs, or gallops. Neuro: grossly intact  Skin: no lesion or rash  Extremities: no cyanosis or edema    No results found for this visit on 02/14/22.     Lab Results   Component Value Date/Time    WBC 7.3 09/12/2021 11:19 AM    HGB 13.0 09/12/2021 11:19 AM    HCT 39.6 09/12/2021 11:19 AM PLATELET 338 36/14/1805 11:19 AM    MCV 93.8 09/12/2021 11:19 AM     Lab Results   Component Value Date/Time    Sodium 132 (L) 09/12/2021 11:19 AM    Potassium 4.4 09/12/2021 11:19 AM    Chloride 98 09/12/2021 11:19 AM    CO2 27 09/12/2021 11:19 AM    Anion gap 7 09/12/2021 11:19 AM    Glucose 81 09/12/2021 11:19 AM    BUN 13 09/12/2021 11:19 AM    Creatinine 1.04 (H) 09/12/2021 11:19 AM    BUN/Creatinine ratio 13 09/12/2021 11:19 AM    GFR est AA >60 09/12/2021 11:19 AM    GFR est non-AA 52 (L) 09/12/2021 11:19 AM    Calcium 8.8 09/12/2021 11:19 AM    Bilirubin, total 0.8 09/12/2021 11:19 AM    Alk. phosphatase 56 09/12/2021 11:19 AM    Protein, total 7.3 09/12/2021 11:19 AM    Albumin 3.8 09/12/2021 11:19 AM    Globulin 3.5 09/12/2021 11:19 AM    A-G Ratio 1.1 09/12/2021 11:19 AM    ALT (SGPT) 19 09/12/2021 11:19 AM    AST (SGOT) 11 (L) 09/12/2021 11:19 AM     Lab Results   Component Value Date/Time    Cholesterol, total 161 01/29/2021 09:31 AM    HDL Cholesterol 65 01/29/2021 09:31 AM    LDL, calculated 71 01/29/2021 09:31 AM    LDL, calculated 63 02/06/2020 08:08 AM    VLDL, calculated 25 01/29/2021 09:31 AM    VLDL, calculated 24 02/06/2020 08:08 AM    Triglyceride 146 01/29/2021 09:31 AM    CHOL/HDL Ratio 2.0 08/15/2017 08:00 AM     Lab Results   Component Value Date/Time    TSH 1.880 01/29/2021 09:31 AM     Lab Results   Component Value Date/Time    Vitamin D 25-Hydroxy 32.2 11/14/2017 11:15 AM    VITAMIN D, 25-HYDROXY 35.2 01/29/2021 09:31 AM           Assessment/Plan:      ICD-10-CM ICD-9-CM    1. Essential hypertension  I10 401.9    2. Chronic obstructive pulmonary disease, unspecified COPD type ([facility]  J44.9 496    3. Osteoarthritis of spine with radiculopathy, cervical region  M47.22 721.0 diclofenac (VOLTAREN) 1 % gel   4. Vasomotor rhinitis  J30.0 477.9    5. Stage 3a chronic kidney disease (HCC)  N18.31 585.3    6. Anxiety  F41.9 300.00    7. Tobacco dependence  F17.200 305.1    8.  Needs flu shot  Z23 V04.81 FLU (FLUAD QUAD INFLUENZA VACCINE,QUAD,ADJUVANTED)       HTN has been controlled and eGFR stable at 52 on current regimen, also monitored by renal Dr Alisia Bernal current meds:  lisinopril 40 mg, torsemide 5 mg (lowered from 10 mg 2/2021 due to syncope possibly related to orthostatic episode)  and amlodipine 5 mg daily    She will see Dr Alex Juarez tomorrow and have labs done at that time    Diclofenac gel prn neck pain (no oral NSAIDs due to CKD)    Asthma, COPD stable on current  Spiriva, Symbicort 2 puffs BID, Xopenix prn, Singulair  She will see pulmonary Dr Sarika Hale in 2 days    She saw allergist Dr Renee Castillo, and negative skin tests  Does not have allergic rhinitis, but vasomotor rhinitis instead  Now on Patanase nasal and Atrovent nasal with benefit    Anxiety stable on buspar 7.5 mg TID  Much more relaxed now that she has her own apartment  We reviewed smoking cessation  She has cut back to 5 cig/day  I praised her efforts and encouraged her to continue to eliminate the smoking    Flu shot today    Health Maintenance reviewed - updated. Orders Placed This Encounter    Influenza Vaccine, QUAD, 65 Yrs +  IM  (Fluad 06966 )    diclofenac (VOLTAREN) 1 % gel     Sig: Apply  to affected area as needed for Pain. Dispense:  100 g     Refill:  0       There are no discontinued medications. Current Outpatient Medications   Medication Sig Dispense Refill    olopatadine (PATANASE) 0.6 % spry two (2) times a day.       Spiriva with HandiHaler 18 mcg inhalation capsule INHALE THE CONTENTS OF 1 CAPSULE VIA INHALATION DEVICE DAILY 90 Capsule 1    busPIRone (BUSPAR) 15 mg tablet TAKE 1/2 TABLET BY MOUTH THREE TIMES DAILY AS DIRECTED 135 Tablet 1    amLODIPine (NORVASC) 5 mg tablet TAKE 1 TABLET BY MOUTH EVERY DAY 90 Tablet 1    montelukast (SINGULAIR) 10 mg tablet TAKE 1 TABLET BY MOUTH DAILY 90 Tablet 1    levalbuterol tartrate (XOPENEX) 45 mcg/actuation inhaler INHALE 2 PUFFS BY MOUTH EVERY 4 HOURS AS NEEDED FOR WHEEZING 15 g 2    budesonide-formoteroL (SYMBICORT) 160-4.5 mcg/actuation HFAA Take 2 Puffs by inhalation two (2) times a day. (TO REPLACE WIXELA) 3 Each 1    ipratropium (ATROVENT) 42 mcg (0.06 %) nasal spray USE 1 TO 2 SPRAYS IN EACH NOSTRIL FOUR TIMES DAILY 15 mL 2    traZODone (DESYREL) 50 mg tablet Take 1 Tablet by mouth nightly. 90 Tablet 3    valACYclovir (VALTREX) 1 gram tablet Take 2 Tablets by mouth two (2) times a day. DO NOT TAKE WITH TIZANIDINE  Indications: prevent recurrent herpes simplex infection 30 Tablet 1    pravastatin (PRAVACHOL) 20 mg tablet TAKE 1 TABLET BY MOUTH  NIGHTLY 90 Tablet 3    lisinopriL (PRINIVIL, ZESTRIL) 40 mg tablet Take 1 Tablet by mouth daily. 90 Tablet 2    diclofenac (VOLTAREN) 1 % gel Apply  to affected area as needed for Pain. 100 g 0    mometasone (ELOCON) 0.1 % lotion INSTILL 4 METRIC DROP TWO TIMES DAILY IN EARS, AS NEEDED 30 mL 1    tiZANidine (ZANAFLEX) 2 mg tablet TAKE 1 TABLET BY MOUTH EVERY 8 HOURS AS NEEDED FOR SPASMS      torsemide (DEMADEX) 10 mg tablet Take 5 mg by mouth daily. 2    carboxymethylcellulose sodium (REFRESH OP) Apply  to eye.  dextran 70/hypromellose (TEARS NATURALE OP) Apply 1 Drop to eye as needed.  omega-3 fatty acids-fish oil (FISH OIL) 360-1,200 mg cap Take 2 Tabs by mouth daily.  glucosamine sulfate 1,000 mg cap Take 2 Tabs by mouth daily.  acetaminophen (TYLENOL) 650 mg CR tablet Take 650 mg by mouth every six (6) hours as needed for Pain.  famotidine (PEPCID) 20 mg tablet Take 20 mg by mouth as needed.  melatonin 1 mg tablet Take 1 mg by mouth nightly.  azelastine (ASTELIN) 137 mcg (0.1 %) nasal spray 1 [place] by Both Nostrils route two (2) times a day. Use in each nostril as directed (Patient not taking: Reported on 2/14/2022) 1 Each 5    azithromycin (ZITHROMAX) 250 mg tablet Take 2 tablets today, then take 1 tablet daily.  (Patient not taking: Reported on 8/2/2021) 6 Tablet 0    CALCIUM-MAGNESIUM-ZINC PO Take 2 Tabs by mouth daily. (Patient not taking: Reported on 9/20/2021)      carica papaya (PAPAYA ENZYME) tab Take  by mouth daily as needed (4-5 a day as needed). (Patient not taking: Reported on 8/2/2021)      fluticasone (FLONASE SENSIMIST) 27.5 mcg/actuation nasal spray 2 Sprays by Nasal route daily. (Patient not taking: Reported on 10/14/2021)      ketoconazole (NIZORAL) 2 % topical cream Apply  to affected area daily. (Patient not taking: Reported on 8/2/2021)         Recommended healthy diet low in carbohydrates, fats, sodium and cholesterol. Recommended regular cardiovascular exercise 3-6 times per week for 30-60 minutes daily. Verbal and written instructions (see AVS) provided. Patient expresses understanding of diagnosis and treatment plan. Follow-up and Dispositions    · Return in about 4 months (around 6/14/2022) for HTN, anxiety, tobacco use.        Future Appointments   Date Time Provider Amanda Addison   6/16/2022 11:00 AM Valeri Rodriguez PA-C [facility]

## 2022-02-17 ENCOUNTER — TRANSCRIBE ORDER (OUTPATIENT)
Dept: SCHEDULING | Age: 77
End: 2022-02-17

## 2022-02-17 DIAGNOSIS — R91.1 LUNG NODULE, SOLITARY: Primary | ICD-10-CM

## 2022-02-18 PROBLEM — J30.0 VASOMOTOR RHINITIS: Status: ACTIVE | Noted: 2022-02-18

## 2022-02-18 PROBLEM — F17.200 TOBACCO DEPENDENCE: Status: ACTIVE | Noted: 2022-02-18

## 2022-02-18 PROBLEM — J44.9 CHRONIC OBSTRUCTIVE PULMONARY DISEASE (HCC): Status: ACTIVE | Noted: 2022-02-18

## 2022-03-14 DIAGNOSIS — J31.0 GUSTATORY RHINITIS: ICD-10-CM

## 2022-03-14 RX ORDER — IPRATROPIUM BROMIDE 42 UG/1
SPRAY, METERED NASAL
Qty: 15 ML | Refills: 2 | Status: SHIPPED | OUTPATIENT
Start: 2022-03-14 | End: 2022-05-26

## 2022-03-18 PROBLEM — I65.23 BILATERAL CAROTID ARTERY STENOSIS: Status: ACTIVE | Noted: 2019-10-04

## 2022-03-19 PROBLEM — N18.30 CKD (CHRONIC KIDNEY DISEASE) STAGE 3, GFR 30-59 ML/MIN (HCC): Status: ACTIVE | Noted: 2018-07-26

## 2022-03-19 PROBLEM — F17.200 TOBACCO DEPENDENCE: Status: ACTIVE | Noted: 2022-02-18

## 2022-03-19 PROBLEM — I67.89 CEREBRAL MICROVASCULAR DISEASE: Status: ACTIVE | Noted: 2019-10-04

## 2022-03-19 PROBLEM — J30.0 VASOMOTOR RHINITIS: Status: ACTIVE | Noted: 2022-02-18

## 2022-03-20 PROBLEM — J44.9 CHRONIC OBSTRUCTIVE PULMONARY DISEASE (HCC): Status: ACTIVE | Noted: 2022-02-18

## 2022-03-24 DIAGNOSIS — J45.40 MODERATE PERSISTENT ASTHMATIC BRONCHITIS WITHOUT COMPLICATION: ICD-10-CM

## 2022-03-25 RX ORDER — LEVALBUTEROL TARTRATE 45 UG/1
AEROSOL, METERED ORAL
Qty: 15 G | Refills: 2 | Status: SHIPPED | OUTPATIENT
Start: 2022-03-25 | End: 2022-06-06

## 2022-04-22 RX ORDER — LISINOPRIL 40 MG/1
40 TABLET ORAL DAILY
Qty: 90 TABLET | Refills: 2 | Status: SHIPPED | OUTPATIENT
Start: 2022-04-22

## 2022-05-24 DIAGNOSIS — J31.0 GUSTATORY RHINITIS: ICD-10-CM

## 2022-05-26 RX ORDER — IPRATROPIUM BROMIDE 42 UG/1
SPRAY, METERED NASAL
Qty: 15 ML | Refills: 2 | Status: SHIPPED | OUTPATIENT
Start: 2022-05-26 | End: 2022-08-08

## 2022-06-06 DIAGNOSIS — J45.40 MODERATE PERSISTENT ASTHMATIC BRONCHITIS WITHOUT COMPLICATION: ICD-10-CM

## 2022-06-06 RX ORDER — LEVALBUTEROL TARTRATE 45 UG/1
AEROSOL, METERED ORAL
Qty: 15 G | Refills: 2 | Status: SHIPPED | OUTPATIENT
Start: 2022-06-06 | End: 2022-08-18

## 2022-06-13 ENCOUNTER — OFFICE VISIT (OUTPATIENT)
Dept: INTERNAL MEDICINE CLINIC | Age: 77
End: 2022-06-13
Payer: MEDICARE

## 2022-06-13 VITALS
BODY MASS INDEX: 20.49 KG/M2 | WEIGHT: 123 LBS | SYSTOLIC BLOOD PRESSURE: 160 MMHG | RESPIRATION RATE: 16 BRPM | HEART RATE: 84 BPM | TEMPERATURE: 98.7 F | OXYGEN SATURATION: 96 % | DIASTOLIC BLOOD PRESSURE: 76 MMHG | HEIGHT: 65 IN

## 2022-06-13 DIAGNOSIS — J30.0 VASOMOTOR RHINITIS: ICD-10-CM

## 2022-06-13 DIAGNOSIS — E78.5 HYPERLIPIDEMIA LDL GOAL <70: ICD-10-CM

## 2022-06-13 DIAGNOSIS — F41.9 ANXIETY: ICD-10-CM

## 2022-06-13 DIAGNOSIS — J44.9 CHRONIC OBSTRUCTIVE PULMONARY DISEASE, UNSPECIFIED COPD TYPE (HCC): ICD-10-CM

## 2022-06-13 DIAGNOSIS — N18.31 STAGE 3A CHRONIC KIDNEY DISEASE (HCC): ICD-10-CM

## 2022-06-13 DIAGNOSIS — I10 ESSENTIAL HYPERTENSION: ICD-10-CM

## 2022-06-13 DIAGNOSIS — Z00.00 MEDICARE ANNUAL WELLNESS VISIT, SUBSEQUENT: Primary | ICD-10-CM

## 2022-06-13 DIAGNOSIS — M47.22 OSTEOARTHRITIS OF SPINE WITH RADICULOPATHY, CERVICAL REGION: ICD-10-CM

## 2022-06-13 DIAGNOSIS — J45.40 MODERATE PERSISTENT ASTHMATIC BRONCHITIS WITHOUT COMPLICATION: ICD-10-CM

## 2022-06-13 DIAGNOSIS — F17.200 TOBACCO DEPENDENCE: ICD-10-CM

## 2022-06-13 DIAGNOSIS — Z11.59 NEED FOR HEPATITIS C SCREENING TEST: ICD-10-CM

## 2022-06-13 PROBLEM — N18.30 CHRONIC RENAL DISEASE, STAGE III (HCC): Status: ACTIVE | Noted: 2022-06-13

## 2022-06-13 PROCEDURE — 1101F PT FALLS ASSESS-DOCD LE1/YR: CPT | Performed by: PHYSICIAN ASSISTANT

## 2022-06-13 PROCEDURE — 1090F PRES/ABSN URINE INCON ASSESS: CPT | Performed by: PHYSICIAN ASSISTANT

## 2022-06-13 PROCEDURE — 99213 OFFICE O/P EST LOW 20 MIN: CPT | Performed by: PHYSICIAN ASSISTANT

## 2022-06-13 PROCEDURE — G8420 CALC BMI NORM PARAMETERS: HCPCS | Performed by: PHYSICIAN ASSISTANT

## 2022-06-13 PROCEDURE — G8536 NO DOC ELDER MAL SCRN: HCPCS | Performed by: PHYSICIAN ASSISTANT

## 2022-06-13 PROCEDURE — G8427 DOCREV CUR MEDS BY ELIG CLIN: HCPCS | Performed by: PHYSICIAN ASSISTANT

## 2022-06-13 PROCEDURE — 1123F ACP DISCUSS/DSCN MKR DOCD: CPT | Performed by: PHYSICIAN ASSISTANT

## 2022-06-13 PROCEDURE — G8753 SYS BP > OR = 140: HCPCS | Performed by: PHYSICIAN ASSISTANT

## 2022-06-13 PROCEDURE — G0439 PPPS, SUBSEQ VISIT: HCPCS | Performed by: PHYSICIAN ASSISTANT

## 2022-06-13 PROCEDURE — G8754 DIAS BP LESS 90: HCPCS | Performed by: PHYSICIAN ASSISTANT

## 2022-06-13 PROCEDURE — G8432 DEP SCR NOT DOC, RNG: HCPCS | Performed by: PHYSICIAN ASSISTANT

## 2022-06-13 RX ORDER — VARENICLINE TARTRATE 25 MG
KIT ORAL
Qty: 1 DOSE PACK | Refills: 0 | Status: SHIPPED | OUTPATIENT
Start: 2022-06-13 | End: 2022-06-13 | Stop reason: RX

## 2022-06-13 RX ORDER — VARENICLINE TARTRATE 1 MG/1
1 TABLET, FILM COATED ORAL 2 TIMES DAILY
Qty: 60 TABLET | Refills: 5 | Status: SHIPPED | OUTPATIENT
Start: 2022-06-13

## 2022-06-13 RX ORDER — DICLOFENAC SODIUM 10 MG/G
GEL TOPICAL AS NEEDED
Qty: 100 G | Refills: 3 | Status: SHIPPED | OUTPATIENT
Start: 2022-06-13

## 2022-06-13 RX ORDER — BUDESONIDE AND FORMOTEROL FUMARATE DIHYDRATE 160; 4.5 UG/1; UG/1
2 AEROSOL RESPIRATORY (INHALATION) 2 TIMES DAILY
Qty: 3 EACH | Refills: 3 | Status: SHIPPED | OUTPATIENT
Start: 2022-06-13

## 2022-06-13 RX ORDER — VARENICLINE TARTRATE 0.5 MG/1
TABLET, FILM COATED ORAL
Qty: 60 TABLET | Refills: 0 | Status: SHIPPED | OUTPATIENT
Start: 2022-06-13 | End: 2022-07-13

## 2022-06-13 NOTE — PROGRESS NOTES
Karen Gusman presents today at the clinic for HTN.      Chief Complaint   Patient presents with    Hypertension        Wt Readings from Last 3 Encounters:   06/13/22 123 lb (55.8 kg)   02/14/22 122 lb 12.8 oz (55.7 kg)   10/14/21 130 lb 11.2 oz (59.3 kg)     Temp Readings from Last 3 Encounters:   02/14/22 98.3 °F (36.8 °C)   10/14/21 97.2 °F (36.2 °C) (Temporal)   09/20/21 97.7 °F (36.5 °C) (Temporal)     BP Readings from Last 3 Encounters:   02/14/22 (!) 138/58   10/14/21 134/70   09/20/21 (!) 142/76     Pulse Readings from Last 3 Encounters:   02/14/22 77   10/14/21 68   09/20/21 82       Health Maintenance Due   Topic    Hepatitis C Screening     Medicare Yearly Exam     Lipid Screen     COVID-19 Vaccine (2 - Pfizer 3-dose series)         Learning Assessment:  :     Learning Assessment 4/26/2018 7/21/2016   PRIMARY LEARNER Patient Patient   HIGHEST LEVEL OF EDUCATION - PRIMARY LEARNER  DID NOT GRADUATE HIGH SCHOOL DID NOT GRADUATE HIGH SCHOOL   BARRIERS PRIMARY LEARNER NONE NONE   CO-LEARNER CAREGIVER No No   PRIMARY LANGUAGE ENGLISH ENGLISH   LEARNER PREFERENCE PRIMARY DEMONSTRATION OTHER (COMMENT)   ANSWERED BY patient patient   RELATIONSHIP SELF SELF       Depression Screening:  :     3 most recent PHQ Screens 6/13/2022   Little interest or pleasure in doing things Not at all   Feeling down, depressed, irritable, or hopeless Not at all   Total Score PHQ 2 0   Trouble falling or staying asleep, or sleeping too much -   Feeling tired or having little energy -   Poor appetite, weight loss, or overeating -   Feeling bad about yourself - or that you are a failure or have let yourself or your family down -   Trouble concentrating on things such as school, work, reading, or watching TV -   Moving or speaking so slowly that other people could have noticed; or the opposite being so fidgety that others notice -   Thoughts of being better off dead, or hurting yourself in some way -   PHQ 9 Score -   How difficult have these problems made it for you to do your work, take care of your home and get along with others -       Fall Risk Assessment:  :     Fall Risk Assessment, last 12 mths 2/14/2022   Able to walk? Yes   Fall in past 12 months? 0   Do you feel unsteady? 0   Are you worried about falling 0   Is the gait abnormal? -   Number of falls in past 12 months -   Fall with injury? -       Abuse Screening:  :     Abuse Screening Questionnaire 10/14/2021 5/29/2019 4/17/2019 1/23/2019 4/26/2018 11/15/2016   Do you ever feel afraid of your partner? N N N N N N   Are you in a relationship with someone who physically or mentally threatens you? N N N N N N   Is it safe for you to go home? Y Y Y Y Y Y       Coordination of Care Questionnaire:  :     1. Have you been to the ER, urgent care clinic since your last visit? Hospitalized since your last visit? No    2. Have you seen or consulted any other health care providers outside of the [address] [place] [address] since your last visit? Include any pap smears or colon screening.  No

## 2022-06-13 NOTE — PATIENT INSTRUCTIONS
Medicare Wellness Visit, Female     The best way to live healthy is to have a lifestyle where you eat a well-balanced diet, exercise regularly, limit alcohol use, and quit all forms of tobacco/nicotine, if applicable. Regular preventive services are another way to keep healthy. Preventive services (vaccines, screening tests, monitoring & exams) can help personalize your care plan, which helps you manage your own care. Screening tests can find health problems at the earliest stages, when they are easiest to treat. [facility] follows the current, evidence-based guidelines published by the [facility] ([facility]) when recommending preventive services for our patients. Because we follow these guidelines, sometimes recommendations change over time as research supports it. (For example, mammograms used to be recommended annually. Even though Medicare will still pay for an annual mammogram, the newer guidelines recommend a mammogram every two years for women of average risk). Of course, you and your doctor may decide to screen more often for some diseases, based on your risk and your co-morbidities (chronic disease you are already diagnosed with). Preventive services for you include:  - Medicare offers their members a free annual wellness visit, which is time for you and your primary care provider to discuss and plan for your preventive service needs. Take advantage of this benefit every year!  -All adults over the age of 72 should receive the recommended pneumonia vaccines. Current USPSTF guidelines recommend a series of two vaccines for the best pneumonia protection.   -All adults should have a flu vaccine yearly and a tetanus vaccine every 10 years.   -All adults age 48 and older should receive the shingles vaccines (series of two vaccines).       -All adults age 38-68 who are overweight should have a diabetes screening test once every three years.   -All adults born between [birth year] and 1966 should be screened once for Hepatitis C.  -Other screening tests and preventive services for persons with diabetes include: an eye exam to screen for diabetic retinopathy, a kidney function test, a foot exam, and stricter control over your cholesterol.   -Cardiovascular screening for adults with routine risk involves an electrocardiogram (ECG) at intervals determined by your doctor.   -Colorectal cancer screenings should be done for adults age 54-65 with no increased risk factors for colorectal cancer. There are a number of acceptable methods of screening for this type of cancer. Each test has its own benefits and drawbacks. Discuss with your doctor what is most appropriate for you during your annual wellness visit. The different tests include: colonoscopy (considered the best screening method), a fecal occult blood test, a fecal DNA test, and sigmoidoscopy.    -A bone mass density test is recommended when a woman turns 65 to screen for osteoporosis. This test is only recommended one time, as a screening. Some providers will use this same test as a disease monitoring tool if you already have osteoporosis. -Breast cancer screenings are recommended every other year for women of normal risk, age 54-69.  -Cervical cancer screenings for women over age 72 are only recommended with certain risk factors. Here is a list of your current Health Maintenance items (your personalized list of preventive services) with a due date:  Health Maintenance Due   Topic Date Due    Hepatitis C Test  Never done    Cholesterol Test   01/29/2022            Stopping Smoking: Care Instructions  Your Care Instructions     Cigarette smokers crave the nicotine in cigarettes. Giving it up is much harder than simply changing a habit. Your body has to stop craving the nicotine. It is hard to quit, but you can do it. There are many tools that people use to quit smoking.  You may find that combining tools works best for you. There are several steps to quitting. First you get ready to quit. Then you get support to help you. After that, you learn new skills and behaviors to become a nonsmoker. For many people, a necessary step is getting and using medicine. Your doctor will help you set up the plan that best meets your needs. You may want to attend a smoking cessation program to help you quit smoking. When you choose a program, look for one that has proven success. Ask your doctor for ideas. You will greatly increase your chances of success if you take medicine as well as get counseling or join a cessation program.  Some of the changes you feel when you first quit tobacco are uncomfortable. Your body will miss the nicotine at first, and you may feel short-tempered and grumpy. You may have trouble sleeping or concentrating. Medicine can help you deal with these symptoms. You may struggle with changing your smoking habits and rituals. The last step is the tricky one: Be prepared for the smoking urge to continue for a time. This is a lot to deal with, but keep at it. You will feel better. Follow-up care is a key part of your treatment and safety. Be sure to make and go to all appointments, and call your doctor if you are having problems. It's also a good idea to know your test results and keep a list of the medicines you take. How can you care for yourself at home? · Ask your family, friends, and coworkers for support. You have a better chance of quitting if you have help and support. · Join a support group, such as Nicotine Anonymous, for people who are trying to quit smoking. · Consider signing up for a smoking cessation program, such as the American [employer] Association's Freedom from Smoking program.  · Get text messaging support. Go to the website at www.smokefree. gov to sign up for the [facility] program.  · Set a quit date.  Pick your date carefully so that it is not right in the middle of a big deadline or stressful time. Once you quit, do not even take a puff. Get rid of all ashtrays and lighters after your last cigarette. Clean your house and your clothes so that they do not smell of smoke. · Learn how to be a nonsmoker. Think about ways you can avoid those things that make you reach for a cigarette. ? Avoid situations that put you at greatest risk for smoking. For some people, it is hard to have a drink with friends without smoking. For others, they might skip a coffee break with coworkers who smoke. ? Change your daily routine. Take a different route to work or eat a meal in a different place. · Cut down on stress. Calm yourself or release tension by doing an activity you enjoy, such as reading a book, taking a hot bath, or gardening. · Talk to your doctor or pharmacist about nicotine replacement therapy, which replaces the nicotine in your body. You still get nicotine but you do not use tobacco. Nicotine replacement products help you slowly reduce the amount of nicotine you need. These products come in several forms, many of them available over-the-counter:  ? Nicotine patches  ? Nicotine gum and lozenges  ? Nicotine inhaler  · Ask your doctor about bupropion (Wellbutrin) or varenicline (Chantix), which are prescription medicines. They do not contain nicotine. They help you by reducing withdrawal symptoms, such as stress and anxiety. · Some people find hypnosis, acupuncture, and massage helpful for ending the smoking habit. · Eat a healthy diet and get regular exercise. Having healthy habits will help your body move past its craving for nicotine. · Be prepared to keep trying. Most people are not successful the first few times they try to quit. Do not get mad at yourself if you smoke again. Make a list of things you learned and think about when you want to try again, such as next week, next month, or next year. Where can you learn more?   Go to http://www.gray.com/  Enter [id number] in the search box to learn more about \"Stopping Smoking: Care Instructions. \"  Current as of: October 28, 2021               Content Version: 13.2  © 6046-9435 [employer]. Care instructions adapted under license by [employer] (which disclaims liability or warranty for this information). If you have questions about a medical condition or this instruction, always ask your healthcare professional. [place] [address] any warranty or liability for your use of this information.

## 2022-06-13 NOTE — PROGRESS NOTES
This is a Subsequent Medicare Annual Wellness Exam (AWV) (Performed 12 months after IPPE or effective date of Medicare Part B enrollment)    I have reviewed the patient's medical history in detail and updated the computerized patient record. History     Past Medical History:   Diagnosis Date    Anxiety     Arthritis     Chronic obstructive pulmonary disease ([facility] [address] 2010    early disease on PFTs, briefly on a daily inhaler    Chronic pain     neck pain, Dr. Chester Owusu Degenerative disc disease, cervical 1998    cervical spine injections, [facility] Dr. Pedroza Danie kidney     Right    Gallstone 1974    GERD (gastroesophageal reflux disease)     pepcid 20 mg prn (about 2x/month)    Herpes simplex type II infection     Hypercholesterolemia     Hypertension     Nausea & vomiting     During surgery when ether was used    Osteoporosis 2012    Reclast 9/24/2012, 10/09/2013, 10/31/2014, 1/29/2016    PUD (peptic ulcer disease) 2010    endoscopy confirmed, was taking aleve    Stage 3 chronic kidney disease ([facility] [address] 9013-3397    Trauma 1972    right arm fracture surgically repaired, twisted in washing machine      Past Surgical History:   Procedure Laterality Date    HX APPENDECTOMY      HX BREAST BIOPSY Right     negative 1987    HX CATARACT REMOVAL Bilateral 07/09/2018    HX CHOLECYSTECTOMY  1974    HX HYSTERECTOMY  1974    HX ORTHOPAEDIC  1972    R arm broken, pins put in, taken out 1 year later   [place]    from bone on L leg      Current Outpatient Medications   Medication Sig Dispense Refill    varenicline (Chantix Starting Month Box) 0.5 mg (11)- 1 mg (42) DsPk Take per package instructions for 1 month 1 Dose Pack 0    varenicline (Chantix Continuing Month Box) 1 mg tablet Take 1 Tablet by mouth two (2) times a day.  (Begin this after completing the Starter Pack for the first month) 60 Tablet 5    levalbuterol tartrate (XOPENEX) 45 mcg/actuation inhaler INHALE 2 PUFFS BY MOUTH EVERY 4 HOURS AS NEEDED FOR WHEEZING 15 g 2    ipratropium (ATROVENT) 42 mcg (0.06 %) nasal spray USE 1 TO 2 SPRAYS IN EACH NOSTRIL FOUR TIMES DAILY 15 mL 2    lisinopriL (PRINIVIL, ZESTRIL) 40 mg tablet TAKE 1 TABLET BY MOUTH DAILY 90 Tablet 2    olopatadine (PATANASE) 0.6 % spry two (2) times a day.  diclofenac (VOLTAREN) 1 % gel Apply  to affected area as needed for Pain. 100 g 0    Spiriva with HandiHaler 18 mcg inhalation capsule INHALE THE CONTENTS OF 1 CAPSULE VIA INHALATION DEVICE DAILY 90 Capsule 1    busPIRone (BUSPAR) 15 mg tablet TAKE 1/2 TABLET BY MOUTH THREE TIMES DAILY AS DIRECTED 135 Tablet 1    amLODIPine (NORVASC) 5 mg tablet TAKE 1 TABLET BY MOUTH EVERY DAY 90 Tablet 1    montelukast (SINGULAIR) 10 mg tablet TAKE 1 TABLET BY MOUTH DAILY 90 Tablet 1    budesonide-formoteroL (SYMBICORT) 160-4.5 mcg/actuation HFAA Take 2 Puffs by inhalation two (2) times a day. (TO REPLACE WIXELA) 3 Each 1    traZODone (DESYREL) 50 mg tablet Take 1 Tablet by mouth nightly. 90 Tablet 3    valACYclovir (VALTREX) 1 gram tablet Take 2 Tablets by mouth two (2) times a day. DO NOT TAKE WITH TIZANIDINE  Indications: prevent recurrent herpes simplex infection 30 Tablet 1    pravastatin (PRAVACHOL) 20 mg tablet TAKE 1 TABLET BY MOUTH  NIGHTLY 90 Tablet 3    mometasone (ELOCON) 0.1 % lotion INSTILL 4 METRIC DROP TWO TIMES DAILY IN EARS, AS NEEDED 30 mL 1    tiZANidine (ZANAFLEX) 2 mg tablet TAKE 1 TABLET BY MOUTH EVERY 8 HOURS AS NEEDED FOR SPASMS      torsemide (DEMADEX) 10 mg tablet Take 5 mg by mouth daily. 2    carboxymethylcellulose sodium (REFRESH OP) Apply  to eye.  dextran 70/hypromellose (TEARS NATURALE OP) Apply 1 Drop to eye as needed.  CALCIUM-MAGNESIUM-ZINC PO Take 2 Tablets by mouth daily.  omega-3 fatty acids-fish oil (FISH OIL) 360-1,200 mg cap Take 2 Tabs by mouth daily.  glucosamine sulfate 1,000 mg cap Take 2 Tabs by mouth daily.       acetaminophen (TYLENOL) 650 mg CR tablet Take 650 mg by mouth every six (6) hours as needed for Pain.  famotidine (PEPCID) 20 mg tablet Take 20 mg by mouth as needed.  melatonin 1 mg tablet Take 1 mg by mouth nightly. Allergies   Allergen Reactions    Indomethacin Other (comments)     Made her feel weird to the point it scared her  Other reaction(s): Other (see comments)  Made her feel weird to the point it scared her  Made her feel weird to the point it scared her    Other reaction(s): Other (comments)  Made her feel weird to the point it scared her    Fosamax [Alendronate] Other (comments)     Had a peptic ulcer    Other Medication Nausea and Vomiting     Flu vaccine in [id number]    Cephalexin Nausea and Vomiting    Erythromycin Nausea and Vomiting    Prochlorperazine Other (comments)     Childhood reaction-Neck spasms  Other reaction(s): Other (see comments)  Childhood reaction  Childhood reaction    Other reaction(s):  Other (comments)  Childhood reaction-Neck spasms     Family History   Problem Relation Age of Onset    Cancer Mother         Cervical    Stroke Mother     Psychiatric Disorder Mother     Heart Disease Father     Breast Cancer Sister         in her 52's     Social History     Tobacco Use    Smoking status: Current Some Day Smoker     Packs/day: 0.50     Years: 41.00     Pack years: 20.50    Smokeless tobacco: Never Used   Substance Use Topics    Alcohol use: Yes     Comment: occasional     Patient Active Problem List    Diagnosis    Chronic renal disease, stage III    Tobacco dependence    Vasomotor rhinitis    Chronic obstructive pulmonary disease (HCC)    Bilateral carotid artery stenosis    Cerebral microvascular disease    GERD (gastroesophageal reflux disease)     pepcid 20 mg prn (about 2x/month)      Chronic pain     neck pain, Dr. Audi Crockett      CKD (chronic kidney disease) stage 3, GFR 30-59 ml/min ([facility])    Sinus disorder    Anxiety    Essential hypertension    Osteoarthritis of cervical spine    Hyperlipidemia LDL goal <70    Herpes simplex type 2 infection    Osteoporosis     Reclast 9/24/2012, 10/09/2013, 10/31/2014, 1/29/2016         Depression Risk Factor Screening:     3 most recent PHQ Screens 6/13/2022   Little interest or pleasure in doing things Not at all   Feeling down, depressed, irritable, or hopeless Not at all   Total Score PHQ 2 0   Trouble falling or staying asleep, or sleeping too much -   Feeling tired or having little energy -   Poor appetite, weight loss, or overeating -   Feeling bad about yourself - or that you are a failure or have let yourself or your family down -   Trouble concentrating on things such as school, work, reading, or watching TV -   Moving or speaking so slowly that other people could have noticed; or the opposite being so fidgety that others notice -   Thoughts of being better off dead, or hurting yourself in some way -   PHQ 9 Score -   How difficult have these problems made it for you to do your work, take care of your home and get along with others -     Alcohol Risk Factor Screening:   Do you average more than 1 drink per night or more than 7 drinks a week:  No    On any one occasion in the past three months have you have had more than 3 drinks containing alcohol:  No    Functional Ability and Level of Safety:   Hearing Loss  The patient wears hearing aids. Activities of Daily Living  The home contains: handrails and grab bars  Patient does total self care    Fall Risk  Fall Risk Assessment, last 12 mths 6/13/2022   Able to walk? Yes   Fall in past 12 months? 1   Do you feel unsteady? 0   Are you worried about falling 0   Is the gait abnormal? 0   Number of falls in past 12 months 2   Fall with injury?  0       Abuse Screen  Patient is not abused    Cognitive Screening   Evaluation of Cognitive Function:  Has your family/caregiver stated any concerns about your memory: no      Patient Care Team   Patient Care Team:  Janice Coulter PA-C as PCP - General (Physician Assistant)  Ifeoma Merino PA-C as PCP - [facility] Provider  Behzad Nash MD (Ophthalmology)  Ross Mathew MD (Nephrology)  Roby Liu MD as Consulting Provider (Endocrinology Physician)    Assessment/Plan   Education and counseling provided:  Are appropriate based on today's review and evaluation  End-of-Life planning (with patient's consent)  Pneumococcal Vaccine  Influenza Vaccine  Appropriate cancer screening tests    Diagnoses and all orders for this visit:    1. Medicare annual wellness visit, subsequent    2. Need for hepatitis C screening test  -     HEPATITIS C AB; Future    3. Anxiety  -     CBC WITH AUTOMATED DIFF; Future  -     TSH 3RD GENERATION; Future    4. Essential hypertension  -     BSHSI MYCHART BP FLOWSHEET  -     CBC WITH AUTOMATED DIFF; Future  -     METABOLIC PANEL, COMPREHENSIVE; Future  -     TSH 3RD GENERATION; Future    5. Stage 3a chronic kidney disease ([facility] [place] [address]    6. Hyperlipidemia LDL goal <75  -     METABOLIC PANEL, COMPREHENSIVE; Future  -     LIPID PANEL; Future    7. Tobacco dependence  -     varenicline (Chantix Continuing Month Box) 1 mg tablet; Take 1 Tablet by mouth two (2) times a day. (Begin this after completing the Starter Pack for the first month)  -     varenicline (CHANTIX) 0.5 mg tablet; Take 1 tab po daily days 1-3, then 1 tab po BID days 4-7, then 2 tabs po BID and stay at this dose, for the first month. 8. Chronic obstructive pulmonary disease, unspecified COPD type ([facility] [place] [address]  -     varenicline (Chantix Continuing Month Box) 1 mg tablet; Take 1 Tablet by mouth two (2) times a day. (Begin this after completing the Starter Pack for the first month)  -     varenicline (CHANTIX) 0.5 mg tablet; Take 1 tab po daily days 1-3, then 1 tab po BID days 4-7, then 2 tabs po BID and stay at this dose, for the first month.     9. Moderate persistent asthmatic bronchitis without complication  -     budesonide-formoteroL (SYMBICORT) 160-4.5 mcg/actuation HFAA; Take 2 Puffs by inhalation two (2) times a day. (TO REPLACE WIXELA)    10. Osteoarthritis of spine with radiculopathy, cervical region  -     diclofenac (VOLTAREN) 1 % gel; Apply  to affected area as needed for Pain. 11. Vasomotor rhinitis        Health Maintenance Due   Topic Date Due    Hepatitis C Screening  Never done    Medicare Yearly Exam  2021    Lipid Screen  2022    COVID-19 Vaccine (2 - Pfizer 3-dose series) 2022     She had 2 Covid vaccines this year, her first series, brings in copy of card    HPI:  Sher Blank also presents for follow up of chronic conditions. Last visit 22     Anxiety  Doing well on buspar 7.5 mg TID  I recommend she maintain her pet dog, a dachshund named Armida Ang, as an emotional support animal to mitigate her symptoms of stress and anxiety.  -since last visit, Armida Ang had pancreatitis, he was really sick and is on the mend  -she has her own apartment now, she moved in on 21, she is feeling much more relaxed having her own space now  -she was with her son who was really anxious last week, it made her anxious and so she started rocking to calm herself, and now she finds herself rocking in her seat from time to time    GAD7 score: 14  Feeling nervous, anxious or on edge:  2  Not being able to stop or control worryin  Worrying too much about different things:  2  Trouble relaxin  Being so restless that it is hard to sit still: 3   Becoming easily annoyed or irritable: 3  Feeling afraid as if something awful might happen: 0  If any of the above were scored more than 0, how difficult have these problems made it for you to do your work, take care of things at home, or get along with other people?   0  Not at all             Somewhat difficult            Very difficult             Extremely difficult       Tobacco use  She is smoking 7 cig/day now that she has moved, back in the fall 10/2021 she was 12-13 on most days and 9 on a good day  -she is motivated to stop smoking now due to cost  -she tried patches in the past with some benefit initially but then lost effectiveness, had to pay out of pocket, is not interested in patches again  -she is ready to try Chantix        HTN and CKD stage 3  Followed by Dr Basil Vo, last appt 6/2/22,  next appt 12/2022  Most recent eGFR 51 on 6/2/22;  52 on 9/2021  On lisinopril 40 mg, torsemide 5 mg (lowered from 10 mg 2/2021 due to syncope possibly related to orthostatic episode)  and amlodipine 5 mg daily  She is on lisinopril since prior to 2016 with good tolerance  -recent hyponatremia considered to be due to drinking too much water, now resolved on less water intake, no more than 6 glasses (or 3 - 16 oz bottles) daily per renal  -BP was up last week at  [facility] office by her report, home readings have been in 130s/?, she does not write it down    Hyperlipidemia  On pravastatin 20 mg  LDL 63 on 2/2020    Vasomotor rhinitis  Allergy testing negative by Dr Arlene Mera  On mometasone in addition to the atrovent nasal QID (the olopatadine nasal was not covered by her insurance)    Patient Active Problem List    Diagnosis    Chronic renal disease, stage III    Tobacco dependence    Vasomotor rhinitis    Chronic obstructive pulmonary disease ([facility] [place] [address]    Bilateral carotid artery stenosis    Cerebral microvascular disease    GERD (gastroesophageal reflux disease)     pepcid 20 mg prn (about 2x/month)      Chronic pain     neck pain, Dr. Eric Mendoza      CKD (chronic kidney disease) stage 3, GFR 30-59 ml/min ([facility])    Sinus disorder    Anxiety    Essential hypertension    Osteoarthritis of cervical spine    Hyperlipidemia LDL goal <70    Herpes simplex type 2 infection    Osteoporosis     Reclast 9/24/2012, 10/09/2013, 10/31/2014, 1/29/2016         See Past Medical History, Surgical History, Social History, Medications, and Allergies documented above.     ROS:  ROS negative except as per HPI.    PE:  Visit Vitals  BP (!) 155/79 (BP 1 Location: Right arm, BP Patient Position: Sitting, BP Cuff Size: Large adult)   Pulse 84   Temp 98.7 °F (37.1 °C) (Oral)   Resp 16   Ht 5' 5\" (1.651 m)   Wt 123 lb (55.8 kg)   LMP  (LMP Unknown)   SpO2 96%   BMI 20.47 kg/m²     Gen: alert, oriented, no acute distress  Head: normocephalic, atraumatic  Ears: external auditory canals clear, TMs without erythema or effusion  Eyes: pupils equal round reactive to light, sclera clear, conjunctiva clear  Oral: moist mucus membranes, no oral lesions, no pharyngeal inflammation or exudate  Neck: symmetric normal sized thyroid, no carotid bruits, no jugular vein distention  Resp: no increase work of breathing, lungs clear to ausculation bilaterally, no wheezing, rales or rhonchi  CV: S1, S2 normal.  No murmurs, rubs, or gallops. Abd: soft, not tender, not distended. No hepatosplenomegaly. Normal bowel sounds. Neuro: cranial nerves intact, normal strength and movement in all extremities, reflexes and sensation intact and symmetric. Skin: no lesion or rash  Extremities: no cyanosis or edema    No results found for this visit on 06/13/22.     Results for orders placed or performed in visit on 09/20/21   CULTURE, URINE    Specimen: Clean catch; Urine   Result Value Ref Range    Special Requests: NO SPECIAL REQUESTS      Culture result: No growth (<1,000 CFU/ML)     URINALYSIS W/MICROSCOPIC   Result Value Ref Range    Color YELLOW/STRAW      Appearance CLEAR CLEAR      Specific gravity <1.005 1.003 - 1.030    pH (UA) 6.0 5.0 - 8.0      Protein Negative Negative mg/dL    Glucose Negative Negative mg/dL    Ketone Negative Negative mg/dL    Bilirubin Negative Negative      Blood Negative Negative      Urobilinogen 0.2 0.2 - 1.0 EU/dL    Nitrites Negative Negative      Leukocyte Esterase Negative Negative      WBC 0-4 0 - 4 /hpf    RBC 0-5 0 - 5 /hpf    Epithelial cells FEW FEW /lpf    Bacteria Negative Negative /hpf     Lab Results Component Value Date/Time    WBC 7.3 09/12/2021 11:19 AM    HGB 13.0 09/12/2021 11:19 AM    HCT 39.6 09/12/2021 11:19 AM    PLATELET 439 49/33/1302 11:19 AM    MCV 93.8 09/12/2021 11:19 AM     Lab Results   Component Value Date/Time    Sodium 132 (L) 09/12/2021 11:19 AM    Potassium 4.4 09/12/2021 11:19 AM    Chloride 98 09/12/2021 11:19 AM    CO2 27 09/12/2021 11:19 AM    Anion gap 7 09/12/2021 11:19 AM    Glucose 81 09/12/2021 11:19 AM    BUN 13 09/12/2021 11:19 AM    Creatinine 1.04 (H) 09/12/2021 11:19 AM    BUN/Creatinine ratio 13 09/12/2021 11:19 AM    GFR est AA >60 09/12/2021 11:19 AM    GFR est non-AA 52 (L) 09/12/2021 11:19 AM    Calcium 8.8 09/12/2021 11:19 AM    Bilirubin, total 0.8 09/12/2021 11:19 AM    Alk. phosphatase 56 09/12/2021 11:19 AM    Protein, total 7.3 09/12/2021 11:19 AM    Albumin 3.8 09/12/2021 11:19 AM    Globulin 3.5 09/12/2021 11:19 AM    A-G Ratio 1.1 09/12/2021 11:19 AM    ALT (SGPT) 19 09/12/2021 11:19 AM    AST (SGOT) 11 (L) 09/12/2021 11:19 AM     Lab Results   Component Value Date/Time    Cholesterol, total 161 01/29/2021 09:31 AM    HDL Cholesterol 65 01/29/2021 09:31 AM    LDL, calculated 71 01/29/2021 09:31 AM    LDL, calculated 63 02/06/2020 08:08 AM    VLDL, calculated 25 01/29/2021 09:31 AM    VLDL, calculated 24 02/06/2020 08:08 AM    Triglyceride 146 01/29/2021 09:31 AM    CHOL/HDL Ratio 2.0 08/15/2017 08:00 AM     Lab Results   Component Value Date/Time    TSH 1.880 01/29/2021 09:31 AM     Lab Results   Component Value Date/Time    Hemoglobin A1c 5.6 07/26/2016 08:15 AM         Assessment/Plan:      ICD-10-CM ICD-9-CM    1. Medicare annual wellness visit, subsequent  Z00.00 V70.0    2. Need for hepatitis C screening test  Z11.59 V73.89 HEPATITIS C AB   3. Anxiety  F41.9 300.00 CBC WITH AUTOMATED DIFF      TSH 3RD GENERATION   4.  Essential hypertension  I10 401.9 BSI MYCHART BP FLOWSHEET      CBC WITH AUTOMATED DIFF      METABOLIC PANEL, COMPREHENSIVE      TSH 3RD GENERATION   5. Stage 3a chronic kidney disease (HCC)  N18.31 585.3    6. Hyperlipidemia LDL goal <70  S10.0 391.0 METABOLIC PANEL, COMPREHENSIVE      LIPID PANEL   7. Tobacco dependence  F17.200 305.1 varenicline (Chantix Continuing Month Box) 1 mg tablet      varenicline (CHANTIX) 0.5 mg tablet      DISCONTINUED: varenicline (Chantix Starting Month Box) 0.5 mg (11)- 1 mg (42) DsPk   8. Chronic obstructive pulmonary disease, unspecified COPD type ([facility])  J44.9 496 varenicline (Chantix Continuing Month Box) 1 mg tablet      varenicline (CHANTIX) 0.5 mg tablet      DISCONTINUED: varenicline (Chantix Starting Month Box) 0.5 mg (11)- 1 mg (42) DsPk   9. Moderate persistent asthmatic bronchitis without complication  K46.33 927.20 budesonide-formoteroL (SYMBICORT) 160-4.5 mcg/actuation HFAA   10. Osteoarthritis of spine with radiculopathy, cervical region  M47.22 721.0 diclofenac (VOLTAREN) 1 % gel   11. Vasomotor rhinitis  J30.0 477.9        1. Medicare wellness visit as above  2. Routine hepatitis C screening  3. Anxiety  On BuSpar 7.5 mg 3 times daily  Some increased stress recently and she is noticed she started walking to calm herself when she was around her son who was showing signs of anxiety as well  I reviewed we could increase BuSpar up to 10 mg 3 times daily as the maximum dose, however she declines to change this dosing for now  MALCOM-7 score is 14 today  She has declined other SSRIs in the past    4.   Hypertension  Blood pressure 160/76  She reports readings at home in the 141R systolic, does not recall diastolic, but states her machine is told her its been normal  She also sees Dr. Andres Redmond for CKD who also assist with blood pressure management  Current therapy: Lisinopril 40 mg, torsemide 5 mg, amlodipine 5 mg  - Consider increasing amlodipine to 7.5 mg daily, I told her if she is checking at home and gets readings 140 or higher she may add a half a tablet  Have asked her to send me blood pressure flowsheet in my chart with home readings in the next 1 to 2 weeks    5. CKD stage III  Followed by nephrology Dr. Leslye Aguilar, most recent EGFR 51 on 6/2/2022    6. Hyperlipidemia  On pravastatin 20 mg  Most recent LDL 63 on 2/2020, will check fasting lipids today    7. Tobacco dependence  She is smoking 7 cigarettes daily  She has tried patches that were briefly effective but then lost their benefit in the past  She is motivated to quit smoking due to finances and increasing cost  New start Chantix, risk benefits side effects reviewed, she understands if she develops depression or suicidal thoughts to stop the medication immediately and let me know, she also has a strong support group that she will inform of this possible side effect    8. COPD  On Symbicort and Spiriva, Xopenex as needed, Singulair  She has seen pulmonary Dr. Annalisa Rothman    9. Asthmatic bronchitis  See #8    10. Osteoarthritis of the cervical spine  Applies Voltaren gel as needed    11. Vasomotor rhinitis  Allergy testing was negative by allergist Dr. Ramon Cleaning  On mometasone nasal spray and Atrovent nasal spray 4 times daily      Orders Placed This Encounter    HEPATITIS C AB     Standing Status:   Future     Standing Expiration Date:   6/13/2023    CBC WITH AUTOMATED DIFF     Standing Status:   Future     Standing Expiration Date:   1/09/0720    METABOLIC PANEL, COMPREHENSIVE     Standing Status:   Future     Standing Expiration Date:   6/13/2023    LIPID PANEL     Standing Status:   Future     Standing Expiration Date:   6/13/2023    TSH 3RD GENERATION     Standing Status:   Future     Standing Expiration Date:   6/13/2023    BSHSI MYCHART BP FLOWSHEET     Order Specific Question:   After how many readings would you like to receive a notification of this patient's entries?      Answer:   5    DISCONTD: varenicline (Chantix Starting Month Box) 0.5 mg (11)- 1 mg (42) DsPk     Sig: Take per package instructions for 1 month     Dispense:  1 Dose Pack Refill:  0    varenicline (Chantix Continuing Month Box) 1 mg tablet     Sig: Take 1 Tablet by mouth two (2) times a day. (Begin this after completing the Starter Pack for the first month)     Dispense:  60 Tablet     Refill:  5    budesonide-formoteroL (SYMBICORT) 160-4.5 mcg/actuation HFAA     Sig: Take 2 Puffs by inhalation two (2) times a day. (TO REPLACE WIXELA)     Dispense:  3 Each     Refill:  3    diclofenac (VOLTAREN) 1 % gel     Sig: Apply  to affected area as needed for Pain. Dispense:  100 g     Refill:  3    varenicline (CHANTIX) 0.5 mg tablet     Sig: Take 1 tab po daily days 1-3, then 1 tab po BID days 4-7, then 2 tabs po BID and stay at this dose, for the first month. Dispense:  60 Tablet     Refill:  0       Medications Discontinued During This Encounter   Medication Reason    budesonide-formoteroL (SYMBICORT) 160-4.5 mcg/actuation HFAA REORDER    diclofenac (VOLTAREN) 1 % gel REORDER    varenicline (Chantix Starting Month Box) 0.5 mg (11)- 1 mg (42) DsPk Availability       Current Outpatient Medications   Medication Sig Dispense Refill    varenicline (Chantix Continuing Month Box) 1 mg tablet Take 1 Tablet by mouth two (2) times a day. (Begin this after completing the Starter Pack for the first month) 60 Tablet 5    budesonide-formoteroL (SYMBICORT) 160-4.5 mcg/actuation HFAA Take 2 Puffs by inhalation two (2) times a day. (TO REPLACE WIXELA) 3 Each 3    diclofenac (VOLTAREN) 1 % gel Apply  to affected area as needed for Pain. 100 g 3    varenicline (CHANTIX) 0.5 mg tablet Take 1 tab po daily days 1-3, then 1 tab po BID days 4-7, then 2 tabs po BID and stay at this dose, for the first month.  60 Tablet 0    levalbuterol tartrate (XOPENEX) 45 mcg/actuation inhaler INHALE 2 PUFFS BY MOUTH EVERY 4 HOURS AS NEEDED FOR WHEEZING 15 g 2    ipratropium (ATROVENT) 42 mcg (0.06 %) nasal spray USE 1 TO 2 SPRAYS IN EACH NOSTRIL FOUR TIMES DAILY 15 mL 2    lisinopriL (PRINIVIL, ZESTRIL) 40 mg tablet TAKE 1 TABLET BY MOUTH DAILY 90 Tablet 2    olopatadine (PATANASE) 0.6 % spry two (2) times a day.  Spiriva with HandiHaler 18 mcg inhalation capsule INHALE THE CONTENTS OF 1 CAPSULE VIA INHALATION DEVICE DAILY 90 Capsule 1    busPIRone (BUSPAR) 15 mg tablet TAKE 1/2 TABLET BY MOUTH THREE TIMES DAILY AS DIRECTED 135 Tablet 1    amLODIPine (NORVASC) 5 mg tablet TAKE 1 TABLET BY MOUTH EVERY DAY 90 Tablet 1    montelukast (SINGULAIR) 10 mg tablet TAKE 1 TABLET BY MOUTH DAILY 90 Tablet 1    traZODone (DESYREL) 50 mg tablet Take 1 Tablet by mouth nightly. 90 Tablet 3    valACYclovir (VALTREX) 1 gram tablet Take 2 Tablets by mouth two (2) times a day. DO NOT TAKE WITH TIZANIDINE  Indications: prevent recurrent herpes simplex infection 30 Tablet 1    pravastatin (PRAVACHOL) 20 mg tablet TAKE 1 TABLET BY MOUTH  NIGHTLY 90 Tablet 3    mometasone (ELOCON) 0.1 % lotion INSTILL 4 METRIC DROP TWO TIMES DAILY IN EARS, AS NEEDED 30 mL 1    tiZANidine (ZANAFLEX) 2 mg tablet TAKE 1 TABLET BY MOUTH EVERY 8 HOURS AS NEEDED FOR SPASMS      torsemide (DEMADEX) 10 mg tablet Take 5 mg by mouth daily. 2    carboxymethylcellulose sodium (REFRESH OP) Apply  to eye.  dextran 70/hypromellose (TEARS NATURALE OP) Apply 1 Drop to eye as needed.  CALCIUM-MAGNESIUM-ZINC PO Take 2 Tablets by mouth daily.  omega-3 fatty acids-fish oil (FISH OIL) 360-1,200 mg cap Take 2 Tabs by mouth daily.  glucosamine sulfate 1,000 mg cap Take 2 Tabs by mouth daily.  acetaminophen (TYLENOL) 650 mg CR tablet Take 650 mg by mouth every six (6) hours as needed for Pain.  famotidine (PEPCID) 20 mg tablet Take 20 mg by mouth as needed.  melatonin 1 mg tablet Take 1 mg by mouth nightly. Recommended healthy diet low in carbohydrates, fats, sodium and cholesterol. Recommended regular cardiovascular exercise 3-6 times per week for 30-60 minutes daily.       Verbal and written instructions (see AVS) provided. Patient expresses understanding of diagnosis and treatment plan. Follow-up and Dispositions    · Return in about 4 months (around 10/13/2022) for HTN, 2nd appt for fasting labs Soon.        Future Appointments   Date Time Provider Amanda Addison   6/21/2022  8:20 AM LAB ONLY [facility]   8/11/2022 12:30 PM [facility] 1 [facility]   10/13/2022  1:00 PM Valeri Rodriguez, PARadhaC [facility]

## 2022-06-21 ENCOUNTER — APPOINTMENT (OUTPATIENT)
Dept: INTERNAL MEDICINE CLINIC | Age: 77
End: 2022-06-21

## 2022-06-21 DIAGNOSIS — Z11.59 NEED FOR HEPATITIS C SCREENING TEST: ICD-10-CM

## 2022-06-21 DIAGNOSIS — F41.9 ANXIETY: ICD-10-CM

## 2022-06-21 DIAGNOSIS — I10 ESSENTIAL HYPERTENSION: ICD-10-CM

## 2022-06-21 DIAGNOSIS — E78.5 HYPERLIPIDEMIA LDL GOAL <70: ICD-10-CM

## 2022-06-21 LAB
ALBUMIN SERPL-MCNC: 4 G/DL (ref 3.5–5)
ALBUMIN/GLOB SERPL: 1.4 {RATIO} (ref 1.1–2.2)
ALP SERPL-CCNC: 65 U/L (ref 45–117)
ALT SERPL-CCNC: 20 U/L (ref 12–78)
ANION GAP SERPL CALC-SCNC: 7 MMOL/L (ref 5–15)
AST SERPL-CCNC: 13 U/L (ref 15–37)
BASOPHILS # BLD: 0.1 K/UL (ref 0–0.1)
BASOPHILS NFR BLD: 1 % (ref 0–1)
BILIRUB SERPL-MCNC: 0.6 MG/DL (ref 0.2–1)
BUN SERPL-MCNC: 14 MG/DL (ref 6–20)
BUN/CREAT SERPL: 14 (ref 12–20)
CALCIUM SERPL-MCNC: 9.5 MG/DL (ref 8.5–10.1)
CHLORIDE SERPL-SCNC: 98 MMOL/L (ref 97–108)
CHOLEST SERPL-MCNC: 151 MG/DL
CO2 SERPL-SCNC: 27 MMOL/L (ref 21–32)
CREAT SERPL-MCNC: 1.02 MG/DL (ref 0.55–1.02)
DIFFERENTIAL METHOD BLD: ABNORMAL
EOSINOPHIL # BLD: 0.4 K/UL (ref 0–0.4)
EOSINOPHIL NFR BLD: 5 % (ref 0–7)
ERYTHROCYTE [DISTWIDTH] IN BLOOD BY AUTOMATED COUNT: 11.7 % (ref 11.5–14.5)
GLOBULIN SER CALC-MCNC: 2.8 G/DL (ref 2–4)
GLUCOSE SERPL-MCNC: 84 MG/DL (ref 65–100)
HCT VFR BLD AUTO: 41.2 % (ref 35–47)
HCV AB SERPL QL IA: NONREACTIVE
HDLC SERPL-MCNC: 77 MG/DL
HDLC SERPL: 2 {RATIO} (ref 0–5)
HGB BLD-MCNC: 13.5 G/DL (ref 11.5–16)
IMM GRANULOCYTES # BLD AUTO: 0 K/UL (ref 0–0.04)
IMM GRANULOCYTES NFR BLD AUTO: 0 % (ref 0–0.5)
LDLC SERPL CALC-MCNC: 61.2 MG/DL (ref 0–100)
LYMPHOCYTES # BLD: 2.4 K/UL (ref 0.8–3.5)
LYMPHOCYTES NFR BLD: 34 % (ref 12–49)
MCH RBC QN AUTO: 30.4 PG (ref 26–34)
MCHC RBC AUTO-ENTMCNC: 32.8 G/DL (ref 30–36.5)
MCV RBC AUTO: 92.8 FL (ref 80–99)
MONOCYTES # BLD: 0.7 K/UL (ref 0–1)
MONOCYTES NFR BLD: 11 % (ref 5–13)
NEUTS SEG # BLD: 3.4 K/UL (ref 1.8–8)
NEUTS SEG NFR BLD: 49 % (ref 32–75)
NRBC # BLD: 0 K/UL (ref 0–0.01)
NRBC BLD-RTO: 0 PER 100 WBC
PLATELET # BLD AUTO: 426 K/UL (ref 150–400)
PMV BLD AUTO: 9.1 FL (ref 8.9–12.9)
POTASSIUM SERPL-SCNC: 5 MMOL/L (ref 3.5–5.1)
PROT SERPL-MCNC: 6.8 G/DL (ref 6.4–8.2)
RBC # BLD AUTO: 4.44 M/UL (ref 3.8–5.2)
SODIUM SERPL-SCNC: 132 MMOL/L (ref 136–145)
TRIGL SERPL-MCNC: 64 MG/DL (ref ?–150)
TSH SERPL DL<=0.05 MIU/L-ACNC: 2.32 UIU/ML (ref 0.36–3.74)
VLDLC SERPL CALC-MCNC: 12.8 MG/DL
WBC # BLD AUTO: 6.9 K/UL (ref 3.6–11)

## 2022-07-18 DIAGNOSIS — J44.9 CHRONIC OBSTRUCTIVE PULMONARY DISEASE, UNSPECIFIED COPD TYPE (HCC): ICD-10-CM

## 2022-07-19 DIAGNOSIS — J30.2 SEASONAL ALLERGIC RHINITIS, UNSPECIFIED TRIGGER: ICD-10-CM

## 2022-07-19 DIAGNOSIS — I10 ESSENTIAL HYPERTENSION: ICD-10-CM

## 2022-07-19 DIAGNOSIS — F41.9 ANXIETY: ICD-10-CM

## 2022-07-19 DIAGNOSIS — J45.40 MODERATE PERSISTENT ASTHMATIC BRONCHITIS WITHOUT COMPLICATION: ICD-10-CM

## 2022-07-19 DIAGNOSIS — E78.5 HYPERLIPIDEMIA LDL GOAL <70: ICD-10-CM

## 2022-07-20 RX ORDER — PRAVASTATIN SODIUM 20 MG/1
TABLET ORAL
Qty: 90 TABLET | Refills: 3 | Status: SHIPPED | OUTPATIENT
Start: 2022-07-20

## 2022-07-20 RX ORDER — TIOTROPIUM BROMIDE 18 UG/1
CAPSULE ORAL; RESPIRATORY (INHALATION)
Qty: 90 CAPSULE | Refills: 1 | Status: SHIPPED | OUTPATIENT
Start: 2022-07-20

## 2022-07-20 RX ORDER — MONTELUKAST SODIUM 10 MG/1
10 TABLET ORAL DAILY
Qty: 90 TABLET | Refills: 1 | Status: SHIPPED | OUTPATIENT
Start: 2022-07-20

## 2022-07-20 RX ORDER — AMLODIPINE BESYLATE 5 MG/1
TABLET ORAL
Qty: 90 TABLET | Refills: 1 | Status: SHIPPED | OUTPATIENT
Start: 2022-07-20

## 2022-07-20 RX ORDER — BUSPIRONE HYDROCHLORIDE 15 MG/1
TABLET ORAL
Qty: 135 TABLET | Refills: 1 | Status: SHIPPED | OUTPATIENT
Start: 2022-07-20

## 2022-08-05 DIAGNOSIS — J31.0 GUSTATORY RHINITIS: ICD-10-CM

## 2022-08-08 RX ORDER — IPRATROPIUM BROMIDE 42 UG/1
SPRAY, METERED NASAL
Qty: 15 ML | Refills: 2 | Status: SHIPPED | OUTPATIENT
Start: 2022-08-08 | End: 2022-10-07

## 2022-08-11 ENCOUNTER — HOSPITAL ENCOUNTER (OUTPATIENT)
Dept: CT IMAGING | Age: 77
Discharge: HOME | End: 2022-08-11
Attending: INTERNAL MEDICINE
Payer: MEDICARE

## 2022-08-11 DIAGNOSIS — R91.1 LUNG NODULE, SOLITARY: ICD-10-CM

## 2022-08-11 PROCEDURE — 71250 CT THORAX DX C-: CPT

## 2022-08-17 DIAGNOSIS — J45.40 MODERATE PERSISTENT ASTHMATIC BRONCHITIS WITHOUT COMPLICATION: ICD-10-CM

## 2022-08-18 DIAGNOSIS — G47.00 INSOMNIA, UNSPECIFIED TYPE: ICD-10-CM

## 2022-08-18 RX ORDER — LEVALBUTEROL TARTRATE 45 UG/1
AEROSOL, METERED ORAL
Qty: 15 G | Refills: 2 | Status: SHIPPED | OUTPATIENT
Start: 2022-08-18 | End: 2022-11-01

## 2022-08-19 RX ORDER — TRAZODONE HYDROCHLORIDE 50 MG/1
TABLET ORAL
Qty: 90 TABLET | Refills: 3 | Status: SHIPPED | OUTPATIENT
Start: 2022-08-19

## 2022-09-06 ENCOUNTER — TELEPHONE (OUTPATIENT)
Dept: ENDOCRINOLOGY | Age: 77
End: 2022-09-06

## 2022-09-06 DIAGNOSIS — M81.0 AGE-RELATED OSTEOPOROSIS WITHOUT CURRENT PATHOLOGICAL FRACTURE: Primary | ICD-10-CM

## 2022-09-06 NOTE — TELEPHONE ENCOUNTER
Patient remains in DKA. Insulin drip and D5 0.45 NS infusing. AAO x 4. Patient ambulated to bathroom with good urine output. Will continue to monitor Q1 glucose and Q4 chemistries. Safety measures in place. Call light within reach.   Unable to reach patient LVM to return call.

## 2022-09-06 NOTE — TELEPHONE ENCOUNTER
----- Message from [facility] [place] [facility]\"" sent at 9/6/2022 11:40 AM EDT -----  Patient scheduled for 03/06/23    ----- Message -----  From: Mal Godwin MD  Sent: 9/5/2022  11:33 AM EDT  To: [address] [place] [address],[address]    Please call her to schedule a follow up for osteoporosis for the 2nd or 3rd week of March 2023. Once this is scheduled, let me know so I can order her bone density scan to be done prior to her visit. Thanks.

## 2022-10-05 DIAGNOSIS — J31.0 GUSTATORY RHINITIS: ICD-10-CM

## 2022-10-07 RX ORDER — IPRATROPIUM BROMIDE 42 UG/1
SPRAY, METERED NASAL
Qty: 15 ML | Refills: 2 | Status: SHIPPED | OUTPATIENT
Start: 2022-10-07

## 2022-10-13 ENCOUNTER — OFFICE VISIT (OUTPATIENT)
Dept: INTERNAL MEDICINE CLINIC | Age: 77
End: 2022-10-13
Payer: MEDICARE

## 2022-10-13 VITALS
WEIGHT: 130.8 LBS | TEMPERATURE: 98.6 F | BODY MASS INDEX: 21.79 KG/M2 | HEIGHT: 65 IN | DIASTOLIC BLOOD PRESSURE: 60 MMHG | OXYGEN SATURATION: 95 % | SYSTOLIC BLOOD PRESSURE: 144 MMHG | HEART RATE: 73 BPM | RESPIRATION RATE: 18 BRPM

## 2022-10-13 DIAGNOSIS — R10.13 EPIGASTRIC PAIN: ICD-10-CM

## 2022-10-13 DIAGNOSIS — E87.1 HYPONATREMIA: ICD-10-CM

## 2022-10-13 DIAGNOSIS — R11.10 VOMITING AND DIARRHEA: ICD-10-CM

## 2022-10-13 DIAGNOSIS — N18.31 STAGE 3A CHRONIC KIDNEY DISEASE (HCC): ICD-10-CM

## 2022-10-13 DIAGNOSIS — I10 ESSENTIAL HYPERTENSION: ICD-10-CM

## 2022-10-13 DIAGNOSIS — J30.2 SEASONAL ALLERGIC RHINITIS, UNSPECIFIED TRIGGER: ICD-10-CM

## 2022-10-13 DIAGNOSIS — F41.9 ANXIETY: ICD-10-CM

## 2022-10-13 DIAGNOSIS — R19.7 VOMITING AND DIARRHEA: ICD-10-CM

## 2022-10-13 DIAGNOSIS — Z23 NEEDS FLU SHOT: ICD-10-CM

## 2022-10-13 DIAGNOSIS — R10.9 RIGHT FLANK PAIN: ICD-10-CM

## 2022-10-13 DIAGNOSIS — J44.9 CHRONIC OBSTRUCTIVE PULMONARY DISEASE, UNSPECIFIED COPD TYPE (HCC): Primary | ICD-10-CM

## 2022-10-13 PROCEDURE — G8432 DEP SCR NOT DOC, RNG: HCPCS | Performed by: PHYSICIAN ASSISTANT

## 2022-10-13 PROCEDURE — 90694 VACC AIIV4 NO PRSRV 0.5ML IM: CPT | Performed by: PHYSICIAN ASSISTANT

## 2022-10-13 PROCEDURE — 1090F PRES/ABSN URINE INCON ASSESS: CPT | Performed by: PHYSICIAN ASSISTANT

## 2022-10-13 PROCEDURE — 1123F ACP DISCUSS/DSCN MKR DOCD: CPT | Performed by: PHYSICIAN ASSISTANT

## 2022-10-13 PROCEDURE — G8536 NO DOC ELDER MAL SCRN: HCPCS | Performed by: PHYSICIAN ASSISTANT

## 2022-10-13 PROCEDURE — G8754 DIAS BP LESS 90: HCPCS | Performed by: PHYSICIAN ASSISTANT

## 2022-10-13 PROCEDURE — G8753 SYS BP > OR = 140: HCPCS | Performed by: PHYSICIAN ASSISTANT

## 2022-10-13 PROCEDURE — G8420 CALC BMI NORM PARAMETERS: HCPCS | Performed by: PHYSICIAN ASSISTANT

## 2022-10-13 PROCEDURE — G8427 DOCREV CUR MEDS BY ELIG CLIN: HCPCS | Performed by: PHYSICIAN ASSISTANT

## 2022-10-13 PROCEDURE — 99214 OFFICE O/P EST MOD 30 MIN: CPT | Performed by: PHYSICIAN ASSISTANT

## 2022-10-13 PROCEDURE — 1101F PT FALLS ASSESS-DOCD LE1/YR: CPT | Performed by: PHYSICIAN ASSISTANT

## 2022-10-13 PROCEDURE — G0008 ADMIN INFLUENZA VIRUS VAC: HCPCS | Performed by: PHYSICIAN ASSISTANT

## 2022-10-13 RX ORDER — LEVALBUTEROL INHALATION SOLUTION 1.25 MG/3ML
1.25 SOLUTION RESPIRATORY (INHALATION)
Qty: 90 ML | Refills: 1 | Status: SHIPPED | OUTPATIENT
Start: 2022-10-13

## 2022-10-13 NOTE — PROGRESS NOTES
HIPAA verified by two patient identifiers. Jodee Wheeler is a 67 y.o. female    Chief Complaint   Patient presents with    Cholesterol Problem     4 months    Hypertension    GERD    Cough     Cant get rid of  and sob   [facility] in [facility] last 3 [facility]        Visit Vitals  BP (!) 150/68 (BP 1 Location: Left upper arm, BP Patient Position: Sitting, BP Cuff Size: Small adult)   Pulse 73   Temp 98.6 °F (37 °C) (Oral)   Resp 18   Ht 5' 5\" (1.651 m)   Wt 130 lb 12.8 oz (59.3 kg)   LMP  (LMP Unknown)   SpO2 95%   BMI 21.77 kg/m²       Pain Scale: 0 - No pain/10  Pain Location:       Health Maintenance Due   Topic Date Due    Flu Vaccine (1) 08/01/2022    COVID-19 Vaccine (3 - Booster for Moderna series) 08/24/2022    DTaP/Tdap/Td series (2 - Td or Tdap) 09/11/2022         Coordination of Care Questionnaire:  :   1) Have you been to an emergency room, urgent care, or hospitalized since your last visit? If yes, where when, and reason for visit? Yes   aug 8th  for  bronchitis , sept 4th,oct 2  [facility]       2. Have seen or consulted any other health care provider since  your last visit? If yes, where when, and reason for visit? NO      Patient is accompanied by self I have received verbal consent from Jodee Wheeler to discuss any/all medical information while they are present in the room.

## 2022-10-13 NOTE — PATIENT INSTRUCTIONS
Start Xyzal 5 mg once daily for seasonal allergies. Vaccine Information Statement    Influenza (Flu) Vaccine (Inactivated or Recombinant): What You Need to Know    Many vaccine information statements are available in [place] and other languages. See www.immunize.org/vis. Hojas de información sobre vacunas están disponibles en español y en muchos otros idiomas. Visite www.immunize.org/vis. 1. Why get vaccinated? Influenza vaccine can prevent influenza (flu). Flu is a contagious disease that spreads around the [facility] every year, usually between October and May. Anyone can get the flu, but it is more dangerous for some people. Infants and young children, people 72 years and older, pregnant people, and people with certain health conditions or a weakened immune system are at greatest risk of flu complications. Pneumonia, bronchitis, sinus infections, and ear infections are examples of flu-related complications. If you have a medical condition, such as heart disease, cancer, or diabetes, flu can make it worse. Flu can cause fever and chills, sore throat, muscle aches, fatigue, cough, headache, and runny or stuffy nose. Some people may have vomiting and diarrhea, though this is more common in children than adults. In an average year, thousands of people in the [facility] die from flu, and many more are hospitalized. Flu vaccine prevents millions of illnesses and flu-related visits to the doctor each year. 2. Influenza vaccines     CDC recommends everyone 6 months and older get vaccinated every flu season. Children 6 months through 6years of age may need 2 doses during a single flu season. Everyone else needs only 1 dose each flu season. It takes about 2 weeks for protection to develop after vaccination. There are many flu viruses, and they are always changing.  Each year a new flu vaccine is made to protect against the influenza viruses believed to be likely to cause disease in the upcoming flu season. Even when the vaccine doesnt exactly match these viruses, it may still provide some protection. Influenza vaccine does not cause flu. Influenza vaccine may be given at the same time as other vaccines. 3. Talk with your health care provider    Tell your vaccination provider if the person getting the vaccine:  Has had an allergic reaction after a previous dose of influenza vaccine, or has any severe, life-threatening allergies   Has ever had Guillain-Barré Syndrome (also called GBS)    In some cases, your health care provider may decide to postpone influenza vaccination until a future visit. Influenza vaccine can be administered at any time during pregnancy. People who are or will be pregnant during influenza season should receive inactivated influenza vaccine. People with minor illnesses, such as a cold, may be vaccinated. People who are moderately or severely ill should usually wait until they recover before getting influenza vaccine. Your health care provider can give you more information. 4. Risks of a vaccine reaction    Soreness, redness, and swelling where the shot is given, fever, muscle aches, and headache can happen after influenza vaccination. There may be a very small increased risk of Guillain-Barré Syndrome (GBS) after inactivated influenza vaccine (the flu shot). Danie Phelps children who get the flu shot along with pneumococcal vaccine (PCV13) and/or DTaP vaccine at the same time might be slightly more likely to have a seizure caused by fever. Tell your health care provider if a child who is getting flu vaccine has ever had a seizure. People sometimes faint after medical procedures, including vaccination. Tell your provider if you feel dizzy or have vision changes or ringing in the ears. As with any medicine, there is a very remote chance of a vaccine causing a severe allergic reaction, other serious injury, or death.     5. What if there is a serious problem? An allergic reaction could occur after the vaccinated person leaves the clinic. If you see signs of a severe allergic reaction (hives, swelling of the face and throat, difficulty breathing, a fast heartbeat, dizziness, or weakness), call 9-1-1 and get the person to the nearest hospital.    For other signs that concern you, call your health care provider. Adverse reactions should be reported to the Vaccine Adverse Event Reporting System (VAERS). Your health care provider will usually file this report, or you can do it yourself. Visit the VAERS website at www.vaers. [facility].gov or call 2-984.293.1476. VAERS is only for reporting reactions, and VAERS staff members do not give medical advice. 6. The National Vaccine Injury Compensation Program    The [facility] Vaccine Injury Compensation Program (VICP) is a federal program that was created to compensate people who may have been injured by certain vaccines. Claims regarding alleged injury or death due to vaccination have a time limit for filing, which may be as short as two years. Visit the VICP website at www.[facility].gov/vaccinecompensation or call 5-175.231.1506 to learn about the program and about filing a claim. 7. How can I learn more? Ask your health care provider. Call your local or state health department. Visit the website of the Food and Drug Administration (FDA) for vaccine package inserts and additional information at www.fda.gov/vaccines-blood-biologics/vaccines. Contact the Centers for Disease Control and Prevention (CDC): Call 9-469.240.9922 (1-800-CDC-INFO) or  Visit CDCs influenza website at www.cdc.gov/flu. Vaccine Information Statement   Inactivated Influenza Vaccine   8/6/2021  42 KIERRA Cha Prakash [id number]     Department of Health and Human Services  Centers for Disease Control and Prevention    Office Use Only

## 2022-10-13 NOTE — PROGRESS NOTES
HPI:  67 y.o.  presents for follow up appointment. No hospital, ER or specialist visits since last primary care visit except as noted below. Last visit 6/13/22    C/O [facility]  COPD followed by pulmonary Dr María Hopkins, last visit 9/21/22  Has been seen at [facility] Aug, Sept, Oct for cough  \"Every time the weather changed, I got sick\"  Notes visible in Care Everywhere  8/16/22-CXR no acute, treated with doxycycline and medrol dose pack  9/4/22 - CXR no acute,   10/2/22 - CXR no acute.  Treated with prednisone 60 mg x 7 days, and tessalon    She continues on:  Symbicort 160 2 puffs BID  Spiriva  Xopenex QID    She sees allergist and is on daily ipratropium nasal spray and Nasonex, and Singulair nightly  -reports antihistamines such as zyrtec and allegra did not help, but she has not tried Xyzal    She does not have a nebulizer    HTN and CKD stage 3  Followed by Dr Bernie Garcia, last appt 6/2/22,  next appt 12/15/2022  Most recent eGFR 51 on 6/2/22;  52 on 9/2021  On lisinopril 40 mg, torsemide 5 mg (lowered from 10 mg 2/2021 due to syncope possibly related to orthostatic episode)  and amlodipine 5 mg daily  She is on lisinopril since prior to 2016 with good tolerance  -recent hyponatremia considered to be due to drinking too much water, now resolved on less water intake, no more than 6 glasses (or 3 - 16 oz bottles) daily per renal    Hyperlipidemia  On pravastatin 20 mg  LDL 71 on 6/2022    Anxiety  Doing well on buspar 7.5 mg TID  I recommend she maintain her pet dog, a dachshund named Katie Sheridan, as an emotional support animal to mitigate her symptoms of stress and anxiety.  -since last visit, Katie Sheridan had pancreatitis, he was really sick and is on the mend  -she has her own apartment now, she moved in on 12/23/21, she is feeling much more relaxed having her own space now  -she was with her son who was really anxious last week, it made her anxious and so she started rocking to calm herself, and now she finds herself rocking in her seat from time to time    Sudden onset of diarrhea and vomiting last night  LUQ abd pain, and right flank pain  Thought her sodium got low as has happened before, so she had a little salt from the salt shaker  She felt better after having some salt, overall \"my body felt better\"  No GI sxs today, normal po intake  But today her chest feels stuffy again          Patient Active Problem List    Diagnosis    Chronic renal disease, stage III    Tobacco dependence    Vasomotor rhinitis    Chronic obstructive pulmonary disease (HCC)    Bilateral carotid artery stenosis    Cerebral microvascular disease    GERD (gastroesophageal reflux disease)     pepcid 20 mg prn (about 2x/month)      Chronic pain     neck pain, Dr. Marin Padilla      CKD (chronic kidney disease) stage 3, GFR 30-59 ml/min ([facility])    Sinus disorder    Anxiety    Essential hypertension    Osteoarthritis of cervical spine    Hyperlipidemia LDL goal <70    Herpes simplex type 2 infection    Osteoporosis     Reclast 9/24/2012, 10/09/2013, 10/31/2014, 1/29/2016           Past Medical History:   Diagnosis Date    Anxiety     Arthritis     Chronic obstructive pulmonary disease ([facility] [place] [address] 2010    early disease on PFTs, briefly on a daily inhaler    Chronic pain     neck pain, Dr. Marin Padilla    Degenerative disc disease, cervical 1998    cervical spine injections, [facility] Dr. Lis Madridune kidney     Right    Gallstone 1974    GERD (gastroesophageal reflux disease)     pepcid 20 mg prn (about 2x/month)    Herpes simplex type II infection     Hypercholesterolemia     Hypertension     Nausea & vomiting     During surgery when ether was used    Osteoporosis 2012    Reclast 9/24/2012, 10/09/2013, 10/31/2014, 1/29/2016    PUD (peptic ulcer disease) 2010    endoscopy confirmed, was taking aleve    Stage 3 chronic kidney disease ([facility] [place] [address] [id number]    Trauma 1972    right arm fracture surgically repaired, twisted in washing machine       Social History     Tobacco Use Smoking status: Some Days     Packs/day: 0.50     Years: 41.00     Pack years: 20.50     Types: Cigarettes    Smokeless tobacco: Never   Vaping Use    Vaping Use: Never used   Substance Use Topics    Alcohol use: Yes     Comment: occasional    Drug use: No       Outpatient Medications Marked as Taking for the 10/13/22 encounter (Office Visit) with Valeri Lopez PA-C   Medication Sig Dispense Refill    ipratropium (ATROVENT) 42 mcg (0.06 %) nasal spray USE 1 TO 2 SPRAYS IN EACH NOSTRIL FOUR TIMES DAILY 15 mL 2    traZODone (DESYREL) 50 mg tablet TAKE 1 TABLET BY MOUTH EVERY NIGHT 90 Tablet 3    levalbuterol tartrate (XOPENEX) 45 mcg/actuation inhaler INHALE 2 PUFFS BY MOUTH EVERY 4 HOURS AS NEEDED FOR WHEEZING 15 g 2    Spiriva with HandiHaler 18 mcg inhalation capsule INHALE THE CONTENTS OF 1 CAPSULE VIA INHALATION DEVICE DAILY 90 Capsule 1    busPIRone (BUSPAR) 15 mg tablet TAKE 1/2 TABLET BY MOUTH THREE TIMES DAILY AS DIRECTED 135 Tablet 1    amLODIPine (NORVASC) 5 mg tablet TAKE 1 TABLET BY MOUTH EVERY DAY 90 Tablet 1    montelukast (SINGULAIR) 10 mg tablet TAKE 1 TABLET BY MOUTH DAILY 90 Tablet 1    pravastatin (PRAVACHOL) 20 mg tablet TAKE 1 TABLET BY MOUTH EVERY NIGHT 90 Tablet 3    budesonide-formoteroL (SYMBICORT) 160-4.5 mcg/actuation HFAA Take 2 Puffs by inhalation two (2) times a day. (TO REPLACE WIXELA) 3 Each 3    diclofenac (VOLTAREN) 1 % gel Apply  to affected area as needed for Pain. 100 g 3    lisinopriL (PRINIVIL, ZESTRIL) 40 mg tablet TAKE 1 TABLET BY MOUTH DAILY 90 Tablet 2    valACYclovir (VALTREX) 1 gram tablet Take 2 Tablets by mouth two (2) times a day.  DO NOT TAKE WITH TIZANIDINE  Indications: prevent recurrent herpes simplex infection 30 Tablet 1    mometasone (ELOCON) 0.1 % lotion INSTILL 4 METRIC DROP TWO TIMES DAILY IN EARS, AS NEEDED 30 mL 1    tiZANidine (ZANAFLEX) 2 mg tablet TAKE 1 TABLET BY MOUTH EVERY 8 HOURS AS NEEDED FOR SPASMS      torsemide (DEMADEX) 10 mg tablet Take 5 mg by mouth daily. 2    carboxymethylcellulose sodium (REFRESH OP) Apply  to eye. dextran 70/hypromellose (TEARS NATURALE OP) Apply 1 Drop to eye as needed. CALCIUM-MAGNESIUM-ZINC PO Take 2 Tablets by mouth daily. omega-3 fatty acids-fish oil 360-1,200 mg cap Take 2 Tabs by mouth daily. glucosamine sulfate 1,000 mg cap Take 2 Tabs by mouth daily. acetaminophen (TYLENOL) 650 mg CR tablet Take 650 mg by mouth every six (6) hours as needed for Pain.      famotidine (PEPCID) 20 mg tablet Take 20 mg by mouth as needed. melatonin 1 mg tablet Take 1 mg by mouth nightly. Allergies   Allergen Reactions    Indomethacin Other (comments)     Made her feel weird to the point it scared her  Other reaction(s): Other (see comments)  Made her feel weird to the point it scared her  Made her feel weird to the point it scared her    Other reaction(s): Other (comments)  Made her feel weird to the point it scared her    Fosamax [Alendronate] Other (comments)     Had a peptic ulcer    Other Medication Nausea and Vomiting     Flu vaccine in [id number]    Cephalexin Nausea and Vomiting    Erythromycin Nausea and Vomiting    Prochlorperazine Other (comments)     Childhood reaction-Neck spasms  Other reaction(s): Other (see comments)  Childhood reaction  Childhood reaction    Other reaction(s): Other (comments)  Childhood reaction-Neck spasms       ROS:  ROS negative except as per HPI.       PE:  Visit Vitals  BP (!) 144/60 (BP 1 Location: Left arm, BP Patient Position: Sitting, BP Cuff Size: Adult)   Pulse 73   Temp 98.6 °F (37 °C) (Oral)   Resp 18   Ht 5' 5\" (1.651 m)   Wt 130 lb 12.8 oz (59.3 kg)   LMP  (LMP Unknown)   SpO2 95%   BMI 21.77 kg/m²     Gen: alert, oriented, no acute distress  Head: normocephalic, atraumatic  Ears: external auditory canals clear, TMs without erythema or effusion  Eyes: pupils equal round reactive to light, sclera clear, conjunctiva clear  Oral: moist mucus membranes, no oral lesions, no pharyngeal inflammation or exudate  Neck: symmetric normal sized thyroid, no carotid bruits, no jugular vein distention  Resp: no increase work of breathing, lungs clear to ausculation bilaterally, no wheezing, rales or rhonchi  CV: S1, S2 normal.  No murmurs, rubs, or gallops. Abd: soft, (+)epigastric tender, not distended. No hepatosplenomegaly. Normal bowel sounds. No CVAT. No suprapubic tenderness    Neuro: grossly intact  Skin: no lesion or rash  Extremities: no cyanosis or edema    No results found for this visit on 10/13/22. Assessment/Plan:      ICD-10-CM ICD-9-CM    1. Chronic obstructive pulmonary disease, unspecified COPD type (HCC)  J44.9 496 AMB SUPPLY ORDER      levalbuterol (XOPENEX) 1.25 mg/3 mL nebu      2. Essential hypertension  M89 467.3 METABOLIC PANEL, COMPREHENSIVE      TSH 3RD GENERATION      TSH 3RD GENERATION      METABOLIC PANEL, COMPREHENSIVE      3. Anxiety  F41.9 300.00 TSH 3RD GENERATION      TSH 3RD GENERATION      4. Seasonal allergic rhinitis, unspecified trigger  J30.2 477.9       5. Stage 3a chronic kidney disease (HCC)  J43.82 010.1 METABOLIC PANEL, COMPREHENSIVE      METABOLIC PANEL, COMPREHENSIVE      6. Hyponatremia  W58.3 088.3 METABOLIC PANEL, COMPREHENSIVE      METABOLIC PANEL, COMPREHENSIVE      7. Right flank pain  R10.9 789.09 CBC WITH AUTOMATED DIFF      URINALYSIS W/ REFLEX CULTURE      URINALYSIS W/ REFLEX CULTURE      CBC WITH AUTOMATED DIFF      8. Epigastric pain  R10.13 789.06 CBC WITH AUTOMATED DIFF      LIPASE      AMYLASE      URINALYSIS W/ REFLEX CULTURE      URINALYSIS W/ REFLEX CULTURE      AMYLASE      LIPASE      CBC WITH AUTOMATED DIFF      9.  Vomiting and diarrhea  R11.10 787.03 CBC WITH AUTOMATED DIFF    R19.7 787.91 LIPASE      AMYLASE      TSH 3RD GENERATION      URINALYSIS W/ REFLEX CULTURE      C DIFFICILE TOXIN GENE, DENG      URINALYSIS W/ REFLEX CULTURE      TSH 3RD GENERATION      AMYLASE      LIPASE      CBC WITH AUTOMATED DIFF      10. Needs flu shot  Z23 V04.81 INFLUENZA, FLUAD, (AGE 65 Y+), IM, PF, 0.5 ML        1. COPD  Follow-up pulmonary Dr. Zeenat Prince  Multiple visits to [facility] with exacerbations  Treated with Medrol Dosepak, doxycycline, another course of prednisone  She continues on Symbicort 160 twice daily and Spiriva and Xopenex as needed  Will add size I will and Xopenex nebulizer for flares  Follow-up with pulmonary    2. Hyper tension  Also followed by renal  On lisinopril 40 mg, torsemide 5 mg, amlodipine 5 mg, blood pressure 144/60 today on recheck  Will continue current therapy and follow-up with renal as scheduled    3. Anxiety  Doing well on BuSpar 7.5 mg 3 times daily, continue same  Will monitor TSH    4.  Seasonal allergies  Seasonal exacerbation of COPD  We will add Xyzal to her current regimen, see above    5. CKD stage III  Followed by renal Dr. Daniel Barajas  EGFR 51 on 6/2/2022    6. Hyponatremia  Has been monitored by renal in the past and felt to be due to drinking too much water  She felt symptomatic with this recently and added some salt to her diet and felt better  Will check electrolytes today    7. Right flank pain  Episode of vomiting and diarrhea  Will check urine studies    8/9. Epigastric pain with vomiting and diarrhea  She felt like it was related to hyponatremia and felt better after having some salt  Symptoms seem resolved today with normal p.o. intake  Will check labs and make further recommendations    10. Flu shot today      Health Maintenance reviewed - updated.     Orders Placed This Encounter    AMB SUPPLY ORDER     Nebulizer with tubing and mask    C DIFFICILE TOXIN GENE, DENG     Standing Status:   Future     Standing Expiration Date:   10/13/2023     Order Specific Question:   Specimen source     Answer:   Stool [235]    Influenza, FLUAD, (age 72 y+), IM, PF, 0.5 mL    METABOLIC PANEL, COMPREHENSIVE     Standing Status:   Future     Number of Occurrences:   1     Standing Expiration Date:   10/13/2023    CBC WITH AUTOMATED DIFF     Standing Status:   Future     Number of Occurrences:   1     Standing Expiration Date:   10/13/2023    LIPASE     Standing Status:   Future     Number of Occurrences:   1     Standing Expiration Date:   10/13/2023    AMYLASE     Standing Status:   Future     Number of Occurrences:   1     Standing Expiration Date:   10/13/2023    TSH 3RD GENERATION     Standing Status:   Future     Number of Occurrences:   1     Standing Expiration Date:   10/13/2023    URINALYSIS W/ REFLEX CULTURE     Standing Status:   Future     Number of Occurrences:   1     Standing Expiration Date:   10/13/2023    levalbuterol (XOPENEX) 1.25 mg/3 mL nebu     Sig: 3 mL by Nebulization route every four (4) hours as needed for Wheezing or Shortness of Breath. Dispense:  90 mL     Refill:  1       There are no discontinued medications. Current Outpatient Medications   Medication Sig Dispense Refill    levalbuterol (XOPENEX) 1.25 mg/3 mL nebu 3 mL by Nebulization route every four (4) hours as needed for Wheezing or Shortness of Breath.  90 mL 1    ipratropium (ATROVENT) 42 mcg (0.06 %) nasal spray USE 1 TO 2 SPRAYS IN EACH NOSTRIL FOUR TIMES DAILY 15 mL 2    traZODone (DESYREL) 50 mg tablet TAKE 1 TABLET BY MOUTH EVERY NIGHT 90 Tablet 3    levalbuterol tartrate (XOPENEX) 45 mcg/actuation inhaler INHALE 2 PUFFS BY MOUTH EVERY 4 HOURS AS NEEDED FOR WHEEZING 15 g 2    Spiriva with HandiHaler 18 mcg inhalation capsule INHALE THE CONTENTS OF 1 CAPSULE VIA INHALATION DEVICE DAILY 90 Capsule 1    busPIRone (BUSPAR) 15 mg tablet TAKE 1/2 TABLET BY MOUTH THREE TIMES DAILY AS DIRECTED 135 Tablet 1    amLODIPine (NORVASC) 5 mg tablet TAKE 1 TABLET BY MOUTH EVERY DAY 90 Tablet 1    montelukast (SINGULAIR) 10 mg tablet TAKE 1 TABLET BY MOUTH DAILY 90 Tablet 1    pravastatin (PRAVACHOL) 20 mg tablet TAKE 1 TABLET BY MOUTH EVERY NIGHT 90 Tablet 3    budesonide-formoteroL (SYMBICORT) 160-4.5 mcg/actuation HFAA Take 2 Puffs by inhalation two (2) times a day. (TO REPLACE WIXELA) 3 Each 3    diclofenac (VOLTAREN) 1 % gel Apply  to affected area as needed for Pain. 100 g 3    lisinopriL (PRINIVIL, ZESTRIL) 40 mg tablet TAKE 1 TABLET BY MOUTH DAILY 90 Tablet 2    valACYclovir (VALTREX) 1 gram tablet Take 2 Tablets by mouth two (2) times a day. DO NOT TAKE WITH TIZANIDINE  Indications: prevent recurrent herpes simplex infection 30 Tablet 1    mometasone (ELOCON) 0.1 % lotion INSTILL 4 METRIC DROP TWO TIMES DAILY IN EARS, AS NEEDED 30 mL 1    tiZANidine (ZANAFLEX) 2 mg tablet TAKE 1 TABLET BY MOUTH EVERY 8 HOURS AS NEEDED FOR SPASMS      torsemide (DEMADEX) 10 mg tablet Take 5 mg by mouth daily. 2    carboxymethylcellulose sodium (REFRESH OP) Apply  to eye. dextran 70/hypromellose (TEARS NATURALE OP) Apply 1 Drop to eye as needed. CALCIUM-MAGNESIUM-ZINC PO Take 2 Tablets by mouth daily. omega-3 fatty acids-fish oil 360-1,200 mg cap Take 2 Tabs by mouth daily. glucosamine sulfate 1,000 mg cap Take 2 Tabs by mouth daily. acetaminophen (TYLENOL) 650 mg CR tablet Take 650 mg by mouth every six (6) hours as needed for Pain.      famotidine (PEPCID) 20 mg tablet Take 20 mg by mouth as needed. melatonin 1 mg tablet Take 1 mg by mouth nightly. varenicline (Chantix Continuing Month Box) 1 mg tablet Take 1 Tablet by mouth two (2) times a day. (Begin this after completing the Starter Pack for the first month) (Patient not taking: Reported on 10/13/2022) 60 Tablet 5    olopatadine (PATANASE) 0.6 % spry two (2) times a day. (Patient not taking: Reported on 10/13/2022)         Recommended healthy diet low in carbohydrates, fats, sodium and cholesterol. Recommended regular cardiovascular exercise 3-6 times per week for 30-60 minutes daily. Verbal and written instructions (see AVS) provided. Patient expresses understanding of diagnosis and treatment plan.       Follow-up and Dispositions    Return in about 3 months (around 1/13/2023) for HTN, anxiety, COPD.

## 2022-10-13 NOTE — PROGRESS NOTES
Marlen Moser a 67 y.o. female who is present for routine immunizations. Prior to vaccine administration After obtaining patient consent and per orders of Dr. Love HARTMAN, injection of FLU HIGH DOSE IN LEFT DELTOID given by Jai Schmid. Risks and adverse reactions were discussed. The patient was provided the VIS and they were given an opportunity to ask questions, all questions addressed. Order and injection/medication verified by second nurse/ma review by Leslie Lynn LPN. Patient tolerated procedure well. No reactions noted. Patient was advised to seek medical or call the office with any questions or concerns post vaccination. Patient verbalized understanding.  Jai Schmid

## 2022-10-14 LAB
ALBUMIN SERPL-MCNC: 4.1 G/DL (ref 3.5–5)
ALBUMIN/GLOB SERPL: 1.4 {RATIO} (ref 1.1–2.2)
ALP SERPL-CCNC: 59 U/L (ref 45–117)
ALT SERPL-CCNC: 24 U/L (ref 12–78)
AMYLASE SERPL-CCNC: 73 U/L (ref 25–115)
ANION GAP SERPL CALC-SCNC: 6 MMOL/L (ref 5–15)
APPEARANCE UR: CLEAR
AST SERPL-CCNC: 14 U/L (ref 15–37)
BACTERIA URNS QL MICRO: NEGATIVE /HPF
BASOPHILS # BLD: 0 K/UL (ref 0–0.1)
BASOPHILS NFR BLD: 0 % (ref 0–1)
BILIRUB SERPL-MCNC: 1.2 MG/DL (ref 0.2–1)
BILIRUB UR QL: NEGATIVE
BUN SERPL-MCNC: 18 MG/DL (ref 6–20)
BUN/CREAT SERPL: 15 (ref 12–20)
CALCIUM SERPL-MCNC: 9.6 MG/DL (ref 8.5–10.1)
CHLORIDE SERPL-SCNC: 96 MMOL/L (ref 97–108)
CO2 SERPL-SCNC: 29 MMOL/L (ref 21–32)
COLOR UR: NORMAL
CREAT SERPL-MCNC: 1.2 MG/DL (ref 0.55–1.02)
DIFFERENTIAL METHOD BLD: ABNORMAL
EOSINOPHIL # BLD: 0.2 K/UL (ref 0–0.4)
EOSINOPHIL NFR BLD: 1 % (ref 0–7)
EPITH CASTS URNS QL MICRO: NORMAL /LPF
ERYTHROCYTE [DISTWIDTH] IN BLOOD BY AUTOMATED COUNT: 13.2 % (ref 11.5–14.5)
GLOBULIN SER CALC-MCNC: 2.9 G/DL (ref 2–4)
GLUCOSE SERPL-MCNC: 91 MG/DL (ref 65–100)
GLUCOSE UR STRIP.AUTO-MCNC: NEGATIVE MG/DL
HCT VFR BLD AUTO: 41.8 % (ref 35–47)
HGB BLD-MCNC: 13.5 G/DL (ref 11.5–16)
HGB UR QL STRIP: NEGATIVE
HYALINE CASTS URNS QL MICRO: NORMAL /LPF (ref 0–5)
IMM GRANULOCYTES # BLD AUTO: 0.1 K/UL (ref 0–0.04)
IMM GRANULOCYTES NFR BLD AUTO: 1 % (ref 0–0.5)
KETONES UR QL STRIP.AUTO: NEGATIVE MG/DL
LEUKOCYTE ESTERASE UR QL STRIP.AUTO: NEGATIVE
LIPASE SERPL-CCNC: 138 U/L (ref 73–393)
LYMPHOCYTES # BLD: 3.2 K/UL (ref 0.8–3.5)
LYMPHOCYTES NFR BLD: 25 % (ref 12–49)
MCH RBC QN AUTO: 30.8 PG (ref 26–34)
MCHC RBC AUTO-ENTMCNC: 32.3 G/DL (ref 30–36.5)
MCV RBC AUTO: 95.2 FL (ref 80–99)
MONOCYTES # BLD: 1.1 K/UL (ref 0–1)
MONOCYTES NFR BLD: 9 % (ref 5–13)
NEUTS SEG # BLD: 7.9 K/UL (ref 1.8–8)
NEUTS SEG NFR BLD: 64 % (ref 32–75)
NITRITE UR QL STRIP.AUTO: NEGATIVE
NRBC # BLD: 0 K/UL (ref 0–0.01)
NRBC BLD-RTO: 0 PER 100 WBC
PH UR STRIP: 7.5 [PH] (ref 5–8)
PLATELET # BLD AUTO: 377 K/UL (ref 150–400)
PMV BLD AUTO: 8.9 FL (ref 8.9–12.9)
POTASSIUM SERPL-SCNC: 4 MMOL/L (ref 3.5–5.1)
PROT SERPL-MCNC: 7 G/DL (ref 6.4–8.2)
PROT UR STRIP-MCNC: NEGATIVE MG/DL
RBC # BLD AUTO: 4.39 M/UL (ref 3.8–5.2)
RBC #/AREA URNS HPF: NORMAL /HPF (ref 0–5)
SODIUM SERPL-SCNC: 131 MMOL/L (ref 136–145)
SP GR UR REFRACTOMETRY: 1.01 (ref 1–1.03)
TSH SERPL DL<=0.05 MIU/L-ACNC: 1 UIU/ML (ref 0.36–3.74)
UA: UC IF INDICATED,UAUC: NORMAL
UROBILINOGEN UR QL STRIP.AUTO: 0.2 EU/DL (ref 0.2–1)
WBC # BLD AUTO: 12.4 K/UL (ref 3.6–11)
WBC URNS QL MICRO: NORMAL /HPF (ref 0–4)

## 2022-10-22 DIAGNOSIS — B00.9 HERPES SIMPLEX TYPE 2 INFECTION: ICD-10-CM

## 2022-10-24 ENCOUNTER — TELEPHONE (OUTPATIENT)
Dept: INTERNAL MEDICINE CLINIC | Age: 77
End: 2022-10-24

## 2022-10-24 NOTE — TELEPHONE ENCOUNTER
Patient called requesting to speak to the nurse regarding her lab results. Please advise once resulted.

## 2022-10-24 NOTE — PROGRESS NOTES
Please call with results-  -urine is clear  - Normal thyroid level  - Normal liver and pancreatic enzymes  - Slight change in her white count which would come from the vomiting and diarrhea  -Sodium level just mildly low but not as low as it has been in the past, this would have corrected by adding salt to her meals  -Slight dip in the kidney function but within range where she has fluctuated before and not alarming, likely secondary to some brief dehydration with the vomiting and diarrhea  - Follow-up with renal as scheduled

## 2022-10-25 RX ORDER — VALACYCLOVIR HYDROCHLORIDE 1 G/1
TABLET, FILM COATED ORAL
Qty: 30 TABLET | Refills: 1 | Status: SHIPPED | OUTPATIENT
Start: 2022-10-25

## 2022-10-28 DIAGNOSIS — J45.40 MODERATE PERSISTENT ASTHMATIC BRONCHITIS WITHOUT COMPLICATION: ICD-10-CM

## 2022-10-28 NOTE — PROGRESS NOTES
Called spoke to pt. Advised patient of results per Rusty HARTMAN. Patient states she has no relief on her back pain from the medication she got from [facility] on Monday. States she has the feeling of  needing to go to the bathroom but only goes a little. Has difficulty  getting out of the bed with the back pain. I advised her she might need to go back to the urgent care to get checked .   PLEASE  ADVISE

## 2022-10-31 NOTE — PROGRESS NOTES
If she is having difficulty urinating associated with severe back pain, she should go to the ER for further evaluation. Please call patient for status check, see how she was doing over the weekend, and if she has not sought further evaluation I recommend going to the ER.

## 2022-10-31 NOTE — PROGRESS NOTES
Called spoke to ptJude Carlson better but still in pain and having difficulty urinating to go to the er and get that checked out  for an uti

## 2022-11-01 RX ORDER — LEVALBUTEROL TARTRATE 45 UG/1
AEROSOL, METERED ORAL
Qty: 15 G | Refills: 2 | Status: SHIPPED | OUTPATIENT
Start: 2022-11-01

## 2022-11-04 ENCOUNTER — TELEPHONE (OUTPATIENT)
Dept: INTERNAL MEDICINE CLINIC | Age: 77
End: 2022-11-04

## 2022-11-04 NOTE — TELEPHONE ENCOUNTER
Patient states she went to [facility] yesterday for her back pain. They told her it is not a uti and wanted to set her up for PT. [facility] tried to schedule it but was told her pcp has to schedule it. Please advise.      [phone]

## 2022-11-09 NOTE — TELEPHONE ENCOUNTER
She will need an acute visit for back pain, Medicare requires OV for evaluation before PT order can be placed, so we can be sure to order the appropriate treatment

## 2022-11-10 ENCOUNTER — OFFICE VISIT (OUTPATIENT)
Dept: INTERNAL MEDICINE CLINIC | Age: 77
End: 2022-11-10
Payer: MEDICARE

## 2022-11-10 VITALS
RESPIRATION RATE: 18 BRPM | OXYGEN SATURATION: 97 % | HEIGHT: 65 IN | BODY MASS INDEX: 21.56 KG/M2 | WEIGHT: 129.4 LBS | DIASTOLIC BLOOD PRESSURE: 78 MMHG | TEMPERATURE: 98.6 F | SYSTOLIC BLOOD PRESSURE: 132 MMHG | HEART RATE: 91 BPM

## 2022-11-10 DIAGNOSIS — M54.50 ACUTE BILATERAL LOW BACK PAIN WITHOUT SCIATICA: Primary | ICD-10-CM

## 2022-11-10 DIAGNOSIS — N18.31 CKD STAGE G3A/A1, GFR 45-59 AND ALBUMIN CREATININE RATIO <30 MG/G (HCC): ICD-10-CM

## 2022-11-10 PROCEDURE — 3074F SYST BP LT 130 MM HG: CPT | Performed by: INTERNAL MEDICINE

## 2022-11-10 PROCEDURE — 3078F DIAST BP <80 MM HG: CPT | Performed by: INTERNAL MEDICINE

## 2022-11-10 PROCEDURE — 1123F ACP DISCUSS/DSCN MKR DOCD: CPT | Performed by: INTERNAL MEDICINE

## 2022-11-10 PROCEDURE — 99213 OFFICE O/P EST LOW 20 MIN: CPT | Performed by: INTERNAL MEDICINE

## 2022-11-10 NOTE — PATIENT INSTRUCTIONS
Back Pain: Care Instructions  Overview     In most cases, there isn't a clear cause for back pain. It may be related to problems with muscles and ligaments of the back. It may also be related to problems with the nerves, discs, or bones of the back. Moving, lifting, standing, sitting, or sleeping in an awkward way can strain the back. Arthritis is another cause of back pain. Although it may hurt a lot, back pain usually improves on its own within several weeks. Most people recover in 12 weeks or less. Using self-care, such as ice or heat and light activity (like walking) may help you feel better sooner. Follow-up care is a key part of your treatment and safety. Be sure to make and go to all appointments, and call your doctor if you are having problems. It's also a good idea to know your test results and keep a list of the medicines you take. How can you care for yourself at home? Sit or lie in positions that are most comfortable and reduce your pain. Try one of these positions when you lie down:  Lie on your back with your knees bent and supported by pillows. Lie on the floor with your legs on the seat of a sofa or chair. Lie on your side with your knees and hips bent and a pillow between your legs. Lie on your stomach if it does not make pain worse. Do not sit up in bed, and avoid soft couches and twisted positions. Bed rest can help relieve pain at first, but it delays healing. Avoid bed rest after the first day of back pain. Change positions every 30 minutes. If you must sit for long periods of time, take breaks from sitting. Get up and walk around, or lie in a comfortable position. Try using a heating pad on a low or medium setting for 15 to 20 minutes every 2 or 3 hours. Try a warm shower in place of one session with the heating pad. You can also try an ice pack for 10 to 15 minutes every 2 to 3 hours. Put a thin cloth between the ice pack and your skin.   Take pain medicines exactly as directed. If the doctor gave you a prescription medicine for pain, take it as prescribed. If you are not taking a prescription pain medicine, ask your doctor if you can take an over-the-counter medicine. Take short walks several times a day. You can start with 5 to 10 minutes, 3 or 4 times a day, and work up to longer walks. Walk on level surfaces and avoid hills and stairs until your back is better. Return to work and other activities as soon as you can. Continued rest without activity is usually not good for your back. To prevent future back pain, do exercises to stretch and strengthen your back and stomach. Learn how to use good posture, safe lifting techniques, and proper body mechanics. When should you call for help? Call your doctor now or seek immediate medical care if:    You have new or worsening numbness in your legs. You have new or worsening weakness in your legs. (This could make it hard to stand up.)     You lose control of your bladder or bowels. Watch closely for changes in your health, and be sure to contact your doctor if:    You have a fever, lose weight, or don't feel well. You do not get better as expected. Where can you learn more? Go to http://www.[facility].com/  Enter I594 in the search box to learn more about \"Back Pain: Care Instructions. \"  Current as of: March 9, 2022               Content Version: 13.4  © 5583-6648 [employer]. Care instructions adapted under license by [employer] (which disclaims liability or warranty for this information). If you have questions about a medical condition or this instruction, always ask your healthcare professional. [place] [address] any warranty or liability for your use of this information. Back Stretches: Exercises  Introduction  Here are some examples of exercises for stretching your back. Start each exercise slowly.  Ease off the exercise if you start to have pain.  Your doctor or physical therapist will tell you when you can start these exercises and which ones will work best for you. How to do the exercises  Overhead stretch  Stand comfortably with your feet shoulder-width apart. Looking straight ahead, raise both arms over your head and reach toward the ceiling. Do not allow your head to tilt back. Hold for 15 to 30 seconds, then lower your arms to your sides. Repeat 2 to 4 times. Side stretch  Stand comfortably with your feet shoulder-width apart. Raise one arm over your head, and then lean to the other side. Slide your hand down your leg as you let the weight of your arm gently stretch your side muscles. Hold for 15 to 30 seconds. Repeat 2 to 4 times on each side. Press-up  Lie on your stomach, supporting your body with your forearms. Press your elbows down into the floor to raise your upper back. As you do this, relax your stomach muscles and allow your back to arch without using your back muscles. As your press up, do not let your hips or pelvis come off the floor. Hold for 15 to 30 seconds, then relax. Repeat 2 to 4 times. Relax and rest  Lie on your back with a rolled towel under your neck and a pillow under your knees. Extend your arms comfortably to your sides. Relax and breathe normally. Remain in this position for about 10 minutes. If you can, do this 2 or 3 times each day. Follow-up care is a key part of your treatment and safety. Be sure to make and go to all appointments, and call your doctor if you are having problems. It's also a good idea to know your test results and keep a list of the medicines you take. Current as of: March 9, 2022               Content Version: 13.4  © 2006-2022 [employer]. Care instructions adapted under license by [employer] (which disclaims liability or warranty for this information).  If you have questions about a medical condition or this instruction, always ask your healthcare professional. [address] any warranty or liability for your use of this information.

## 2022-11-10 NOTE — PROGRESS NOTES
ICD-10-CM ICD-9-CM    1. Acute bilateral low back pain without sciatica  M54.50 724.2      338.19       2. CKD stage G3a/A1, GFR 45-59 and albumin creatinine ratio <30 mg/g ([facility])  N18.31 585. 3                Subjective:     Chief Complaint   Patient presents with    Back Pain       Matt Fitzgerald is a 67 y.o. F.  She is a previous patient of Jesus Erazo. She presents today for an acute complaint of lower back pain. The patient says that she had been in her usual state of health until a few weeks ago when she had been stretching over her head to rearrange some spices on the shelf in her kitchen. She had a twinge in her lower back and had subsequently had pain radiating across both sides of her lower back and down towards her buttocks. There was no other associated sciatica type symptoms including any electrical tingling or numbness or other focal neurological symptoms at the time. She had attempted to treat this at home by herself with Tylenol but did not have immediate relief and subsequently had some difficulty with sleep as a result of these symptoms. [facility], and had initially been treated with corticosteroids as well as muscle relaxants, but apparently did not have any significant improvement with this. She was seen a few days later and [facility] more, for the same issue, and had been treated with Tylenol and continued muscle relaxants, and was advised to consider physical therapy. Because of her kidney disease, no NSAIDs were used. Today, the patient comes in for these same complaints. She says that since her [facility] with [facility], her symptoms have been slightly improving. She now still has some pain in her lower back but says that this now feels like muscle tightness. She denies having had any interval development of any groin anesthesia, lower extremity weakness or other focal neurological symptoms, or any fevers or chills. She denies any personal history of cancer, and is noted to have a history of osteoporosis that was improved after infusion of Reclast several years ago and for which she is now on drug holiday. She otherwise denies any other new interval development of other symptoms. Her review of systems is otherwise negative. Routine Healthcare Maintenance issues are reviewed and discussed with the patient as noted below. Orders to update gaps in healthcare maintenance were placed as noted below in the Assessment and Plan, where applicable. Past Medical History:  Past Medical History:   Diagnosis Date    Anxiety     Arthritis     Chronic obstructive pulmonary disease ([facility] [address] 2010    early disease on PFTs, briefly on a daily inhaler    Chronic pain     neck pain, Dr. Maged Amador    Degenerative disc disease, cervical 1998    cervical spine injections, [facility] Dr. Kamila Bonner kidney     Right    Gallstone 1974    GERD (gastroesophageal reflux disease)     pepcid 20 mg prn (about 2x/month)    Herpes simplex type II infection     Hypercholesterolemia     Hypertension     Nausea & vomiting     During surgery when ether was used    Osteoporosis 2012    Reclast 9/24/2012, 10/09/2013, 10/31/2014, 1/29/2016    PUD (peptic ulcer disease) 2010    endoscopy confirmed, was taking aleve    Stage 3 chronic kidney disease ([facility] [address] [id number]    Trauma 1972    right arm fracture surgically repaired, twisted in washing machine       Past Surgical Histor:  Past Surgical History:   Procedure Laterality Date    HX APPENDECTOMY      HX BREAST BIOPSY Right     negative 1987    HX CATARACT REMOVAL Bilateral 07/09/2018    HX CHOLECYSTECTOMY  1974    HX HYSTERECTOMY  1974    HX ORTHOPAEDIC  1972    R arm broken, pins put in, taken out 1 year later    [address] [facility]    from bone on L leg        Allergies:   Allergies   Allergen Reactions    Indomethacin Other (comments)     Made her feel weird to the point it scared her  Other reaction(s): Other (see comments)  Made her feel weird to the point it scared her  Made her feel weird to the point it scared her    Other reaction(s): Other (comments)  Made her feel weird to the point it scared her    Fosamax [Alendronate] Other (comments)     Had a peptic ulcer    Other Medication Nausea and Vomiting     Flu vaccine in [id number]    Cephalexin Nausea and Vomiting    Erythromycin Nausea and Vomiting    Prochlorperazine Other (comments)     Childhood reaction-Neck spasms  Other reaction(s): Other (see comments)  Childhood reaction  Childhood reaction    Other reaction(s): Other (comments)  Childhood reaction-Neck spasms       Medications:  Current Outpatient Medications   Medication Sig Dispense Refill    levalbuterol tartrate (XOPENEX) 45 mcg/actuation inhaler INHALE 2 PUFFS BY MOUTH EVERY 4 HOURS AS NEEDED FOR WHEEZING 15 g 2    valACYclovir (VALTREX) 1 gram tablet TAKE 2 TABLETS BY MOUTH TWICE DAILY. DO NOT TAKE WITH TIZANIDINE INDICATIONS: PREVENT RECURRENT HERPES SIMPLEX INFECTION 30 Tablet 1    levalbuterol (XOPENEX) 1.25 mg/3 mL nebu 3 mL by Nebulization route every four (4) hours as needed for Wheezing or Shortness of Breath. 90 mL 1    ipratropium (ATROVENT) 42 mcg (0.06 %) nasal spray USE 1 TO 2 SPRAYS IN EACH NOSTRIL FOUR TIMES DAILY 15 mL 2    traZODone (DESYREL) 50 mg tablet TAKE 1 TABLET BY MOUTH EVERY NIGHT 90 Tablet 3    Spiriva with HandiHaler 18 mcg inhalation capsule INHALE THE CONTENTS OF 1 CAPSULE VIA INHALATION DEVICE DAILY 90 Capsule 1    busPIRone (BUSPAR) 15 mg tablet TAKE 1/2 TABLET BY MOUTH THREE TIMES DAILY AS DIRECTED 135 Tablet 1    amLODIPine (NORVASC) 5 mg tablet TAKE 1 TABLET BY MOUTH EVERY DAY 90 Tablet 1    montelukast (SINGULAIR) 10 mg tablet TAKE 1 TABLET BY MOUTH DAILY 90 Tablet 1    pravastatin (PRAVACHOL) 20 mg tablet TAKE 1 TABLET BY MOUTH EVERY NIGHT 90 Tablet 3    varenicline (Chantix Continuing Month Box) 1 mg tablet Take 1 Tablet by mouth two (2) times a day.  (Begin this after completing the Starter Pack for the first month) (Patient not taking: Reported on 10/13/2022) 60 Tablet 5    budesonide-formoteroL (SYMBICORT) 160-4.5 mcg/actuation HFAA Take 2 Puffs by inhalation two (2) times a day. (TO REPLACE WIXELA) 3 Each 3    diclofenac (VOLTAREN) 1 % gel Apply  to affected area as needed for Pain. 100 g 3    lisinopriL (PRINIVIL, ZESTRIL) 40 mg tablet TAKE 1 TABLET BY MOUTH DAILY 90 Tablet 2    olopatadine (PATANASE) 0.6 % spry two (2) times a day. (Patient not taking: Reported on 10/13/2022)      mometasone (ELOCON) 0.1 % lotion INSTILL 4 METRIC DROP TWO TIMES DAILY IN EARS, AS NEEDED 30 mL 1    tiZANidine (ZANAFLEX) 2 mg tablet TAKE 1 TABLET BY MOUTH EVERY 8 HOURS AS NEEDED FOR SPASMS      torsemide (DEMADEX) 10 mg tablet Take 5 mg by mouth daily. 2    carboxymethylcellulose sodium (REFRESH OP) Apply  to eye. dextran 70/hypromellose (TEARS NATURALE OP) Apply 1 Drop to eye as needed. CALCIUM-MAGNESIUM-ZINC PO Take 2 Tablets by mouth daily. omega-3 fatty acids-fish oil 360-1,200 mg cap Take 2 Tabs by mouth daily. glucosamine sulfate 1,000 mg cap Take 2 Tabs by mouth daily. acetaminophen (TYLENOL) 650 mg CR tablet Take 650 mg by mouth every six (6) hours as needed for Pain.      famotidine (PEPCID) 20 mg tablet Take 20 mg by mouth as needed. melatonin 1 mg tablet Take 1 mg by mouth nightly. Social History:  Social History     Socioeconomic History    Marital status:    Tobacco Use    Smoking status: Some Days     Packs/day: 0.50     Years: 41.00     Pack years: 20.50     Types: Cigarettes    Smokeless tobacco: Never   Vaping Use    Vaping Use: Never used   Substance and Sexual Activity    Alcohol use: Yes     Comment: occasional    Drug use: No    Sexual activity: Never   Social History Narrative    Lives in [place] with her grandson and his girlfriend.  x 2. Has 3 girls and 2 boys. Retired.   Worked as a  and drove trucks and school buses. Likes to read. Family History:  Family History   Problem Relation Age of Onset    Cancer Mother         Cervical    Stroke Mother     Psychiatric Disorder Mother     Heart Disease Father     Breast Cancer Sister         in her 52's       Immunizations:  Immunization History   Administered Date(s) Administered    COVID-19, MODERNA BLUE border, Primary or Immunocompromised, (age 18y+), IM, 100 mcg/0.5mL 02/24/2022, 03/24/2022    Influenza, FLUAD, (age 72 y+), Adjuvanted 02/14/2022, 10/13/2022    Pneumococcal Conjugate (PCV-13) 06/14/2016    Pneumococcal Polysaccharide (PPSV-23) 06/15/2017    Zoster Recombinant 05/02/2018, 05/09/2018, 05/09/2018, 08/09/2018        Healthcare Maintenance:  Health Maintenance   Topic Date Due    COVID-19 Vaccine (3 - Booster for Moderna series) 05/19/2022    DTaP/Tdap/Td series (2 - Td or Tdap) 09/11/2022    Low dose CT lung screening  10/25/2022    Medicare Yearly Exam  06/14/2023    Lipid Screen  06/21/2023    Depression Screen  10/13/2023    Hepatitis C Screening  Completed    Shingrix Vaccine Age 50>  Completed    Flu Vaccine  Completed    Pneumococcal 65+ years  Completed        Review of Systems:  ROS:  Review of Systems   Constitutional: Negative. HENT: Negative. Eyes: Negative. Respiratory: Negative. Cardiovascular: Negative. Gastrointestinal: Negative. Genitourinary: Negative. Musculoskeletal:  Positive for back pain. Skin: Negative. Neurological: Negative. Endo/Heme/Allergies: Negative. Psychiatric/Behavioral: Negative. ROS otherwise negative      Objective:     Vital Signs:  Visit Vitals  /78 (BP 1 Location: Left upper arm, BP Patient Position: Sitting, BP Cuff Size: Adult)   Pulse 91   Temp 98.6 °F (37 °C) (Oral)   Resp 18   Ht 5' 5\" (1.651 m)   Wt 129 lb 6.4 oz (58.7 kg)   LMP  (LMP Unknown)   SpO2 97%   BMI 21.53 kg/m²       BMI:  Body mass index is 21.53 kg/m².     Physical Examination:  Physical Exam  Constitutional:       Appearance: Normal appearance. She is normal weight. HENT:      Head: Normocephalic and atraumatic. Right Ear: External ear normal.      Left Ear: External ear normal.      Nose: Nose normal.      Mouth/Throat:      Mouth: Mucous membranes are moist.      Pharynx: Oropharynx is clear. No oropharyngeal exudate or posterior oropharyngeal erythema. Cardiovascular:      Rate and Rhythm: Normal rate and regular rhythm. Pulses: Normal pulses. Heart sounds: Normal heart sounds. No murmur heard. No friction rub. No gallop. Pulmonary:      Effort: Pulmonary effort is normal. No respiratory distress. Breath sounds: Normal breath sounds. No wheezing, rhonchi or rales. Comments: Mildly diminished throughout  Abdominal:      General: Abdomen is flat. Bowel sounds are normal. There is no distension. Palpations: Abdomen is soft. Tenderness: There is no abdominal tenderness. There is no guarding. Musculoskeletal:         General: No swelling, tenderness or deformity. Normal range of motion. Cervical back: Normal range of motion and neck supple. No rigidity or tenderness. Comments: SLR negative BL   Skin:     General: Skin is warm and dry. Findings: No erythema, lesion or rash. Neurological:      General: No focal deficit present. Mental Status: She is alert and oriented to person, place, and time. Mental status is at baseline. Sensory: No sensory deficit. Motor: No weakness.       Gait: Gait normal.      Deep Tendon Reflexes: Reflexes normal.   Psychiatric:         Mood and Affect: Mood normal.         Behavior: Behavior normal.         Judgment: Judgment normal.        Physical exam otherwise negative    Diagnostic Testing:    Laboratory Studies:  Office Visit on 10/13/2022   Component Date Value Ref Range Status    Color 10/13/2022 YELLOW/STRAW    Final    Color Reference Range: Straw, Yellow or Dark Yellow    Appearance 10/13/2022 CLEAR  CLEAR   Final    Specific gravity 10/13/2022 1.006  1.003 - 1.030   Final    pH (UA) 10/13/2022 7.5  5.0 - 8.0   Final    Protein 10/13/2022 Negative  Negative mg/dL Final    Glucose 10/13/2022 Negative  Negative mg/dL Final    Ketone 10/13/2022 Negative  Negative mg/dL Final    Bilirubin 10/13/2022 Negative  Negative   Final    Blood 10/13/2022 Negative  Negative   Final    Urobilinogen 10/13/2022 0.2  0.2 - 1.0 EU/dL Final    Nitrites 10/13/2022 Negative  Negative   Final    Leukocyte Esterase 10/13/2022 Negative  Negative   Final    UA:UC IF INDICATED 10/13/2022 CULTURE NOT INDICATED BY UA RESULT  CULTURE NOT INDICATED BY UA RESULT   Final    WBC 10/13/2022 0-4  0 - 4 /hpf Final    RBC 10/13/2022 0-5  0 - 5 /hpf Final    Epithelial cells 10/13/2022 FEW  FEW /lpf Final    Epithelial cell category consists of squamous cells and /or transitional urothelial cells. Renal tubular cells, if present, are separately identified as such. Bacteria 10/13/2022 Negative  Negative /hpf Final    Hyaline cast 10/13/2022 0-2  0 - 5 /lpf Final    TSH 10/13/2022 1.00  0.36 - 3.74 uIU/mL Final    Comment:   Due to TSH heterogeneity, both structurally and degree of glycosylation, monoclonal antibodies used in the TSH assay may not accurately quantitate TSH. Therefore, this result should be correlated with clinical findings as well as with other assessments of thyroid function, e.g., free T4, free T3.       Amylase 10/13/2022 73  25 - 115 U/L Final    Lipase 10/13/2022 138  73 - 393 U/L Final    WBC 10/13/2022 12.4 (A)  3.6 - 11.0 K/uL Final    RBC 10/13/2022 4.39  3.80 - 5.20 M/uL Final    HGB 10/13/2022 13.5  11.5 - 16.0 g/dL Final    HCT 10/13/2022 41.8  35.0 - 47.0 % Final    MCV 10/13/2022 95.2  80.0 - 99.0 FL Final    MCH 10/13/2022 30.8  26.0 - 34.0 PG Final    MCHC 10/13/2022 32.3  30.0 - 36.5 g/dL Final    RDW 10/13/2022 13.2  11.5 - 14.5 % Final    PLATELET 50/13/3868 098  150 - 400 K/uL Final    MPV 10/13/2022 8.9  8.9 - 12.9 FL Final    NRBC 10/13/2022 0.0  0  WBC Final    ABSOLUTE NRBC 10/13/2022 0.00  0.00 - 0.01 K/uL Final    NEUTROPHILS 10/13/2022 64  32 - 75 % Final    LYMPHOCYTES 10/13/2022 25  12 - 49 % Final    MONOCYTES 10/13/2022 9  5 - 13 % Final    EOSINOPHILS 10/13/2022 1  0 - 7 % Final    BASOPHILS 10/13/2022 0  0 - 1 % Final    IMMATURE GRANULOCYTES 10/13/2022 1 (A)  0.0 - 0.5 % Final    ABS. NEUTROPHILS 10/13/2022 7.9  1.8 - 8.0 K/UL Final    ABS. LYMPHOCYTES 10/13/2022 3.2  0.8 - 3.5 K/UL Final    ABS. MONOCYTES 10/13/2022 1.1 (A)  0.0 - 1.0 K/UL Final    ABS. EOSINOPHILS 10/13/2022 0.2  0.0 - 0.4 K/UL Final    ABS. BASOPHILS 10/13/2022 0.0  0.0 - 0.1 K/UL Final    ABS. IMM. GRANS. 10/13/2022 0.1 (A)  0.00 - 0.04 K/UL Final    DF 10/13/2022 AUTOMATED    Final    Sodium 10/13/2022 131 (A)  136 - 145 mmol/L Final    Potassium 10/13/2022 4.0  3.5 - 5.1 mmol/L Final    Chloride 10/13/2022 96 (A)  97 - 108 mmol/L Final    CO2 10/13/2022 29  21 - 32 mmol/L Final    Anion gap 10/13/2022 6  5 - 15 mmol/L Final    Glucose 10/13/2022 91  65 - 100 mg/dL Final    BUN 10/13/2022 18  6 - 20 MG/DL Final    Creatinine 10/13/2022 1.20 (A)  0.55 - 1.02 MG/DL Final    BUN/Creatinine ratio 10/13/2022 15  12 - 20   Final    eGFR 10/13/2022 47 (A)  >60 ml/min/1.73m2 Final    Comment:   Pediatric calculator link: Sukhwinder.at. org/professionals/kdoqi/gfr_calculatorped    Effective Oct 3, 2022    These results are not intended for use in patients <25years of age. eGFR results are calculated without a race factor using  the 2021 CKD-EPI equation. Careful clinical correlation is recommended, particularly when comparing to results calculated using previous equations. The CKD-EPI equation is less accurate in patients with extremes of muscle mass, extra-renal metabolism of creatinine, excessive creatine ingestion, or following therapy that affects renal tubular secretion.       Calcium 10/13/2022 9.6  8.5 - 10.1 MG/DL Final    Bilirubin, total 10/13/2022 1.2 (A)  0.2 - 1.0 MG/DL Final    ALT (SGPT) 10/13/2022 24  12 - 78 U/L Final    AST (SGOT) 10/13/2022 14 (A)  15 - 37 U/L Final    Alk. phosphatase 10/13/2022 59  45 - 117 U/L Final    Protein, total 10/13/2022 7.0  6.4 - 8.2 g/dL Final    Albumin 10/13/2022 4.1  3.5 - 5.0 g/dL Final    Globulin 10/13/2022 2.9  2.0 - 4.0 g/dL Final    A-G Ratio 10/13/2022 1.4  1.1 - 2.2   Final   Appointment on 06/21/2022   Component Date Value Ref Range Status    TSH 06/21/2022 2.32  0.36 - 3.74 uIU/mL Final    Comment:   Due to TSH heterogeneity, both structurally and degree of glycosylation, monoclonal antibodies used in the TSH assay may not accurately quantitate TSH. Therefore, this result should be correlated with clinical findings as well as with other assessments of thyroid function, e.g., free T4, free T3. Cholesterol, total 06/21/2022 151  <200 MG/DL Final    Triglyceride 06/21/2022 64  <150 MG/DL Final    Based on NCEP-ATP III:  Triglycerides <150 mg/dL  is considered normal, 150-199 mg/dL  borderline high,  200-499 mg/dL high and  greater than or equal to 500 mg/dL very high. HDL Cholesterol 06/21/2022 77  MG/DL Final    Based on NCEP ATP III, HDL Cholesterol <40 mg/dL is considered low and >60 mg/dL is elevated.     LDL, calculated 06/21/2022 61.2  0 - 100 MG/DL Final    Comment: Based on the NCEP-ATP: LDL-C concentrations are considered  optimal <100 mg/dL,  near optimal/above Normal 100-129 mg/dL  Borderline High: 130-159, High: 160-189 mg/dL  Very High: Greater than or equal to 190 mg/dL      VLDL, calculated 06/21/2022 12.8  MG/DL Final    CHOL/HDL Ratio 06/21/2022 2.0  0.0 - 5.0   Final    Sodium 06/21/2022 132 (A)  136 - 145 mmol/L Final    Potassium 06/21/2022 5.0  3.5 - 5.1 mmol/L Final    Chloride 06/21/2022 98  97 - 108 mmol/L Final    CO2 06/21/2022 27  21 - 32 mmol/L Final    Anion gap 06/21/2022 7  5 - 15 mmol/L Final    Glucose 06/21/2022 84  65 - 100 mg/dL Final    BUN 06/21/2022 14  6 - 20 MG/DL Final    Creatinine 06/21/2022 1.02  0.55 - 1.02 MG/DL Final    BUN/Creatinine ratio 06/21/2022 14  12 - 20   Final    GFR est AA 06/21/2022 >60  >60 ml/min/1.73m2 Final    GFR est non-AA 06/21/2022 53 (A)  >60 ml/min/1.73m2 Final    Estimated GFR is calculated using the IDMS-traceable Modification of Diet in Renal Disease (MDRD) Study equation, reported for both  Americans (GFRAA) and non- Americans (GFRNA), and normalized to 1.73m2 body surface area. The physician must decide which value applies to the patient. Calcium 06/21/2022 9.5  8.5 - 10.1 MG/DL Final    Bilirubin, total 06/21/2022 0.6  0.2 - 1.0 MG/DL Final    ALT (SGPT) 06/21/2022 20  12 - 78 U/L Final    AST (SGOT) 06/21/2022 13 (A)  15 - 37 U/L Final    Alk. phosphatase 06/21/2022 65  45 - 117 U/L Final    Protein, total 06/21/2022 6.8  6.4 - 8.2 g/dL Final    Albumin 06/21/2022 4.0  3.5 - 5.0 g/dL Final    Globulin 06/21/2022 2.8  2.0 - 4.0 g/dL Final    A-G Ratio 06/21/2022 1.4  1.1 - 2.2   Final    WBC 06/21/2022 6.9  3.6 - 11.0 K/uL Final    RBC 06/21/2022 4.44  3.80 - 5.20 M/uL Final    HGB 06/21/2022 13.5  11.5 - 16.0 g/dL Final    HCT 06/21/2022 41.2  35.0 - 47.0 % Final    MCV 06/21/2022 92.8  80.0 - 99.0 FL Final    MCH 06/21/2022 30.4  26.0 - 34.0 PG Final    MCHC 06/21/2022 32.8  30.0 - 36.5 g/dL Final    RDW 06/21/2022 11.7  11.5 - 14.5 % Final    PLATELET 78/38/0824 024 (A)  150 - 400 K/uL Final    MPV 06/21/2022 9.1  8.9 - 12.9 FL Final    NRBC 06/21/2022 0.0  0  WBC Final    ABSOLUTE NRBC 06/21/2022 0.00  0.00 - 0.01 K/uL Final    NEUTROPHILS 06/21/2022 49  32 - 75 % Final    LYMPHOCYTES 06/21/2022 34  12 - 49 % Final    MONOCYTES 06/21/2022 11  5 - 13 % Final    EOSINOPHILS 06/21/2022 5  0 - 7 % Final    BASOPHILS 06/21/2022 1  0 - 1 % Final    IMMATURE GRANULOCYTES 06/21/2022 0  0.0 - 0.5 % Final    ABS.  NEUTROPHILS 06/21/2022 3.4  1.8 - 8.0 K/UL Final    ABS. LYMPHOCYTES 06/21/2022 2.4  0.8 - 3.5 K/UL Final    ABS. MONOCYTES 06/21/2022 0.7  0.0 - 1.0 K/UL Final    ABS. EOSINOPHILS 06/21/2022 0.4  0.0 - 0.4 K/UL Final    ABS. BASOPHILS 06/21/2022 0.1  0.0 - 0.1 K/UL Final    ABS. IMM. GRANS. 06/21/2022 0.0  0.00 - 0.04 K/UL Final    DF 06/21/2022 AUTOMATED    Final    Hep C virus Ab Interp. 06/21/2022 NONREACTIVE  NONREACTIVE   Final    Method used is Siemens Advia Boracciaur         Radiographic Studies:  XR Results (most recent):  Results from Hospital Encounter encounter on 09/09/21    XR CHEST PORT    Narrative  INDICATION: Shortness of breath. Portable AP view of the chest.    Direct comparison made to prior chest x-ray dated February 2020. Cardiomediastinal silhouette is stable. Lungs are clear bilaterally. Pleural  spaces are normal and there is no pneumothorax. Osseous structures are diffusely  demineralized. There is a healed left posterior seventh rib fracture deformity. Impression  No acute cardiopulmonary disease. [facility] Results (most recent):  Results from [place] encounter on 02/27/19    [facility] MAMMO BI SCREENING INCL CAD    Narrative  STUDY: Bilateral digital screening mammogram    INDICATION:  Screening. COMPARISON: Limited comparison to outside exam from 2017    BREAST COMPOSITION:  The breasts are heterogeneously dense, which may obscure  small masses. FINDINGS: Bilateral digital screening mammography was performed and is  interpreted in conjunction with a computer assisted detection (CAD) system. No  significant change in the appearance of calcifications in either breast.  No  suspicious masses or calcifications are identified. Allowing for differences in  technique/equipment, there has been no significant change. Impression  IMPRESSION:  1. This examination essentially serves as a new baseline. BI-RADS 1: Negative. No mammographic evidence of malignancy.     RECOMMENDATIONS:  Next screening mammogram is recommended in one year. The patient will be notified of these results. CT Results (most recent):  Results from Hospital Encounter encounter on 08/11/22    CT CHEST WO CONT    Narrative  INDICATION: Lung nodule    COMPARISON: February 10, 2022    CONTRAST: None. TECHNIQUE:  5 mm axial images were obtained through the chest. Coronal and  sagittal reformats were generated. CT dose reduction was achieved through use  of a standardized protocol tailored for this examination and automatic exposure  control for dose modulation. The absence of intravenous contrast reduces the sensitivity for evaluation of  the mediastinum, stephanie, vasculature, and upper abdominal organs. FINDINGS:    CHEST WALL: No mass or axillary lymphadenopathy. THYROID: No nodule. MEDIASTINUM: No mass or lymphadenopathy. STEPHANIE: No mass or lymphadenopathy. THORACIC AORTA: No aneurysm. MAIN PULMONARY ARTERY: Normal in caliber. TRACHEA/BRONCHI: Patent. ESOPHAGUS: No wall thickening or dilatation. HEART: Normal in size. PLEURA: No effusion or pneumothorax. LUNGS: Stable right upper lobe nodule. No new findings. INCIDENTALLY IMAGED UPPER ABDOMEN: No significant abnormality in the  incidentally imaged upper abdomen. BONES: No destructive bone lesion. Impression  Stable right upper lobe nodule, no new findings. DEXA Results (most recent):  Results from Hospital Encounter encounter on 02/26/21    DEXA BONE DENSITY STUDY AXIAL    Narrative  Bone Mineral Density    Indication: Postmenopausal  Age: 67  Sex: Female. Menopause status: Postmenopausal.  Hormone replacement therapy: yes/no    Number of falls in the past year: 1  Risk factors for osteoporosis: History of smoking    Current medication for osteoporosis: None.     Comparison: 2/27/2019 performed on a Deep [facility]int unit    Technique: Imaging was performed on the [address] [place] [address]  meta-analysis fracture risk calculator (FRAX) analysis was performed for 10 year  fracture risk probability assessment    Excluded sites: None    Findings:    Fractures identified on Lateral scanogram: None    Femoral Neck Left:  Bone mineral density (gm/cm2): 0.785 g/cm?  % of peak bone mass: 76%  % for age matched controls: 105%  T-score: -1.8  Z-score: 0.3    Femoral Neck Right:  Bone mineral density (gm/cm2): 0.756 g/cm?  % of peak bone mass: 73%  % for age matched controls:  101%  T-score: -2.0  Z-score: 0.1    Total Hip Left:  Bone mineral density (gm/cm2): 0.757 g/cm?  % of peak bone mass: 75%  % for age matched controls: 99%  T-score: -2.0  Z-score: -0.1    Total Hip Right:  Bone mineral density (gm/cm2): 0.738 g/cm?  % of peak bone mass: 73%  % for age matched controls:  96%  T-score: -2.1  Z-score: -0.2    Lumbar Spine: L1-L4  Bone mineral density (gm/cm2): 0.935 g/cm?  % of peak bone mass: 79%  % for age matched controls: 98%  T-score: -2.1  Z-score: -0.1    33% Radius Left:  Bone mineral density (gm/cm2): 0.691 g/cm?  % of peak bone mass: 79%  % for age matched controls:  103%  T-score: -2.1  Z-score: 0.2    Impression  This patient is osteopenic using the World Health Organization criteria  Compared to the prior study, bone mineral density measurements are not  comparable as the patient was imaged on different units. 10 year probability of major osteoporotic fracture: 6.2%  10 year probability of hip fracture: 2.5%    Recommendations:  Therapy recommendations need to be tailored to each individual patient. Using  the [facility] FRAX absolute fracture algorithm, the  [address] [place] [address] recommends beginning pharmacological therapy in  postmenopausal women and men over the age of 48 with a 8 year probability of a  hip fracture of >3% OR with the 10 year probability of a major osteoporotic  fracture of >20%. Please reconsider testing based on risk factors.  Currently, Medicare will only  reimburse for a central DXA examination every two years, unless the patient is  on chronic glucocorticoid therapy. Note: Please note that reliable, valid comparisons cannot be made between  studies which have been performed on machines from different manufacturers. If  clinically warranted, a follow up study performed at this site, on the same  unit, would allow the most sensitive assessment of change in bone mineral  density. MRI Results (most recent):  [place] from [place] on 06/[place]/19    MRI CERV SPINE WO CONT    Narrative  EXAM:  MRI CERV SPINE WO CONT    INDICATION:   Left upper extremity and cervical neck pain    COMPARISON: None. TECHNIQUE: MR imaging of the cervical spine was performed including sagittal T1,  T2, STIR;  axial GRE, T2, T1. Contrast was not administered. FINDINGS:    Mild reversal the normal cervical lordosis. Moderate C5-6 degenerative disc  disease. No evidence for acute fracture or focal destructive bone marrow  process. Craniocervical junction is unremarkable. No signal abnormality of the  cervical spinal cord    C2/3:   The spinal canal and neural foramina are widely patent. C3/4:  Small disc osteophyte complex without any significant central stenosis. Mild to moderate left and no right neural foraminal stenosis    C4/5:  Disc osteophyte complex with mild central stenosis. Mild right and mild  to moderate left neural foraminal stenosis    C5/6:  Disc osteophyte complex with ligamentum flavum hypertrophy causing  moderate central stenosis. Moderate to severe right and moderate left neural  foraminal stenosis    C6/7:  Disc osteophyte complex with ligamentum flavum hypertrophy causing mild  to moderate central stenosis. Mild right and no left neural foraminal stenosis    C7/T1:   The spinal canal and neural foramina are widely patent.     Impression  IMPRESSION:    Moderate C5-6 and mild to moderate C6-7 central stenosis with multilevel neural  foraminal narrowing as described above       Assessment/Plan: ICD-10-CM ICD-9-CM    1. Acute bilateral low back pain without sciatica  M54.50 724.2      338.19       2. CKD stage G3a/A1, GFR 45-59 and albumin creatinine ratio <30 mg/g ([facility])  N18.31 585. 3              Back Pain:   - No red flags: history negative for incontinence, fever, hx cancer, weight loss, recent prolonged steroid use, leg weakness, new numbness, new weakness, new tingling   - Current Pharmacotherapy:   Key Pain Meds               acetaminophen (TYLENOL) 650 mg CR tablet Take 650 mg by mouth every six (6) hours as needed for Pain.            - Physical therapy referral active:NO   - Pain management referral active: NO   - Duloxetine: NO   - Nonpharmacologic therapies tried or discussed:    - Yoga: NO     - Kenneth Chi: NO    - Massage: NO    - Acupuncture: NO   - Notes: This patient likely has acute musculoskeletal pain and musculoskeletal strain. She is notably improving with conservative management alone. Discussed back stretches, as well as the expected course of recovery for this. Deferring additional NSAIDs given her history of chronic kidney disease. Noted to patient that should her symptoms not improve with conservative management that we could consider formal referral to physical therapy, consider plain films or MRI, though at this point given her interval improvement, I think that these interventions would probably be unnecessary. Return precautions are discussed with the patient who endorses agreement and understanding.     Chronic Kidney Disease:   - Last Serum Creatinine:   Lab Results   Component Value Date/Time    Creatinine 1.20 (H) 10/13/2022 03:27 PM       - Last GFR:   Lab Results   Component Value Date/Time    GFR est AA >60 06/21/2022 08:20 AM    GFR est non-AA 53 (L) 06/21/2022 08:20 AM      - Current Stage by eGFR: G3A   - Proteinuria: A1   -   Key CKD Meds               lisinopriL (PRINIVIL, ZESTRIL) 40 mg tablet TAKE 1 TABLET BY MOUTH DAILY    torsemide (DEMADEX) 10 mg tablet Take 5 mg by mouth daily.           - Blood Pressure Target: See Hypertension/Blood Pressure Management Section   - Advised avoidance of NSAIDs and other nephrotoxins wherever possible   - Advised renal dosing of medications where necessary   - Acid/Base Management: ---   - Phosphorus Management: ---   - Nephrology Consultation: Dr. Rissa Colon   - Notes: CCM, avoid NSAIDs              I have reviewed the patient's medical history in detail and updated the computerized patient record. We had a prolonged discussion about these complex clinical issues and went over the various important aspects to consider. All questions were answered. Advised the patient to call back or return to office if symptoms do not improve, change in nature, or persist.     The patient was given an after visit summary or informed of [facility] which includes patient instructions, diagnoses, current medications, & vitals. she expressed understanding with the diagnosis and plan. Bennie Gasca MD    Please note that this dictation was completed with [employer], the computer voice recognition software. Quite often unanticipated grammatical, syntax, homophones, and other interpretive errors are inadvertently transcribed by the computer software. Please disregard these errors. Please excuse any errors that have escaped final proofreading.

## 2022-11-10 NOTE — PROGRESS NOTES
Chief Complaint   Patient presents with    Back Pain       1. \"Have you been to the ER, urgent care clinic since your last visit? Hospitalized since your last visit? \"  [facility] for back pain on 11/ 2/22    2. \"Have you seen or consulted any other health care providers outside of the [address] [place] [address] since your last visit? \" No     3. For patients aged 39-70: Has the patient had a colonoscopy / FIT/ Cologuard? No      If the patient is female:    4. For patients aged 41-77: Has the patient had a mammogram within the past 2 years? No      5. For patients aged 21-65: Has the patient had a pap smear?  No

## 2022-12-05 NOTE — PROGRESS NOTES
MyChart message sentAll of your labs are stable, continue same regimen. No signs of exposure to hepatitis C virus. English

## 2023-02-27 ENCOUNTER — HOSPITAL ENCOUNTER (OUTPATIENT)
Dept: MAMMOGRAPHY | Age: 78
Discharge: HOME | End: 2023-02-27
Attending: INTERNAL MEDICINE
Payer: MEDICARE

## 2023-02-27 DIAGNOSIS — M81.0 AGE-RELATED OSTEOPOROSIS WITHOUT CURRENT PATHOLOGICAL FRACTURE: ICD-10-CM

## 2023-02-27 PROCEDURE — 77080 DXA BONE DENSITY AXIAL: CPT

## 2023-02-28 ENCOUNTER — TELEPHONE (OUTPATIENT)
Dept: ENDOCRINOLOGY | Age: 78
End: 2023-02-28

## 2023-02-28 NOTE — TELEPHONE ENCOUNTER
Crystal wanted to confirm if pt's DEXA report can be seen in chart. Confirmed it is under imaging in pt's chart.

## 2023-03-06 ENCOUNTER — HOSPITAL ENCOUNTER (OUTPATIENT)
Dept: GENERAL RADIOLOGY | Age: 78
Discharge: HOME | End: 2023-03-06
Payer: MEDICARE

## 2023-03-06 ENCOUNTER — OFFICE VISIT (OUTPATIENT)
Dept: ENDOCRINOLOGY | Age: 78
End: 2023-03-06
Payer: MEDICARE

## 2023-03-06 VITALS
HEART RATE: 75 BPM | HEIGHT: 65 IN | WEIGHT: 131.8 LBS | BODY MASS INDEX: 21.96 KG/M2 | SYSTOLIC BLOOD PRESSURE: 114 MMHG | DIASTOLIC BLOOD PRESSURE: 57 MMHG

## 2023-03-06 DIAGNOSIS — M81.0 AGE-RELATED OSTEOPOROSIS WITHOUT CURRENT PATHOLOGICAL FRACTURE: Primary | ICD-10-CM

## 2023-03-06 DIAGNOSIS — E55.9 VITAMIN D DEFICIENCY: ICD-10-CM

## 2023-03-06 DIAGNOSIS — S32.049A CLOSED FRACTURE OF FOURTH LUMBAR VERTEBRA, UNSPECIFIED FRACTURE MORPHOLOGY, INITIAL ENCOUNTER (HCC): ICD-10-CM

## 2023-03-06 PROCEDURE — 3074F SYST BP LT 130 MM HG: CPT | Performed by: INTERNAL MEDICINE

## 2023-03-06 PROCEDURE — G8432 DEP SCR NOT DOC, RNG: HCPCS | Performed by: INTERNAL MEDICINE

## 2023-03-06 PROCEDURE — 99214 OFFICE O/P EST MOD 30 MIN: CPT | Performed by: INTERNAL MEDICINE

## 2023-03-06 PROCEDURE — 1123F ACP DISCUSS/DSCN MKR DOCD: CPT | Performed by: INTERNAL MEDICINE

## 2023-03-06 PROCEDURE — G8427 DOCREV CUR MEDS BY ELIG CLIN: HCPCS | Performed by: INTERNAL MEDICINE

## 2023-03-06 PROCEDURE — 3078F DIAST BP <80 MM HG: CPT | Performed by: INTERNAL MEDICINE

## 2023-03-06 PROCEDURE — G8420 CALC BMI NORM PARAMETERS: HCPCS | Performed by: INTERNAL MEDICINE

## 2023-03-06 PROCEDURE — 72100 X-RAY EXAM L-S SPINE 2/3 VWS: CPT

## 2023-03-06 PROCEDURE — G8536 NO DOC ELDER MAL SCRN: HCPCS | Performed by: INTERNAL MEDICINE

## 2023-03-06 PROCEDURE — 1101F PT FALLS ASSESS-DOCD LE1/YR: CPT | Performed by: INTERNAL MEDICINE

## 2023-03-06 PROCEDURE — 1090F PRES/ABSN URINE INCON ASSESS: CPT | Performed by: INTERNAL MEDICINE

## 2023-03-06 RX ORDER — CALCIUM CARBONATE/VITAMIN D3 600 MG-125
2 TABLET ORAL DAILY
COMMUNITY

## 2023-03-06 RX ORDER — MOMETASONE FUROATE 50 UG/1
SPRAY, METERED NASAL
COMMUNITY
Start: 2022-12-19

## 2023-03-06 NOTE — PATIENT INSTRUCTIONS
1) Your T-score is -2.7 at the lumbar spine and anything -2.5 or less is in the osteoporosis range. Given you had a possible fracture on your scan I want to do a dedicated lumbar x-ray today to take a picture of this and I'll be in touch with the results. 2) We'll also repeat your calcium and creatinine (kidney test) and vitamin D level to determine a plan once the labs and x-ray are back.

## 2023-03-06 NOTE — PROGRESS NOTES
Chief Complaint   Patient presents with    Osteoporosis     PCP and pharmacy confirmed      History of Present Illness: Debbie Gill is a 65 y.o. female here for follow up of osteoporosis. Since last visit in 3/21, she is currently taking 2 tabs of Calcium + D 600/400 in place of the calcium-mg-zinc tabs she took previously. No falls or fractures since last visit. When the weather is over 55, she'll try to walk 10-15 minutes at a time and some days will do this twice. Has had some increased hip pain with walking. Has had some back pain intermittently since the summer of 2022 when she was going up and down her step stool trying to arrange her spice cabinet and didn't hear any pop and did go to [facility] but no x-ray was taken. Has had 3 courses of steroids over the past year, one in 8/22, one in 10/22, and one in 12/22. Still smoking 3-7 cigs/day. Takes Pepcid as needed 2-3 times a week at most and other weeks none. Last vitamin D was 35 in 1/21. Current Outpatient Medications   Medication Sig    calcium-cholecalciferol, d3, (CALCIUM 600 + D) 600-125 mg-unit tab Take 2 Tablets by mouth daily. levalbuterol tartrate (XOPENEX) 45 mcg/actuation inhaler INHALE 2 PUFFS BY MOUTH EVERY 4 HOURS AS NEEDED FOR WHEEZING    valACYclovir (VALTREX) 1 gram tablet TAKE 2 TABLETS BY MOUTH TWICE DAILY. DO NOT TAKE WITH TIZANIDINE INDICATIONS: PREVENT RECURRENT HERPES SIMPLEX INFECTION    levalbuterol (XOPENEX) 1.25 mg/3 mL nebu 3 mL by Nebulization route every four (4) hours as needed for Wheezing or Shortness of Breath.     ipratropium (ATROVENT) 42 mcg (0.06 %) nasal spray USE 1 TO 2 SPRAYS IN EACH NOSTRIL FOUR TIMES DAILY    traZODone (DESYREL) 50 mg tablet TAKE 1 TABLET BY MOUTH EVERY NIGHT (Patient taking differently: 25 mg.)    Spiriva with HandiHaler 18 mcg inhalation capsule INHALE THE CONTENTS OF 1 CAPSULE VIA INHALATION DEVICE DAILY    busPIRone (BUSPAR) 15 mg tablet TAKE 1/2 TABLET BY MOUTH THREE TIMES DAILY AS DIRECTED    amLODIPine (NORVASC) 5 mg tablet TAKE 1 TABLET BY MOUTH EVERY DAY    montelukast (SINGULAIR) 10 mg tablet TAKE 1 TABLET BY MOUTH DAILY    pravastatin (PRAVACHOL) 20 mg tablet TAKE 1 TABLET BY MOUTH EVERY NIGHT    budesonide-formoteroL (SYMBICORT) 160-4.5 mcg/actuation HFAA Take 2 Puffs by inhalation two (2) times a day. (TO REPLACE WIXELA)    diclofenac (VOLTAREN) 1 % gel Apply  to affected area as needed for Pain. lisinopriL (PRINIVIL, ZESTRIL) 40 mg tablet TAKE 1 TABLET BY MOUTH DAILY    mometasone (ELOCON) 0.1 % lotion INSTILL 4 METRIC DROP TWO TIMES DAILY IN EARS, AS NEEDED    tiZANidine (ZANAFLEX) 2 mg tablet TAKE 1 TABLET BY MOUTH EVERY 8 HOURS AS NEEDED FOR SPASMS    torsemide (DEMADEX) 10 mg tablet Take 5 mg by mouth daily. carboxymethylcellulose sodium (REFRESH OP) Apply  to eye. dextran 70/hypromellose (TEARS NATURALE OP) Apply 1 Drop to eye as needed. omega-3 fatty acids-fish oil 360-1,200 mg cap Take 2 Tabs by mouth daily. glucosamine sulfate 1,000 mg cap Take 2 Tabs by mouth daily. acetaminophen (TYLENOL) 650 mg CR tablet Take 650 mg by mouth every six (6) hours as needed for Pain.    famotidine (PEPCID) 20 mg tablet Take 20 mg by mouth as needed. melatonin 1 mg tablet Take 1 mg by mouth nightly. mometasone (NASONEX) 50 mcg/actuation nasal spray INSTILL 2 SPRAYS IN EACH NOSTRIL EVERY DAY AS NEEDED     No current facility-administered medications for this visit. Allergies   Allergen Reactions    Indomethacin Other (comments)     Made her feel weird to the point it scared her  Other reaction(s): Other (see comments)  Made her feel weird to the point it scared her  Made her feel weird to the point it scared her    Other reaction(s):  Other (comments)  Made her feel weird to the point it scared her    Fosamax [Alendronate] Other (comments)     Had a peptic ulcer    Other Medication Nausea and Vomiting     Flu vaccine in 2120-1802    Cephalexin Nausea and Vomiting    Erythromycin Nausea and Vomiting    Prochlorperazine Other (comments)     Childhood reaction-Neck spasms  Other reaction(s): Other (see comments)  Childhood reaction  Childhood reaction    Other reaction(s): Other (comments)  Childhood reaction-Neck spasms     Review of Systems: PER HPI    Physical Examination:  Blood pressure (!) 114/57, pulse 75, height 5' 5\" (1.651 m), weight 131 lb 12.8 oz (59.8 kg). General: pleasant, no distress, good eye contact   Neck: no carotid bruits  Cardiovascular: regular, normal rate, nl s1 and s2, no m/r/g   Respiratory: clear to auscultation bilaterally   Integumentary: skin is normal, no edema   Musculoskeletal: no proximal muscle weakness in upper or lower extremities, no vertebral tenderness   Psychiatric: normal mood and affect    Data Reviewed:   Component      Latest Ref Rng & Units 10/13/2022 6/21/2022           3:27 PM  8:20 AM   Sodium      136 - 145 mmol/L 131 (L) 132 (L)   Potassium      3.5 - 5.1 mmol/L 4.0 5.0   Chloride      97 - 108 mmol/L 96 (L) 98   CO2      21 - 32 mmol/L 29 27   Anion gap      5 - 15 mmol/L 6 7   Glucose      65 - 100 mg/dL 91 84   BUN      6 - 20 MG/DL 18 14   Creatinine      0.55 - 1.02 MG/DL 1.20 (H) 1.02   BUN/Creatinine ratio      12 - 20   15 14   GFR est AA      >60 ml/min/1.73m2  >60   GFR est non-AA      >60 ml/min/1.73m2  53 (L)   Calcium      8.5 - 10.1 MG/DL 9.6 9.5   Bilirubin, total      0.2 - 1.0 MG/DL 1.2 (H) 0.6   ALT      12 - 78 U/L 24 20   AST      15 - 37 U/L 14 (L) 13 (L)   Alk. phosphatase      45 - 117 U/L 59 65   Protein, total      6.4 - 8.2 g/dL 7.0 6.8   Albumin      3.5 - 5.0 g/dL 4.1 4.0   Globulin      2.0 - 4.0 g/dL 2.9 2.8   A-G Ratio      1.1 - 2.2   1.4 1.4   eGFR      >60 ml/min/1.73m2 47 (L)      Bone Mineral Density     Indication:  Osteopenia  Age: 65  Sex: Female. Menopause status: postmenopausal.  Hormone replacement therapy: None     Number of falls in the past year:   None.   Risk factors for osteoporosis:  Smoking     Current medication for osteoporosis: Calcium and vitamin D. Comparison: 2021     Technique: Imaging was performed on the [employer]    FRAX is not reported for patients with osteoporosis. Excluded sites: L3 and L4 for spondylitic change      Findings:     Fractures identified on Lateral scanogram:  L4     Femoral Neck Left:  Bone mineral density (gm/cm2):  0.756 g/cm?  % of peak bone mass:  73.0%  % for age matched controls:  103.0%  T-score:  -2.0   Z-score:  0.2      Femoral Neck Right:  Bone mineral density (gm/cm2):  0.745 g/cm?  % of peak bone mass:  72.0%  % for age matched controls:  101.0%  T-score:  -2.1   Z-score:  0.1      Total Hip Left:  Bone mineral density (gm/cm2):  0.710 g/cm?  % of peak bone mass:  70.0%  % for age matched controls:  94.0%  T-score:  -2.4   Z-score:  -0.3      Total Hip Right:  Bone mineral density (gm/cm2):  0.703 g/cm?  % of peak bone mass:  70.0%  % for age matched controls:  94.0%  T-score:  -2.4   Z-score:  -0.4      Lumbar Spine:  L1/L2  Bone mineral density (gm/cm2):  0.836  % of peak bone mass:  72  % for age matched controls:  90  T-score:  -2.7  Z-score:  -0.8     33% Radius Left:  Bone mineral density (gm/cm2):  0.713 g/cm?  % of peak bone mass:  81.0%  % for age matched controls:  81.0%  T-score:  -1.9   Z-score:  0.7      IMPRESSION     This patient is osteoporotic using the World Health Organization criteria  As compared to the prior study, there has been no interval change in the spine,  but a trend toward a slight decrease in both total hips  L4 vertebral fracture has developed as compared to the 2021 scanogram.  Recommend lateral radiography. Recommendations:  Therapy recommendations need to be tailored to each individual patient. Please reconsider testing based on risk factors.  Currently, Medicare will only  reimburse for a central DXA examination every two years, unless the patient is  on chronic glucocorticoid therapy. Assessment/Plan:     1. Age-related osteoporosis without current pathological fracture: Recalls a doctor starting her on weekly fosamax sometime in the [address] and took this for about 5 years and was found to have ulcers in her stomach so she was taken off this med. Was in [place] from 2007 to 2016 and was seeing her PCP and was recommended to start Reclast and had 4 doses in 9/12, 10/13, 10/14 and 1/16 and hasn't had any treatment since then. No history of any fracture. No FH of osteoporosis or hip fracture for sure but her mother may have had this condition as she was stooped over. Went into menopause in her early 45s and was never put on HRT due a doctor being concerned of her breast health. Did have a period of time she drank 4-7 drinks per day for about 5 years in her 45s but not on a regular basis since then. Did smoke up to 1 ppd at the most but currently is about 1/4-1/2 ppd for the past 40 years. Did take PPI for a year after her ulcer diagnosis but since then just takes H2 blocker as needed. No chronic steroid exposure. Drinks a glass of milk every morning and has cheese at lunch and occ yogurt and eats some green veggies. Takes 1000 units of D3 daily and a calcium-mg-zn tab 2 tabs per day. No fall risk. Not currently doing any walk or weight bearing activity. Did have a DXA in Feb 2017 in [place] that comments on a T-score of -2.2 at the L-spine and -2.0 at the femurs. In 2/19 her DXA showed a T-score of -2.1 at the L-spine, and -2.3 at the right fem neck and -2.0 at the right total hip. Her FRAX score is 6.2% for hip fracture and 15% for major osteoporotic fracture.   We discussed that currently her bone density is in the osteopenic range at all sites and I'm assuming must have improved from the osteoporotic range in the past.  Although the bone density scans from 2017 and 2019 cannot be compared directly as they were done on different machines, the T-scores are similar enough to assume that she is relatively stable over the past 2 years. She is not a high fall risk and can improve her weight bearing activities so we agreed to hold off on any treatment for the next 2 years and work on cutting back on her smoking and continue her calcium and vitamin D as her most recent D level was normal at 30 in 1/19. Repeat DXA in 2/21 (done on a different machine) showed T- score of -2.1 at the L-spine and -2.0 at the right fem neck and -2.1 at the right total hip. FRAX 6.2% for MOP and 2.5% for hip fracture so both are below treatment thresholds and all sites are still in the osteopenic range so we held on any treatment for another 2 years. DXA in 2/23 showed L3/L4 excluded for spondylitic changes but also L4 with a possible fracture so will get a dedicated x-ray as below to look at this. T-score now -2.7 at L1/L2 which is in the osteoporosis range so likely will benefit from treatment especially since she is still smoking and has gotten 3 prednisone packs in the past 6 months. - cont calcium+D 2 tabs daily  - repeat DXA in 2/25     2. Closed fracture of fourth lumbar vertebra, unspecified fracture morphology, initial encounter [facility]): seen on lateral scanogram on her DXA in 2/23  - order l-spine x-ray      3. Vitamin D deficiency: level was previously normal at 35 in 1/21 but hasn't been checked since  - check Vitamin D 25-OH level and bmp today            Patient Instructions   1) Your T-score is -2.7 at the lumbar spine and anything -2.5 or less is in the osteoporosis range. Given you had a possible fracture on your scan I want to do a dedicated lumbar x-ray today to take a picture of this and I'll be in touch with the results. 2) We'll also repeat your calcium and creatinine (kidney test) and vitamin D level to determine a plan once the labs and x-ray are back. Follow-up and Dispositions    Return to be determined based on lab results and x-ray results.                Copy sent to:  Thiago Lopez NP      Lab follow up: 03/09/23  Component      Latest Ref Rng & Units 3/6/2023 3/6/2023           3:24 PM  3:24 PM   Glucose      70 - 99 mg/dL  96   BUN      8 - 27 mg/dL  15   Creatinine      0.57 - 1.00 mg/dL  0.97   eGFR      >59 mL/min/1.73  60   BUN/Creatinine ratio      12 - 28  15   Sodium      134 - 144 mmol/L  136   Potassium      3.5 - 5.2 mmol/L  4.6   Chloride      96 - 106 mmol/L  96   CO2      20 - 29 mmol/L  23   Calcium      8.7 - 10.3 mg/dL  10.0   VITAMIN D, 25-HYDROXY      30.0 - 100.0 ng/mL 39.0      Spine x-ray:    Clinical indication: Fracture. Frontal and lateral views of  the lumbar sacral spine are obtained. There is a compression deformity of the superior endplate of L4 approximately  40% with good alignment. Age unknown. There is mild osteopenia. Pedicles are  visualized. Incidental prominent fecal stasis. IMPRESSION  Compression deformity L4.  -------------------------------------------------------------------------------------------------------------------    Sent her the following message through [email or web address]:  BUN and creatinine are markers of kidney function. Your values are normal.  -------------------------------------------------------------------------------------------------------------------  Your electrolytes (sodium, potassium, and calcium) are all normal.  Your glucose (blood sugar) is normal.  -------------------------------------------------------------------------------------------------------------------  Your vitamin D level is 39 which is normal.  Goal is over 30. Please continue to take your current dose of vitamin D in your calcium tabs to keep your levels at goal.  -------------------------------------------------------------------------------------------------------------------  Your spine x-ray does confirm a vertebral fracture at L4 so therefore I do feel you would benefit from treatment of your osteoporosis.   Previously you did well with Reclast and I would like to consider giving another dose at this time. Are you willing to have this done again? To refresh your memory, Reclast is a once a year IV infusion to prevent fracture. This is given at the infusion center and involves having an IV placed and receiving an infusion over about 15 minutes. Side effects are a possible flu-like reaction with low grade fevers and chills and body aches over the 24-48 hours after the infusion which can occur in 5-10% of patients. If this occurs, you can take Tylenol or motrin for these symptoms. Please be sure to drink at least 32 oz of water the day before the infusion, the day of the infusion, and the day after the infusion. Since your calcium, kidney function, and vitamin D level are normal I think you are safe to receive this. Let me know when you have a chance. Addendum: 03/10/23  She let me know that she would like to pursue Reclast so I placed the order for this and will schedule her a follow up in one year.

## 2023-03-07 LAB
25(OH)D3+25(OH)D2 SERPL-MCNC: 39 NG/ML (ref 30–100)
BUN SERPL-MCNC: 15 MG/DL (ref 8–27)
BUN/CREAT SERPL: 15 (ref 12–28)
CALCIUM SERPL-MCNC: 10 MG/DL (ref 8.7–10.3)
CHLORIDE SERPL-SCNC: 96 MMOL/L (ref 96–106)
CO2 SERPL-SCNC: 23 MMOL/L (ref 20–29)
CREAT SERPL-MCNC: 0.97 MG/DL (ref 0.57–1)
EGFRCR SERPLBLD CKD-EPI 2021: 60 ML/MIN/1.73
GLUCOSE SERPL-MCNC: 96 MG/DL (ref 70–99)
POTASSIUM SERPL-SCNC: 4.6 MMOL/L (ref 3.5–5.2)
SODIUM SERPL-SCNC: 136 MMOL/L (ref 134–144)

## 2023-03-10 ENCOUNTER — TELEPHONE (OUTPATIENT)
Dept: ENDOCRINOLOGY | Age: 78
End: 2023-03-10

## 2023-03-10 DIAGNOSIS — M81.0 AGE-RELATED OSTEOPOROSIS WITHOUT CURRENT PATHOLOGICAL FRACTURE: Primary | ICD-10-CM

## 2023-03-10 RX ORDER — SODIUM CHLORIDE 9 MG/ML
5-250 INJECTION, SOLUTION INTRAVENOUS AS NEEDED
OUTPATIENT
Start: 2023-03-10

## 2023-03-10 RX ORDER — ONDANSETRON 2 MG/ML
8 INJECTION INTRAMUSCULAR; INTRAVENOUS AS NEEDED
OUTPATIENT
Start: 2023-03-10

## 2023-03-10 RX ORDER — EPINEPHRINE 1 MG/ML
0.3 INJECTION, SOLUTION, CONCENTRATE INTRAVENOUS AS NEEDED
OUTPATIENT
Start: 2023-03-10

## 2023-03-10 RX ORDER — HEPARIN 100 UNIT/ML
500 SYRINGE INTRAVENOUS AS NEEDED
Start: 2023-03-10

## 2023-03-10 RX ORDER — HYDROCORTISONE SODIUM SUCCINATE 100 MG/2ML
100 INJECTION, POWDER, FOR SOLUTION INTRAMUSCULAR; INTRAVENOUS AS NEEDED
OUTPATIENT
Start: 2023-03-10

## 2023-03-10 RX ORDER — DIPHENHYDRAMINE HYDROCHLORIDE 50 MG/ML
50 INJECTION, SOLUTION INTRAMUSCULAR; INTRAVENOUS AS NEEDED
Start: 2023-03-10

## 2023-03-10 RX ORDER — DIPHENHYDRAMINE HYDROCHLORIDE 50 MG/ML
25 INJECTION, SOLUTION INTRAMUSCULAR; INTRAVENOUS AS NEEDED
Start: 2023-03-10

## 2023-03-10 RX ORDER — ALBUTEROL SULFATE 0.83 MG/ML
2.5 SOLUTION RESPIRATORY (INHALATION) AS NEEDED
Start: 2023-03-10

## 2023-03-10 RX ORDER — SODIUM CHLORIDE 9 MG/ML
5-40 INJECTION INTRAVENOUS AS NEEDED
OUTPATIENT
Start: 2023-03-10

## 2023-03-10 RX ORDER — ACETAMINOPHEN 325 MG/1
650 TABLET ORAL AS NEEDED
Start: 2023-03-10

## 2023-03-10 RX ORDER — SODIUM CHLORIDE 0.9 % (FLUSH) 0.9 %
5-40 SYRINGE (ML) INJECTION AS NEEDED
OUTPATIENT
Start: 2023-03-10

## 2023-03-10 RX ORDER — ZOLEDRONIC ACID 5 MG/100ML
5 INJECTION, SOLUTION INTRAVENOUS ONCE
OUTPATIENT
Start: 2023-03-10 | End: 2023-03-10

## 2023-03-10 NOTE — TELEPHONE ENCOUNTER
Sent her the following message through [employer]:    I have placed the order for Reclast so the infusion center should be able to see this and someone should contact you within the next 2 business days to schedule your appointment. If you have not heard from them by then, please call [phone] for [facility] to get this scheduled. Thanks. I will have the office reach out to you to schedule a one year follow up for March 2024 to reassess your bone health at that time.

## 2023-04-06 ENCOUNTER — HOSPITAL ENCOUNTER (OUTPATIENT)
Dept: INFUSION THERAPY | Age: 78
End: 2023-04-06
Payer: MEDICARE

## 2023-04-06 PROCEDURE — 96374 THER/PROPH/DIAG INJ IV PUSH: CPT

## 2023-04-06 PROCEDURE — 74011250636 HC RX REV CODE- 250/636: Performed by: INTERNAL MEDICINE

## 2023-04-06 RX ADMIN — ZOLEDRONIC ACID 5 MG: 0.05 INJECTION, SOLUTION INTRAVENOUS at 13:08

## 2023-04-06 NOTE — PROGRESS NOTES
Outpatient Infusion Center Short Visit Progress Note    7304 Ms. Best admitted to [facility] for [facility] ambulatory in stable condition. Assessment completed. No new concerns voiced. Vital Signs:  Visit Vitals  /64 (BP 1 Location: Left arm, BP Patient Position: Sitting)   Pulse 75   Temp 97.6 °F (36.4 °C)   Resp 16   Wt 59.9 kg (132 lb 1.6 oz)   LMP  (LMP Unknown)   SpO2 98%   Breastfeeding No   BMI 21.98 kg/m²         Peripheral IV 04/06/23 Anterior;Left;Proximal Forearm (Active)   Site Assessment Clean, dry, & intact 04/06/23 1305   Phlebitis Assessment 0 04/06/23 1305   Infiltration Assessment 0 04/06/23 1305   Dressing Status New 04/06/23 1305   Dressing Type Transparent 04/06/23 1305   Hub Color/Line Status Yellow; Flushed; Infusing 04/06/23 1305   Alcohol Cap Used Yes 04/06/23 1305       Lab Results:  Labs completed on 2/06/23;   Calcium 10.0  Creatinine 0.97  Creatinine Clearance 44.9        Medications:  Medications Administered       zoledronic acid (RECLAST) IVPB 5 mg       Admin Date  04/06/2023 Action  New Bag Dose  5 mg Rate  400 mL/hr Route  IntraVENous Administered By  Bishop Marks                    Patient has no dental work in the last 6 weeks. Patient PIV flushed and removed, bandage placed over site. Patient tolerated treatment well. Patient discharged from [place] [address] ambulatory in no distress at 1325. Patient aware of next appointment.     Merced Whitaker  April 6, 2023    Future Appointments   Date Time Provider Amanda Addison   3/7/2024 11:30 AM Jurgen Mota MD RDE [facility] [address] BS AMB

## 2023-05-26 RX ORDER — MOMETASONE FUROATE 1 MG/ML
SOLUTION TOPICAL
COMMUNITY
Start: 2020-08-04

## 2023-05-26 RX ORDER — MOMETASONE FUROATE 50 UG/1
SPRAY, METERED NASAL
COMMUNITY
Start: 2022-12-19

## 2023-05-26 RX ORDER — AMLODIPINE BESYLATE 5 MG/1
1 TABLET ORAL DAILY
COMMUNITY
Start: 2022-07-20

## 2023-05-26 RX ORDER — TIZANIDINE 2 MG/1
TABLET ORAL
COMMUNITY
Start: 2019-06-18

## 2023-05-26 RX ORDER — VALACYCLOVIR HYDROCHLORIDE 1 G/1
TABLET, FILM COATED ORAL
COMMUNITY
Start: 2022-10-25

## 2023-05-26 RX ORDER — PRAVASTATIN SODIUM 20 MG
1 TABLET ORAL NIGHTLY
COMMUNITY
Start: 2022-07-20

## 2023-05-26 RX ORDER — IPRATROPIUM BROMIDE 42 UG/1
SPRAY, METERED NASAL
COMMUNITY
Start: 2022-10-07

## 2023-05-26 RX ORDER — LEVALBUTEROL TARTRATE 45 UG/1
2 AEROSOL, METERED ORAL EVERY 4 HOURS PRN
COMMUNITY
Start: 2022-11-01

## 2023-05-26 RX ORDER — PETROLATUM,WHITE/LANOLIN
2 OINTMENT (GRAM) TOPICAL DAILY
COMMUNITY

## 2023-05-26 RX ORDER — LEVALBUTEROL INHALATION SOLUTION 1.25 MG/3ML
1.25 SOLUTION RESPIRATORY (INHALATION) EVERY 4 HOURS PRN
COMMUNITY
Start: 2022-10-13

## 2023-05-26 RX ORDER — BUSPIRONE HYDROCHLORIDE 15 MG/1
TABLET ORAL
COMMUNITY
Start: 2022-07-20

## 2023-05-26 RX ORDER — TIOTROPIUM BROMIDE 18 UG/1
CAPSULE ORAL; RESPIRATORY (INHALATION)
COMMUNITY
Start: 2022-07-20

## 2023-05-26 RX ORDER — MONTELUKAST SODIUM 10 MG/1
10 TABLET ORAL DAILY
COMMUNITY
Start: 2022-07-20

## 2023-05-26 RX ORDER — TRAZODONE HYDROCHLORIDE 50 MG/1
1 TABLET ORAL NIGHTLY
COMMUNITY
Start: 2022-08-19

## 2023-05-26 RX ORDER — TORSEMIDE 10 MG/1
5 TABLET ORAL DAILY
COMMUNITY
Start: 2019-05-03

## 2023-05-26 RX ORDER — LISINOPRIL 40 MG/1
40 TABLET ORAL DAILY
COMMUNITY
Start: 2022-04-22

## 2023-05-26 RX ORDER — FAMOTIDINE 20 MG/1
20 TABLET, FILM COATED ORAL PRN
COMMUNITY

## 2023-05-26 RX ORDER — SENNOSIDES 8.6 MG
650 CAPSULE ORAL EVERY 6 HOURS PRN
COMMUNITY

## 2023-05-26 RX ORDER — UREA 10 %
1 LOTION (ML) TOPICAL NIGHTLY
COMMUNITY

## 2023-07-14 ENCOUNTER — TELEPHONE (OUTPATIENT)
Age: 78
End: 2023-07-14

## 2023-07-14 NOTE — TELEPHONE ENCOUNTER
----- Message from Ash Signs sent at 7/14/2023 10:13 AM EDT -----  Good Morning Irma Reed called this morning to give 3 alternate facilities for this patient since starting August 1,2023 we will not be taking her insurance anymore. The 3 facilities are [address] and [address].       Thanks,  Ash Perry

## 2023-10-18 ENCOUNTER — TRANSCRIBE ORDERS (OUTPATIENT)
Facility: HOSPITAL | Age: 78
End: 2023-10-18

## 2023-10-18 ENCOUNTER — HOSPITAL ENCOUNTER (OUTPATIENT)
Facility: HOSPITAL | Age: 78
Discharge: HOME | End: 2023-10-21
Payer: MEDICARE

## 2023-10-18 DIAGNOSIS — J44.1 OBSTRUCTIVE CHRONIC BRONCHITIS WITH EXACERBATION (HCC): Primary | ICD-10-CM

## 2023-10-18 DIAGNOSIS — J44.1 OBSTRUCTIVE CHRONIC BRONCHITIS WITH EXACERBATION (HCC): ICD-10-CM

## 2023-10-18 PROCEDURE — 71046 X-RAY EXAM CHEST 2 VIEWS: CPT

## 2023-11-25 ENCOUNTER — HOSPITAL ENCOUNTER (OUTPATIENT)
Facility: HOSPITAL | Age: 78
End: 2023-11-25
Attending: INTERNAL MEDICINE
Payer: MEDICARE

## 2023-11-25 DIAGNOSIS — Z87.891 PERSONAL HISTORY OF TOBACCO USE: ICD-10-CM

## 2023-11-25 DIAGNOSIS — F17.210 CIGARETTE NICOTINE DEPENDENCE, UNCOMPLICATED: ICD-10-CM

## 2023-11-25 PROCEDURE — 71271 CT THORAX LUNG CANCER SCR C-: CPT

## 2024-01-30 ENCOUNTER — TELEPHONE (OUTPATIENT)
Age: 79
End: 2024-01-30

## 2024-01-30 NOTE — TELEPHONE ENCOUNTER
Please let her know she will not need another bone density scan until Feb 2025, 2 years after her last scan.  I will plan on seeing her in March as scheduled and we will discuss about whether to pursue another Reclast infusion at that time.

## 2024-03-07 ENCOUNTER — OFFICE VISIT (OUTPATIENT)
Age: 79
End: 2024-03-07
Payer: MEDICARE

## 2024-03-07 VITALS
SYSTOLIC BLOOD PRESSURE: 133 MMHG | HEIGHT: 65 IN | DIASTOLIC BLOOD PRESSURE: 61 MMHG | BODY MASS INDEX: 23.63 KG/M2 | WEIGHT: 141.8 LBS | HEART RATE: 74 BPM

## 2024-03-07 DIAGNOSIS — M81.0 AGE-RELATED OSTEOPOROSIS WITHOUT CURRENT PATHOLOGICAL FRACTURE: ICD-10-CM

## 2024-03-07 DIAGNOSIS — E55.9 VITAMIN D DEFICIENCY, UNSPECIFIED: ICD-10-CM

## 2024-03-07 DIAGNOSIS — M81.0 AGE-RELATED OSTEOPOROSIS WITHOUT CURRENT PATHOLOGICAL FRACTURE: Primary | ICD-10-CM

## 2024-03-07 PROCEDURE — 1123F ACP DISCUSS/DSCN MKR DOCD: CPT | Performed by: INTERNAL MEDICINE

## 2024-03-07 PROCEDURE — G2211 COMPLEX E/M VISIT ADD ON: HCPCS | Performed by: INTERNAL MEDICINE

## 2024-03-07 PROCEDURE — 3075F SYST BP GE 130 - 139MM HG: CPT | Performed by: INTERNAL MEDICINE

## 2024-03-07 PROCEDURE — 99214 OFFICE O/P EST MOD 30 MIN: CPT | Performed by: INTERNAL MEDICINE

## 2024-03-07 PROCEDURE — 3078F DIAST BP <80 MM HG: CPT | Performed by: INTERNAL MEDICINE

## 2024-03-07 RX ORDER — PANTOPRAZOLE SODIUM 40 MG/1
TABLET, DELAYED RELEASE ORAL
COMMUNITY

## 2024-03-07 NOTE — PATIENT INSTRUCTIONS
1) I will repeat your calcium and vitamin D and kidney function now and provided these are still normal, I will arrange for another Reclast infusion to be done after 4/6/24.    2)  I will send you a message through [employer] with your lab results.    3) I will plan on repeating a bone density scan prior to your next visit in 1 year.  Do this after 2/27/25.  Please call the Patient Care team at [phone] to schedule this appointment at your earliest convenience

## 2024-03-07 NOTE — PROGRESS NOTES
Chief Complaint   Patient presents with    Osteoporosis     PCP and pharmacy confirmed     History of Present Illness: Erinn Van is a 78 y.o. female here for follow up of osteoporosis.  Weight up 10 lbs since last visit in 3/23.  No major change to health since last visit aside from COPD flares and has needed several more courses of prednisone.  Did tolerated her Reclast infusion in 4/23.  No falls or fractures.  Still taking 2 tabs of calcium + D daily.  Still trying to walk 2-3 times a day when the weather is warmer.  Still smoking 3-7 cigs per day.  Has been on protonix 40 mg daily for the past 6 months or more and this has better controlled her GERD.  May still need an occ pepcid based on what she ate.  Willing to have another Reclast infusion.      Current Outpatient Medications   Medication Sig    pantoprazole (PROTONIX) 40 MG tablet TAKE 1 TABLET BY MOUTH EVERY MORNING 30 MINUTES BEFORE FIRST MEAL    acetaminophen (TYLENOL) 650 MG extended release tablet Take 1 tablet by mouth every 6 hours as needed    busPIRone (BUSPAR) 15 MG tablet TAKE 1/2 TABLET BY MOUTH THREE TIMES DAILY AS DIRECTED    Calcium Carbonate-Vitamin D 600-3.125 MG-MCG TABS Take 2 tablets by mouth daily    diclofenac sodium (VOLTAREN) 1 % GEL Apply topically as needed    famotidine (PEPCID) 20 MG tablet Take 1 tablet by mouth as needed    Glucosamine Sulfate 1000 MG CAPS Take 2 tablets by mouth daily    ipratropium (ATROVENT) 0.06 % nasal spray USE 1 TO 2 SPRAYS IN EACH NOSTRIL FOUR TIMES DAILY    levalbuterol (XOPENEX) 1.25 MG/3ML nebulizer solution Inhale 3 mLs into the lungs every 4 hours as needed    levalbuterol (XOPENEX HFA) 45 MCG/ACT inhaler Inhale 2 puffs into the lungs every 4 hours as needed    lisinopril (PRINIVIL;ZESTRIL) 40 MG tablet Take 1 tablet by mouth daily    melatonin 1 MG tablet Take 1 tablet by mouth nightly    mometasone (ELOCON) 0.1 % lotion INSTILL 4 METRIC DROP TWO TIMES DAILY IN EARS, AS NEEDED

## 2024-03-08 LAB
25(OH)D3+25(OH)D2 SERPL-MCNC: 31.7 NG/ML (ref 30–100)
BUN SERPL-MCNC: 18 MG/DL (ref 8–27)
BUN/CREAT SERPL: 16 (ref 12–28)
CHLORIDE SERPL-SCNC: 93 MMOL/L (ref 96–106)
CO2 SERPL-SCNC: 22 MMOL/L (ref 20–29)
CREAT SERPL-MCNC: 1.1 MG/DL (ref 0.57–1)
EGFRCR SERPLBLD CKD-EPI 2021: 51 ML/MIN/1.73
GLUCOSE SERPL-MCNC: 82 MG/DL (ref 70–99)
POTASSIUM SERPL-SCNC: 5.2 MMOL/L (ref 3.5–5.2)
REPORT: NORMAL
SODIUM SERPL-SCNC: 133 MMOL/L (ref 134–144)

## 2024-03-10 RX ORDER — FAMOTIDINE 10 MG/ML
20 INJECTION, SOLUTION INTRAVENOUS
OUTPATIENT
Start: 2024-04-07

## 2024-03-10 RX ORDER — SODIUM CHLORIDE 9 MG/ML
5-250 INJECTION, SOLUTION INTRAVENOUS PRN
OUTPATIENT
Start: 2024-04-07

## 2024-03-10 RX ORDER — SODIUM CHLORIDE 0.9 % (FLUSH) 0.9 %
5-40 SYRINGE (ML) INJECTION PRN
OUTPATIENT
Start: 2024-04-07

## 2024-03-10 RX ORDER — ACETAMINOPHEN 325 MG/1
650 TABLET ORAL
OUTPATIENT
Start: 2024-04-07

## 2024-03-10 RX ORDER — SODIUM CHLORIDE 9 MG/ML
INJECTION, SOLUTION INTRAVENOUS CONTINUOUS
OUTPATIENT
Start: 2024-04-07

## 2024-03-10 RX ORDER — HEPARIN SODIUM (PORCINE) LOCK FLUSH IV SOLN 100 UNIT/ML 100 UNIT/ML
500 SOLUTION INTRAVENOUS PRN
OUTPATIENT
Start: 2024-04-07

## 2024-03-10 RX ORDER — ALBUTEROL SULFATE 90 UG/1
4 AEROSOL, METERED RESPIRATORY (INHALATION) PRN
OUTPATIENT
Start: 2024-04-07

## 2024-03-10 RX ORDER — EPINEPHRINE 1 MG/ML
0.3 INJECTION, SOLUTION, CONCENTRATE INTRAVENOUS PRN
OUTPATIENT
Start: 2024-04-07

## 2024-03-10 RX ORDER — ONDANSETRON 2 MG/ML
8 INJECTION INTRAMUSCULAR; INTRAVENOUS
OUTPATIENT
Start: 2024-04-07

## 2024-03-10 RX ORDER — DIPHENHYDRAMINE HYDROCHLORIDE 50 MG/ML
50 INJECTION INTRAMUSCULAR; INTRAVENOUS
OUTPATIENT
Start: 2024-04-07

## 2024-03-10 RX ORDER — ZOLEDRONIC ACID 5 MG/100ML
5 INJECTION, SOLUTION INTRAVENOUS ONCE
OUTPATIENT
Start: 2024-04-07 | End: 2024-04-07

## 2024-04-11 ENCOUNTER — HOSPITAL ENCOUNTER (OUTPATIENT)
Facility: HOSPITAL | Age: 79
Setting detail: INFUSION SERIES
Discharge: HOME | End: 2024-04-11
Payer: MEDICARE

## 2024-04-11 VITALS
OXYGEN SATURATION: 96 % | WEIGHT: 141.4 LBS | BODY MASS INDEX: 23.56 KG/M2 | RESPIRATION RATE: 16 BRPM | TEMPERATURE: 98 F | HEART RATE: 77 BPM | DIASTOLIC BLOOD PRESSURE: 78 MMHG | SYSTOLIC BLOOD PRESSURE: 170 MMHG | HEIGHT: 65 IN

## 2024-04-11 DIAGNOSIS — M81.0 AGE-RELATED OSTEOPOROSIS WITHOUT CURRENT PATHOLOGICAL FRACTURE: Primary | ICD-10-CM

## 2024-04-11 PROCEDURE — 6360000002 HC RX W HCPCS: Performed by: INTERNAL MEDICINE

## 2024-04-11 PROCEDURE — 2580000003 HC RX 258: Performed by: INTERNAL MEDICINE

## 2024-04-11 PROCEDURE — 96374 THER/PROPH/DIAG INJ IV PUSH: CPT

## 2024-04-11 RX ORDER — SODIUM CHLORIDE 9 MG/ML
INJECTION, SOLUTION INTRAVENOUS CONTINUOUS
OUTPATIENT
Start: 2025-04-06

## 2024-04-11 RX ORDER — ONDANSETRON 2 MG/ML
8 INJECTION INTRAMUSCULAR; INTRAVENOUS
OUTPATIENT
Start: 2025-04-06

## 2024-04-11 RX ORDER — ZOLEDRONIC ACID 5 MG/100ML
5 INJECTION, SOLUTION INTRAVENOUS ONCE
OUTPATIENT
Start: 2025-04-06 | End: 2025-04-06

## 2024-04-11 RX ORDER — SODIUM CHLORIDE 0.9 % (FLUSH) 0.9 %
5-40 SYRINGE (ML) INJECTION PRN
Status: DISCONTINUED | OUTPATIENT
Start: 2024-04-11 | End: 2024-04-12 | Stop reason: HOSPADM

## 2024-04-11 RX ORDER — SODIUM CHLORIDE 9 MG/ML
5-250 INJECTION, SOLUTION INTRAVENOUS PRN
OUTPATIENT
Start: 2025-04-06

## 2024-04-11 RX ORDER — DIPHENHYDRAMINE HYDROCHLORIDE 50 MG/ML
50 INJECTION INTRAMUSCULAR; INTRAVENOUS
OUTPATIENT
Start: 2025-04-06

## 2024-04-11 RX ORDER — SODIUM CHLORIDE 9 MG/ML
5-250 INJECTION, SOLUTION INTRAVENOUS PRN
Status: DISCONTINUED | OUTPATIENT
Start: 2024-04-11 | End: 2024-04-12 | Stop reason: HOSPADM

## 2024-04-11 RX ORDER — EPINEPHRINE 1 MG/ML
0.3 INJECTION, SOLUTION INTRAMUSCULAR; SUBCUTANEOUS PRN
OUTPATIENT
Start: 2025-04-06

## 2024-04-11 RX ORDER — HEPARIN 100 UNIT/ML
500 SYRINGE INTRAVENOUS PRN
OUTPATIENT
Start: 2025-04-06

## 2024-04-11 RX ORDER — ZOLEDRONIC ACID 5 MG/100ML
5 INJECTION, SOLUTION INTRAVENOUS ONCE
Status: COMPLETED | OUTPATIENT
Start: 2024-04-11 | End: 2024-04-11

## 2024-04-11 RX ORDER — SODIUM CHLORIDE 0.9 % (FLUSH) 0.9 %
5-40 SYRINGE (ML) INJECTION PRN
OUTPATIENT
Start: 2025-04-06

## 2024-04-11 RX ORDER — ACETAMINOPHEN 325 MG/1
650 TABLET ORAL
OUTPATIENT
Start: 2025-04-06

## 2024-04-11 RX ORDER — ALBUTEROL SULFATE 90 UG/1
4 AEROSOL, METERED RESPIRATORY (INHALATION) PRN
OUTPATIENT
Start: 2025-04-06

## 2024-04-11 RX ADMIN — SODIUM CHLORIDE, PRESERVATIVE FREE 20 ML: 5 INJECTION INTRAVENOUS at 15:56

## 2024-04-11 RX ADMIN — ZOLEDRONIC ACID 5 MG: 5 INJECTION, SOLUTION INTRAVENOUS at 15:38

## 2024-04-11 NOTE — PROGRESS NOTES
[facility] Visit Note    Pt arrived to [facility] ambulatory in no acute distress at 1518 for Reclast.  Assessment unremarkable, no new concerns voiced.  #24g PIV established in right AC without issue and positive blood return noted. Pt denies any recent or upcoming dental work.      Patient denied having any symptoms of COVID-19, i.e. SOB, coughing, fever, or generally not feeling well.      BP (!) 170/78   Pulse 77   Temp 98 °F (36.7 °C) (Temporal)   Resp 16   Ht 1.651 m (5' 5\")   Wt 64.1 kg (141 lb 6.4 oz)   SpO2 96%   BMI 23.53 kg/m²     Ok to use labs from 3/7- Creatinine 1.10, Ca 9.8 per MD.  CrCl 41.7     The following medications administered:  Reclast 5 mg IV over 15 minutes    Pt tolerated treatment well.  No adverse reaction noted.  IV flushed per policy and removed, 2x2 and coban placed.  Pt discharged ambulatory in no acute distress at 1558, accompanied by self.  Next appointment- patient will wait to see MD before scheduling next appt.

## 2024-06-06 ENCOUNTER — HOSPITAL ENCOUNTER (OUTPATIENT)
Facility: HOSPITAL | Age: 79
Discharge: HOME | End: 2024-06-06
Payer: MEDICARE

## 2024-06-06 DIAGNOSIS — J44.1 COPD EXACERBATION (HCC): ICD-10-CM

## 2024-06-06 PROCEDURE — 71046 X-RAY EXAM CHEST 2 VIEWS: CPT

## 2024-12-12 ENCOUNTER — TELEPHONE (OUTPATIENT)
Facility: CLINIC | Age: 79
End: 2024-12-12

## 2024-12-12 NOTE — TELEPHONE ENCOUNTER
----- Message from Chemo CENTENO sent at 12/11/2024  4:08 PM EST -----  Regarding: [facility] Appointment Request  [facility] Appointment Request    Patient needs appointment for [facility] Appointment Type: New to Provider.    Patient Requested Dates(s): Any date will do   Patient Requested Time: Anytime after 11 am  Provider Name: Dari Cabrera PA-C    Reason for Appointment Request: New Patient - Requested Provider unavailable  --------------------------------------------------------------------------------------------------------------------------    Relationship to Patient: Self     Call Back Information: OK to leave message on voicemail  Preferred Call Back Number: Phone [phone] (home)

## 2024-12-26 ENCOUNTER — HOSPITAL ENCOUNTER (OUTPATIENT)
Facility: HOSPITAL | Age: 79
Discharge: HOME | End: 2024-12-29
Attending: INTERNAL MEDICINE
Payer: MEDICARE

## 2024-12-26 DIAGNOSIS — F17.200 CURRENT SMOKER: ICD-10-CM

## 2024-12-26 DIAGNOSIS — Z87.891 PERSONAL HISTORY OF TOBACCO USE: ICD-10-CM

## 2024-12-26 PROCEDURE — 71271 CT THORAX LUNG CANCER SCR C-: CPT

## 2025-04-10 ENCOUNTER — OFFICE VISIT (OUTPATIENT)
Age: 80
End: 2025-04-10
Payer: MEDICARE

## 2025-04-10 VITALS
HEART RATE: 79 BPM | WEIGHT: 134 LBS | DIASTOLIC BLOOD PRESSURE: 66 MMHG | SYSTOLIC BLOOD PRESSURE: 125 MMHG | BODY MASS INDEX: 22.33 KG/M2 | HEIGHT: 65 IN

## 2025-04-10 DIAGNOSIS — E55.9 VITAMIN D DEFICIENCY, UNSPECIFIED: ICD-10-CM

## 2025-04-10 DIAGNOSIS — M81.0 AGE-RELATED OSTEOPOROSIS WITHOUT CURRENT PATHOLOGICAL FRACTURE: Primary | ICD-10-CM

## 2025-04-10 PROCEDURE — 1159F MED LIST DOCD IN RCRD: CPT | Performed by: INTERNAL MEDICINE

## 2025-04-10 PROCEDURE — 1123F ACP DISCUSS/DSCN MKR DOCD: CPT | Performed by: INTERNAL MEDICINE

## 2025-04-10 PROCEDURE — 1125F AMNT PAIN NOTED PAIN PRSNT: CPT | Performed by: INTERNAL MEDICINE

## 2025-04-10 PROCEDURE — 3078F DIAST BP <80 MM HG: CPT | Performed by: INTERNAL MEDICINE

## 2025-04-10 PROCEDURE — 3074F SYST BP LT 130 MM HG: CPT | Performed by: INTERNAL MEDICINE

## 2025-04-10 PROCEDURE — G2211 COMPLEX E/M VISIT ADD ON: HCPCS | Performed by: INTERNAL MEDICINE

## 2025-04-10 PROCEDURE — 99213 OFFICE O/P EST LOW 20 MIN: CPT | Performed by: INTERNAL MEDICINE

## 2025-04-10 PROCEDURE — 1160F RVW MEDS BY RX/DR IN RCRD: CPT | Performed by: INTERNAL MEDICINE

## 2025-04-10 RX ORDER — DOXYCYCLINE 100 MG/1
CAPSULE ORAL
COMMUNITY
Start: 2024-09-18

## 2025-04-10 RX ORDER — FEXOFENADINE HCL 180 MG/1
180 TABLET ORAL DAILY
COMMUNITY

## 2025-04-10 RX ORDER — FLUTICASONE PROPIONATE 50 MCG
1 SPRAY, SUSPENSION (ML) NASAL DAILY PRN
COMMUNITY

## 2025-04-10 RX ORDER — CHLORAL HYDRATE 500 MG
CAPSULE ORAL DAILY
COMMUNITY

## 2025-04-10 NOTE — PATIENT INSTRUCTIONS
1) Please call the Patient Care team at [phone] to schedule your bone density scan.   I will send you a message through [employer] with your lab results.    2) Please call [phone] to reset your [employer] password to get back in.      3) Based on the results, we'll determine if you need any more reclast or not.

## 2025-04-10 NOTE — PROGRESS NOTES
Chief Complaint   Patient presents with    Osteoporosis     PCP and pharmacy confirmed  Pt consented to use of [facility]     History of Present Illness: Erinn Van is a 79 y.o. female here for follow up of osteoporosis.  Weight down 7 lbs since last visit in 3/24.      History of Present Illness  The patient is an 79-year-old female here for a follow-up of osteoporosis.    She received her last Reclast infusion on 04/11/2024, which was well-tolerated with no adverse reactions. Over the past year, no falls or fractures have been reported. Her physical activity includes walking her dog twice daily, although the duration of these walks is brief. Self-directed walking exercises are performed infrequently due to weather conditions. She continues her regimen of calcium and vitamin D supplements, taking two doses daily.  She has not yet had her bone density scan and will call for this now.      Current Outpatient Medications   Medication Sig    doxycycline monohydrate (MONODOX) 100 MG capsule Take 1 tablet by oral route twice daily    fexofenadine (ALLEGRA) 180 MG tablet Take 1 tablet by mouth daily    Omega-3 Fatty Acids (FISH OIL) 1000 MG capsule Take by mouth daily    fluticasone (FLONASE) 50 MCG/ACT nasal spray 1 spray by Each Nostril route daily as needed for Rhinitis    guaiFENesin (MUCUS RELIEF ADULT PO) Take by mouth    Vitamin D3 125 MCG (5000 UT) TABS tablet Take 1 tablet by mouth daily    pantoprazole (PROTONIX) 40 MG tablet TAKE 1 TABLET BY MOUTH EVERY MORNING 30 MINUTES BEFORE FIRST MEAL    acetaminophen (TYLENOL) 650 MG extended release tablet Take 1 tablet by mouth every 6 hours as needed    busPIRone (BUSPAR) 15 MG tablet TAKE 1/2 TABLET BY MOUTH THREE TIMES DAILY AS DIRECTED    Calcium Carbonate-Vitamin D 600-3.125 MG-MCG TABS Take 2 tablets by mouth daily    diclofenac sodium (VOLTAREN) 1 % GEL Apply topically as needed    famotidine (PEPCID) 20 MG tablet Take 1 tablet by mouth as needed    Glucosamine

## 2025-05-03 ENCOUNTER — HOSPITAL ENCOUNTER (OUTPATIENT)
Facility: HOSPITAL | Age: 80
End: 2025-05-03
Attending: INTERNAL MEDICINE
Payer: MEDICARE

## 2025-05-03 DIAGNOSIS — M81.0 AGE-RELATED OSTEOPOROSIS WITHOUT CURRENT PATHOLOGICAL FRACTURE: ICD-10-CM

## 2025-05-03 PROCEDURE — 77080 DXA BONE DENSITY AXIAL: CPT

## 2025-05-10 ENCOUNTER — RESULTS FOLLOW-UP (OUTPATIENT)
Age: 80
End: 2025-05-10

## 2025-05-19 ENCOUNTER — HOSPITAL ENCOUNTER (OUTPATIENT)
Facility: HOSPITAL | Age: 80
Discharge: HOME | End: 2025-05-22

## 2025-05-19 LAB
25(OH)D3 SERPL-MCNC: 33.2 NG/ML (ref 30–100)
ALBUMIN SERPL-MCNC: 4.2 G/DL (ref 3.5–5)
ALBUMIN/GLOB SERPL: 1.4 (ref 1.1–2.2)
ALP SERPL-CCNC: 87 U/L (ref 45–117)
ALT SERPL-CCNC: 24 U/L (ref 12–78)
ANION GAP SERPL CALC-SCNC: 8 MMOL/L (ref 2–12)
AST SERPL-CCNC: 20 U/L (ref 15–37)
BILIRUB SERPL-MCNC: 0.9 MG/DL (ref 0.2–1)
BUN SERPL-MCNC: 11 MG/DL (ref 6–20)
BUN/CREAT SERPL: 10 (ref 12–20)
CALCIUM SERPL-MCNC: 9.4 MG/DL (ref 8.5–10.1)
CHLORIDE SERPL-SCNC: 93 MMOL/L (ref 97–108)
CO2 SERPL-SCNC: 26 MMOL/L (ref 21–32)
CREAT SERPL-MCNC: 1.05 MG/DL (ref 0.55–1.02)
GLOBULIN SER CALC-MCNC: 3 G/DL (ref 2–4)
GLUCOSE SERPL-MCNC: 104 MG/DL (ref 65–100)
POTASSIUM SERPL-SCNC: 5 MMOL/L (ref 3.5–5.1)
PROT SERPL-MCNC: 7.2 G/DL (ref 6.4–8.2)
SODIUM SERPL-SCNC: 127 MMOL/L (ref 136–145)

## 2025-05-20 ENCOUNTER — RESULTS FOLLOW-UP (OUTPATIENT)
Age: 80
End: 2025-05-20

## 2025-06-07 LAB
BASOPHILS # BLD: 0.06 K/UL (ref 0–0.1)
BASOPHILS NFR BLD: 0.8 % (ref 0–1)
DIFFERENTIAL METHOD BLD: NORMAL
EOSINOPHIL # BLD: 0.32 K/UL (ref 0–0.4)
EOSINOPHIL NFR BLD: 4.5 % (ref 0–7)
ERYTHROCYTE [DISTWIDTH] IN BLOOD BY AUTOMATED COUNT: 12.1 % (ref 11.5–14.5)
HCT VFR BLD AUTO: 38.2 % (ref 35–47)
HGB BLD-MCNC: 12.2 G/DL (ref 11.5–16)
IMM GRANULOCYTES # BLD AUTO: 0.02 K/UL (ref 0–0.04)
IMM GRANULOCYTES NFR BLD AUTO: 0.3 % (ref 0–0.5)
LYMPHOCYTES # BLD: 1.93 K/UL (ref 0.8–3.5)
LYMPHOCYTES NFR BLD: 26.9 % (ref 12–49)
MCH RBC QN AUTO: 31 PG (ref 26–34)
MCHC RBC AUTO-ENTMCNC: 31.9 G/DL (ref 30–36.5)
MCV RBC AUTO: 97.2 FL (ref 80–99)
MONOCYTES # BLD: 0.82 K/UL (ref 0–1)
MONOCYTES NFR BLD: 11.4 % (ref 5–13)
NEUTS SEG # BLD: 4.03 K/UL (ref 1.8–8)
NEUTS SEG NFR BLD: 56.1 % (ref 32–75)
NRBC # BLD: 0 K/UL (ref 0–0.01)
NRBC BLD-RTO: 0 PER 100 WBC
PLATELET # BLD AUTO: 302 K/UL (ref 150–400)
PMV BLD AUTO: 9.6 FL (ref 8.9–12.9)
RBC # BLD AUTO: 3.93 M/UL (ref 3.8–5.2)
WBC # BLD AUTO: 7.2 K/UL (ref 3.6–11)

## 2025-06-09 LAB — IGE SERPL-ACNC: 23 IU/ML (ref 6–495)

## 2025-06-23 ENCOUNTER — TELEPHONE (OUTPATIENT)
Age: 80
End: 2025-06-23

## 2025-06-23 NOTE — TELEPHONE ENCOUNTER
Patient would like to have a clinical staff member call her to explain is she's getting the Reclast or not based off her recent labs.    [phone]

## 2025-06-23 NOTE — TELEPHONE ENCOUNTER
Please let her know it appears she read my [employer] message (sent 5/20/25 and read 5/22/25) stating she should be getting Reclast.  If she has not heard from the infusion center, then please she calls the number to get this scheduled.  Thanks!    Your vitamin D level is 33 which is normal.  Goal is over 30.  Please continue to take your current dose of vitamin D to keep your levels at goal.  -------------------------------------------------------------------------------------------------------------------  BUN and creatinine are markers of kidney function.  Your creatinine is slightly high at 1.05 but better than 1.0 one year ago so you are safe to have another Reclast infusion.  -------------------------------------------------------------------------------------------------------------------  Your electrolytes (potassium, and calcium) are all normal but your sodium was slightly low at 127 but it has been this way over the past 3 years so I'm not too concerned.  -------------------------------------------------------------------------------------------------------------------  I have placed the order for Reclast so the infusion center should be able to see this and someone should contact you within the next 2 business days to schedule your appointment. If you have not heard from them by then, please call [phone] for [facility] to get this scheduled.  Thanks.

## 2025-06-23 NOTE — TELEPHONE ENCOUNTER
Spoke to the pt and notified her of the message per Dr. Warren. Patient understood with no further questions.

## 2025-07-09 ENCOUNTER — TRANSCRIBE ORDERS (OUTPATIENT)
Facility: HOSPITAL | Age: 80
End: 2025-07-09

## 2025-07-09 DIAGNOSIS — Z12.31 OTHER SCREENING MAMMOGRAM: Primary | ICD-10-CM

## 2025-07-09 DIAGNOSIS — Z12.31 VISIT FOR SCREENING MAMMOGRAM: Primary | ICD-10-CM

## 2025-07-31 ENCOUNTER — HOSPITAL ENCOUNTER (OUTPATIENT)
Facility: HOSPITAL | Age: 80
Setting detail: INFUSION SERIES
Discharge: HOME | End: 2025-07-31
Payer: MEDICARE

## 2025-07-31 VITALS
OXYGEN SATURATION: 92 % | HEIGHT: 65 IN | RESPIRATION RATE: 16 BRPM | BODY MASS INDEX: 21.04 KG/M2 | HEART RATE: 66 BPM | WEIGHT: 126.3 LBS | TEMPERATURE: 97.9 F | SYSTOLIC BLOOD PRESSURE: 157 MMHG | DIASTOLIC BLOOD PRESSURE: 75 MMHG

## 2025-07-31 DIAGNOSIS — M81.0 AGE-RELATED OSTEOPOROSIS WITHOUT CURRENT PATHOLOGICAL FRACTURE: Primary | ICD-10-CM

## 2025-07-31 LAB
ALBUMIN SERPL-MCNC: 3.8 G/DL (ref 3.5–5.2)
ALBUMIN/GLOB SERPL: 1.5 (ref 1.1–2.2)
ALP SERPL-CCNC: 61 U/L (ref 35–104)
ALT SERPL-CCNC: 16 U/L (ref 10–35)
ANION GAP SERPL CALC-SCNC: 15 MMOL/L (ref 2–14)
AST SERPL-CCNC: 27 U/L (ref 10–35)
BILIRUB SERPL-MCNC: 0.7 MG/DL (ref 0–1.2)
BUN SERPL-MCNC: 20 MG/DL (ref 8–23)
BUN/CREAT SERPL: 18 (ref 12–20)
CALCIUM SERPL-MCNC: 9.7 MG/DL (ref 8.8–10.2)
CHLORIDE SERPL-SCNC: 93 MMOL/L (ref 98–107)
CO2 SERPL-SCNC: 20 MMOL/L (ref 20–29)
CREAT SERPL-MCNC: 1.1 MG/DL (ref 0.6–1)
GLOBULIN SER CALC-MCNC: 2.6 G/DL (ref 2–4)
GLUCOSE SERPL-MCNC: 85 MG/DL (ref 65–100)
POTASSIUM SERPL-SCNC: 5.1 MMOL/L (ref 3.5–5.1)
PROT SERPL-MCNC: 6.4 G/DL (ref 6.4–8.3)
SODIUM SERPL-SCNC: 128 MMOL/L (ref 136–145)

## 2025-07-31 PROCEDURE — 36415 COLL VENOUS BLD VENIPUNCTURE: CPT

## 2025-07-31 PROCEDURE — 80053 COMPREHEN METABOLIC PANEL: CPT

## 2025-07-31 RX ORDER — EPINEPHRINE 1 MG/ML
0.3 INJECTION, SOLUTION INTRAMUSCULAR; SUBCUTANEOUS PRN
OUTPATIENT
Start: 2026-04-05

## 2025-07-31 RX ORDER — SODIUM CHLORIDE 9 MG/ML
INJECTION, SOLUTION INTRAVENOUS CONTINUOUS
OUTPATIENT
Start: 2026-04-05

## 2025-07-31 RX ORDER — SODIUM CHLORIDE 9 MG/ML
5-250 INJECTION, SOLUTION INTRAVENOUS PRN
OUTPATIENT
Start: 2026-04-05

## 2025-07-31 RX ORDER — ACETAMINOPHEN 325 MG/1
650 TABLET ORAL
OUTPATIENT
Start: 2026-04-05

## 2025-07-31 RX ORDER — HEPARIN 100 UNIT/ML
500 SYRINGE INTRAVENOUS PRN
OUTPATIENT
Start: 2026-04-05

## 2025-07-31 RX ORDER — ALBUTEROL SULFATE 90 UG/1
4 INHALANT RESPIRATORY (INHALATION) PRN
OUTPATIENT
Start: 2026-04-05

## 2025-07-31 RX ORDER — DIPHENHYDRAMINE HYDROCHLORIDE 50 MG/ML
50 INJECTION, SOLUTION INTRAMUSCULAR; INTRAVENOUS
OUTPATIENT
Start: 2026-04-05

## 2025-07-31 RX ORDER — ZOLEDRONIC ACID 0.05 MG/ML
5 INJECTION, SOLUTION INTRAVENOUS ONCE
Status: DISCONTINUED | OUTPATIENT
Start: 2025-07-31 | End: 2025-08-01 | Stop reason: HOSPADM

## 2025-07-31 RX ORDER — ONDANSETRON 2 MG/ML
8 INJECTION INTRAMUSCULAR; INTRAVENOUS
OUTPATIENT
Start: 2026-04-05

## 2025-07-31 RX ORDER — ZOLEDRONIC ACID 0.05 MG/ML
5 INJECTION, SOLUTION INTRAVENOUS ONCE
OUTPATIENT
Start: 2026-04-05 | End: 2026-04-05

## 2025-07-31 RX ORDER — SODIUM CHLORIDE 9 MG/ML
5-250 INJECTION, SOLUTION INTRAVENOUS PRN
Status: DISCONTINUED | OUTPATIENT
Start: 2025-07-31 | End: 2025-08-01 | Stop reason: HOSPADM

## 2025-07-31 RX ORDER — HYDROCORTISONE SODIUM SUCCINATE 100 MG/2ML
100 INJECTION INTRAMUSCULAR; INTRAVENOUS
OUTPATIENT
Start: 2026-04-05

## 2025-07-31 RX ORDER — SODIUM CHLORIDE 0.9 % (FLUSH) 0.9 %
5-40 SYRINGE (ML) INJECTION PRN
OUTPATIENT
Start: 2026-04-05

## 2025-07-31 NOTE — PROGRESS NOTES
[facility] Visit Note    1305 Pt arrived to hospitals ambulatory and in no distress for Reclast.  Assessment completed, no new complaints voiced.  IV established in left wrist. CMP drawn and sent. Pt reports having teeth pulled 2 months ago.     BP (!) 157/75   Pulse 66   Temp 97.9 °F (36.6 °C) (Temporal)   Resp 16   Ht 1.651 m (5' 5\")   Wt 57.3 kg (126 lb 4.8 oz)   SpO2 92%   BMI 21.02 kg/m²     1355 CMP checked in  1533 CMP not resulted. Lab notified and stated 10 more minutes. Pt in agreement to wait 10 more minutes but then needs to leave if not resulted.  1543 Labs not resulted. IV d/c'd. Pt will return Monday at 3:30 for Reclast.  1630 labs resulted

## 2025-08-04 ENCOUNTER — HOSPITAL ENCOUNTER (OUTPATIENT)
Facility: HOSPITAL | Age: 80
Setting detail: INFUSION SERIES
Discharge: HOME | End: 2025-08-04
Payer: MEDICARE

## 2025-08-04 VITALS
RESPIRATION RATE: 16 BRPM | TEMPERATURE: 98.1 F | DIASTOLIC BLOOD PRESSURE: 81 MMHG | SYSTOLIC BLOOD PRESSURE: 165 MMHG | HEART RATE: 60 BPM | OXYGEN SATURATION: 93 %

## 2025-08-04 DIAGNOSIS — M81.0 AGE-RELATED OSTEOPOROSIS WITHOUT CURRENT PATHOLOGICAL FRACTURE: Primary | ICD-10-CM

## 2025-08-04 PROCEDURE — 6360000002 HC RX W HCPCS: Performed by: INTERNAL MEDICINE

## 2025-08-04 PROCEDURE — 2500000003 HC RX 250 WO HCPCS: Performed by: INTERNAL MEDICINE

## 2025-08-04 PROCEDURE — 96374 THER/PROPH/DIAG INJ IV PUSH: CPT

## 2025-08-04 RX ORDER — DIPHENHYDRAMINE HYDROCHLORIDE 50 MG/ML
50 INJECTION, SOLUTION INTRAMUSCULAR; INTRAVENOUS
OUTPATIENT
Start: 2026-04-09

## 2025-08-04 RX ORDER — SODIUM CHLORIDE 0.9 % (FLUSH) 0.9 %
5-40 SYRINGE (ML) INJECTION PRN
OUTPATIENT
Start: 2026-04-09

## 2025-08-04 RX ORDER — HEPARIN 100 UNIT/ML
500 SYRINGE INTRAVENOUS PRN
OUTPATIENT
Start: 2026-04-09

## 2025-08-04 RX ORDER — EPINEPHRINE 1 MG/ML
0.3 INJECTION, SOLUTION INTRAMUSCULAR; SUBCUTANEOUS PRN
OUTPATIENT
Start: 2026-04-09

## 2025-08-04 RX ORDER — ZOLEDRONIC ACID 0.05 MG/ML
5 INJECTION, SOLUTION INTRAVENOUS ONCE
OUTPATIENT
Start: 2026-04-09 | End: 2026-04-09

## 2025-08-04 RX ORDER — HYDROCORTISONE SODIUM SUCCINATE 100 MG/2ML
100 INJECTION INTRAMUSCULAR; INTRAVENOUS
OUTPATIENT
Start: 2026-04-09

## 2025-08-04 RX ORDER — ZOLEDRONIC ACID 0.05 MG/ML
5 INJECTION, SOLUTION INTRAVENOUS ONCE
Status: COMPLETED | OUTPATIENT
Start: 2025-08-04 | End: 2025-08-04

## 2025-08-04 RX ORDER — SODIUM CHLORIDE 9 MG/ML
5-250 INJECTION, SOLUTION INTRAVENOUS PRN
OUTPATIENT
Start: 2026-04-09

## 2025-08-04 RX ORDER — ALBUTEROL SULFATE 90 UG/1
4 INHALANT RESPIRATORY (INHALATION) PRN
OUTPATIENT
Start: 2026-04-09

## 2025-08-04 RX ORDER — SODIUM CHLORIDE 9 MG/ML
INJECTION, SOLUTION INTRAVENOUS CONTINUOUS
OUTPATIENT
Start: 2026-04-09

## 2025-08-04 RX ORDER — ACETAMINOPHEN 325 MG/1
650 TABLET ORAL
OUTPATIENT
Start: 2026-04-09

## 2025-08-04 RX ORDER — SODIUM CHLORIDE 0.9 % (FLUSH) 0.9 %
5-40 SYRINGE (ML) INJECTION PRN
Status: DISCONTINUED | OUTPATIENT
Start: 2025-08-04 | End: 2025-08-05 | Stop reason: HOSPADM

## 2025-08-04 RX ORDER — ONDANSETRON 2 MG/ML
8 INJECTION INTRAMUSCULAR; INTRAVENOUS
OUTPATIENT
Start: 2026-04-09

## 2025-08-04 RX ADMIN — SODIUM CHLORIDE, PRESERVATIVE FREE 10 ML: 5 INJECTION INTRAVENOUS at 15:59

## 2025-08-04 RX ADMIN — ZOLEDRONIC ACID 5 MG: 5 INJECTION INTRAVENOUS at 15:41

## 2025-08-04 ASSESSMENT — PAIN SCALES - GENERAL: PAINLEVEL_OUTOF10: 5

## 2025-08-04 ASSESSMENT — PAIN DESCRIPTION - LOCATION: LOCATION: BACK;SHOULDER

## (undated) DEVICE — STERILE POLYISOPRENE POWDER-FREE SURGICAL GLOVES: Brand: PROTEXIS

## (undated) DEVICE — NDL FLTR TIP 5 MIC 18GX1.5IN --

## (undated) DEVICE — POLYLINED TOWEL: Brand: CONVERTORS

## (undated) DEVICE — (D)PREP SKN CHLRAPRP APPL 26ML -- CONVERT TO ITEM 371833

## (undated) DEVICE — NEEDLE HYPO 18GA L1.5IN PNK S STL HUB POLYPR SHLD REG BVL

## (undated) DEVICE — ADULT SPO2 SENSOR: Brand: NELLCOR

## (undated) DEVICE — Device

## (undated) DEVICE — TOWEL SURG W17XL27IN STD BLU COT NONFENESTRATED PREWASHED

## (undated) DEVICE — SET EXTN L7IN PRIMING VOL 0.4ML PRSS RATE MINIBOR 1 REM

## (undated) DEVICE — NEEDLE SPNL L4.75IN OD25GA QNCKE TYP SPINOCAN

## (undated) DEVICE — SYR 10ML LUER LOK 1/5ML GRAD --

## (undated) DEVICE — SYR 5ML 1/5 GRAD LL NSAF LF --

## (undated) DEVICE — NEEDLE HYPO 25GA L1.5IN BVL ORIENTED ECLIPSE

## (undated) DEVICE — SKIN MARKER,REGULAR TIP WITH RULER AND LABELS: Brand: DEVON

## (undated) DEVICE — SET EXTN PRIMING 0.59ML 8.5IN 1.55LB PRSS RATE MINIBOR PUR